# Patient Record
Sex: MALE | Race: WHITE | NOT HISPANIC OR LATINO | Employment: OTHER | URBAN - METROPOLITAN AREA
[De-identification: names, ages, dates, MRNs, and addresses within clinical notes are randomized per-mention and may not be internally consistent; named-entity substitution may affect disease eponyms.]

---

## 2017-01-26 ENCOUNTER — LAB VISIT (OUTPATIENT)
Dept: LAB | Facility: HOSPITAL | Age: 77
End: 2017-01-26
Attending: FAMILY MEDICINE
Payer: MEDICARE

## 2017-01-26 DIAGNOSIS — D64.9 ANEMIA: ICD-10-CM

## 2017-01-26 LAB
BASOPHILS # BLD AUTO: 0.01 K/UL
BASOPHILS NFR BLD: 0.2 %
DIFFERENTIAL METHOD: ABNORMAL
EOSINOPHIL # BLD AUTO: 0.2 K/UL
EOSINOPHIL NFR BLD: 2.6 %
ERYTHROCYTE [DISTWIDTH] IN BLOOD BY AUTOMATED COUNT: 14.6 %
HCT VFR BLD AUTO: 41.2 %
HGB BLD-MCNC: 13.7 G/DL
IRON SERPL-MCNC: 106 UG/DL
LYMPHOCYTES # BLD AUTO: 1.7 K/UL
LYMPHOCYTES NFR BLD: 28.8 %
MCH RBC QN AUTO: 29.6 PG
MCHC RBC AUTO-ENTMCNC: 33.3 %
MCV RBC AUTO: 89 FL
MONOCYTES # BLD AUTO: 0.6 K/UL
MONOCYTES NFR BLD: 9.7 %
NEUTROPHILS # BLD AUTO: 3.4 K/UL
NEUTROPHILS NFR BLD: 58.5 %
PLATELET # BLD AUTO: 174 K/UL
PMV BLD AUTO: 11.1 FL
RBC # BLD AUTO: 4.63 M/UL
SATURATED IRON: 30 %
TOTAL IRON BINDING CAPACITY: 351 UG/DL
TRANSFERRIN SERPL-MCNC: 237 MG/DL
WBC # BLD AUTO: 5.77 K/UL

## 2017-01-26 PROCEDURE — 85025 COMPLETE CBC W/AUTO DIFF WBC: CPT

## 2017-01-26 PROCEDURE — 36415 COLL VENOUS BLD VENIPUNCTURE: CPT | Mod: PO

## 2017-01-26 PROCEDURE — 83540 ASSAY OF IRON: CPT

## 2017-02-01 ENCOUNTER — TELEPHONE (OUTPATIENT)
Dept: FAMILY MEDICINE | Facility: CLINIC | Age: 77
End: 2017-02-01

## 2017-02-01 DIAGNOSIS — D64.9 ANEMIA, UNSPECIFIED TYPE: Primary | ICD-10-CM

## 2017-02-13 ENCOUNTER — PATIENT OUTREACH (OUTPATIENT)
Dept: ADMINISTRATIVE | Facility: HOSPITAL | Age: 77
End: 2017-02-13

## 2017-02-13 DIAGNOSIS — E11.69 COMBINED HYPERLIPIDEMIA ASSOCIATED WITH TYPE 2 DIABETES MELLITUS: Primary | ICD-10-CM

## 2017-02-13 DIAGNOSIS — E78.2 COMBINED HYPERLIPIDEMIA ASSOCIATED WITH TYPE 2 DIABETES MELLITUS: Primary | ICD-10-CM

## 2017-02-16 ENCOUNTER — TELEPHONE (OUTPATIENT)
Dept: PHARMACY | Facility: CLINIC | Age: 77
End: 2017-02-16

## 2017-02-22 ENCOUNTER — OFFICE VISIT (OUTPATIENT)
Dept: FAMILY MEDICINE | Facility: CLINIC | Age: 77
End: 2017-02-22
Payer: MEDICARE

## 2017-02-22 VITALS
TEMPERATURE: 98 F | HEART RATE: 50 BPM | WEIGHT: 229.25 LBS | BODY MASS INDEX: 31.05 KG/M2 | HEIGHT: 72 IN | SYSTOLIC BLOOD PRESSURE: 138 MMHG | DIASTOLIC BLOOD PRESSURE: 78 MMHG

## 2017-02-22 DIAGNOSIS — N18.30 CONTROLLED TYPE 2 DIABETES MELLITUS WITH STAGE 3 CHRONIC KIDNEY DISEASE, WITHOUT LONG-TERM CURRENT USE OF INSULIN: Primary | ICD-10-CM

## 2017-02-22 DIAGNOSIS — E11.59 HYPERTENSION ASSOCIATED WITH DIABETES: ICD-10-CM

## 2017-02-22 DIAGNOSIS — N18.30 CKD STAGE 3 SECONDARY TO DIABETES: ICD-10-CM

## 2017-02-22 DIAGNOSIS — E11.22 CONTROLLED TYPE 2 DIABETES MELLITUS WITH STAGE 3 CHRONIC KIDNEY DISEASE, WITHOUT LONG-TERM CURRENT USE OF INSULIN: Primary | ICD-10-CM

## 2017-02-22 DIAGNOSIS — E11.69 COMBINED HYPERLIPIDEMIA ASSOCIATED WITH TYPE 2 DIABETES MELLITUS: ICD-10-CM

## 2017-02-22 DIAGNOSIS — L84 PRE-ULCERATIVE CORN OR CALLOUS: ICD-10-CM

## 2017-02-22 DIAGNOSIS — E78.2 COMBINED HYPERLIPIDEMIA ASSOCIATED WITH TYPE 2 DIABETES MELLITUS: ICD-10-CM

## 2017-02-22 DIAGNOSIS — E11.22 CKD STAGE 3 SECONDARY TO DIABETES: ICD-10-CM

## 2017-02-22 DIAGNOSIS — I15.2 HYPERTENSION ASSOCIATED WITH DIABETES: ICD-10-CM

## 2017-02-22 PROCEDURE — 1157F ADVNC CARE PLAN IN RCRD: CPT | Mod: S$GLB,,, | Performed by: FAMILY MEDICINE

## 2017-02-22 PROCEDURE — 99213 OFFICE O/P EST LOW 20 MIN: CPT | Mod: S$GLB,,, | Performed by: FAMILY MEDICINE

## 2017-02-22 PROCEDURE — 3078F DIAST BP <80 MM HG: CPT | Mod: S$GLB,,, | Performed by: FAMILY MEDICINE

## 2017-02-22 PROCEDURE — 99999 PR PBB SHADOW E&M-EST. PATIENT-LVL III: CPT | Mod: PBBFAC,,, | Performed by: FAMILY MEDICINE

## 2017-02-22 PROCEDURE — 1126F AMNT PAIN NOTED NONE PRSNT: CPT | Mod: S$GLB,,, | Performed by: FAMILY MEDICINE

## 2017-02-22 PROCEDURE — 3075F SYST BP GE 130 - 139MM HG: CPT | Mod: S$GLB,,, | Performed by: FAMILY MEDICINE

## 2017-02-22 PROCEDURE — 99499 UNLISTED E&M SERVICE: CPT | Mod: S$PBB,,, | Performed by: FAMILY MEDICINE

## 2017-02-22 PROCEDURE — 1160F RVW MEDS BY RX/DR IN RCRD: CPT | Mod: S$GLB,,, | Performed by: FAMILY MEDICINE

## 2017-02-22 PROCEDURE — 1159F MED LIST DOCD IN RCRD: CPT | Mod: S$GLB,,, | Performed by: FAMILY MEDICINE

## 2017-02-22 NOTE — MR AVS SNAPSHOT
[facility]  [address] [place] [address]  Phone: [phone]  Fax: [phone]                  Rudy Saldaña   2017 1:40 PM   Office Visit    Description:  Male : 1940   Provider:  Mohinder Morfin MD   Department:  [facility]           Reason for Visit     Follow-up           Diagnoses this Visit        Comments    Controlled type 2 diabetes mellitus with stage 3 chronic kidney disease, without long-term current use of insulin    -  Primary     CKD stage 3 secondary to diabetes         Combined hyperlipidemia associated with type 2 diabetes mellitus         Hypertension associated with diabetes         Pre-ulcerative corn or callous                To Do List           Future Appointments        Provider Department Dept Phone    3/2/2017 7:25 AM SPECIMEN, [place] [facility] [phone]    3/2/2017 8:25 AM LABORATORY, [place] [facility] [phone]    3/2/2017 8:30 AM SPECIMEN, [place] [facility] [phone]    3/7/2017 2:40 PM Padmaja Dave DPM [facility] [phone]    2017 8:55 AM LABORATORY, [place] [facility] [phone]      Goals (5 Years of Data)     None      [facility]     [facility] Care [facility] -  Assistance  Registered nurses in the [facility] provide clinical advisement, health education, appointment booking, and other advisory services.  Call for this free service at [phone].             Medications           Message regarding Medications     Verify the changes and/or additions to your medication regime listed below are the same as discussed with your clinician today.  If any of these changes or additions are incorrect, please notify your healthcare provider.             Verify that the below list of medications is an accurate representation of the medications you are currently taking.  If  none reported, the list may be blank. If incorrect, please contact your healthcare provider. Carry this list with you in case of emergency.           Current Medications     acarbose (PRECOSE) 100 MG Tab Take 1 tablet (100 mg total) by mouth 3 (three) times daily with meals.    aspirin (ECOTRIN) 81 MG EC tablet Take 81 mg by mouth once daily.    atorvastatin (LIPITOR) 20 MG tablet take 1 tablet by mouth once daily    benazepril-hydrochlorthiazide (LOTENSIN HCT) 20-25 mg Tab Take 1 tablet by mouth 2 (two) times daily.    blood sugar diagnostic Strp Use once daily as directed    calcium-vitamin D3 500 mg(1,250mg) -200 unit per tablet Take 1 tablet by mouth 2 (two) times daily with meals.    clopidogrel (PLAVIX) 75 mg tablet Take 75 mg by mouth once daily.    ergocalciferol (VITAMIN D2) 50,000 unit Cap take 1 tablet by mouth every 7 days    ferrous sulfate 325 mg (65 mg iron) Tab tablet Take 1 tablet (325 mg total) by mouth 3 (three) times daily.    galantamine (RAZADYNE) 8 MG Tab take 1 tablet by mouth twice a day with meals    glipiZIDE (GLUCOTROL) 10 MG tablet Take 1 tablet (10 mg total) by mouth 2 (two) times daily before meals.    lancets Misc One touch lancets use once daily as directed    levothyroxine (SYNTHROID) 25 MCG tablet Take 1 tablet (25 mcg total) by mouth before breakfast.    loratadine (CLARITIN) 10 mg tablet     niacin (NIASPAN) 1000 MG CR tablet Take 1 tablet (1,000 mg total) by mouth every evening.    ondansetron (ZOFRAN-ODT) 4 MG TbDL dissolve 1 tablet ON TONGUE every 6 hours if needed for nausea    pioglitazone (ACTOS) 30 MG tablet Take 1 tablet (30 mg total) by mouth once daily.    sitagliptin (JANUVIA) 100 MG Tab Take 1 tablet (100 mg total) by mouth once daily.    ONETOUCH VERIO IQ METER kit Use as instructed           Clinical Reference Information           Your Vitals Were     BP Pulse Temp Height Weight BMI    138/78 (BP Location: Left arm, Patient Position: Sitting, BP Method: Automatic)  50 97.7 °F (36.5 °C) (Oral) 6' (1.829 m) 104 kg (229 lb 4.5 oz) 31.1 kg/m2      Blood Pressure          Most Recent Value    BP  138/78      Allergies as of 2/22/2017     No Known Drug Allergies      Immunizations Administered on Date of Encounter - 2/22/2017     None      Orders Placed During Today's Visit      Normal Orders This Visit    Ambulatory referral to Podiatry     Future Labs/Procedures Expected by Expires    Basic metabolic panel  3/2/2017 4/23/2018    Influenza - High Dose (65+) (PF) (IM)  3/24/2017 (Approximate) 2/22/2018      Language Assistance Services     ATTENTION: Language assistance services are available, free of charge. Please call [phone].      ATENCIÓN: Si lindsayla monique, tiene a rodríguez disposición servicios gratuitos de asistencia lingüística. Llame al [phone].     CHÚ Ý: N?u b?n nói Ti?ng Vi?t, có các d?ch v? h? tr? ngôn ng? mi?n phí dành cho b?n. G?i s? [phone].         [facility] complies with applicable Federal civil rights laws and does not discriminate on the basis of race, color, national origin, age, disability, or sex.

## 2017-02-22 NOTE — PROGRESS NOTES
Subjective:      Patient ID: Rudy Saldaña is a 76 y.o. male.    Chief Complaint: Follow-up (diabetes)    HPI  The patient presents with diabetes.  The patient denies polyuria, polydipsia, polyphagia, hypoglycemia and paresthesias.  The patient's glucose control has been good.  Home glucose averages are routinely checked.  The patient is without retinopathy currently.  The patient has no history of neuropathy.  The patient currently complains of no podiatric problems.  The patient has excellent compliance.  Hemoglobin A1C   Date Value Ref Range Status   09/01/2016 6.5 (H) 4.5 - 6.2 % Final     Comment:     According to ADA guidelines, hemoglobin A1C <7.0% represents  optimal control in non-pregnant diabetic patients.  Different  metrics may apply to specific populations.   Standards of Medical Care in Diabetes - 2016.  For the purpose of screening for the presence of diabetes:  <5.7%     Consistent with the absence of diabetes  5.7-6.4%  Consistent with increasing risk for diabetes   (prediabetes)  >or=6.5%  Consistent with diabetes  Currently no consensus exists for use of hemoglobin A1C  for diagnosis of diabetes for children.     02/29/2016 6.1 4.5 - 6.2 % Final   11/27/2015 7.6 (H) 4.5 - 6.2 % Final     No results found for: JENNIFER, [id number]    The patient presents with essential hypertension.  The patient is tolerating the medication well and is in excellent compliance.  The patient is experiencing no side effects.  Counseling was offered regarding low salt diets.  The patient has a reduced salt intake.  The patient denies chest pain, palpitations, shortness of breath, dyspnea on exertion, left or murmur neck pain, nausea, vomiting, diaphoresis, paroxysmal nocturnal dyspnea, and orthopnea.     Visit Vitals    /78 (BP Location: Left arm, Patient Position: Sitting, BP Method: Automatic)    Pulse (!) 50    Temp 97.7 °F (36.5 °C) (Oral)    Ht 6' (1.829 m)    Wt 104 kg (229 lb 4.5 oz)    BMI 31.1  kg/m2     The patient presents with hyperlipidemia.  The patient reports tolerating the medication well and is in excellent compliance.  There have been no medication side effects.  The patient denies chest pain, neuropathy, and myalgias.  The patient has reduced fat intake and has been exercising.  Current treatment has included the medications listed in the med card.    Lab Results   Component Value Date    CHOL 124 12/12/2016    CHOL 114 (L) 06/15/2016    CHOL 114 (L) 12/14/2015       Lab Results   Component Value Date    HDL 49 12/12/2016    HDL 51 06/15/2016    HDL 39 (L) 12/14/2015       Lab Results   Component Value Date    LDLCALC 65.4 12/12/2016    LDLCALC 53.6 (L) 06/15/2016    LDLCALC 60.4 (L) 12/14/2015       Lab Results   Component Value Date    TRIG 48 12/12/2016    TRIG 47 06/15/2016    TRIG 73 12/14/2015       Lab Results   Component Value Date    CHOLHDL 39.5 12/12/2016    CHOLHDL 44.7 06/15/2016    CHOLHDL 34.2 12/14/2015     Lab Results   Component Value Date    ALT 19 12/12/2016    AST 21 12/12/2016    ALKPHOS 46 (L) 12/12/2016    BILITOT 1.3 (H) 12/12/2016       Health Maintenance Due   Topic Date Due    Influenza Vaccine  08/01/2016    Foot Exam  11/03/2016    Hemoglobin A1c  03/01/2017       Past Medical History:  Past Medical History   Diagnosis Date    Anemia     BPH (benign prostatic hypertrophy)     Carotid bruit     CVA (cerebral vascular accident)     Hyperlipidemia     Hypertension     Hypothyroidism 2/29/2016    Type II or unspecified type diabetes mellitus without mention of complication, not stated as uncontrolled     Vitamin D deficiency 2/29/2016     Past Surgical History   Procedure Laterality Date    Colonoscopy  2008    Transurethral resection of prostate      Pilonidal cyst drainage      Colonoscopy  5/31/2005     repeat in 2015     Review of patient's allergies indicates:   Allergen Reactions    No known drug allergies      Current Outpatient Prescriptions on  File Prior to Visit   Medication Sig Dispense Refill    acarbose (PRECOSE) 100 MG Tab Take 1 tablet (100 mg total) by mouth 3 (three) times daily with meals. 270 tablet 3    aspirin (ECOTRIN) 81 MG EC tablet Take 81 mg by mouth once daily.      atorvastatin (LIPITOR) 20 MG tablet take 1 tablet by mouth once daily 90 tablet 3    benazepril-hydrochlorthiazide (LOTENSIN HCT) 20-25 mg Tab Take 1 tablet by mouth 2 (two) times daily. 180 tablet 3    blood sugar diagnostic Strp Use once daily as directed 100 each 0    calcium-vitamin D3 500 mg(1,250mg) -200 unit per tablet Take 1 tablet by mouth 2 (two) times daily with meals.      clopidogrel (PLAVIX) 75 mg tablet Take 75 mg by mouth once daily.      ergocalciferol (VITAMIN D2) 50,000 unit Cap take 1 tablet by mouth every 7 days 12 capsule 3    ferrous sulfate 325 mg (65 mg iron) Tab tablet Take 1 tablet (325 mg total) by mouth 3 (three) times daily. 270 tablet 3    galantamine (RAZADYNE) 8 MG Tab take 1 tablet by mouth twice a day with meals 180 tablet 3    glipiZIDE (GLUCOTROL) 10 MG tablet Take 1 tablet (10 mg total) by mouth 2 (two) times daily before meals. 180 tablet 3    lancets Misc One touch lancets use once daily as directed 100 each 0    levothyroxine (SYNTHROID) 25 MCG tablet Take 1 tablet (25 mcg total) by mouth before breakfast. 90 tablet 3    loratadine (CLARITIN) 10 mg tablet   0    niacin (NIASPAN) 1000 MG CR tablet Take 1 tablet (1,000 mg total) by mouth every evening. 90 tablet 3    ondansetron (ZOFRAN-ODT) 4 MG TbDL dissolve 1 tablet ON TONGUE every 6 hours if needed for nausea  0    pioglitazone (ACTOS) 30 MG tablet Take 1 tablet (30 mg total) by mouth once daily. 90 tablet 3    sitagliptin (JANUVIA) 100 MG Tab Take 1 tablet (100 mg total) by mouth once daily. 90 tablet 3    ONETOUCH VERIO IQ METER kit Use as instructed 1 each 0     No current facility-administered medications on file prior to visit.      Social History     Social  History    Marital status:      Spouse name: N/A    Number of children: N/A    Years of education: N/A     Occupational History    Not on file.     Social History Main Topics    Smoking status: Never Smoker    Smokeless tobacco: Not on file    Alcohol use No    Drug use: No    Sexual activity: Not on file     Other Topics Concern    Not on file     Social History Narrative    No narrative on file     Family History   Problem Relation Age of Onset    Hypertension Mother     Hypertension Father     Heart attack Maternal Uncle     Brain cancer Sister     Diabetes Neg Hx     Stroke Neg Hx            Review of Systems   Constitutional: Negative for chills and fever.   Respiratory: Negative for cough, shortness of breath and wheezing.    Cardiovascular: Negative for chest pain and palpitations.   Genitourinary: Negative for dysuria and hematuria.       Objective:     Visit Vitals    /78 (BP Location: Left arm, Patient Position: Sitting, BP Method: Automatic)    Pulse (!) 50    Temp 97.7 °F (36.5 °C) (Oral)    Ht 6' (1.829 m)    Wt 104 kg (229 lb 4.5 oz)    BMI 31.1 kg/m2       Physical Exam   Constitutional: He is oriented to person, place, and time. He appears well-developed and well-nourished.   HENT:   Head: Normocephalic and atraumatic.   Right Ear: External ear normal.   Left Ear: External ear normal.   Nose: Nose normal.   Mouth/Throat: Oropharynx is clear and moist. No oropharyngeal exudate.   Eyes: Conjunctivae and EOM are normal. Pupils are equal, round, and reactive to light. Right eye exhibits no discharge. Left eye exhibits no discharge. No scleral icterus.   Neck: Normal range of motion. Neck supple. No JVD present. No thyromegaly present.   Cardiovascular: Normal rate, regular rhythm, normal heart sounds and intact distal pulses.  Exam reveals no gallop and no friction rub.    No murmur heard.  Pulses:       Dorsalis pedis pulses are 2+ on the right side, and 2+ on the  left side.        Posterior tibial pulses are 2+ on the right side, and 2+ on the left side.   Pulmonary/Chest: Effort normal and breath sounds normal. No respiratory distress. He has no wheezes. He has no rales. He exhibits no tenderness.   Abdominal: Soft. Bowel sounds are normal. He exhibits no distension and no mass. There is no tenderness. There is no rebound and no guarding.   Musculoskeletal: Normal range of motion. He exhibits no edema or tenderness.        Right foot: There is deformity (callouses are noted on the great toe). There is normal range of motion.        Left foot: There is deformity (callouses are noted on the great toe). There is normal range of motion.   Feet:   Right Foot:   Protective Sensation: 10 sites tested. 10 sites sensed.   Skin Integrity: Negative for ulcer, blister, skin breakdown, erythema, warmth, callus or dry skin.   Left Foot:   Protective Sensation: 10 sites tested. 10 sites sensed.   Skin Integrity: Negative for ulcer, blister, skin breakdown, erythema, warmth, callus or dry skin.   Lymphadenopathy:     He has no cervical adenopathy.   Neurological: He is alert and oriented to person, place, and time. No cranial nerve deficit. Coordination normal.   Skin: Skin is warm and dry. He is not diaphoretic.   Psychiatric: He has a normal mood and affect.       Assessment:     1. Controlled type 2 diabetes mellitus with stage 3 chronic kidney disease, without long-term current use of insulin    2. CKD stage 3 secondary to diabetes    3. Combined hyperlipidemia associated with type 2 diabetes mellitus    4. Hypertension associated with diabetes    5. Pre-ulcerative corn or callous        Plan:   Rudy was seen today for follow-up.    Diagnoses and all orders for this visit:    Controlled type 2 diabetes mellitus with stage 3 chronic kidney disease, without long-term current use of insulin    CKD stage 3 secondary to diabetes    Combined hyperlipidemia associated with type 2 diabetes  mellitus    Hypertension associated with diabetes    Pre-ulcerative corn or callous  -     Ambulatory referral to Podiatry    Other orders  -     Influenza - High Dose (65+) (PF) (IM); Future

## 2017-03-02 ENCOUNTER — LAB VISIT (OUTPATIENT)
Dept: LAB | Facility: HOSPITAL | Age: 77
End: 2017-03-02
Attending: FAMILY MEDICINE
Payer: MEDICARE

## 2017-03-02 DIAGNOSIS — E11.9 TYPE 2 DIABETES MELLITUS WITHOUT COMPLICATION, WITHOUT LONG-TERM CURRENT USE OF INSULIN: ICD-10-CM

## 2017-03-02 PROCEDURE — 36415 COLL VENOUS BLD VENIPUNCTURE: CPT | Mod: PO

## 2017-03-02 PROCEDURE — 83036 HEMOGLOBIN GLYCOSYLATED A1C: CPT

## 2017-03-03 DIAGNOSIS — E11.9 TYPE II OR UNSPECIFIED TYPE DIABETES MELLITUS WITHOUT MENTION OF COMPLICATION, NOT STATED AS UNCONTROLLED: Primary | ICD-10-CM

## 2017-03-03 LAB
ESTIMATED AVG GLUCOSE: 140 MG/DL
HBA1C MFR BLD HPLC: 6.5 %

## 2017-03-03 RX ORDER — ACARBOSE 100 MG/1
100 TABLET ORAL
Qty: 270 TABLET | Refills: 3 | Status: SHIPPED | OUTPATIENT
Start: 2017-03-03 | End: 2017-09-07 | Stop reason: SDUPTHER

## 2017-03-03 RX ORDER — GLIPIZIDE 10 MG/1
10 TABLET ORAL
Qty: 180 TABLET | Refills: 3 | Status: SHIPPED | OUTPATIENT
Start: 2017-03-03 | End: 2017-09-07 | Stop reason: SDUPTHER

## 2017-03-03 NOTE — TELEPHONE ENCOUNTER
I have signed for the following orders AND/OR meds.  Please call the patient and ask the patient to schedule the testing AND/OR inform about any medications that were sent.      Orders Placed This Encounter   Procedures    Hemoglobin A1c     Standing Status:   Future     Standing Expiration Date:   5/2/2018         Medications Ordered This Encounter      acarbose (PRECOSE) 100 MG Tab          Sig: Take 1 tablet (100 mg total) by mouth 3 (three) times daily with meals.          Dispense:  270 tablet          Refill:  3      glipiZIDE (GLUCOTROL) 10 MG tablet          Sig: Take 1 tablet (10 mg total) by mouth 2 (two) times daily before meals.          Dispense:  180 tablet          Refill:  3      SITagliptan (JANUVIA) 100 MG Tab          Sig: Take 1 tablet (100 mg total) by mouth once daily.          Dispense:  90 tablet          Refill:  3

## 2017-03-07 ENCOUNTER — PATIENT MESSAGE (OUTPATIENT)
Dept: PODIATRY | Facility: CLINIC | Age: 77
End: 2017-03-07

## 2017-03-07 ENCOUNTER — OFFICE VISIT (OUTPATIENT)
Dept: PODIATRY | Facility: CLINIC | Age: 77
End: 2017-03-07
Payer: MEDICARE

## 2017-03-07 ENCOUNTER — HOSPITAL ENCOUNTER (OUTPATIENT)
Dept: RADIOLOGY | Facility: HOSPITAL | Age: 77
Discharge: HOME | End: 2017-03-07
Attending: PODIATRIST
Payer: MEDICARE

## 2017-03-07 ENCOUNTER — PATIENT MESSAGE (OUTPATIENT)
Dept: PULMONOLOGY | Facility: CLINIC | Age: 77
End: 2017-03-07

## 2017-03-07 VITALS
BODY MASS INDEX: 30.97 KG/M2 | HEART RATE: 53 BPM | DIASTOLIC BLOOD PRESSURE: 67 MMHG | HEIGHT: 72 IN | WEIGHT: 228.63 LBS | SYSTOLIC BLOOD PRESSURE: 121 MMHG

## 2017-03-07 DIAGNOSIS — M20.41 HAMMER TOES OF BOTH FEET: ICD-10-CM

## 2017-03-07 DIAGNOSIS — M79.672 PAIN IN BOTH FEET: ICD-10-CM

## 2017-03-07 DIAGNOSIS — M20.42 HAMMER TOES OF BOTH FEET: ICD-10-CM

## 2017-03-07 DIAGNOSIS — M79.671 PAIN IN BOTH FEET: ICD-10-CM

## 2017-03-07 DIAGNOSIS — E11.49 TYPE II DIABETES MELLITUS WITH NEUROLOGICAL MANIFESTATIONS: Primary | ICD-10-CM

## 2017-03-07 DIAGNOSIS — M19.079 DJD (DEGENERATIVE JOINT DISEASE), ANKLE AND FOOT, UNSPECIFIED LATERALITY: ICD-10-CM

## 2017-03-07 PROCEDURE — 73630 X-RAY EXAM OF FOOT: CPT | Mod: 50,TC,PO

## 2017-03-07 PROCEDURE — 1159F MED LIST DOCD IN RCRD: CPT | Mod: S$GLB,,, | Performed by: PODIATRIST

## 2017-03-07 PROCEDURE — 99204 OFFICE O/P NEW MOD 45 MIN: CPT | Mod: S$GLB,,, | Performed by: PODIATRIST

## 2017-03-07 PROCEDURE — 73630 X-RAY EXAM OF FOOT: CPT | Mod: 26,LT,, | Performed by: RADIOLOGY

## 2017-03-07 PROCEDURE — 1157F ADVNC CARE PLAN IN RCRD: CPT | Mod: S$GLB,,, | Performed by: PODIATRIST

## 2017-03-07 PROCEDURE — 99999 PR PBB SHADOW E&M-EST. PATIENT-LVL III: CPT | Mod: PBBFAC,,, | Performed by: PODIATRIST

## 2017-03-07 PROCEDURE — 1160F RVW MEDS BY RX/DR IN RCRD: CPT | Mod: S$GLB,,, | Performed by: PODIATRIST

## 2017-03-07 PROCEDURE — 1126F AMNT PAIN NOTED NONE PRSNT: CPT | Mod: S$GLB,,, | Performed by: PODIATRIST

## 2017-03-07 PROCEDURE — 73630 X-RAY EXAM OF FOOT: CPT | Mod: 26,RT,, | Performed by: RADIOLOGY

## 2017-03-07 PROCEDURE — 3078F DIAST BP <80 MM HG: CPT | Mod: S$GLB,,, | Performed by: PODIATRIST

## 2017-03-07 PROCEDURE — 99499 UNLISTED E&M SERVICE: CPT | Mod: S$PBB,,, | Performed by: PODIATRIST

## 2017-03-07 PROCEDURE — 3074F SYST BP LT 130 MM HG: CPT | Mod: S$GLB,,, | Performed by: PODIATRIST

## 2017-03-07 RX ORDER — CALCIUM CARBONATE 600 MG
600 TABLET ORAL
COMMUNITY
End: 2018-11-16

## 2017-03-07 RX ORDER — NISOLDIPINE 30 MG/1
30 TABLET, FILM COATED, EXTENDED RELEASE ORAL
COMMUNITY
End: 2018-07-06

## 2017-03-07 RX ORDER — EZETIMIBE 10 MG/1
10 TABLET ORAL
COMMUNITY
End: 2017-03-13

## 2017-03-07 RX ORDER — FENOFIBRATE 48 MG/1
48 TABLET, FILM COATED ORAL
COMMUNITY
End: 2018-07-06

## 2017-03-07 RX ORDER — METFORMIN HYDROCHLORIDE 1000 MG/1
1000 TABLET ORAL
COMMUNITY
End: 2017-03-13

## 2017-03-07 RX ORDER — TAMSULOSIN HYDROCHLORIDE 0.4 MG/1
0.4 CAPSULE ORAL
COMMUNITY
End: 2017-09-25

## 2017-03-07 RX ORDER — NATEGLINIDE 120 MG/1
120 TABLET ORAL
COMMUNITY
End: 2017-09-07 | Stop reason: SDUPTHER

## 2017-03-07 NOTE — PROGRESS NOTES
Subjective:     Patient ID: Rudy Saldaña is a 76 y.o. male.    Chief Complaint: Diabetic Foot Exam (Patient states his feet are stiff and they are sometimes painful to touch.) and Diabetes Mellitus (Last PCP visit with - 2/22/17. 140 mg/dL-AM glucose. )    Rudy is a 76 y.o. male who presents to the clinic upon referral from Dr. Morfin  for evaluation and treatment of diabetic feet. Rudy has a past medical history of Anemia; BPH (benign prostatic hypertrophy); Carotid bruit; CVA (cerebral vascular accident); Hyperlipidemia; Hypertension; Hypothyroidism (2/29/2016); Type II or unspecified type diabetes mellitus without mention of complication, not stated as uncontrolled; and Vitamin D deficiency (2/29/2016). Patient relates no major problem with feet. Only complaints today consist of tightness of feet when he first step out of bed and area on the left foot painful to touch. Patient points to lateral left midfoot. Patient states the pain comes and goes and at the worst is 9/10. Patient denies any trauma to foot.     PCP: Mohinder Morfin MD    Date Last Seen by PCP: 2/29/16    Current shoe gear: Casual shoes    Hemoglobin A1C   Date Value Ref Range Status   03/02/2017 6.5 (H) 4.5 - 6.2 % Final     Comment:     According to ADA guidelines, hemoglobin A1C <7.0% represents  optimal control in non-pregnant diabetic patients.  Different  metrics may apply to specific populations.   Standards of Medical Care in Diabetes - 2016.  For the purpose of screening for the presence of diabetes:  <5.7%     Consistent with the absence of diabetes  5.7-6.4%  Consistent with increasing risk for diabetes   (prediabetes)  >or=6.5%  Consistent with diabetes  Currently no consensus exists for use of hemoglobin A1C  for diagnosis of diabetes for children.     09/01/2016 6.5 (H) 4.5 - 6.2 % Final     Comment:     According to ADA guidelines, hemoglobin A1C <7.0% represents  optimal control in non-pregnant diabetic  patients.  Different  metrics may apply to specific populations.   Standards of Medical Care in Diabetes - 2016.  For the purpose of screening for the presence of diabetes:  <5.7%     Consistent with the absence of diabetes  5.7-6.4%  Consistent with increasing risk for diabetes   (prediabetes)  >or=6.5%  Consistent with diabetes  Currently no consensus exists for use of hemoglobin A1C  for diagnosis of diabetes for children.     02/29/2016 6.1 4.5 - 6.2 % Final       Patient Active Problem List   Diagnosis    Combined hyperlipidemia associated with type 2 diabetes mellitus    Hypertension associated with diabetes    DM (diabetes mellitus) type II controlled with renal manifestation    History of stroke    BPH (benign prostatic hypertrophy)    History of colon polyps    Iron deficiency anemia    Polyneuropathy due to secondary diabetes    DEVIKA on CPAP    Family history of colonic polyps    Vitamin D deficiency    Hypothyroidism    Anemia    Gross hematuria    History of diabetes mellitus    Obstruction of urinary tract    CKD stage 3 secondary to diabetes       Medication List with Changes/Refills   Current Medications    ACARBOSE (PRECOSE) 100 MG TAB    Take 1 tablet (100 mg total) by mouth 3 (three) times daily with meals.    ASPIRIN (ECOTRIN) 81 MG EC TABLET    Take 81 mg by mouth once daily.    ATORVASTATIN (LIPITOR) 20 MG TABLET    take 1 tablet by mouth once daily    BENAZEPRIL-HYDROCHLORTHIAZIDE (LOTENSIN HCT) 20-25 MG TAB    Take 1 tablet by mouth 2 (two) times daily.    BLOOD SUGAR DIAGNOSTIC STRP    Use once daily as directed    CALCIUM CARBONATE (OS-STACEY) 600 MG (1,500 MG) TAB    Take 600 mg by mouth.    CALCIUM-VITAMIN D3 500 MG(1,250MG) -200 UNIT PER TABLET    Take 1 tablet by mouth 2 (two) times daily with meals.    CLOPIDOGREL (PLAVIX) 75 MG TABLET    Take 75 mg by mouth once daily.    ERGOCALCIFEROL (VITAMIN D2) 50,000 UNIT CAP    take 1 tablet by mouth every 7 days    EZETIMIBE  (ZETIA) 10 MG TABLET    Take 10 mg by mouth.    FENOFIBRATE (TRICOR) 48 MG TABLET    Take 48 mg by mouth.    FERROUS SULFATE 325 MG (65 MG IRON) TAB TABLET    Take 1 tablet (325 mg total) by mouth 3 (three) times daily.    GALANTAMINE (RAZADYNE) 8 MG TAB    take 1 tablet by mouth twice a day with meals    GLIPIZIDE (GLUCOTROL) 10 MG TABLET    Take 1 tablet (10 mg total) by mouth 2 (two) times daily before meals.    LANCETS MISC    One touch lancets use once daily as directed    LEVOTHYROXINE (SYNTHROID) 25 MCG TABLET    Take 1 tablet (25 mcg total) by mouth before breakfast.    LORATADINE (CLARITIN) 10 MG TABLET        METFORMIN (GLUCOPHAGE) 1000 MG TABLET    Take 1,000 mg by mouth.    NATEGLINIDE (STARLIX) 120 MG TABLET    Take 120 mg by mouth.    NIACIN (NIASPAN) 1000 MG CR TABLET    Take 1 tablet (1,000 mg total) by mouth every evening.    NISOLDIPINE (SULAR) 30 MG TB24    Take 30 mg by mouth.    ONDANSETRON (ZOFRAN-ODT) 4 MG TBDL    dissolve 1 tablet ON TONGUE every 6 hours if needed for nausea    ONETOUCH VERIO IQ METER KIT    Use as instructed    PIOGLITAZONE (ACTOS) 30 MG TABLET    Take 1 tablet (30 mg total) by mouth once daily.    SITAGLIPTAN (JANUVIA) 100 MG TAB    Take 1 tablet (100 mg total) by mouth once daily.    TAMSULOSIN (FLOMAX) 0.4 MG CP24    Take 0.4 mg by mouth.       Review of patient's allergies indicates:   Allergen Reactions    No known drug allergies        Past Surgical History:   Procedure Laterality Date    COLONOSCOPY  2008    COLONOSCOPY  5/31/2005    repeat in 2015    PILONIDAL CYST DRAINAGE      TRANSURETHRAL RESECTION OF PROSTATE         Family History   Problem Relation Age of Onset    Hypertension Mother     Hypertension Father     Heart attack Maternal Uncle     Brain cancer Sister     Diabetes Neg Hx     Stroke Neg Hx        Social History     Social History    Marital status:      Spouse name: N/A    Number of children: N/A    Years of education: N/A      Occupational History    Not on file.     Social History Main Topics    Smoking status: Never Smoker    Smokeless tobacco: Not on file    Alcohol use No    Drug use: No    Sexual activity: Not on file     Other Topics Concern    Not on file     Social History Narrative       Vitals:    03/07/17 1448   BP: 121/67   Pulse: (!) 53   Weight: 103.7 kg (228 lb 9.6 oz)   Height: 6' (1.829 m)   PainSc: 0-No pain       Hemoglobin A1C   Date Value Ref Range Status   03/02/2017 6.5 (H) 4.5 - 6.2 % Final     Comment:     According to ADA guidelines, hemoglobin A1C <7.0% represents  optimal control in non-pregnant diabetic patients.  Different  metrics may apply to specific populations.   Standards of Medical Care in Diabetes - 2016.  For the purpose of screening for the presence of diabetes:  <5.7%     Consistent with the absence of diabetes  5.7-6.4%  Consistent with increasing risk for diabetes   (prediabetes)  >or=6.5%  Consistent with diabetes  Currently no consensus exists for use of hemoglobin A1C  for diagnosis of diabetes for children.     09/01/2016 6.5 (H) 4.5 - 6.2 % Final     Comment:     According to ADA guidelines, hemoglobin A1C <7.0% represents  optimal control in non-pregnant diabetic patients.  Different  metrics may apply to specific populations.   Standards of Medical Care in Diabetes - 2016.  For the purpose of screening for the presence of diabetes:  <5.7%     Consistent with the absence of diabetes  5.7-6.4%  Consistent with increasing risk for diabetes   (prediabetes)  >or=6.5%  Consistent with diabetes  Currently no consensus exists for use of hemoglobin A1C  for diagnosis of diabetes for children.     02/29/2016 6.1 4.5 - 6.2 % Final        Review of Systems   Constitutional: Negative for chills and fever.   Respiratory: Negative for shortness of breath.    Cardiovascular: Negative for chest pain, palpitations, orthopnea, claudication and leg swelling.   Gastrointestinal: Negative for  diarrhea, nausea and vomiting.   Musculoskeletal: Negative for joint pain.   Skin: Negative for rash.   Neurological: Negative for dizziness, tingling, sensory change, focal weakness and weakness.   Psychiatric/Behavioral: Negative.          Objective:      PHYSICAL EXAM: Apperance: Alert and orient in no distress,well developed, and with good attention to grooming and body habits  Patient presents ambulating in lace up shoes.   LOWER EXTREMITY EXAM:  VASCULAR: Dorsalis pedis pulses 2/4 bilateral and Posterior Tibial pulses 2/4 bilateral. Capillary fill time <4 seconds bilateral. No edema observed bilateral. Varicosities present bilateral. Skin temperature of the lower extremities is warm to warm, proximal to distal. Hair growth WNL bilateral.  DERMATOLOGICAL: No skin rashes, subcutaneous nodules, lesions, or ulcers observed bilateral. Nails 1-5 right and 2-5 left normal length and thickness. Left hallux nail absent. Webspaces 1-4 clean, dry and without evidence of break in skin integrity bilateral.   NEUROLOGICAL: Light touch, sharp-dull, proprioception all present and equal bilaterally.  Vibratory sensation absent at bilateral hallux and diminished at bilateral navicular . Protective sensation intact at all sites as tested with a [employer] 5.07 monofilament.   MUSCULOSKELETAL: Muscle strength is 5/5 for foot inverters, everters, plantarflexors, and dorsiflexors. Muscle tone is normal. No pain on palpation left lateral midfoot. Minimal bony prominence noted to bilateral lateral midfoot at cuboid-metatarsal joint.       Assessment:       Encounter Diagnoses   Name Primary?    Type II diabetes mellitus with neurological manifestations Yes    Hammer toes of both feet     DJD (degenerative joint disease), ankle and foot, unspecified laterality     Pain in both feet          Plan:   Type II diabetes mellitus with neurological manifestations  -     DIABETIC SHOES FOR HOME USE    Hammer toes of both feet  -      DIABETIC SHOES FOR HOME USE    DJD (degenerative joint disease), ankle and foot, unspecified laterality  -     DIABETIC SHOES FOR HOME USE    Pain in both feet  -     X-Ray Foot Complete Bilateral; Future; Expected date: 3/7/17      I counseled the patient on his conditions, their implications and medical management.  1. The patient was counseled regarding findings of my examination, my impressions, and usual treatment plan.   2.Greater than 50% of this visit spent on counseling and coordination of care.  Greater than 20 minutes spent on education about the diabetic foot, neuropathy, and prevention of limb loss.  3. Shoe inspection. Diabetic Foot Education. Patient reminded of the importance of good nutrition and blood sugar control to help prevent podiatric complications of diabetes. Patient instructed on proper foot hygeine. We discussed wearing proper shoe gear, daily foot inspections, never walking without protective shoe gear, never putting sharp instruments to feet.    4. Prescription written for Diabetic shoes and inserts.  5. Ordered bilateral foot x-rays. Results to be reviewed through [facility].   6. Patient  will continue to monitor the areas daily, inspect feet, wear protective shoe gear when ambulatory, moisturizer to maintain skin integrity. Patient reminded of the importance of good nutrition and blood sugar control to help prevent podiatric complications of diabetes.  7. Patient to return in this office in approximately 6 months, or sooner if needed.             Padmaja Dave DPM  [facility]

## 2017-03-07 NOTE — LETTER
March 7, 2017      Mohinder Morfin MD  [address] [facility] [address]           [facility]  [address] [facility] [address]  Phone: [phone]  Fax: [phone]          Patient: Rudy Saldaña   MR Number: [id number]   YOB: 1940   Date of Visit: 3/7/2017       Dear Dr. Mohinder Morfin:    Thank you for referring Rudy Saldaña to me for evaluation. Attached you will find relevant portions of my assessment and plan of care.    If you have questions, please do not hesitate to call me. I look forward to following Rudy Saldaña along with you.    Sincerely,    Padmaja Dave, HEATHER    Enclosure  CC:  No Recipients    If you would like to receive this communication electronically, please contact [email or web address] or [phone] to request more information on [employer] access.    For providers and/or their staff who would like to refer a patient to [facility], please contact us through our one-stop-shop provider referral line, Jose Jose, at [phone].    If you feel you have received this communication in error or would no longer like to receive these types of communications, please e-mail [email or web address]

## 2017-03-07 NOTE — MR AVS SNAPSHOT
[facility]  [address] [address] [facility]  [place] [address]  Phone: [phone]  Fax: [phone]                  Rudy Saldaña   3/7/2017 2:40 PM   Office Visit    Description:  Male : 1940   Provider:  Padmaja Dave DPM   Department:  [facility]           Reason for Visit     Diabetic Foot Exam     Diabetes Mellitus           Diagnoses this Visit        Comments    Type II diabetes mellitus with neurological manifestations    -  Primary     Hammer toes of both feet         DJD (degenerative joint disease), ankle and foot, unspecified laterality         Pain in both feet                To Do List           Future Appointments        Provider Department Dept Phone    3/7/2017 3:30 PM [facility] XR1 [facility] [phone]    2017 8:55 AM LABORATORY, [place] [facility] [phone]    2017 8:45 AM LABORATORY, [place] [facility] [phone]    2017 8:20 AM LABORATORY, [place] [facility] [phone]    2017 9:00 AM Padmaja Dave DPM [facility] [phone]      Goals (5 Years of Data)     None      [facility]     [facility] -  Assistance  Registered nurses in the [facility] provide clinical advisement, health education, appointment booking, and other advisory services.  Call for this free service at [phone].             Medications           Message regarding Medications     Verify the changes and/or additions to your medication regime listed below are the same as discussed with your clinician today.  If any of these changes or additions are incorrect, please notify your healthcare provider.             Verify that the below list of medications is an accurate representation of the medications you are currently taking.  If none reported, the list may be blank. If incorrect, please contact your healthcare provider. Carry this list  with you in case of emergency.           Current Medications     acarbose (PRECOSE) 100 MG Tab Take 1 tablet (100 mg total) by mouth 3 (three) times daily with meals.    aspirin (ECOTRIN) 81 MG EC tablet Take 81 mg by mouth once daily.    atorvastatin (LIPITOR) 20 MG tablet take 1 tablet by mouth once daily    benazepril-hydrochlorthiazide (LOTENSIN HCT) 20-25 mg Tab Take 1 tablet by mouth 2 (two) times daily.    blood sugar diagnostic Strp Use once daily as directed    calcium carbonate (OS-STACEY) 600 mg (1,500 mg) Tab Take 600 mg by mouth.    calcium-vitamin D3 500 mg(1,250mg) -200 unit per tablet Take 1 tablet by mouth 2 (two) times daily with meals.    clopidogrel (PLAVIX) 75 mg tablet Take 75 mg by mouth once daily.    ergocalciferol (VITAMIN D2) 50,000 unit Cap take 1 tablet by mouth every 7 days    ezetimibe (ZETIA) 10 mg tablet Take 10 mg by mouth.    fenofibrate (TRICOR) 48 MG tablet Take 48 mg by mouth.    ferrous sulfate 325 mg (65 mg iron) Tab tablet Take 1 tablet (325 mg total) by mouth 3 (three) times daily.    galantamine (RAZADYNE) 8 MG Tab take 1 tablet by mouth twice a day with meals    glipiZIDE (GLUCOTROL) 10 MG tablet Take 1 tablet (10 mg total) by mouth 2 (two) times daily before meals.    lancets Misc One touch lancets use once daily as directed    levothyroxine (SYNTHROID) 25 MCG tablet Take 1 tablet (25 mcg total) by mouth before breakfast.    loratadine (CLARITIN) 10 mg tablet     metformin (GLUCOPHAGE) 1000 MG tablet Take 1,000 mg by mouth.    nateglinide (STARLIX) 120 MG tablet Take 120 mg by mouth.    niacin (NIASPAN) 1000 MG CR tablet Take 1 tablet (1,000 mg total) by mouth every evening.    nisoldipine (SULAR) 30 MG Tb24 Take 30 mg by mouth.    ondansetron (ZOFRAN-ODT) 4 MG TbDL dissolve 1 tablet ON TONGUE every 6 hours if needed for nausea    pioglitazone (ACTOS) 30 MG tablet Take 1 tablet (30 mg total) by mouth once daily.    SITagliptan (JANUVIA) 100 MG Tab Take 1 tablet (100 mg  total) by mouth once daily.    tamsulosin (FLOMAX) 0.4 mg Cp24 Take 0.4 mg by mouth.    ONETOUCH VERIO IQ METER kit Use as instructed           Clinical Reference Information           Your Vitals Were     BP Pulse Height Weight BMI    121/67 (BP Location: Left arm, Patient Position: Sitting, BP Method: Automatic) 53 6' (1.829 m) 103.7 kg (228 lb 9.6 oz) 31 kg/m2      Blood Pressure          Most Recent Value    BP  121/67      Allergies as of 3/7/2017     No Known Drug Allergies      Immunizations Administered on Date of Encounter - 3/7/2017     None      Orders Placed During Today's Visit      Normal Orders This Visit    DIABETIC SHOES FOR HOME USE     Future Labs/Procedures Expected by Expires    X-Ray Foot Complete Bilateral  3/7/2017 3/7/2018      Language Assistance Services     ATTENTION: Language assistance services are available, free of charge. Please call [phone].      ATENCIÓN: Si habla monique, tiene a rodríguez disposición servicios gratuitos de asistencia lingüística. Llame al [phone].     CHÚ Ý: N?u b?n nói Ti?ng Vi?t, có các d?ch v? h? tr? ngôn ng? mi?n phí dành cho b?n. G?i s? [phone].         [facility] complies with applicable Federal civil rights laws and does not discriminate on the basis of race, color, national origin, age, disability, or sex.

## 2017-03-13 ENCOUNTER — OFFICE VISIT (OUTPATIENT)
Dept: PULMONOLOGY | Facility: CLINIC | Age: 77
End: 2017-03-13
Payer: MEDICARE

## 2017-03-13 VITALS
OXYGEN SATURATION: 100 % | DIASTOLIC BLOOD PRESSURE: 60 MMHG | HEIGHT: 73 IN | RESPIRATION RATE: 18 BRPM | BODY MASS INDEX: 30.33 KG/M2 | HEART RATE: 44 BPM | SYSTOLIC BLOOD PRESSURE: 120 MMHG | WEIGHT: 228.81 LBS

## 2017-03-13 DIAGNOSIS — G47.33 OSA ON CPAP: Primary | Chronic | ICD-10-CM

## 2017-03-13 PROCEDURE — 99214 OFFICE O/P EST MOD 30 MIN: CPT | Mod: S$GLB,,, | Performed by: INTERNAL MEDICINE

## 2017-03-13 PROCEDURE — 1159F MED LIST DOCD IN RCRD: CPT | Mod: S$GLB,,, | Performed by: INTERNAL MEDICINE

## 2017-03-13 PROCEDURE — 1160F RVW MEDS BY RX/DR IN RCRD: CPT | Mod: S$GLB,,, | Performed by: INTERNAL MEDICINE

## 2017-03-13 PROCEDURE — 3078F DIAST BP <80 MM HG: CPT | Mod: S$GLB,,, | Performed by: INTERNAL MEDICINE

## 2017-03-13 PROCEDURE — 99999 PR PBB SHADOW E&M-EST. PATIENT-LVL III: CPT | Mod: PBBFAC,,, | Performed by: INTERNAL MEDICINE

## 2017-03-13 PROCEDURE — 3074F SYST BP LT 130 MM HG: CPT | Mod: S$GLB,,, | Performed by: INTERNAL MEDICINE

## 2017-03-13 PROCEDURE — 1157F ADVNC CARE PLAN IN RCRD: CPT | Mod: S$GLB,,, | Performed by: INTERNAL MEDICINE

## 2017-03-13 NOTE — PROGRESS NOTES
Subjective:      Patient ID: Rudy Saldaña is a 76 y.o. male.    Patient Active Problem List   Diagnosis    Combined hyperlipidemia associated with type 2 diabetes mellitus    Hypertension associated with diabetes    DM (diabetes mellitus) type II controlled with renal manifestation    History of stroke    BPH (benign prostatic hypertrophy)    History of colon polyps    Iron deficiency anemia    Polyneuropathy due to secondary diabetes    DEVIKA on CPAP    Family history of colonic polyps    Vitamin D deficiency    Hypothyroidism    Anemia    Gross hematuria    History of diabetes mellitus    Obstruction of urinary tract    CKD stage 3 secondary to diabetes     Problem list has been reviewed.    Chief Complaint: Sleep Apnea    HPI: He is  here for follow up for DEVIKA and CPAP complaince assessment.  Patient denies snoring, headaches, excessive daytime sleepiness. He definitely thinks PAP is beneficial to his health and he wants to continue with PAP therapy.       Compliance Summary  2/11/2017 - 3/12/2017 (30 days)  Days with Device Usage 29 days  Days without Device Usage 1 day  Percent Days with Device Usage 96.7%  Cumulative Usage 5 days 4 hrs. 59 mins. 50 secs.  Maximum Usage (1 Day) 6 hrs. 22 mins. 16 secs.  Average Usage (All Days) 4 hrs. 9 mins. 59 secs.  Average Usage (Days Used) 4 hrs. 18 mins. 36 secs.  Minimum Usage (1 Day) 3 hrs. 32 secs.  Percent of Days with Usage >= 4 Hours 60.0%  Percent of Days with Usage < 4 Hours 40.0%  Date Range  Total Blower Time 5 days 4 hrs. 59 mins. 50 secs.  Sleep Therapy Statistics ([facility])  Average Time in Large Leak Per Day 0 secs.  Average AHI 3.0  CPAP 8.0 cmH2O      [place] Sleepiness Scale   EPWORTH SLEEPINESS SCALE 12/7/2015 10/5/2015 8/10/2015   Sitting and reading 2 2 3   Watching TV 2 2 3   Sitting, inactive in a public place (e.g. a theatre or a meeting) 2 1 3   As a passenger in a car for an hour without a break 2 1 3   Lying down to  rest in the afternoon when circumstances permit 1 2 3   Sitting and talking to someone 0 0 1   Sitting quietly after a lunch without alcohol 2 1 3   In a car, while stopped for a few minutes in traffic 0 1 0   Total score 11 10 19     Previous Report Reviewed: office notes     The following portions of the patient's history were reviewed and updated as appropriate: He  has a past medical history of Anemia; BPH (benign prostatic hypertrophy); Carotid bruit; CVA (cerebral vascular accident); Hyperlipidemia; Hypertension; Hypothyroidism (2/29/2016); Type II or unspecified type diabetes mellitus without mention of complication, not stated as uncontrolled; and Vitamin D deficiency (2/29/2016).  He  has a past surgical history that includes Colonoscopy (2008); Transurethral resection of prostate; Pilonidal cyst drainage; and Colonoscopy (5/31/2005).  His family history includes Brain cancer in his sister; Heart attack in his maternal uncle; Hypertension in his father and mother. There is no history of Diabetes or Stroke.  He  reports that he has never smoked. He does not have any smokeless tobacco history on file. He reports that he does not drink alcohol or use illicit drugs.  He has a current medication list which includes the following prescription(s): acarbose, aspirin, atorvastatin, benazepril-hydrochlorthiazide, blood sugar diagnostic, calcium carbonate, calcium-vitamin d3, clopidogrel, ergocalciferol, fenofibrate, ferrous sulfate, galantamine, glipizide, lancets, levothyroxine, loratadine, nateglinide, niacin, nisoldipine, ondansetron, pioglitazone, sitagliptan, tamsulosin, and onetouch verio iq meter.  He is allergic to no known drug allergies..    Review of Systems   Constitutional: Negative for fever, weight gain and weakness.   HENT: Negative for nosebleeds, postnasal drip and hearing loss.    Eyes: Negative for itching.   Respiratory: Negative for hemoptysis, asthma nighttime symptoms, dyspnea on extertion and  "somnolence.    Cardiovascular: Negative for chest pain and leg swelling.   Endocrine: Negative for polydipsia and heat intolerance.    Musculoskeletal: Negative for back pain and gait problem.   Skin: Negative for rash.   Gastrointestinal: Negative for nausea, vomiting and acid reflux.   Neurological: Negative for dizziness, light-headedness and headaches.   Hematological: Does not bruise/bleed easily.   Psychiatric/Behavioral: The patient is not nervous/anxious.    All other systems reviewed and are negative.     Objective:     Vitals:    03/13/17 0838   BP: 120/60   Pulse: (!) 44   Resp: 18   SpO2: 100%   Weight: 103.8 kg (228 lb 13.4 oz)   Height: 6' 1" (1.854 m)     Body mass index is 30.19 kg/(m^2).    Physical Exam   Constitutional: He is oriented to person, place, and time. He appears well-developed and well-nourished.   HENT:   Head: Normocephalic and atraumatic.   Right Ear: External ear normal.   Left Ear: External ear normal.   Nose: Nose normal.   Mouth/Throat: Oropharynx is clear and moist. No oropharyngeal exudate.   Eyes: Conjunctivae and EOM are normal. Pupils are equal, round, and reactive to light. Right eye exhibits no discharge. Left eye exhibits no discharge. No scleral icterus.   Neck: Normal range of motion. Neck supple. No JVD present. No thyromegaly present.   Cardiovascular: Normal rate, regular rhythm, normal heart sounds and intact distal pulses.  Exam reveals no gallop and no friction rub.    No murmur heard.  Pulmonary/Chest: Effort normal and breath sounds normal. No respiratory distress. He has no wheezes. He has no rales. He exhibits no tenderness.   Abdominal: Soft. Bowel sounds are normal. He exhibits no distension and no mass. There is no tenderness. There is no rebound and no guarding.   Musculoskeletal: Normal range of motion. He exhibits no edema or tenderness.   Lymphadenopathy:     He has no cervical adenopathy.   Neurological: He is alert and oriented to person, place, and " time. No cranial nerve deficit. Coordination normal.   Skin: Skin is warm and dry. He is not diaphoretic.   Psychiatric: He has a normal mood and affect.       Personal Diagnostic Review  CPAP complaince download.     Assessment:     1. DEVIKA on CPAP Stable       Orders Placed This Encounter   Procedures    CPAP/BIPAP SUPPLIES     Please fax to [email or web address].     Order Specific Question:   Type of mask:     Answer:   FFM     Order Specific Question:   Headgear?     Answer:   Yes     Order Specific Question:   Tubing?     Answer:   Yes     Order Specific Question:   Humidifier chamber?     Answer:   Yes     Order Specific Question:   Chin strap?     Answer:   Yes     Order Specific Question:   Filters?     Answer:   Yes     Order Specific Question:   Length of need (1-99 months):     Answer:   99       Plan:     Discussed diagnosis, its evaluation, treatment and usual course. All questions answered.    DEVIKA on CPAP  Continue CPAP of 8 CMWP. [facility] Discussed therapeutic goals for positive airway pressure therapy(CPAP or BiPAP): Ideal is usage 100% of nights for 6 - 8 hours per night. Minimum usage is 70% of night for at least 4 hours per night used. Pateint expressed understanding. All Questions answered. CPAP supplies renewed.     Complications of untreated sleep apnea discussed with patient in detail including but not limited to  high blood pressure, heart attack, stroke, obesity,  diabetes and worsening heart failure. Patient voiced understanding.      Return in about 1 year (around 3/13/2018) for DEVIKA.

## 2017-03-13 NOTE — PATIENT INSTRUCTIONS
Continuous Positive Air Pressure (CPAP)  Continuous positive air pressure (CPAP) uses gentle air pressure to hold the airway open. CPAP is often the most effective treatment for sleep apnea and severe snoring. It works very well for many people. But keep in mind that it can take several adjustments before the setup is right for you.    How CPAP works  The CPAPmachine  is a small portable pump beside the bed. The pump sends air through a hose, which is held over your nose and mouth by a mask. Mild air pressure is gently pushed through your airway. The air pressure nudges sagging tissues aside. This widens the airway so you can breathe better. CPAP may be combined with other kinds of therapy for sleep apnea.     A mask over the nose gently directs air into the throat to keep the airway open.   Types of air pressure treatments  There are different types of CPAP. Your doctor or CPAP technician will help you decide which type is best for you:  · Basic CPAP keeps the pressure constant all night long.  · A bilevel device (BiPAP) provides more pressure when you breathe in and less when you breathe out. A BiPAP machine also may be set to provide automatic breaths to maintain breathing if you stop breathing while sleeping.  · An autoCPAP device automatically adjusts pressure throughout the night and in response to changes such as body position, sleep stage, and snoring.  Date Last Reviewed: 8/10/2015  © 3867-5841 [employer]. [address] [place] [address], [place] [address]. All rights reserved. This information is not intended as a substitute for professional medical care. Always follow your healthcare professional's instructions.

## 2017-03-13 NOTE — ASSESSMENT & PLAN NOTE
Continue CPAP of 8 CMWP. ([facility]). Discussed therapeutic goals for positive airway pressure therapy(CPAP or BiPAP): Ideal is usage 100% of nights for 6 - 8 hours per night. Minimum usage is 70% of night for at least 4 hours per night used. Pateint expressed understanding. All Questions answered. CPAP supplies renewed.     Complications of untreated sleep apnea discussed with patient in detail including but not limited to  high blood pressure, heart attack, stroke, obesity,  diabetes and worsening heart failure. Patient voiced understanding.

## 2017-04-10 DIAGNOSIS — E78.2 COMBINED HYPERLIPIDEMIA ASSOCIATED WITH TYPE 2 DIABETES MELLITUS: ICD-10-CM

## 2017-04-10 DIAGNOSIS — E11.69 COMBINED HYPERLIPIDEMIA ASSOCIATED WITH TYPE 2 DIABETES MELLITUS: ICD-10-CM

## 2017-04-10 DIAGNOSIS — I15.2 HYPERTENSION ASSOCIATED WITH DIABETES: ICD-10-CM

## 2017-04-10 DIAGNOSIS — E11.59 HYPERTENSION ASSOCIATED WITH DIABETES: ICD-10-CM

## 2017-04-10 RX ORDER — NIACIN 1000 MG/1
TABLET, EXTENDED RELEASE ORAL
Qty: 90 TABLET | Refills: 3 | Status: SHIPPED | OUTPATIENT
Start: 2017-04-10 | End: 2018-03-28 | Stop reason: SDUPTHER

## 2017-04-10 RX ORDER — BENAZEPRIL/HYDROCHLOROTHIAZIDE 20 MG-25MG
TABLET ORAL
Qty: 180 TABLET | Refills: 3 | Status: SHIPPED | OUTPATIENT
Start: 2017-04-10 | End: 2018-04-02 | Stop reason: SDUPTHER

## 2017-04-24 DIAGNOSIS — E03.9 HYPOTHYROIDISM: ICD-10-CM

## 2017-04-24 DIAGNOSIS — Z86.73 HISTORY OF CVA (CEREBROVASCULAR ACCIDENT): ICD-10-CM

## 2017-04-24 RX ORDER — PIOGLITAZONEHYDROCHLORIDE 30 MG/1
TABLET ORAL
Qty: 90 TABLET | Refills: 3 | Status: SHIPPED | OUTPATIENT
Start: 2017-04-24 | End: 2018-03-14 | Stop reason: SDUPTHER

## 2017-04-24 RX ORDER — CLOPIDOGREL BISULFATE 75 MG/1
TABLET ORAL
Qty: 90 TABLET | Refills: 3 | Status: SHIPPED | OUTPATIENT
Start: 2017-04-24 | End: 2018-04-30 | Stop reason: SDUPTHER

## 2017-04-24 RX ORDER — LEVOTHYROXINE SODIUM 25 UG/1
TABLET ORAL
Qty: 90 TABLET | Refills: 3 | Status: SHIPPED | OUTPATIENT
Start: 2017-04-24 | End: 2018-04-23 | Stop reason: SDUPTHER

## 2017-05-01 DIAGNOSIS — D50.9 IRON DEFICIENCY ANEMIA: ICD-10-CM

## 2017-05-01 RX ORDER — FERROUS SULFATE 325(65) MG
TABLET ORAL
Qty: 270 TABLET | Refills: 1 | Status: SHIPPED | OUTPATIENT
Start: 2017-05-01 | End: 2017-10-30 | Stop reason: SDUPTHER

## 2017-05-01 RX ORDER — ATORVASTATIN CALCIUM 20 MG/1
TABLET, FILM COATED ORAL
Qty: 90 TABLET | Refills: 3 | Status: SHIPPED | OUTPATIENT
Start: 2017-05-01 | End: 2018-04-30 | Stop reason: SDUPTHER

## 2017-05-01 RX ORDER — GALANTAMINE 8 MG/1
TABLET, FILM COATED ORAL
Qty: 180 TABLET | Refills: 3 | Status: SHIPPED | OUTPATIENT
Start: 2017-05-01 | End: 2018-03-09 | Stop reason: SDUPTHER

## 2017-05-15 ENCOUNTER — TELEPHONE (OUTPATIENT)
Dept: URGENT CARE | Facility: CLINIC | Age: 77
End: 2017-05-15

## 2017-05-15 DIAGNOSIS — E55.9 VITAMIN D DEFICIENCY: ICD-10-CM

## 2017-05-15 DIAGNOSIS — E55.9 VITAMIN D DEFICIENCY: Primary | ICD-10-CM

## 2017-05-15 RX ORDER — ERGOCALCIFEROL 1.25 MG/1
CAPSULE ORAL
Qty: 4 CAPSULE | Refills: 0 | Status: SHIPPED | OUTPATIENT
Start: 2017-05-15 | End: 2017-06-16

## 2017-05-19 DIAGNOSIS — E55.9 VITAMIN D DEFICIENCY: ICD-10-CM

## 2017-05-19 RX ORDER — ERGOCALCIFEROL 1.25 MG/1
CAPSULE ORAL
Qty: 12 CAPSULE | Refills: 3 | Status: SHIPPED | OUTPATIENT
Start: 2017-05-19 | End: 2017-06-16

## 2017-06-14 ENCOUNTER — LAB VISIT (OUTPATIENT)
Dept: LAB | Facility: HOSPITAL | Age: 77
End: 2017-06-14
Attending: FAMILY MEDICINE
Payer: MEDICARE

## 2017-06-14 DIAGNOSIS — E55.9 VITAMIN D DEFICIENCY: ICD-10-CM

## 2017-06-14 DIAGNOSIS — E78.5 HYPERLIPIDEMIA, UNSPECIFIED HYPERLIPIDEMIA TYPE: ICD-10-CM

## 2017-06-14 DIAGNOSIS — D64.9 ANEMIA, UNSPECIFIED TYPE: ICD-10-CM

## 2017-06-14 LAB
25(OH)D3+25(OH)D2 SERPL-MCNC: 28 NG/ML
ALBUMIN SERPL BCP-MCNC: 3.8 G/DL
ALP SERPL-CCNC: 42 U/L
ALT SERPL W/O P-5'-P-CCNC: 16 U/L
AST SERPL-CCNC: 20 U/L
BASOPHILS # BLD AUTO: 0.01 K/UL
BASOPHILS NFR BLD: 0.2 %
BILIRUB DIRECT SERPL-MCNC: 0.6 MG/DL
BILIRUB SERPL-MCNC: 1.6 MG/DL
CHOLEST/HDLC SERPL: 2.5 {RATIO}
DIFFERENTIAL METHOD: ABNORMAL
EOSINOPHIL # BLD AUTO: 0.3 K/UL
EOSINOPHIL NFR BLD: 4.4 %
ERYTHROCYTE [DISTWIDTH] IN BLOOD BY AUTOMATED COUNT: 14.3 %
HCT VFR BLD AUTO: 41.2 %
HDL/CHOLESTEROL RATIO: 40.2 %
HDLC SERPL-MCNC: 117 MG/DL
HDLC SERPL-MCNC: 47 MG/DL
HGB BLD-MCNC: 13.8 G/DL
IRON SERPL-MCNC: 78 UG/DL
LDLC SERPL CALC-MCNC: 56.8 MG/DL
LYMPHOCYTES # BLD AUTO: 1.4 K/UL
LYMPHOCYTES NFR BLD: 25.4 %
MCH RBC QN AUTO: 29.8 PG
MCHC RBC AUTO-ENTMCNC: 33.5 %
MCV RBC AUTO: 89 FL
MONOCYTES # BLD AUTO: 0.5 K/UL
MONOCYTES NFR BLD: 9.3 %
NEUTROPHILS # BLD AUTO: 3.4 K/UL
NEUTROPHILS NFR BLD: 60.2 %
NONHDLC SERPL-MCNC: 70 MG/DL
PLATELET # BLD AUTO: 143 K/UL
PMV BLD AUTO: 11.5 FL
PROT SERPL-MCNC: 6.9 G/DL
RBC # BLD AUTO: 4.63 M/UL
SATURATED IRON: 22 %
TOTAL IRON BINDING CAPACITY: 348 UG/DL
TRANSFERRIN SERPL-MCNC: 235 MG/DL
TRIGL SERPL-MCNC: 66 MG/DL
WBC # BLD AUTO: 5.68 K/UL

## 2017-06-14 PROCEDURE — 82306 VITAMIN D 25 HYDROXY: CPT

## 2017-06-14 PROCEDURE — 83540 ASSAY OF IRON: CPT

## 2017-06-14 PROCEDURE — 80061 LIPID PANEL: CPT

## 2017-06-14 PROCEDURE — 85025 COMPLETE CBC W/AUTO DIFF WBC: CPT

## 2017-06-14 PROCEDURE — 36415 COLL VENOUS BLD VENIPUNCTURE: CPT | Mod: PO

## 2017-06-14 PROCEDURE — 80076 HEPATIC FUNCTION PANEL: CPT

## 2017-06-16 ENCOUNTER — TELEPHONE (OUTPATIENT)
Dept: ENDOSCOPY | Facility: HOSPITAL | Age: 77
End: 2017-06-16

## 2017-06-16 DIAGNOSIS — E55.9 VITAMIN D DEFICIENCY: Primary | ICD-10-CM

## 2017-06-16 RX ORDER — ERGOCALCIFEROL 1.25 MG/1
50000 CAPSULE ORAL
Qty: 24 CAPSULE | Refills: 3 | Status: SHIPPED | OUTPATIENT
Start: 2017-06-16 | End: 2017-07-16

## 2017-06-16 NOTE — TELEPHONE ENCOUNTER
----- Message from Lacie Sandoval NP sent at 6/15/2017 10:48 AM CDT -----  Dr. Morfin,   Forwarding lab report to you.

## 2017-06-16 NOTE — TELEPHONE ENCOUNTER
I have signed for the following orders AND/OR meds.  Please call the patient and ask the patient to schedule the testing AND/OR inform about any medications that were sent.      Orders Placed This Encounter   Procedures    Vitamin D     Standing Status:   Future     Standing Expiration Date:   8/15/2018         Medications Ordered This Encounter      ergocalciferol (ERGOCALCIFEROL) 50,000 unit Cap          Sig: Take 1 capsule (50,000 Units total) by mouth twice a week.          Dispense:  24 capsule          Refill:  3

## 2017-06-18 NOTE — PROGRESS NOTES
The lipid and liver along with the blood count and iron are stable.  Don't change any meds.  Recheck all of this in 1 year.

## 2017-08-02 ENCOUNTER — LAB VISIT (OUTPATIENT)
Dept: LAB | Facility: HOSPITAL | Age: 77
End: 2017-08-02
Attending: FAMILY MEDICINE
Payer: MEDICARE

## 2017-08-02 DIAGNOSIS — D64.9 ANEMIA, UNSPECIFIED TYPE: ICD-10-CM

## 2017-08-02 LAB
BASOPHILS # BLD AUTO: 0.01 K/UL
BASOPHILS NFR BLD: 0.2 %
DIFFERENTIAL METHOD: ABNORMAL
EOSINOPHIL # BLD AUTO: 0.2 K/UL
EOSINOPHIL NFR BLD: 3.8 %
ERYTHROCYTE [DISTWIDTH] IN BLOOD BY AUTOMATED COUNT: 14.5 %
HCT VFR BLD AUTO: 40.8 %
HGB BLD-MCNC: 13.6 G/DL
IRON SERPL-MCNC: 104 UG/DL
LYMPHOCYTES # BLD AUTO: 1.4 K/UL
LYMPHOCYTES NFR BLD: 26.3 %
MCH RBC QN AUTO: 29.6 PG
MCHC RBC AUTO-ENTMCNC: 33.3 G/DL
MCV RBC AUTO: 89 FL
MONOCYTES # BLD AUTO: 0.6 K/UL
MONOCYTES NFR BLD: 11.9 %
NEUTROPHILS # BLD AUTO: 3 K/UL
NEUTROPHILS NFR BLD: 57.4 %
PLATELET # BLD AUTO: 157 K/UL
PMV BLD AUTO: 11 FL
RBC # BLD AUTO: 4.59 M/UL
SATURATED IRON: 32 %
TOTAL IRON BINDING CAPACITY: 330 UG/DL
TRANSFERRIN SERPL-MCNC: 223 MG/DL
WBC # BLD AUTO: 5.29 K/UL

## 2017-08-02 PROCEDURE — 85025 COMPLETE CBC W/AUTO DIFF WBC: CPT

## 2017-08-02 PROCEDURE — 83540 ASSAY OF IRON: CPT

## 2017-08-02 PROCEDURE — 36415 COLL VENOUS BLD VENIPUNCTURE: CPT | Mod: PO

## 2017-08-16 ENCOUNTER — LAB VISIT (OUTPATIENT)
Dept: LAB | Facility: HOSPITAL | Age: 77
End: 2017-08-16
Attending: FAMILY MEDICINE
Payer: MEDICARE

## 2017-08-16 DIAGNOSIS — E55.9 VITAMIN D DEFICIENCY: ICD-10-CM

## 2017-08-16 LAB — 25(OH)D3+25(OH)D2 SERPL-MCNC: 35 NG/ML

## 2017-08-16 PROCEDURE — 36415 COLL VENOUS BLD VENIPUNCTURE: CPT | Mod: PO

## 2017-08-16 PROCEDURE — 82306 VITAMIN D 25 HYDROXY: CPT

## 2017-09-05 ENCOUNTER — OFFICE VISIT (OUTPATIENT)
Dept: PODIATRY | Facility: CLINIC | Age: 77
End: 2017-09-05
Payer: MEDICARE

## 2017-09-05 ENCOUNTER — LAB VISIT (OUTPATIENT)
Dept: LAB | Facility: HOSPITAL | Age: 77
End: 2017-09-05
Attending: FAMILY MEDICINE
Payer: MEDICARE

## 2017-09-05 VITALS
HEART RATE: 58 BPM | SYSTOLIC BLOOD PRESSURE: 152 MMHG | WEIGHT: 233.69 LBS | HEIGHT: 73 IN | BODY MASS INDEX: 30.97 KG/M2 | DIASTOLIC BLOOD PRESSURE: 72 MMHG

## 2017-09-05 DIAGNOSIS — E11.9 TYPE 2 DIABETES MELLITUS WITHOUT COMPLICATIONS: ICD-10-CM

## 2017-09-05 DIAGNOSIS — E11.49 TYPE II DIABETES MELLITUS WITH NEUROLOGICAL MANIFESTATIONS: Primary | ICD-10-CM

## 2017-09-05 DIAGNOSIS — M19.079 DJD (DEGENERATIVE JOINT DISEASE), ANKLE AND FOOT, UNSPECIFIED LATERALITY: ICD-10-CM

## 2017-09-05 LAB
ESTIMATED AVG GLUCOSE: 140 MG/DL
HBA1C MFR BLD HPLC: 6.5 %

## 2017-09-05 PROCEDURE — 99213 OFFICE O/P EST LOW 20 MIN: CPT | Mod: S$GLB,,, | Performed by: PODIATRIST

## 2017-09-05 PROCEDURE — 3078F DIAST BP <80 MM HG: CPT | Mod: S$GLB,,, | Performed by: PODIATRIST

## 2017-09-05 PROCEDURE — 1126F AMNT PAIN NOTED NONE PRSNT: CPT | Mod: S$GLB,,, | Performed by: PODIATRIST

## 2017-09-05 PROCEDURE — 3008F BODY MASS INDEX DOCD: CPT | Mod: S$GLB,,, | Performed by: PODIATRIST

## 2017-09-05 PROCEDURE — 1159F MED LIST DOCD IN RCRD: CPT | Mod: S$GLB,,, | Performed by: PODIATRIST

## 2017-09-05 PROCEDURE — 99999 PR PBB SHADOW E&M-EST. PATIENT-LVL III: CPT | Mod: PBBFAC,,, | Performed by: PODIATRIST

## 2017-09-05 PROCEDURE — 83036 HEMOGLOBIN GLYCOSYLATED A1C: CPT

## 2017-09-05 PROCEDURE — 99499 UNLISTED E&M SERVICE: CPT | Mod: S$PBB,,, | Performed by: PODIATRIST

## 2017-09-05 PROCEDURE — 36415 COLL VENOUS BLD VENIPUNCTURE: CPT | Mod: PO

## 2017-09-05 PROCEDURE — 3077F SYST BP >= 140 MM HG: CPT | Mod: S$GLB,,, | Performed by: PODIATRIST

## 2017-09-05 RX ORDER — ERGOCALCIFEROL 1.25 MG/1
50000 CAPSULE ORAL
COMMUNITY
End: 2018-07-05 | Stop reason: SDUPTHER

## 2017-09-05 NOTE — PROGRESS NOTES
Subjective:     Patient ID: Rudy Saldaña is a 77 y.o. male.    Chief Complaint: Diabetic Foot Exam (Patient states no current pain but he is experiencing some stiffness.) and Diabetes Mellitus (Last PCP visit with - 2/22/17. 126 mg/dL-AM glucose. )    Rudy is a 77 y.o. male who presents to the clinic upon referral from Dr. Hillary silva. provider found  for evaluation and treatment of diabetic feet. Rudy has a past medical history of Anemia; BPH (benign prostatic hypertrophy); Carotid bruit; CVA (cerebral vascular accident); Hyperlipidemia; Hypertension; Hypothyroidism (2/29/2016); Type II or unspecified type diabetes mellitus without mention of complication, not stated as uncontrolled; and Vitamin D deficiency (2/29/2016). Patient relates no major problem with feet. Only complaints today consist of tightness of feet when he first step out of bed. Patient state his blood sugar this morning 126mg/dl. Patient denies any trauma to foot.     PCP: Mohinder Morfin MD    Date Last Seen by PCP: 2/22/17 Scheduled for 9/25/17    Current shoe gear: Casual shoes    Hemoglobin A1C   Date Value Ref Range Status   03/02/2017 6.5 (H) 4.5 - 6.2 % Final     Comment:     According to ADA guidelines, hemoglobin A1C <7.0% represents  optimal control in non-pregnant diabetic patients.  Different  metrics may apply to specific populations.   Standards of Medical Care in Diabetes - 2016.  For the purpose of screening for the presence of diabetes:  <5.7%     Consistent with the absence of diabetes  5.7-6.4%  Consistent with increasing risk for diabetes   (prediabetes)  >or=6.5%  Consistent with diabetes  Currently no consensus exists for use of hemoglobin A1C  for diagnosis of diabetes for children.     09/01/2016 6.5 (H) 4.5 - 6.2 % Final     Comment:     According to ADA guidelines, hemoglobin A1C <7.0% represents  optimal control in non-pregnant diabetic patients.  Different  metrics may apply to specific populations.    Standards of Medical Care in Diabetes - 2016.  For the purpose of screening for the presence of diabetes:  <5.7%     Consistent with the absence of diabetes  5.7-6.4%  Consistent with increasing risk for diabetes   (prediabetes)  >or=6.5%  Consistent with diabetes  Currently no consensus exists for use of hemoglobin A1C  for diagnosis of diabetes for children.     02/29/2016 6.1 4.5 - 6.2 % Final       Patient Active Problem List   Diagnosis    Combined hyperlipidemia associated with type 2 diabetes mellitus    Hypertension associated with diabetes    DM (diabetes mellitus) type II controlled with renal manifestation    History of stroke    BPH (benign prostatic hypertrophy)    History of colon polyps    Iron deficiency anemia    Polyneuropathy due to secondary diabetes    DEVIKA on CPAP    Family history of colonic polyps    Vitamin D deficiency    Hypothyroidism    Anemia    Gross hematuria    History of diabetes mellitus    Obstruction of urinary tract    CKD stage 3 secondary to diabetes       Medication List with Changes/Refills   Current Medications    ACARBOSE (PRECOSE) 100 MG TAB    Take 1 tablet (100 mg total) by mouth 3 (three) times daily with meals.    ASPIRIN (ECOTRIN) 81 MG EC TABLET    Take 81 mg by mouth once daily.    ATORVASTATIN (LIPITOR) 20 MG TABLET    take 1 tablet by mouth once daily    BENAZEPRIL-HYDROCHLORTHIAZIDE (LOTENSIN HCT) 20-25 MG TAB    take 1 tablet by mouth twice a day    BLOOD SUGAR DIAGNOSTIC STRP    Use once daily as directed    CALCIUM CARBONATE (OS-STACEY) 600 MG (1,500 MG) TAB    Take 600 mg by mouth.    CALCIUM-VITAMIN D3 500 MG(1,250MG) -200 UNIT PER TABLET    Take 1 tablet by mouth 2 (two) times daily with meals.    CLOPIDOGREL (PLAVIX) 75 MG TABLET    take 1 tablet by mouth once daily    ERGOCALCIFEROL (ERGOCALCIFEROL) 50,000 UNIT CAP    Take 50,000 Units by mouth.    FENOFIBRATE (TRICOR) 48 MG TABLET    Take 48 mg by mouth.    FERROUS SULFATE 325 MG (65 MG  IRON) TAB TABLET    take 1 tablet by mouth three times a day    GALANTAMINE (RAZADYNE) 8 MG TAB    take 1 tablet by mouth twice a day with meals    GLIPIZIDE (GLUCOTROL) 10 MG TABLET    Take 1 tablet (10 mg total) by mouth 2 (two) times daily before meals.    LANCETS MISC    One touch lancets use once daily as directed    LEVOTHYROXINE (SYNTHROID) 25 MCG TABLET    take 1 tablet by mouth every morning ON AN EMPTY STOMACH    LORATADINE (CLARITIN) 10 MG TABLET        NATEGLINIDE (STARLIX) 120 MG TABLET    Take 120 mg by mouth.    NIACIN (NIASPAN) 1000 MG CR TABLET    take 1 tablet by mouth at bedtime    NISOLDIPINE (SULAR) 30 MG TB24    Take 30 mg by mouth.    ONDANSETRON (ZOFRAN-ODT) 4 MG TBDL    dissolve 1 tablet ON TONGUE every 6 hours if needed for nausea    ONETOUCH VERIO IQ METER KIT    Use as instructed    PIOGLITAZONE (ACTOS) 30 MG TABLET    take 1 tablet by mouth once daily    SITAGLIPTAN (JANUVIA) 100 MG TAB    Take 1 tablet (100 mg total) by mouth once daily.    TAMSULOSIN (FLOMAX) 0.4 MG CP24    Take 0.4 mg by mouth.       Review of patient's allergies indicates:   Allergen Reactions    No known drug allergies        Past Surgical History:   Procedure Laterality Date    COLONOSCOPY  2008    COLONOSCOPY  5/31/2005    repeat in 2015    PILONIDAL CYST DRAINAGE      TRANSURETHRAL RESECTION OF PROSTATE         Family History   Problem Relation Age of Onset    Hypertension Mother     Hypertension Father     Heart attack Maternal Uncle     Brain cancer Sister     Diabetes Neg Hx     Stroke Neg Hx        Social History     Social History    Marital status:      Spouse name: N/A    Number of children: N/A    Years of education: N/A     Occupational History    Not on file.     Social History Main Topics    Smoking status: Never Smoker    Smokeless tobacco: Never Used    Alcohol use No    Drug use: No    Sexual activity: Not on file     Other Topics Concern    Not on file     Social History  "Narrative    No narrative on file       Vitals:    09/05/17 0910   BP: (!) 152/72   Pulse: (!) 58   Weight: 106 kg (233 lb 11 oz)   Height: 6' 1" (1.854 m)   PainSc: 0-No pain       Hemoglobin A1C   Date Value Ref Range Status   03/02/2017 6.5 (H) 4.5 - 6.2 % Final     Comment:     According to ADA guidelines, hemoglobin A1C <7.0% represents  optimal control in non-pregnant diabetic patients.  Different  metrics may apply to specific populations.   Standards of Medical Care in Diabetes - 2016.  For the purpose of screening for the presence of diabetes:  <5.7%     Consistent with the absence of diabetes  5.7-6.4%  Consistent with increasing risk for diabetes   (prediabetes)  >or=6.5%  Consistent with diabetes  Currently no consensus exists for use of hemoglobin A1C  for diagnosis of diabetes for children.     09/01/2016 6.5 (H) 4.5 - 6.2 % Final     Comment:     According to ADA guidelines, hemoglobin A1C <7.0% represents  optimal control in non-pregnant diabetic patients.  Different  metrics may apply to specific populations.   Standards of Medical Care in Diabetes - 2016.  For the purpose of screening for the presence of diabetes:  <5.7%     Consistent with the absence of diabetes  5.7-6.4%  Consistent with increasing risk for diabetes   (prediabetes)  >or=6.5%  Consistent with diabetes  Currently no consensus exists for use of hemoglobin A1C  for diagnosis of diabetes for children.     02/29/2016 6.1 4.5 - 6.2 % Final        Review of Systems   Constitutional: Negative for chills and fever.   Respiratory: Negative for shortness of breath.    Cardiovascular: Negative for chest pain, palpitations, orthopnea, claudication and leg swelling.   Gastrointestinal: Negative for diarrhea, nausea and vomiting.   Musculoskeletal: Negative for joint pain.   Skin: Negative for rash.   Neurological: Negative for dizziness, tingling, sensory change, focal weakness and weakness.   Psychiatric/Behavioral: Negative.        "   Objective:      PHYSICAL EXAM: Apperance: Alert and orient in no distress,well developed, and with good attention to grooming and body habits  Patient presents ambulating in lace up shoes.   LOWER EXTREMITY EXAM:  VASCULAR: Dorsalis pedis pulses 2/4 bilateral and Posterior Tibial pulses 2/4 bilateral. Capillary fill time <4 seconds bilateral. No edema observed bilateral. Varicosities present bilateral. Skin temperature of the lower extremities is warm to warm, proximal to distal. Hair growth WNL bilateral.  DERMATOLOGICAL: No skin rashes, subcutaneous nodules, lesions, or ulcers observed bilateral. Nails 1,2,3,4,5 right and 2,3,4,5 left normal length and thickness. Left hallux nail absent. Webspaces 1-4 clean, dry and without evidence of break in skin integrity bilateral.   NEUROLOGICAL: Light touch, sharp-dull, proprioception all present and equal bilaterally.  Vibratory sensation absent at bilateral hallux and diminished at bilateral navicular . Protective sensation absent at 4/10 sites as tested with a Tidioute-Chelsy 5.07 monofilament.   MUSCULOSKELETAL: Muscle strength is 5/5 for foot inverters, everters, plantarflexors, and dorsiflexors. Muscle tone is normal. No pain on palpation left lateral midfoot. Minimal bony prominence noted to bilateral lateral midfoot at cuboid-metatarsal joint.       Assessment:       Encounter Diagnoses   Name Primary?    Type II diabetes mellitus with neurological manifestations Yes    DJD (degenerative joint disease), ankle and foot, unspecified laterality          Plan:   Type II diabetes mellitus with neurological manifestations    DJD (degenerative joint disease), ankle and foot, unspecified laterality      I counseled the patient on his conditions, their implications and medical management.  Reviewed previous x-rays from last visit in exam room with patient.   Shoe inspection. Diabetic Foot Education. Patient reminded of the importance of good nutrition and blood sugar  control to help prevent podiatric complications of diabetes. Patient instructed on proper foot hygeine. We discussed wearing proper shoe gear, daily foot inspections, never walking without protective shoe gear, never putting sharp instruments to feet.    Patient  will continue to monitor the areas daily, inspect feet, wear protective shoe gear when ambulatory, moisturizer to maintain skin integrity. Patient reminded of the importance of good nutrition and blood sugar control to help prevent podiatric complications of diabetes.  Patient to return in this office in approximately 6 months, or sooner if needed.             Padmaja Dave DPM  [facility]

## 2017-09-07 DIAGNOSIS — N18.30 CONTROLLED TYPE 2 DIABETES MELLITUS WITH STAGE 3 CHRONIC KIDNEY DISEASE, WITHOUT LONG-TERM CURRENT USE OF INSULIN: Primary | ICD-10-CM

## 2017-09-07 DIAGNOSIS — E11.22 CONTROLLED TYPE 2 DIABETES MELLITUS WITH STAGE 3 CHRONIC KIDNEY DISEASE, WITHOUT LONG-TERM CURRENT USE OF INSULIN: Primary | ICD-10-CM

## 2017-09-07 RX ORDER — GLIPIZIDE 10 MG/1
10 TABLET ORAL
Qty: 180 TABLET | Refills: 3 | Status: SHIPPED | OUTPATIENT
Start: 2017-09-07 | End: 2018-03-14 | Stop reason: SDUPTHER

## 2017-09-07 RX ORDER — NATEGLINIDE 120 MG/1
120 TABLET ORAL
Qty: 180 TABLET | Refills: 3 | Status: SHIPPED | OUTPATIENT
Start: 2017-09-07 | End: 2017-09-25

## 2017-09-07 RX ORDER — ACARBOSE 100 MG/1
100 TABLET ORAL
Qty: 270 TABLET | Refills: 3 | Status: SHIPPED | OUTPATIENT
Start: 2017-09-07 | End: 2018-03-14 | Stop reason: SDUPTHER

## 2017-09-07 NOTE — TELEPHONE ENCOUNTER
I have signed for the following orders AND/OR meds.  Please call the patient and ask the patient to schedule the testing AND/OR inform about any medications that were sent.      Orders Placed This Encounter   Procedures    Hemoglobin A1c     Standing Status:   Future     Standing Expiration Date:   11/6/2018         Medications Ordered This Encounter      acarbose (PRECOSE) 100 MG Tab          Sig: Take 1 tablet (100 mg total) by mouth 3 (three) times daily with meals.          Dispense:  270 tablet          Refill:  3      glipiZIDE (GLUCOTROL) 10 MG tablet          Sig: Take 1 tablet (10 mg total) by mouth 2 (two) times daily before meals.          Dispense:  180 tablet          Refill:  3      nateglinide (STARLIX) 120 MG tablet          Sig: Take 1 tablet (120 mg total) by mouth 2 (two) times daily before meals.          Dispense:  180 tablet          Refill:  3      SITagliptin (JANUVIA) 100 MG Tab          Sig: Take 1 tablet (100 mg total) by mouth once daily.          Dispense:  90 tablet          Refill:  3

## 2017-09-08 ENCOUNTER — OFFICE VISIT (OUTPATIENT)
Dept: FAMILY MEDICINE | Facility: CLINIC | Age: 77
End: 2017-09-08
Payer: MEDICARE

## 2017-09-08 VITALS
TEMPERATURE: 98 F | BODY MASS INDEX: 31.48 KG/M2 | HEART RATE: 68 BPM | WEIGHT: 232.38 LBS | SYSTOLIC BLOOD PRESSURE: 115 MMHG | DIASTOLIC BLOOD PRESSURE: 68 MMHG | HEIGHT: 72 IN

## 2017-09-08 DIAGNOSIS — J30.9 ALLERGIC RHINITIS, UNSPECIFIED CHRONICITY, UNSPECIFIED SEASONALITY, UNSPECIFIED TRIGGER: ICD-10-CM

## 2017-09-08 DIAGNOSIS — J02.9 ACUTE PHARYNGITIS, UNSPECIFIED ETIOLOGY: Primary | ICD-10-CM

## 2017-09-08 PROCEDURE — 99999 PR PBB SHADOW E&M-EST. PATIENT-LVL III: CPT | Mod: PBBFAC,,, | Performed by: NURSE PRACTITIONER

## 2017-09-08 PROCEDURE — 3078F DIAST BP <80 MM HG: CPT | Mod: S$GLB,,, | Performed by: NURSE PRACTITIONER

## 2017-09-08 PROCEDURE — 3008F BODY MASS INDEX DOCD: CPT | Mod: S$GLB,,, | Performed by: NURSE PRACTITIONER

## 2017-09-08 PROCEDURE — 3074F SYST BP LT 130 MM HG: CPT | Mod: S$GLB,,, | Performed by: NURSE PRACTITIONER

## 2017-09-08 PROCEDURE — 1159F MED LIST DOCD IN RCRD: CPT | Mod: S$GLB,,, | Performed by: NURSE PRACTITIONER

## 2017-09-08 PROCEDURE — 99213 OFFICE O/P EST LOW 20 MIN: CPT | Mod: S$GLB,,, | Performed by: NURSE PRACTITIONER

## 2017-09-08 RX ORDER — AMOXICILLIN 500 MG/1
500 TABLET, FILM COATED ORAL EVERY 12 HOURS
Qty: 20 TABLET | Refills: 0 | Status: SHIPPED | OUTPATIENT
Start: 2017-09-08 | End: 2017-09-18

## 2017-09-08 RX ORDER — FLUTICASONE PROPIONATE 50 MCG
1 SPRAY, SUSPENSION (ML) NASAL DAILY
Qty: 1 BOTTLE | Refills: 0 | Status: SHIPPED | OUTPATIENT
Start: 2017-09-08 | End: 2018-07-06

## 2017-09-08 NOTE — PROGRESS NOTES
Subjective:       Patient ID: Rudy Saldaña is a 77 y.o. male.    Chief Complaint: Sore Throat and Cough    Sore Throat    This is a new problem. Episode onset: Wednesday. The problem has been gradually worsening. Associated symptoms include coughing and a hoarse voice. Pertinent negatives include no congestion, ear pain, headaches, plugged ear sensation, shortness of breath or trouble swallowing. Treatments tried: Coricidin. The treatment provided mild relief.   Cough   This is a new problem. Episode onset: Wednesday. The problem has been gradually worsening. The cough is non-productive. Associated symptoms include a sore throat. Pertinent negatives include no chest pain, chills, ear pain, fever, headaches, postnasal drip, rash, rhinorrhea, shortness of breath or wheezing.     Review of Systems   Constitutional: Positive for fatigue. Negative for chills and fever.   HENT: Positive for hoarse voice and sore throat. Negative for congestion, ear pain, postnasal drip, rhinorrhea, sinus pain, sinus pressure and trouble swallowing.    Eyes: Negative.    Respiratory: Positive for cough. Negative for shortness of breath and wheezing.    Cardiovascular: Negative for chest pain, palpitations and leg swelling.   Gastrointestinal: Negative.    Endocrine: Negative.    Genitourinary: Negative.    Skin: Negative for rash and wound.   Allergic/Immunologic: Negative.    Neurological: Negative for weakness, numbness and headaches.   Psychiatric/Behavioral: The patient is not nervous/anxious.          Objective:      /68 (BP Location: Left arm, Patient Position: Sitting, BP Method: Medium (Automatic))   Pulse 68   Temp 98 °F (36.7 °C) (Oral)   Ht 6' (1.829 m)   Wt 105.4 kg (232 lb 6.4 oz)   BMI 31.52 kg/m²     Physical Exam   Constitutional: He is oriented to person, place, and time. He appears well-developed and well-nourished.   HENT:   Head: Normocephalic.   Right Ear: Tympanic membrane normal.   Left Ear: Tympanic  membrane normal.   Nose: Rhinorrhea present. No mucosal edema.   Mouth/Throat: Oropharyngeal exudate, posterior oropharyngeal edema and posterior oropharyngeal erythema present.       Eyes: Pupils are equal, round, and reactive to light.   Cardiovascular: Normal rate, regular rhythm and normal heart sounds.    Pulmonary/Chest: Effort normal and breath sounds normal. No respiratory distress. He has no wheezes. He has no rales.   Neurological: He is alert and oriented to person, place, and time.   Skin: Skin is warm and dry. No rash noted.   Psychiatric: He has a normal mood and affect. His behavior is normal. Judgment and thought content normal.   Vitals reviewed.      Assessment:       1. Acute pharyngitis, unspecified etiology    2. Allergic rhinitis, unspecified chronicity, unspecified seasonality, unspecified trigger          Plan:   Acute pharyngitis, unspecified etiology  -     amoxicillin (AMOXIL) 500 MG Tab; Take 1 tablet (500 mg total) by mouth every 12 (twelve) hours.  Dispense: 20 tablet; Refill: 0  - Rest and increase fluids.   - Use sugar free lozenges as needed.    Allergic rhinitis, unspecified chronicity, unspecified seasonality, unspecified trigger        - Recommended Claritin or Zyrtec         -     fluticasone (FLONASE) 50 mcg/actuation nasal spray; 1 spray by Each Nare route once daily.  Dispense: 1 Bottle; Refill: 0             Return if symptoms worsen or fail to improve.

## 2017-09-25 ENCOUNTER — OFFICE VISIT (OUTPATIENT)
Dept: FAMILY MEDICINE | Facility: CLINIC | Age: 77
End: 2017-09-25
Payer: MEDICARE

## 2017-09-25 ENCOUNTER — HOSPITAL ENCOUNTER (OUTPATIENT)
Dept: RADIOLOGY | Facility: HOSPITAL | Age: 77
Discharge: HOME | End: 2017-09-25
Attending: FAMILY MEDICINE
Payer: MEDICARE

## 2017-09-25 VITALS
SYSTOLIC BLOOD PRESSURE: 126 MMHG | WEIGHT: 232.63 LBS | DIASTOLIC BLOOD PRESSURE: 72 MMHG | BODY MASS INDEX: 31.51 KG/M2 | HEART RATE: 62 BPM | HEIGHT: 72 IN

## 2017-09-25 DIAGNOSIS — E78.2 COMBINED HYPERLIPIDEMIA ASSOCIATED WITH TYPE 2 DIABETES MELLITUS: ICD-10-CM

## 2017-09-25 DIAGNOSIS — E11.22 CONTROLLED TYPE 2 DIABETES MELLITUS WITH STAGE 3 CHRONIC KIDNEY DISEASE, WITHOUT LONG-TERM CURRENT USE OF INSULIN: ICD-10-CM

## 2017-09-25 DIAGNOSIS — M25.551 RIGHT HIP PAIN: ICD-10-CM

## 2017-09-25 DIAGNOSIS — E11.69 COMBINED HYPERLIPIDEMIA ASSOCIATED WITH TYPE 2 DIABETES MELLITUS: ICD-10-CM

## 2017-09-25 DIAGNOSIS — M25.551 RIGHT HIP PAIN: Primary | ICD-10-CM

## 2017-09-25 DIAGNOSIS — N18.30 CONTROLLED TYPE 2 DIABETES MELLITUS WITH STAGE 3 CHRONIC KIDNEY DISEASE, WITHOUT LONG-TERM CURRENT USE OF INSULIN: ICD-10-CM

## 2017-09-25 DIAGNOSIS — I15.2 HYPERTENSION ASSOCIATED WITH DIABETES: ICD-10-CM

## 2017-09-25 DIAGNOSIS — E13.42 POLYNEUROPATHY DUE TO SECONDARY DIABETES: ICD-10-CM

## 2017-09-25 DIAGNOSIS — Z86.73 HISTORY OF CVA (CEREBROVASCULAR ACCIDENT): ICD-10-CM

## 2017-09-25 DIAGNOSIS — E11.22 CKD STAGE 3 SECONDARY TO DIABETES: ICD-10-CM

## 2017-09-25 DIAGNOSIS — D64.9 ANEMIA, UNSPECIFIED TYPE: ICD-10-CM

## 2017-09-25 DIAGNOSIS — N18.30 CKD STAGE 3 SECONDARY TO DIABETES: ICD-10-CM

## 2017-09-25 DIAGNOSIS — E03.9 HYPOTHYROIDISM, UNSPECIFIED TYPE: ICD-10-CM

## 2017-09-25 DIAGNOSIS — E11.59 HYPERTENSION ASSOCIATED WITH DIABETES: ICD-10-CM

## 2017-09-25 DIAGNOSIS — G47.33 OSA ON CPAP: Chronic | ICD-10-CM

## 2017-09-25 PROCEDURE — 73502 X-RAY EXAM HIP UNI 2-3 VIEWS: CPT | Mod: 26,RT,, | Performed by: RADIOLOGY

## 2017-09-25 PROCEDURE — 90662 IIV NO PRSV INCREASED AG IM: CPT | Mod: S$GLB,,, | Performed by: FAMILY MEDICINE

## 2017-09-25 PROCEDURE — G0008 ADMIN INFLUENZA VIRUS VAC: HCPCS | Mod: S$GLB,,, | Performed by: FAMILY MEDICINE

## 2017-09-25 PROCEDURE — 73502 X-RAY EXAM HIP UNI 2-3 VIEWS: CPT | Mod: TC,PO,RT

## 2017-09-25 PROCEDURE — 99499 UNLISTED E&M SERVICE: CPT | Mod: S$PBB,,, | Performed by: FAMILY MEDICINE

## 2017-09-25 PROCEDURE — 3074F SYST BP LT 130 MM HG: CPT | Mod: S$GLB,,, | Performed by: FAMILY MEDICINE

## 2017-09-25 PROCEDURE — 1159F MED LIST DOCD IN RCRD: CPT | Mod: S$GLB,,, | Performed by: FAMILY MEDICINE

## 2017-09-25 PROCEDURE — 3078F DIAST BP <80 MM HG: CPT | Mod: S$GLB,,, | Performed by: FAMILY MEDICINE

## 2017-09-25 PROCEDURE — 3008F BODY MASS INDEX DOCD: CPT | Mod: S$GLB,,, | Performed by: FAMILY MEDICINE

## 2017-09-25 PROCEDURE — 99215 OFFICE O/P EST HI 40 MIN: CPT | Mod: S$GLB,,, | Performed by: FAMILY MEDICINE

## 2017-09-25 PROCEDURE — 99999 PR PBB SHADOW E&M-EST. PATIENT-LVL IV: CPT | Mod: PBBFAC,,, | Performed by: FAMILY MEDICINE

## 2017-09-25 NOTE — PROGRESS NOTES
Subjective:      Patient ID: Rudy Saldaña is a 77 y.o. male.    Chief Complaint: Extremity Weakness (right lower body)    HPI he states that he has a pain in his right leg for the last month. h he states that when he stands he has a strange feeling in his right buttock and into the right leg.  He has had a pain in it and he can't stand on it long.  He states that standing for any period of time will cause him the pain.   Lying in bed hurts him also.  He has an intermittent limp with this.    The patient presents with diabetes.  The patient denies polyuria, polydipsia, polyphagia, hypoglycemia and paresthesias.  The patient's glucose control has been good.  Home glucose averages are routinely checked.  The patient is without retinopathy currently.  The patient has no history of neuropathy.  The patient currently complains of no podiatric problems.  The patient has excellent compliance.  Hemoglobin A1C   Date Value Ref Range Status   09/05/2017 6.5 (H) 4.0 - 5.6 % Final     Comment:     According to ADA guidelines, hemoglobin A1c <7.0% represents  optimal control in non-pregnant diabetic patients. Different  metrics may apply to specific patient populations.   Standards of Medical Care in Diabetes-2016.  For the purpose of screening for the presence of diabetes:  <5.7%     Consistent with the absence of diabetes  5.7-6.4%  Consistent with increasing risk for diabetes   (prediabetes)  >or=6.5%  Consistent with diabetes  Currently, no consensus exists for use of hemoglobin A1c  for diagnosis of diabetes for children.  This Hemoglobin A1c assay has significant interference with fetal   hemoglobin   (HbF). The results are invalid for patients with abnormal amounts of   HbF,   including those with known Hereditary Persistence   of Fetal Hemoglobin. Heterozygous hemoglobin variants (HbAS, HbAC,   HbAD, HbAE, HbA2) do not significantly interfere with this assay;   however, presence of multiple variants in a sample may  impact the %   interference.     03/02/2017 6.5 (H) 4.5 - 6.2 % Final     Comment:     According to ADA guidelines, hemoglobin A1C <7.0% represents  optimal control in non-pregnant diabetic patients.  Different  metrics may apply to specific populations.   Standards of Medical Care in Diabetes - 2016.  For the purpose of screening for the presence of diabetes:  <5.7%     Consistent with the absence of diabetes  5.7-6.4%  Consistent with increasing risk for diabetes   (prediabetes)  >or=6.5%  Consistent with diabetes  Currently no consensus exists for use of hemoglobin A1C  for diagnosis of diabetes for children.     09/01/2016 6.5 (H) 4.5 - 6.2 % Final     Comment:     According to ADA guidelines, hemoglobin A1C <7.0% represents  optimal control in non-pregnant diabetic patients.  Different  metrics may apply to specific populations.   Standards of Medical Care in Diabetes - 2016.  For the purpose of screening for the presence of diabetes:  <5.7%     Consistent with the absence of diabetes  5.7-6.4%  Consistent with increasing risk for diabetes   (prediabetes)  >or=6.5%  Consistent with diabetes  Currently no consensus exists for use of hemoglobin A1C  for diagnosis of diabetes for children.       No results found for: MICROALBUR, [id number]  He was not on starlix and we thought that he was and had called in a refill but he is not taking it now and we discussed that he should not be on it.  I took it off of the list.    The patient presents with essential hypertension.  The patient is tolerating the medication well and is in excellent compliance.  The patient is experiencing no side effects.  Counseling was offered regarding low salt diets.  The patient has a reduced salt intake.  The patient denies chest pain, palpitations, shortness of breath, dyspnea on exertion, left or murmur neck pain, nausea, vomiting, diaphoresis, paroxysmal nocturnal dyspnea, and orthopnea.   /72   Pulse 62   Ht 6' (1.829 m)   Wt  105.5 kg (232 lb 9.6 oz)   BMI 31.55 kg/m²     The patient presents with hypothyroidism.  The patient denies agitation, anxiety, blurred vision, chest pain, cold intolerance, constipation, dizziness, dry skin, fatigue, lightheadedness, paresthesias, skin coarsening, tachycardia, tremor, weight gain or weight loss.  The patient's current treatment has included Synthroid with a good response.    Lab Results   Component Value Date    TSH 3.125 01/29/2016       He has had iron deficiency anemia.   Lab Results   Component Value Date    WBC 5.29 08/02/2017    HGB 13.6 (L) 08/02/2017    HCT 40.8 08/02/2017    MCV 89 08/02/2017     08/02/2017     Lab Results   Component Value Date    IRON 104 08/02/2017    TIBC 330 08/02/2017    FERRITIN 350 (H) 11/09/2015   ]    He has vit d deficiency and he is on meds. He had a good level recently.      He has DEVIKA and has to use his cpap nightly.  He does well with this.     He has polyneuropathy due to diabetes that has been stable.     He has stage III ckd due to diabetes.  He does not see a kidney specialist  CMP  Sodium   Date Value Ref Range Status   03/02/2017 141 136 - 145 mmol/L Final     Potassium   Date Value Ref Range Status   03/02/2017 4.3 3.5 - 5.1 mmol/L Final     Chloride   Date Value Ref Range Status   03/02/2017 107 95 - 110 mmol/L Final     CO2   Date Value Ref Range Status   03/02/2017 29 23 - 29 mmol/L Final     Glucose   Date Value Ref Range Status   03/02/2017 136 (H) 70 - 110 mg/dL Final     BUN, Bld   Date Value Ref Range Status   03/02/2017 29 (H) 8 - 23 mg/dL Final     Creatinine   Date Value Ref Range Status   03/02/2017 1.4 0.5 - 1.4 mg/dL Final     Calcium   Date Value Ref Range Status   03/02/2017 9.7 8.7 - 10.5 mg/dL Final     Total Protein   Date Value Ref Range Status   06/14/2017 6.9 6.0 - 8.4 g/dL Final     Albumin   Date Value Ref Range Status   06/14/2017 3.8 3.5 - 5.2 g/dL Final     Total Bilirubin   Date Value Ref Range Status   06/14/2017  1.6 (H) 0.1 - 1.0 mg/dL Final     Comment:     For infants and newborns, interpretation of results should be based  on gestational age, weight and in agreement with clinical  observations.  Premature Infant recommended reference ranges:  Up to 24 hours.............<8.0 mg/dL  Up to 48 hours............<12.0 mg/dL  3-5 days..................<15.0 mg/dL  6-29 days.................<15.0 mg/dL       Alkaline Phosphatase   Date Value Ref Range Status   06/14/2017 42 (L) 55 - 135 U/L Final     AST   Date Value Ref Range Status   06/14/2017 20 10 - 40 U/L Final     ALT   Date Value Ref Range Status   06/14/2017 16 10 - 44 U/L Final     Anion Gap   Date Value Ref Range Status   03/02/2017 5 (L) 8 - 16 mmol/L Final     eGFR if    Date Value Ref Range Status   03/02/2017 55.2 (A) >60 mL/min/1.73 m^2 Final     eGFR if non    Date Value Ref Range Status   03/02/2017 47.8 (A) >60 mL/min/1.73 m^2 Final     Comment:     Calculation used to obtain the estimated glomerular filtration  rate (eGFR) is the CKD-EPI equation. Since race is unknown   in our information system, the eGFR values for   -American and Non--American patients are given   for each creatinine result.       He has been stable on the renal issue but it is time to recheck this.     Health Maintenance Due   Topic Date Due    Influenza Vaccine  08/01/2017       Past Medical History:  Past Medical History:   Diagnosis Date    Anemia     BPH (benign prostatic hypertrophy)     Carotid bruit     CVA (cerebral vascular accident)     Hyperlipidemia     Hypertension     Hypothyroidism 2/29/2016    Type II or unspecified type diabetes mellitus without mention of complication, not stated as uncontrolled     Vitamin D deficiency 2/29/2016     Past Surgical History:   Procedure Laterality Date    COLONOSCOPY  2008    COLONOSCOPY  5/31/2005    repeat in 2015    PILONIDAL CYST DRAINAGE      TRANSURETHRAL RESECTION OF  PROSTATE       Review of patient's allergies indicates:   Allergen Reactions    No known drug allergies      Current Outpatient Prescriptions on File Prior to Visit   Medication Sig Dispense Refill    acarbose (PRECOSE) 100 MG Tab Take 1 tablet (100 mg total) by mouth 3 (three) times daily with meals. 270 tablet 3    aspirin (ECOTRIN) 81 MG EC tablet Take 81 mg by mouth once daily.      atorvastatin (LIPITOR) 20 MG tablet take 1 tablet by mouth once daily 90 tablet 3    benazepril-hydrochlorthiazide (LOTENSIN HCT) 20-25 mg Tab take 1 tablet by mouth twice a day 180 tablet 3    blood sugar diagnostic Strp Use once daily as directed 100 each 0    calcium carbonate (OS-STACEY) 600 mg (1,500 mg) Tab Take 600 mg by mouth.      calcium-vitamin D3 500 mg(1,250mg) -200 unit per tablet Take 1 tablet by mouth 2 (two) times daily with meals.      clopidogrel (PLAVIX) 75 mg tablet take 1 tablet by mouth once daily 90 tablet 3    ergocalciferol (ERGOCALCIFEROL) 50,000 unit Cap Take 50,000 Units by mouth.      ferrous sulfate 325 mg (65 mg iron) Tab tablet take 1 tablet by mouth three times a day 270 tablet 1    galantamine (RAZADYNE) 8 MG Tab take 1 tablet by mouth twice a day with meals 180 tablet 3    glipiZIDE (GLUCOTROL) 10 MG tablet Take 1 tablet (10 mg total) by mouth 2 (two) times daily before meals. 180 tablet 3    lancets Misc One touch lancets use once daily as directed 100 each 0    levothyroxine (SYNTHROID) 25 MCG tablet take 1 tablet by mouth every morning ON AN EMPTY STOMACH 90 tablet 3    niacin (NIASPAN) 1000 MG CR tablet take 1 tablet by mouth at bedtime 90 tablet 3    pioglitazone (ACTOS) 30 MG tablet take 1 tablet by mouth once daily 90 tablet 3    SITagliptin (JANUVIA) 100 MG Tab Take 1 tablet (100 mg total) by mouth once daily. 90 tablet 3    fenofibrate (TRICOR) 48 MG tablet Take 48 mg by mouth.      fluticasone (FLONASE) 50 mcg/actuation nasal spray 1 spray by Each Nare route once daily. 1  Bottle 0    loratadine (CLARITIN) 10 mg tablet   0    nisoldipine (SULAR) 30 MG Tb24 Take 30 mg by mouth.      ONETOUCH VERIO IQ METER kit Use as instructed 1 each 0    [DISCONTINUED] nateglinide (STARLIX) 120 MG tablet Take 1 tablet (120 mg total) by mouth 2 (two) times daily before meals. 180 tablet 3    [DISCONTINUED] ondansetron (ZOFRAN-ODT) 4 MG TbDL dissolve 1 tablet ON TONGUE every 6 hours if needed for nausea  0    [DISCONTINUED] tamsulosin (FLOMAX) 0.4 mg Cp24 Take 0.4 mg by mouth.       No current facility-administered medications on file prior to visit.      Social History     Social History    Marital status:      Spouse name: N/A    Number of children: N/A    Years of education: N/A     Occupational History    Not on file.     Social History Main Topics    Smoking status: Never Smoker    Smokeless tobacco: Never Used    Alcohol use No    Drug use: No    Sexual activity: Not on file     Other Topics Concern    Not on file     Social History Narrative    No narrative on file     Family History   Problem Relation Age of Onset    Hypertension Mother     Hypertension Father     Heart attack Maternal Uncle     Brain cancer Sister     Diabetes Neg Hx     Stroke Neg Hx                Review of Systems   Constitutional: Negative.  Negative for chills, diaphoresis and fever.   HENT: Negative for congestion, hearing loss, mouth sores, postnasal drip and sore throat.    Eyes: Negative for pain and visual disturbance.   Respiratory: Positive for shortness of breath. Negative for cough, chest tightness and wheezing.    Cardiovascular: Negative for chest pain.   Gastrointestinal: Negative for abdominal pain, anal bleeding, blood in stool, constipation, diarrhea, nausea and vomiting.   Genitourinary: Negative for dysuria and hematuria.   Musculoskeletal: Positive for arthralgias. Negative for back pain, neck pain and neck stiffness.   Skin: Negative for rash.   Neurological: Negative for  dizziness and weakness.       Objective:   /72   Pulse 62   Ht 6' (1.829 m)   Wt 105.5 kg (232 lb 9.6 oz)   BMI 31.55 kg/m²     Physical Exam   Constitutional: He is oriented to person, place, and time. He appears well-developed and well-nourished.   HENT:   Head: Normocephalic and atraumatic.   Right Ear: External ear normal.   Left Ear: External ear normal.   Nose: Nose normal.   Mouth/Throat: Oropharynx is clear and moist. No oropharyngeal exudate.   Eyes: Conjunctivae and EOM are normal. Pupils are equal, round, and reactive to light. Right eye exhibits no discharge. Left eye exhibits no discharge. No scleral icterus.   Neck: Normal range of motion. Neck supple. No JVD present. No thyromegaly present.   Cardiovascular: Normal rate, regular rhythm, normal heart sounds and intact distal pulses.  Exam reveals no gallop and no friction rub.    No murmur heard.  Pulmonary/Chest: Effort normal and breath sounds normal. No respiratory distress. He has no wheezes. He has no rales. He exhibits no tenderness.   Abdominal: Soft. Bowel sounds are normal. He exhibits no distension and no mass. There is no tenderness. There is no rebound and no guarding.   Genitourinary:   Genitourinary Comments: The patient was offered to allow me to check the prostate today but the patient declined to have it examined.    Musculoskeletal: Normal range of motion. He exhibits no edema or tenderness.        Right hip: He exhibits normal range of motion and no swelling.        Legs:  Lymphadenopathy:     He has no cervical adenopathy.   Neurological: He is alert and oriented to person, place, and time. No cranial nerve deficit. Coordination normal.   Skin: Skin is warm and dry. He is not diaphoretic.   Psychiatric: He has a normal mood and affect.       Assessment:     1. Right hip pain    2. Controlled type 2 diabetes mellitus with stage 3 chronic kidney disease, without long-term current use of insulin    3. Combined hyperlipidemia  associated with type 2 diabetes mellitus    4. CKD stage 3 secondary to diabetes    5. Anemia, unspecified type    6. Hypertension associated with diabetes    7. Hypothyroidism, unspecified type    8. DEVIKA on CPAP    9. Polyneuropathy due to secondary diabetes    10. History of CVA (cerebrovascular accident)        Plan:   Rudy was seen today for extremity weakness.    Diagnoses and all orders for this visit:    Right hip pain  -     X-Ray Hip 2 or 3 views Right; Future    Controlled type 2 diabetes mellitus with stage 3 chronic kidney disease, without long-term current use of insulin  -     Basic metabolic panel; Future    Combined hyperlipidemia associated with type 2 diabetes mellitus    CKD stage 3 secondary to diabetes  -     Basic metabolic panel; Future    Anemia, unspecified type    Hypertension associated with diabetes    Hypothyroidism, unspecified type  -     TSH; Future    DEVIKA on CPAP    Polyneuropathy due to secondary diabetes    History of CVA (cerebrovascular accident)    Other orders  -     Influenza - High Dose (65+) (PF) (IM)      Continue lipitor for the cholesterol and continue the blood pressure medicine and diabetes medicine.  Continue the cpap.

## 2017-09-27 NOTE — PROGRESS NOTES
The xray of the hip shows some arthritis but there is no fracture or bone lesions noted.  I recommend that we start physical therapy to help with the pain and if this does not help him, I would want to do an MRI of the hip.  Please order the PT for him at [facility] if he agrees.   Please call him to discuss this.

## 2017-10-06 ENCOUNTER — PATIENT MESSAGE (OUTPATIENT)
Dept: FAMILY MEDICINE | Facility: CLINIC | Age: 77
End: 2017-10-06

## 2017-10-06 ENCOUNTER — TELEPHONE (OUTPATIENT)
Dept: FAMILY MEDICINE | Facility: CLINIC | Age: 77
End: 2017-10-06

## 2017-10-06 DIAGNOSIS — M25.551 RIGHT HIP PAIN: Primary | ICD-10-CM

## 2017-10-06 NOTE — TELEPHONE ENCOUNTER
I put this in when I reviewed his xray so I guess desiree no one put in an order to get it done.  I will put it in now.  Family Medicine      []Hide copied text  []Hover for attribution information  The xray of the hip shows some arthritis but there is no fracture or bone lesions noted.  I recommend that we start physical therapy to help with the pain and if this does not help him, I would want to do an MRI of the hip.  Please order the PT for him at [facility] if he agrees.   Please call him to discuss this.      Electronically signed by Mohinder Morfin MD at 9/27/2017  8:55 AM          Orders Placed This Encounter   Procedures    Ambulatory consult to Physical Therapy     Referral Priority:   Routine     Referral Type:   Physical Medicine     Referral Reason:   Specialty Services Required     Requested Specialty:   Physical Therapy     Number of Visits Requested:   1

## 2017-10-20 ENCOUNTER — PATIENT MESSAGE (OUTPATIENT)
Dept: FAMILY MEDICINE | Facility: CLINIC | Age: 77
End: 2017-10-20

## 2017-10-20 RX ORDER — TRAMADOL HYDROCHLORIDE 50 MG/1
50 TABLET ORAL EVERY 6 HOURS PRN
Qty: 30 TABLET | Refills: 0 | Status: SHIPPED | OUTPATIENT
Start: 2017-10-20 | End: 2017-10-30

## 2017-10-23 ENCOUNTER — PATIENT MESSAGE (OUTPATIENT)
Dept: FAMILY MEDICINE | Facility: CLINIC | Age: 77
End: 2017-10-23

## 2017-10-23 DIAGNOSIS — M25.551 RIGHT HIP PAIN: Primary | ICD-10-CM

## 2017-10-23 NOTE — TELEPHONE ENCOUNTER
Refer to ortho.      I have signed for the following orders AND/OR meds.  Please call the patient and ask the patient to schedule the testing AND/OR inform about any medications that were sent.      Orders Placed This Encounter   Procedures    Ambulatory referral to Orthopedics     Referral Priority:   Routine     Referral Type:   Consultation     Referral Reason:   Specialty Services Required

## 2017-10-25 ENCOUNTER — OFFICE VISIT (OUTPATIENT)
Dept: ORTHOPEDICS | Facility: CLINIC | Age: 77
End: 2017-10-25
Payer: MEDICARE

## 2017-10-25 VITALS
HEIGHT: 72 IN | BODY MASS INDEX: 31.42 KG/M2 | HEART RATE: 72 BPM | SYSTOLIC BLOOD PRESSURE: 153 MMHG | DIASTOLIC BLOOD PRESSURE: 75 MMHG | WEIGHT: 232 LBS

## 2017-10-25 DIAGNOSIS — M16.11 PRIMARY OSTEOARTHRITIS OF RIGHT HIP: ICD-10-CM

## 2017-10-25 DIAGNOSIS — M70.61 GREATER TROCHANTERIC BURSITIS OF RIGHT HIP: Primary | ICD-10-CM

## 2017-10-25 DIAGNOSIS — M47.26 OSTEOARTHRITIS OF SPINE WITH RADICULOPATHY, LUMBAR REGION: ICD-10-CM

## 2017-10-25 DIAGNOSIS — M25.551 RIGHT HIP PAIN: ICD-10-CM

## 2017-10-25 DIAGNOSIS — M47.816 FACET ARTHROPATHY, LUMBAR: ICD-10-CM

## 2017-10-25 PROCEDURE — 99999 PR PBB SHADOW E&M-EST. PATIENT-LVL III: CPT | Mod: PBBFAC,,, | Performed by: ORTHOPAEDIC SURGERY

## 2017-10-25 PROCEDURE — 99203 OFFICE O/P NEW LOW 30 MIN: CPT | Mod: S$GLB,,, | Performed by: ORTHOPAEDIC SURGERY

## 2017-10-25 PROCEDURE — 99499 UNLISTED E&M SERVICE: CPT | Mod: S$PBB,,, | Performed by: ORTHOPAEDIC SURGERY

## 2017-10-25 RX ORDER — DICLOFENAC SODIUM 10 MG/G
2 GEL TOPICAL 4 TIMES DAILY
Qty: 300 G | Refills: 3 | Status: SHIPPED | OUTPATIENT
Start: 2017-10-25 | End: 2018-07-06

## 2017-10-25 NOTE — LETTER
October 27, 2017      Mohinder Morfin MD  [address] [facility] [address] [facility] [address]           [place] [facility]  [address] [facility] [address] [address]  Phone: [phone]          Patient: Rudy Saldaña   MR Number: [id number]   YOB: 1940   Date of Visit: 10/25/2017       Dear Dr. Mohinder Morfin:    Thank you for referring Rudy Saldaña to me for evaluation. Attached you will find relevant portions of my assessment and plan of care.    If you have questions, please do not hesitate to call me. I look forward to following Rudy Saldaña along with you.    Sincerely,    Yasir Peralta MD    Enclosure  CC:  No Recipients    If you would like to receive this communication electronically, please contact [email or web address] or [phone] to request more information on [email or web address] access.    For providers and/or their staff who would like to refer a patient to [facility], please contact us through our one-stop-shop provider referral line, Jose Jose, at [phone].    If you feel you have received this communication in error or would no longer like to receive these types of communications, please e-mail [email or web address]

## 2017-10-27 NOTE — PROGRESS NOTES
Subjective:          Chief Complaint: Rudy Saldaña is a 77 y.o. male who had concerns including Pain of the Right Hip.    Mr. Saldaña is here for his right hip pain. His right hip and leg pain are seemingly multifactorial as he has right groin; right lateral hip pain as well as right posterior hip/lower back and leg pain.     Right lateral hip pain is increased when he lies directly on the right hip when sleeping and it does disturb his sleeping    Pain: 2/10          Past Medical History:   Diagnosis Date    Anemia     BPH (benign prostatic hypertrophy)     Carotid bruit     CVA (cerebral vascular accident)     Hyperlipidemia     Hypertension     Hypothyroidism 2/29/2016    Type II or unspecified type diabetes mellitus without mention of complication, not stated as uncontrolled     Vitamin D deficiency 2/29/2016       Past Surgical History:   Procedure Laterality Date    COLONOSCOPY  2008    COLONOSCOPY  5/31/2005    repeat in 2015    PILONIDAL CYST DRAINAGE      TRANSURETHRAL RESECTION OF PROSTATE         Family History   Problem Relation Age of Onset    Hypertension Mother     Hypertension Father     Heart attack Maternal Uncle     Brain cancer Sister     Diabetes Neg Hx     Stroke Neg Hx          Current Outpatient Prescriptions:     acarbose (PRECOSE) 100 MG Tab, Take 1 tablet (100 mg total) by mouth 3 (three) times daily with meals., Disp: 270 tablet, Rfl: 3    aspirin (ECOTRIN) 81 MG EC tablet, Take 81 mg by mouth once daily., Disp: , Rfl:     atorvastatin (LIPITOR) 20 MG tablet, take 1 tablet by mouth once daily, Disp: 90 tablet, Rfl: 3    benazepril-hydrochlorthiazide (LOTENSIN HCT) 20-25 mg Tab, take 1 tablet by mouth twice a day, Disp: 180 tablet, Rfl: 3    blood sugar diagnostic Strp, Use once daily as directed, Disp: 100 each, Rfl: 0    calcium carbonate (OS-STACEY) 600 mg (1,500 mg) Tab, Take 600 mg by mouth., Disp: , Rfl:     calcium-vitamin D3 500 mg(1,250mg) -200 unit per  tablet, Take 1 tablet by mouth 2 (two) times daily with meals., Disp: , Rfl:     clopidogrel (PLAVIX) 75 mg tablet, take 1 tablet by mouth once daily, Disp: 90 tablet, Rfl: 3    ergocalciferol (ERGOCALCIFEROL) 50,000 unit Cap, Take 50,000 Units by mouth., Disp: , Rfl:     fenofibrate (TRICOR) 48 MG tablet, Take 48 mg by mouth., Disp: , Rfl:     ferrous sulfate 325 mg (65 mg iron) Tab tablet, take 1 tablet by mouth three times a day, Disp: 270 tablet, Rfl: 1    fluticasone (FLONASE) 50 mcg/actuation nasal spray, 1 spray by Each Nare route once daily., Disp: 1 Bottle, Rfl: 0    galantamine (RAZADYNE) 8 MG Tab, take 1 tablet by mouth twice a day with meals, Disp: 180 tablet, Rfl: 3    glipiZIDE (GLUCOTROL) 10 MG tablet, Take 1 tablet (10 mg total) by mouth 2 (two) times daily before meals., Disp: 180 tablet, Rfl: 3    lancets Misc, One touch lancets use once daily as directed, Disp: 100 each, Rfl: 0    levothyroxine (SYNTHROID) 25 MCG tablet, take 1 tablet by mouth every morning ON AN EMPTY STOMACH, Disp: 90 tablet, Rfl: 3    loratadine (CLARITIN) 10 mg tablet, , Disp: , Rfl: 0    niacin (NIASPAN) 1000 MG CR tablet, take 1 tablet by mouth at bedtime, Disp: 90 tablet, Rfl: 3    nisoldipine (SULAR) 30 MG Tb24, Take 30 mg by mouth., Disp: , Rfl:     pioglitazone (ACTOS) 30 MG tablet, take 1 tablet by mouth once daily, Disp: 90 tablet, Rfl: 3    SITagliptin (JANUVIA) 100 MG Tab, Take 1 tablet (100 mg total) by mouth once daily., Disp: 90 tablet, Rfl: 3    tramadol (ULTRAM) 50 mg tablet, Take 1 tablet (50 mg total) by mouth every 6 (six) hours as needed for Pain., Disp: 30 tablet, Rfl: 0    diclofenac sodium 1 % Gel, Apply 2 g topically 4 (four) times daily. Apply to affected areas; CKD and anti-coagulants, Disp: 300 g, Rfl: 3    ONETOUCH VERIO IQ METER kit, Use as instructed, Disp: 1 each, Rfl: 0    Review of patient's allergies indicates:   Allergen Reactions    No known drug allergies        Vitals:     10/25/17 0851   BP: (!) 153/75   Pulse: 72       Review of Systems   Musculoskeletal: Positive for arthritis, joint pain and myalgias.   Neurological: Positive for numbness and paresthesias.   All other systems reviewed and are negative.                  Objective:        General: Rudy is well-developed, well-nourished, appears stated age, in no acute distress, alert and oriented to time, place and person.     General    Vitals reviewed.  Constitutional: He is oriented to person, place, and time. He appears well-developed and well-nourished. No distress.   HENT:   Head: Normocephalic and atraumatic.   Nose: Nose normal.   Eyes: Pupils are equal, round, and reactive to light.   Cardiovascular: Normal rate.    Pulmonary/Chest: Effort normal.   Neurological: He is alert and oriented to person, place, and time.   Psychiatric: He has a normal mood and affect. His behavior is normal. Judgment and thought content normal.     General Musculoskeletal Exam   Gait: normal       Right Knee Exam     Inspection   Alignment:  normal  Effusion: effusion    Right Hip Exam     Inspection   Scars: absent  Swelling: absent  Bruising: absent  No deformity of hip.  Quadriceps Atrophy:  Negative  Erythema: absent    Tenderness   The patient tender to palpation of the trochanteric bursa.    Range of Motion   Extension: normal   Flexion: normal   Internal Rotation: normal   External Rotation: normal   Abduction: normal   Adduction: normal     Tests   Pain w/ forced internal rotation (BHUPENDRA): absent  Pain w/ forced external rotation (FADIR): present  Ko: negative  Stinchfield test: positive    Other   Sensation: normal      Muscle Strength   Right Lower Extremity   Hip Abduction: 5/5   Hip Adduction: 5/5   Hip Flexion: 5/5   Ankle Dorsiflexion:  5/5     Vascular Exam     Right Pulses  Dorsalis Pedis:      2+  Posterior Tibial:      2+        Capillary Refill  Right Hand: normal capillary refill    Edema  Right Upper Leg:  absent        Current and previous radiographic studies and results were reviewed with the patient:   Findings:    Generalized osteopenia and degenerative change again noted throughout the included lumbosacral spine, SI and hip joints.  No acute fracture, dislocation or definite AVN.  Vascular calcifications noted.   Impression           Stable exam area see above.    Followup and/or further evaluation as clinically warranted.      Previous Lumbar Films:  There is anterior osteophyte formation throughout the lumbar region.  There is slight anterior wedging of L1 which is slightly increased relative to the 05/23/08 examination.  Lower lumbar degenerative facet changes are present.  There is anterior   osteophyte formation lower lumbar degenerative facet changes.  There is loss of disk space height at L2-3 and L3-4.   Impression      Slight anterior wedging of L1 increased relative to 2008.    Lower lumbar degenerative disk and facet changes without evidence of subluxation.           Assessment:       Encounter Diagnoses   Name Primary?    Right hip pain Yes    Primary osteoarthritis of right hip     Osteoarthritis of spine with radiculopathy, lumbar region     Facet arthropathy, lumbar     Greater trochanteric bursitis of right hip           Plan:         The patient was counseled today regarding his diagnostic study results and the different treatment options. I spent >50% of the time discussing conservative vs. Operative options with the patient as well as risks and benefits of the different options.  The patient does not want to have an injection at this time  He is unable to take NSAIDs secondary chronic anti-coagulation and CKD  He is also not able to take a Medrol dose pack secondary to his DM II.  He will attempt topical medications for now and f/u PRN

## 2017-10-30 DIAGNOSIS — D50.9 IRON DEFICIENCY ANEMIA: ICD-10-CM

## 2017-10-30 RX ORDER — FERROUS SULFATE 325(65) MG
TABLET ORAL
Qty: 270 TABLET | Refills: 1 | Status: SHIPPED | OUTPATIENT
Start: 2017-10-30 | End: 2018-05-08 | Stop reason: SDUPTHER

## 2018-03-05 ENCOUNTER — PATIENT MESSAGE (OUTPATIENT)
Dept: PODIATRY | Facility: CLINIC | Age: 78
End: 2018-03-05

## 2018-03-05 ENCOUNTER — PATIENT MESSAGE (OUTPATIENT)
Dept: FAMILY MEDICINE | Facility: CLINIC | Age: 78
End: 2018-03-05

## 2018-03-05 ENCOUNTER — PATIENT MESSAGE (OUTPATIENT)
Dept: PULMONOLOGY | Facility: CLINIC | Age: 78
End: 2018-03-05

## 2018-03-09 RX ORDER — GALANTAMINE 8 MG/1
TABLET, FILM COATED ORAL
Qty: 180 TABLET | Refills: 3 | Status: SHIPPED | OUTPATIENT
Start: 2018-03-09 | End: 2018-08-10 | Stop reason: SDUPTHER

## 2018-03-13 ENCOUNTER — LAB VISIT (OUTPATIENT)
Dept: LAB | Facility: HOSPITAL | Age: 78
End: 2018-03-13
Attending: FAMILY MEDICINE
Payer: MEDICARE

## 2018-03-13 DIAGNOSIS — E11.22 CONTROLLED TYPE 2 DIABETES MELLITUS WITH STAGE 3 CHRONIC KIDNEY DISEASE, WITHOUT LONG-TERM CURRENT USE OF INSULIN: ICD-10-CM

## 2018-03-13 DIAGNOSIS — N18.30 CONTROLLED TYPE 2 DIABETES MELLITUS WITH STAGE 3 CHRONIC KIDNEY DISEASE, WITHOUT LONG-TERM CURRENT USE OF INSULIN: ICD-10-CM

## 2018-03-13 LAB
ESTIMATED AVG GLUCOSE: 151 MG/DL
HBA1C MFR BLD HPLC: 6.9 %

## 2018-03-13 PROCEDURE — 36415 COLL VENOUS BLD VENIPUNCTURE: CPT | Mod: PO

## 2018-03-13 PROCEDURE — 83036 HEMOGLOBIN GLYCOSYLATED A1C: CPT

## 2018-03-14 DIAGNOSIS — N18.30 CONTROLLED TYPE 2 DIABETES MELLITUS WITH STAGE 3 CHRONIC KIDNEY DISEASE, WITHOUT LONG-TERM CURRENT USE OF INSULIN: Primary | ICD-10-CM

## 2018-03-14 DIAGNOSIS — E11.22 CONTROLLED TYPE 2 DIABETES MELLITUS WITH STAGE 3 CHRONIC KIDNEY DISEASE, WITHOUT LONG-TERM CURRENT USE OF INSULIN: Primary | ICD-10-CM

## 2018-03-14 RX ORDER — PIOGLITAZONEHYDROCHLORIDE 30 MG/1
30 TABLET ORAL DAILY
Qty: 90 TABLET | Refills: 1 | Status: SHIPPED | OUTPATIENT
Start: 2018-03-14 | End: 2018-04-23 | Stop reason: SDUPTHER

## 2018-03-14 RX ORDER — ACARBOSE 100 MG/1
100 TABLET ORAL
Qty: 270 TABLET | Refills: 1 | Status: SHIPPED | OUTPATIENT
Start: 2018-03-14 | End: 2018-08-10 | Stop reason: SDUPTHER

## 2018-03-14 RX ORDER — GLIPIZIDE 10 MG/1
10 TABLET ORAL
Qty: 180 TABLET | Refills: 1 | Status: SHIPPED | OUTPATIENT
Start: 2018-03-14 | End: 2018-08-10 | Stop reason: SDUPTHER

## 2018-03-14 NOTE — TELEPHONE ENCOUNTER
----- Message from Mohinder Morfin MD sent at 3/14/2018  6:34 AM CDT -----  Book a1c in 6 months.  Notify of the appointment via MyChart or letter.   Refill Diabetes Meds x 6 months.    The patient's Health Maintenance that is due is below:  There are no preventive care reminders to display for this patient.

## 2018-03-15 ENCOUNTER — OFFICE VISIT (OUTPATIENT)
Dept: PULMONOLOGY | Facility: CLINIC | Age: 78
End: 2018-03-15
Payer: MEDICARE

## 2018-03-15 VITALS
OXYGEN SATURATION: 96 % | RESPIRATION RATE: 18 BRPM | SYSTOLIC BLOOD PRESSURE: 120 MMHG | DIASTOLIC BLOOD PRESSURE: 83 MMHG | WEIGHT: 230.63 LBS | HEART RATE: 62 BPM | HEIGHT: 72 IN | BODY MASS INDEX: 31.24 KG/M2

## 2018-03-15 DIAGNOSIS — G47.33 OSA ON CPAP: Primary | Chronic | ICD-10-CM

## 2018-03-15 PROCEDURE — 3074F SYST BP LT 130 MM HG: CPT | Mod: CPTII,S$GLB,, | Performed by: INTERNAL MEDICINE

## 2018-03-15 PROCEDURE — 99999 PR PBB SHADOW E&M-EST. PATIENT-LVL III: CPT | Mod: PBBFAC,,, | Performed by: INTERNAL MEDICINE

## 2018-03-15 PROCEDURE — 99214 OFFICE O/P EST MOD 30 MIN: CPT | Mod: S$GLB,,, | Performed by: INTERNAL MEDICINE

## 2018-03-15 PROCEDURE — 3079F DIAST BP 80-89 MM HG: CPT | Mod: CPTII,S$GLB,, | Performed by: INTERNAL MEDICINE

## 2018-03-15 NOTE — ASSESSMENT & PLAN NOTE
Continue CPAP of 8 CMWP. ([facility] [place] [facility] /[employer]). Discussed therapeutic goals for positive airway pressure therapy(CPAP or BiPAP): Ideal is usage 100% of nights for 6 - 8 hours per night. Minimum usage is 70% of night for at least 4 hours per night used. Pateint expressed understanding. All Questions answered. CPAP supplies renewed.         CPAP supplies renewed.

## 2018-03-15 NOTE — PATIENT INSTRUCTIONS
Continuous Positive Airway Pressure (CPAP)  Your healthcare provider has prescribed continuous positive airway pressure (CPAP) therapy for you. A CPAP device helps you breathe better at night. The device sends air through your nose or mouth when you breathe in to keep your air passages open. CPAP is:  · Used most often to treat sleep apnea and some other problems. (Sleep apnea is a chronic condition with periods of sleep in which you briefly stop breathing.)  · Safe and very effective. But it takes time to get used to the mask.  Your healthcare provider, nurse, or medical supplier will give you tips for wearing and caring for your CPAP device.  General guidelines  Recommendations include the following:  · It's very important not to give up! It takes time to get used to wearing the mask at night.  · Practice using your CPAP device during the day, especially whenever you take a nap.  · Remember, there are several different types of masks. If you cant get used to your mask, ask your provider or medical supply company about trying another style.  · If you have nasal stuffiness or dryness when using your CPAP device, talk with your provider or medical supply company. There are ways to ease these problems. For example, your provider may recommend using a moistening nasal spray. Or the medical supply company may recommend a device with a humidifier.  · The goal is to use your CPAP all night, every night, during all naps, and even when you travel.  · Keep your mask clean. Wash it with soap and water. Be sure to rinse the mask and tubing well with water to remove any soap. Let them air-dry completely before using.  · Make yourself comfortable when sleeping with CPAP. Try using extra pillows.  Work with your medical supply company so that you know how to correctly use your CPAP. The company's representative will be able to help you:  · Use the CPAP correctly  · Troubleshoot any problems that come up  · Learn to clean and  maintain the device  · Adjust to regular use of the CPAP     The CPAP device settings are given as centimeters of water, or cm/H2O. Each persons pressure settings are different. Your healthcare provider will tell you what settings to use. Never change your CPAP pressure setting unless your provider tells you to.  CPAP ____________cm/H20 pressure when you breathe in   Date Last Reviewed: 5/1/2016  © 3591-9701 [employer]. [address] [place] [address]. All rights reserved. This information is not intended as a substitute for professional medical care. Always follow your healthcare professional's instructions.

## 2018-03-28 DIAGNOSIS — E11.69 COMBINED HYPERLIPIDEMIA ASSOCIATED WITH TYPE 2 DIABETES MELLITUS: ICD-10-CM

## 2018-03-28 DIAGNOSIS — E78.2 COMBINED HYPERLIPIDEMIA ASSOCIATED WITH TYPE 2 DIABETES MELLITUS: ICD-10-CM

## 2018-03-28 RX ORDER — NIACIN 1000 MG/1
TABLET, EXTENDED RELEASE ORAL
Qty: 90 TABLET | Refills: 0 | Status: SHIPPED | OUTPATIENT
Start: 2018-03-28 | End: 2018-07-05 | Stop reason: SDUPTHER

## 2018-04-02 DIAGNOSIS — I15.2 HYPERTENSION ASSOCIATED WITH DIABETES: ICD-10-CM

## 2018-04-02 DIAGNOSIS — E11.59 HYPERTENSION ASSOCIATED WITH DIABETES: ICD-10-CM

## 2018-04-02 RX ORDER — BENAZEPRIL/HYDROCHLOROTHIAZIDE 20 MG-25MG
TABLET ORAL
Qty: 180 TABLET | Refills: 0 | Status: SHIPPED | OUTPATIENT
Start: 2018-04-02 | End: 2018-07-09 | Stop reason: SDUPTHER

## 2018-04-02 NOTE — TELEPHONE ENCOUNTER
I have refilled the patient's requested medication x 1 month.  However, the patient is due for a follow up visit.  Call the patient on the phone and book an appointment

## 2018-04-03 ENCOUNTER — OFFICE VISIT (OUTPATIENT)
Dept: PODIATRY | Facility: CLINIC | Age: 78
End: 2018-04-03
Payer: MEDICARE

## 2018-04-03 VITALS
WEIGHT: 230 LBS | RESPIRATION RATE: 20 BRPM | SYSTOLIC BLOOD PRESSURE: 129 MMHG | HEART RATE: 53 BPM | BODY MASS INDEX: 31.15 KG/M2 | DIASTOLIC BLOOD PRESSURE: 56 MMHG | HEIGHT: 72 IN

## 2018-04-03 DIAGNOSIS — M20.42 HAMMER TOES OF BOTH FEET: ICD-10-CM

## 2018-04-03 DIAGNOSIS — E11.9 COMPREHENSIVE DIABETIC FOOT EXAMINATION, TYPE 2 DM, ENCOUNTER FOR: ICD-10-CM

## 2018-04-03 DIAGNOSIS — B35.1 DERMATOPHYTOSIS OF NAIL: ICD-10-CM

## 2018-04-03 DIAGNOSIS — E11.49 TYPE II DIABETES MELLITUS WITH NEUROLOGICAL MANIFESTATIONS: Primary | ICD-10-CM

## 2018-04-03 DIAGNOSIS — M20.41 HAMMER TOES OF BOTH FEET: ICD-10-CM

## 2018-04-03 PROCEDURE — 99213 OFFICE O/P EST LOW 20 MIN: CPT | Mod: S$GLB,,, | Performed by: PODIATRIST

## 2018-04-03 PROCEDURE — 3078F DIAST BP <80 MM HG: CPT | Mod: CPTII,S$GLB,, | Performed by: PODIATRIST

## 2018-04-03 PROCEDURE — 3074F SYST BP LT 130 MM HG: CPT | Mod: CPTII,S$GLB,, | Performed by: PODIATRIST

## 2018-04-03 PROCEDURE — 99999 PR PBB SHADOW E&M-EST. PATIENT-LVL III: CPT | Mod: PBBFAC,,, | Performed by: PODIATRIST

## 2018-04-03 NOTE — PROGRESS NOTES
Subjective:     Patient ID: Rudy Saldaña is a 77 y.o. male.    Chief Complaint: Routine Foot Care (last PCP visit 9/25/17)    Rudy is a 77 y.o. male who presents to the clinic upon referral from Dr. Hillary silva. provider found  for evaluation and treatment of diabetic feet. Rudy has a past medical history of Anemia; BPH (benign prostatic hypertrophy); Carotid bruit; CVA (cerebral vascular accident); Hyperlipidemia; Hypertension; Hypothyroidism (2/29/2016); Type II or unspecified type diabetes mellitus without mention of complication, not stated as uncontrolled; and Vitamin D deficiency (2/29/2016). Patient relates no major problem with feet. Patient state his blood sugar this morning 142mg/dl.    PCP: Mohinder Morfin MD    Date Last Seen by PCP:  9/25/17    Current shoe gear: Casual shoes    Hemoglobin A1C   Date Value Ref Range Status   03/13/2018 6.9 (H) 4.0 - 5.6 % Final     Comment:     According to ADA guidelines, hemoglobin A1c <7.0% represents  optimal control in non-pregnant diabetic patients. Different  metrics may apply to specific patient populations.   Standards of Medical Care in Diabetes-2016.  For the purpose of screening for the presence of diabetes:  <5.7%     Consistent with the absence of diabetes  5.7-6.4%  Consistent with increasing risk for diabetes   (prediabetes)  >or=6.5%  Consistent with diabetes  Currently, no consensus exists for use of hemoglobin A1c  for diagnosis of diabetes for children.  This Hemoglobin A1c assay has significant interference with fetal   hemoglobin   (HbF). The results are invalid for patients with abnormal amounts of   HbF,   including those with known Hereditary Persistence   of Fetal Hemoglobin. Heterozygous hemoglobin variants (HbAS, HbAC,   HbAD, HbAE, HbA2) do not significantly interfere with this assay;   however, presence of multiple variants in a sample may impact the %   interference.     09/05/2017 6.5 (H) 4.0 - 5.6 % Final     Comment:     According  to ADA guidelines, hemoglobin A1c <7.0% represents  optimal control in non-pregnant diabetic patients. Different  metrics may apply to specific patient populations.   Standards of Medical Care in Diabetes-2016.  For the purpose of screening for the presence of diabetes:  <5.7%     Consistent with the absence of diabetes  5.7-6.4%  Consistent with increasing risk for diabetes   (prediabetes)  >or=6.5%  Consistent with diabetes  Currently, no consensus exists for use of hemoglobin A1c  for diagnosis of diabetes for children.  This Hemoglobin A1c assay has significant interference with fetal   hemoglobin   (HbF). The results are invalid for patients with abnormal amounts of   HbF,   including those with known Hereditary Persistence   of Fetal Hemoglobin. Heterozygous hemoglobin variants (HbAS, HbAC,   HbAD, HbAE, HbA2) do not significantly interfere with this assay;   however, presence of multiple variants in a sample may impact the %   interference.     03/02/2017 6.5 (H) 4.5 - 6.2 % Final     Comment:     According to ADA guidelines, hemoglobin A1C <7.0% represents  optimal control in non-pregnant diabetic patients.  Different  metrics may apply to specific populations.   Standards of Medical Care in Diabetes - 2016.  For the purpose of screening for the presence of diabetes:  <5.7%     Consistent with the absence of diabetes  5.7-6.4%  Consistent with increasing risk for diabetes   (prediabetes)  >or=6.5%  Consistent with diabetes  Currently no consensus exists for use of hemoglobin A1C  for diagnosis of diabetes for children.         Patient Active Problem List   Diagnosis    Combined hyperlipidemia associated with type 2 diabetes mellitus    Hypertension associated with diabetes    DM (diabetes mellitus) type II controlled with renal manifestation    History of stroke    BPH (benign prostatic hypertrophy)    History of colon polyps    Iron deficiency anemia    Polyneuropathy due to secondary diabetes     DEVIKA on CPAP    Family history of colonic polyps    Vitamin D deficiency    Hypothyroidism    Anemia    Gross hematuria    Obstruction of urinary tract    CKD stage 3 secondary to diabetes    Facet arthropathy, lumbar    Right hip pain    Primary osteoarthritis of right hip    Osteoarthritis of spine with radiculopathy, lumbar region       Medication List with Changes/Refills   Current Medications    ACARBOSE (PRECOSE) 100 MG TAB    Take 1 tablet (100 mg total) by mouth 3 (three) times daily with meals.    ASPIRIN (ECOTRIN) 81 MG EC TABLET    Take 81 mg by mouth once daily.    ATORVASTATIN (LIPITOR) 20 MG TABLET    take 1 tablet by mouth once daily    BENAZEPRIL-HYDROCHLORTHIAZIDE (LOTENSIN HCT) 20-25 MG TAB    take 1 tablet by mouth twice a day    BLOOD SUGAR DIAGNOSTIC STRP    Use once daily as directed    CALCIUM CARBONATE (OS-STACEY) 600 MG (1,500 MG) TAB    Take 600 mg by mouth.    CALCIUM-VITAMIN D3 500 MG(1,250MG) -200 UNIT PER TABLET    Take 1 tablet by mouth 2 (two) times daily with meals.    CLOPIDOGREL (PLAVIX) 75 MG TABLET    take 1 tablet by mouth once daily    DICLOFENAC SODIUM 1 % GEL    Apply 2 g topically 4 (four) times daily. Apply to affected areas; CKD and anti-coagulants    ERGOCALCIFEROL (ERGOCALCIFEROL) 50,000 UNIT CAP    Take 50,000 Units by mouth.    FENOFIBRATE (TRICOR) 48 MG TABLET    Take 48 mg by mouth.    FERROUS SULFATE 325 MG (65 MG IRON) TAB TABLET    take 1 tablet by mouth three times a day    FLUTICASONE (FLONASE) 50 MCG/ACTUATION NASAL SPRAY    1 spray by Each Nare route once daily.    GALANTAMINE (RAZADYNE) 8 MG TAB    take 1 tablet by mouth twice a day with meals    GLIPIZIDE (GLUCOTROL) 10 MG TABLET    Take 1 tablet (10 mg total) by mouth 2 (two) times daily before meals.    LANCETS MISC    One touch lancets use once daily as directed    LEVOTHYROXINE (SYNTHROID) 25 MCG TABLET    take 1 tablet by mouth every morning ON AN EMPTY STOMACH    LORATADINE (CLARITIN) 10 MG  TABLET        NIACIN (NIASPAN) 1000 MG CR TABLET    take 1 tablet by mouth at bedtime    NISOLDIPINE (SULAR) 30 MG TB24    Take 30 mg by mouth.    ONETOUCH VERIO IQ METER KIT    Use as instructed    PIOGLITAZONE (ACTOS) 30 MG TABLET    Take 1 tablet (30 mg total) by mouth once daily.    SITAGLIPTIN (JANUVIA) 100 MG TAB    Take 1 tablet (100 mg total) by mouth once daily.       Review of patient's allergies indicates:   Allergen Reactions    No known drug allergies        Past Surgical History:   Procedure Laterality Date    COLONOSCOPY  2008    COLONOSCOPY  5/31/2005    repeat in 2015    PILONIDAL CYST DRAINAGE      TRANSURETHRAL RESECTION OF PROSTATE         Family History   Problem Relation Age of Onset    Hypertension Mother     Hypertension Father     Heart attack Maternal Uncle     Brain cancer Sister     Diabetes Neg Hx     Stroke Neg Hx        Social History     Social History    Marital status:      Spouse name: N/A    Number of children: N/A    Years of education: N/A     Occupational History    Not on file.     Social History Main Topics    Smoking status: Never Smoker    Smokeless tobacco: Never Used    Alcohol use No    Drug use: No    Sexual activity: Not on file     Other Topics Concern    Not on file     Social History Narrative    No narrative on file       Vitals:    04/03/18 1049   BP: (!) 129/56   Pulse: (!) 53   Resp: 20   Weight: 104.3 kg (230 lb)   Height: 6' (1.829 m)   PainSc: 0-No pain       Hemoglobin A1C   Date Value Ref Range Status   03/13/2018 6.9 (H) 4.0 - 5.6 % Final     Comment:     According to ADA guidelines, hemoglobin A1c <7.0% represents  optimal control in non-pregnant diabetic patients. Different  metrics may apply to specific patient populations.   Standards of Medical Care in Diabetes-2016.  For the purpose of screening for the presence of diabetes:  <5.7%     Consistent with the absence of diabetes  5.7-6.4%  Consistent with increasing risk for  diabetes   (prediabetes)  >or=6.5%  Consistent with diabetes  Currently, no consensus exists for use of hemoglobin A1c  for diagnosis of diabetes for children.  This Hemoglobin A1c assay has significant interference with fetal   hemoglobin   (HbF). The results are invalid for patients with abnormal amounts of   HbF,   including those with known Hereditary Persistence   of Fetal Hemoglobin. Heterozygous hemoglobin variants (HbAS, HbAC,   HbAD, HbAE, HbA2) do not significantly interfere with this assay;   however, presence of multiple variants in a sample may impact the %   interference.     09/05/2017 6.5 (H) 4.0 - 5.6 % Final     Comment:     According to ADA guidelines, hemoglobin A1c <7.0% represents  optimal control in non-pregnant diabetic patients. Different  metrics may apply to specific patient populations.   Standards of Medical Care in Diabetes-2016.  For the purpose of screening for the presence of diabetes:  <5.7%     Consistent with the absence of diabetes  5.7-6.4%  Consistent with increasing risk for diabetes   (prediabetes)  >or=6.5%  Consistent with diabetes  Currently, no consensus exists for use of hemoglobin A1c  for diagnosis of diabetes for children.  This Hemoglobin A1c assay has significant interference with fetal   hemoglobin   (HbF). The results are invalid for patients with abnormal amounts of   HbF,   including those with known Hereditary Persistence   of Fetal Hemoglobin. Heterozygous hemoglobin variants (HbAS, HbAC,   HbAD, HbAE, HbA2) do not significantly interfere with this assay;   however, presence of multiple variants in a sample may impact the %   interference.     03/02/2017 6.5 (H) 4.5 - 6.2 % Final     Comment:     According to ADA guidelines, hemoglobin A1C <7.0% represents  optimal control in non-pregnant diabetic patients.  Different  metrics may apply to specific populations.   Standards of Medical Care in Diabetes - 2016.  For the purpose of screening for the presence of  diabetes:  <5.7%     Consistent with the absence of diabetes  5.7-6.4%  Consistent with increasing risk for diabetes   (prediabetes)  >or=6.5%  Consistent with diabetes  Currently no consensus exists for use of hemoglobin A1C  for diagnosis of diabetes for children.          Review of Systems   Constitutional: Negative for chills and fever.   Respiratory: Negative for shortness of breath.    Cardiovascular: Negative for chest pain, palpitations, orthopnea, claudication and leg swelling.   Gastrointestinal: Negative for diarrhea, nausea and vomiting.   Musculoskeletal: Negative for joint pain.   Skin: Negative for rash.   Neurological: Negative for dizziness, tingling, sensory change, focal weakness and weakness.   Psychiatric/Behavioral: Negative.          Objective:      PHYSICAL EXAM: Apperance: Alert and orient in no distress,well developed, and with good attention to grooming and body habits  Patient presents ambulating in lace up shoes.   LOWER EXTREMITY EXAM:  VASCULAR: Dorsalis pedis pulses 2/4 bilateral and Posterior Tibial pulses 2/4 bilateral. Capillary fill time <4 seconds bilateral. No edema observed bilateral. Varicosities present bilateral. Skin temperature of the lower extremities is warm to warm, proximal to distal. Hair growth WNL bilateral.  DERMATOLOGICAL: No skin rashes, subcutaneous nodules, lesions, or ulcers observed bilateral. Nails 1,2,3,4,5 right and 2,3,4,5 left thickened. Left hallux nail absent. Webspaces 1-4 clean, dry and without evidence of break in skin integrity bilateral.   NEUROLOGICAL: Light touch, sharp-dull, proprioception all present and equal bilaterally.  Vibratory sensation absent at bilateral hallux and diminished at bilateral navicular . Protective sensation absent at 4/10 sites as tested with a [employer] 5.07 monofilament.   MUSCULOSKELETAL: Muscle strength is 5/5 for foot inverters, everters, plantarflexors, and dorsiflexors. Muscle tone is normal. No pain on  palpation left lateral midfoot. Minimal bony prominence noted to bilateral lateral midfoot at cuboid-metatarsal joint.       Assessment:       Encounter Diagnoses   Name Primary?    Type II diabetes mellitus with neurological manifestations Yes    Comprehensive diabetic foot examination, type 2 DM, encounter for     Dermatophytosis of nail          Plan:   Type II diabetes mellitus with neurological manifestations    Comprehensive diabetic foot examination, type 2 DM, encounter for    Dermatophytosis of nail      I counseled the patient on his conditions, their implications and medical management.  Greater than 50% of this visit spent on counseling and coordination of care.  Greater than 15 minutes of a 20 minute appointment spent on education about the diabetic foot, neuropathy, and prevention of limb loss.  Prescription written for Diabetic shoes and inserts.   Shoe inspection. Diabetic Foot Education. Patient reminded of the importance of good nutrition and blood sugar control to help prevent podiatric complications of diabetes. Patient instructed on proper foot hygeine. We discussed wearing proper shoe gear, daily foot inspections, never walking without protective shoe gear, never putting sharp instruments to feet.    Patient  will continue to monitor the areas daily, inspect feet, wear protective shoe gear when ambulatory, moisturizer to maintain skin integrity. Patient reminded of the importance of good nutrition and blood sugar control to help prevent podiatric complications of diabetes.  Patient to return in this office in approximately 6 months, or sooner if needed.             Padmaja Dave DPM  [facility]

## 2018-04-23 DIAGNOSIS — E03.9 HYPOTHYROIDISM: ICD-10-CM

## 2018-04-23 RX ORDER — LEVOTHYROXINE SODIUM 25 UG/1
TABLET ORAL
Qty: 90 TABLET | Refills: 3 | Status: SHIPPED | OUTPATIENT
Start: 2018-04-23 | End: 2018-08-10 | Stop reason: SDUPTHER

## 2018-04-23 RX ORDER — PIOGLITAZONEHYDROCHLORIDE 30 MG/1
TABLET ORAL
Qty: 90 TABLET | Refills: 3 | Status: SHIPPED | OUTPATIENT
Start: 2018-04-23 | End: 2018-08-10 | Stop reason: SDUPTHER

## 2018-04-30 ENCOUNTER — PATIENT MESSAGE (OUTPATIENT)
Dept: FAMILY MEDICINE | Facility: CLINIC | Age: 78
End: 2018-04-30

## 2018-04-30 DIAGNOSIS — Z86.73 HISTORY OF CVA (CEREBROVASCULAR ACCIDENT): ICD-10-CM

## 2018-04-30 RX ORDER — CLOPIDOGREL BISULFATE 75 MG/1
TABLET ORAL
Qty: 30 TABLET | Refills: 0 | Status: SHIPPED | OUTPATIENT
Start: 2018-04-30 | End: 2018-05-28 | Stop reason: SDUPTHER

## 2018-04-30 RX ORDER — ATORVASTATIN CALCIUM 20 MG/1
TABLET, FILM COATED ORAL
Qty: 30 TABLET | Refills: 0 | Status: SHIPPED | OUTPATIENT
Start: 2018-04-30 | End: 2018-05-28 | Stop reason: SDUPTHER

## 2018-04-30 NOTE — TELEPHONE ENCOUNTER
I have refilled the patient's medication for 1 month.  Please contact the patient and book for an annual physical because it is due.

## 2018-05-08 DIAGNOSIS — D50.9 IRON DEFICIENCY ANEMIA: ICD-10-CM

## 2018-05-08 RX ORDER — FERROUS SULFATE 325(65) MG
TABLET ORAL
Qty: 270 TABLET | Refills: 1 | Status: SHIPPED | OUTPATIENT
Start: 2018-05-08 | End: 2018-08-10 | Stop reason: SDUPTHER

## 2018-05-28 DIAGNOSIS — Z86.73 HISTORY OF CVA (CEREBROVASCULAR ACCIDENT): ICD-10-CM

## 2018-05-28 RX ORDER — CLOPIDOGREL BISULFATE 75 MG/1
TABLET ORAL
Qty: 30 TABLET | Refills: 0 | Status: SHIPPED | OUTPATIENT
Start: 2018-05-28 | End: 2018-07-03 | Stop reason: SDUPTHER

## 2018-05-28 RX ORDER — ATORVASTATIN CALCIUM 20 MG/1
TABLET, FILM COATED ORAL
Qty: 30 TABLET | Refills: 0 | Status: SHIPPED | OUTPATIENT
Start: 2018-05-28 | End: 2018-07-03 | Stop reason: SDUPTHER

## 2018-06-04 DIAGNOSIS — Z86.73 HISTORY OF CVA (CEREBROVASCULAR ACCIDENT): ICD-10-CM

## 2018-06-04 RX ORDER — CLOPIDOGREL BISULFATE 75 MG/1
TABLET ORAL
Qty: 30 TABLET | Refills: 0 | OUTPATIENT
Start: 2018-06-04

## 2018-06-04 RX ORDER — ATORVASTATIN CALCIUM 20 MG/1
TABLET, FILM COATED ORAL
Qty: 30 TABLET | Refills: 0 | OUTPATIENT
Start: 2018-06-04

## 2018-06-14 RX ORDER — ERGOCALCIFEROL 1.25 MG/1
CAPSULE ORAL
Qty: 24 CAPSULE | Refills: 3 | OUTPATIENT
Start: 2018-06-14

## 2018-06-14 RX ORDER — ERGOCALCIFEROL 1.25 MG/1
50000 CAPSULE ORAL
Qty: 8 CAPSULE | Refills: 11 | Status: CANCELLED | OUTPATIENT
Start: 2018-06-14

## 2018-06-15 DIAGNOSIS — E78.2 COMBINED HYPERLIPIDEMIA ASSOCIATED WITH TYPE 2 DIABETES MELLITUS: ICD-10-CM

## 2018-06-15 DIAGNOSIS — E11.69 COMBINED HYPERLIPIDEMIA ASSOCIATED WITH TYPE 2 DIABETES MELLITUS: ICD-10-CM

## 2018-06-15 RX ORDER — ERGOCALCIFEROL 1.25 MG/1
CAPSULE ORAL
Qty: 24 CAPSULE | Refills: 0 | Status: CANCELLED | OUTPATIENT
Start: 2018-06-15

## 2018-06-16 ENCOUNTER — TELEPHONE (OUTPATIENT)
Dept: FAMILY MEDICINE | Facility: CLINIC | Age: 78
End: 2018-06-16

## 2018-06-16 RX ORDER — ATORVASTATIN CALCIUM 20 MG/1
TABLET, FILM COATED ORAL
Qty: 30 TABLET | Refills: 0 | OUTPATIENT
Start: 2018-06-16

## 2018-06-16 RX ORDER — NIACIN 1000 MG/1
TABLET, EXTENDED RELEASE ORAL
Qty: 90 TABLET | Refills: 0 | OUTPATIENT
Start: 2018-06-16

## 2018-06-18 RX ORDER — ERGOCALCIFEROL 1.25 MG/1
CAPSULE ORAL
Qty: 24 CAPSULE | Refills: 3 | OUTPATIENT
Start: 2018-06-18

## 2018-06-25 ENCOUNTER — PATIENT MESSAGE (OUTPATIENT)
Dept: FAMILY MEDICINE | Facility: CLINIC | Age: 78
End: 2018-06-25

## 2018-06-29 ENCOUNTER — PATIENT OUTREACH (OUTPATIENT)
Dept: ADMINISTRATIVE | Facility: HOSPITAL | Age: 78
End: 2018-06-29

## 2018-06-29 DIAGNOSIS — E11.69 COMBINED HYPERLIPIDEMIA ASSOCIATED WITH TYPE 2 DIABETES MELLITUS: Primary | ICD-10-CM

## 2018-06-29 DIAGNOSIS — E78.2 COMBINED HYPERLIPIDEMIA ASSOCIATED WITH TYPE 2 DIABETES MELLITUS: Primary | ICD-10-CM

## 2018-07-02 ENCOUNTER — OFFICE VISIT (OUTPATIENT)
Dept: FAMILY MEDICINE | Facility: CLINIC | Age: 78
End: 2018-07-02
Payer: MEDICARE

## 2018-07-02 VITALS
HEIGHT: 72 IN | HEART RATE: 68 BPM | TEMPERATURE: 98 F | WEIGHT: 220.38 LBS | SYSTOLIC BLOOD PRESSURE: 117 MMHG | BODY MASS INDEX: 29.85 KG/M2 | DIASTOLIC BLOOD PRESSURE: 60 MMHG

## 2018-07-02 DIAGNOSIS — M10.041 ACUTE IDIOPATHIC GOUT OF RIGHT HAND: ICD-10-CM

## 2018-07-02 DIAGNOSIS — L03.90 CELLULITIS OF SKIN: Primary | ICD-10-CM

## 2018-07-02 DIAGNOSIS — E11.22 CONTROLLED TYPE 2 DIABETES MELLITUS WITH STAGE 3 CHRONIC KIDNEY DISEASE, WITHOUT LONG-TERM CURRENT USE OF INSULIN: ICD-10-CM

## 2018-07-02 DIAGNOSIS — E11.22 CKD STAGE 3 SECONDARY TO DIABETES: ICD-10-CM

## 2018-07-02 DIAGNOSIS — N18.30 CKD STAGE 3 SECONDARY TO DIABETES: ICD-10-CM

## 2018-07-02 DIAGNOSIS — E78.2 COMBINED HYPERLIPIDEMIA ASSOCIATED WITH TYPE 2 DIABETES MELLITUS: ICD-10-CM

## 2018-07-02 DIAGNOSIS — N18.30 CONTROLLED TYPE 2 DIABETES MELLITUS WITH STAGE 3 CHRONIC KIDNEY DISEASE, WITHOUT LONG-TERM CURRENT USE OF INSULIN: ICD-10-CM

## 2018-07-02 DIAGNOSIS — M47.816 FACET ARTHROPATHY, LUMBAR: ICD-10-CM

## 2018-07-02 DIAGNOSIS — E11.69 COMBINED HYPERLIPIDEMIA ASSOCIATED WITH TYPE 2 DIABETES MELLITUS: ICD-10-CM

## 2018-07-02 PROCEDURE — 96372 THER/PROPH/DIAG INJ SC/IM: CPT | Mod: S$GLB,,, | Performed by: FAMILY MEDICINE

## 2018-07-02 PROCEDURE — 99499 UNLISTED E&M SERVICE: CPT | Mod: S$GLB,,, | Performed by: FAMILY MEDICINE

## 2018-07-02 PROCEDURE — 3078F DIAST BP <80 MM HG: CPT | Mod: CPTII,S$GLB,, | Performed by: FAMILY MEDICINE

## 2018-07-02 PROCEDURE — 99999 PR PBB SHADOW E&M-EST. PATIENT-LVL IV: CPT | Mod: PBBFAC,,, | Performed by: FAMILY MEDICINE

## 2018-07-02 PROCEDURE — 3074F SYST BP LT 130 MM HG: CPT | Mod: CPTII,S$GLB,, | Performed by: FAMILY MEDICINE

## 2018-07-02 PROCEDURE — 99214 OFFICE O/P EST MOD 30 MIN: CPT | Mod: 25,S$GLB,, | Performed by: FAMILY MEDICINE

## 2018-07-02 RX ORDER — CEFTRIAXONE 1 G/1
1 INJECTION, POWDER, FOR SOLUTION INTRAMUSCULAR; INTRAVENOUS ONCE
Status: COMPLETED | OUTPATIENT
Start: 2018-07-02 | End: 2018-07-02

## 2018-07-02 RX ORDER — LIDOCAINE HYDROCHLORIDE 10 MG/ML
1 INJECTION INFILTRATION; PERINEURAL ONCE
Status: DISCONTINUED | OUTPATIENT
Start: 2018-07-02 | End: 2018-07-02

## 2018-07-02 RX ORDER — LIDOCAINE HYDROCHLORIDE 10 MG/ML
2.1 INJECTION INFILTRATION; PERINEURAL ONCE
Status: COMPLETED | OUTPATIENT
Start: 2018-07-02 | End: 2018-07-02

## 2018-07-02 RX ORDER — SULFAMETHOXAZOLE AND TRIMETHOPRIM 800; 160 MG/1; MG/1
1 TABLET ORAL 2 TIMES DAILY
Qty: 20 TABLET | Refills: 0 | Status: SHIPPED | OUTPATIENT
Start: 2018-07-02 | End: 2018-07-12

## 2018-07-02 RX ORDER — CEFTRIAXONE 1 G/1
1 INJECTION, POWDER, FOR SOLUTION INTRAMUSCULAR; INTRAVENOUS ONCE
Status: DISCONTINUED | OUTPATIENT
Start: 2018-07-02 | End: 2018-07-02

## 2018-07-02 RX ADMIN — CEFTRIAXONE 1 G: 1 INJECTION, POWDER, FOR SOLUTION INTRAMUSCULAR; INTRAVENOUS at 04:07

## 2018-07-02 RX ADMIN — LIDOCAINE HYDROCHLORIDE 2.1 ML: 10 INJECTION INFILTRATION; PERINEURAL at 05:07

## 2018-07-02 NOTE — PROGRESS NOTES
Subjective:      Patient ID: Rudy Saldaña is a 77 y.o. male.    Chief Complaint: right hand cellulitis.     HPI   He got pain on the back of his right hand 9 days ago and 7 days ago, he went to an orthopedist and he was given medrol and it helped some initially but it drove the sugar up.  He then had blood work later and he has been diagnosed with gout.  Of concern to me is that patient has had some fever at.  His white count was normal patient has been on steroids.  The steroids tapered off.  Patient was called earlier and was told that he needed to get on colcrys and this was sent to his pharmacy by the physician's assistant that saw at [facility].  I reviewed all of his labs and his x-rays that done.  Of concern to me is that the patient has a history renal insufficiency and we discussed this today.  He has not been seen by his nephrologist since 2015.  His daughter, Shelli, was with him today so she understands the need for follow-up.  Also discussed is facet arthropathy today.  We discussed that this could be underlying gout but because it is diffuse in the hand, cellulitis is more risk to him.  Are no streaks going up the hand.  Problem List Items Addressed This Visit     Acute idiopathic gout of right hand    CKD stage 3 secondary to diabetes    Combined hyperlipidemia associated with type 2 diabetes mellitus    Overview     The patient presents with hyperlipidemia.  The patient reports tolerating the medication well and is in excellent compliance.  There have been no medication side effects.  The patient denies chest pain, neuropathy, and myalgias.  The patient has reduced fat intake and has been exercising.  Current treatment has included the medications listed in the med card.    Lab Results   Component Value Date    CHOL 117 (L) 06/14/2017    CHOL 124 12/12/2016    CHOL 114 (L) 06/15/2016       Lab Results   Component Value Date    HDL 47 06/14/2017    HDL 49 12/12/2016    HDL 51 06/15/2016        Lab Results   Component Value Date    LDLCALC 56.8 (L) 06/14/2017    LDLCALC 65.4 12/12/2016    LDLCALC 53.6 (L) 06/15/2016       Lab Results   Component Value Date    TRIG 66 06/14/2017    TRIG 48 12/12/2016    TRIG 47 06/15/2016       Lab Results   Component Value Date    CHOLHDL 40.2 06/14/2017    CHOLHDL 39.5 12/12/2016    CHOLHDL 44.7 06/15/2016     Lab Results   Component Value Date    ALT 16 06/14/2017    AST 20 06/14/2017    ALKPHOS 42 (L) 06/14/2017    BILITOT 1.6 (H) 06/14/2017              Relevant Orders    Lipid panel    DM (diabetes mellitus) type II controlled with renal manifestation    Overview     The patient presents with diabetes.  The patient denies polyuria, polydipsia, polyphagia, hypoglycemia and paresthesias.  The patient's glucose control has been good.  Home glucose averages are routinely checked.  The patient is without retinopathy currently.  The patient has no history of neuropathy.  The patient currently complains of no podiatric problems.  The patient has excellent compliance.  Hemoglobin A1C   Date Value Ref Range Status   03/13/2018 6.9 (H) 4.0 - 5.6 % Final     Comment:     According to ADA guidelines, hemoglobin A1c <7.0% represents  optimal control in non-pregnant diabetic patients. Different  metrics may apply to specific patient populations.   Standards of Medical Care in Diabetes-2016.  For the purpose of screening for the presence of diabetes:  <5.7%     Consistent with the absence of diabetes  5.7-6.4%  Consistent with increasing risk for diabetes   (prediabetes)  >or=6.5%  Consistent with diabetes  Currently, no consensus exists for use of hemoglobin A1c  for diagnosis of diabetes for children.  This Hemoglobin A1c assay has significant interference with fetal   hemoglobin   (HbF). The results are invalid for patients with abnormal amounts of   HbF,   including those with known Hereditary Persistence   of Fetal Hemoglobin. Heterozygous hemoglobin variants (HbAS, HbAC,    HbAD, HbAE, HbA2) do not significantly interfere with this assay;   however, presence of multiple variants in a sample may impact the %   interference.     09/05/2017 6.5 (H) 4.0 - 5.6 % Final     Comment:     According to ADA guidelines, hemoglobin A1c <7.0% represents  optimal control in non-pregnant diabetic patients. Different  metrics may apply to specific patient populations.   Standards of Medical Care in Diabetes-2016.  For the purpose of screening for the presence of diabetes:  <5.7%     Consistent with the absence of diabetes  5.7-6.4%  Consistent with increasing risk for diabetes   (prediabetes)  >or=6.5%  Consistent with diabetes  Currently, no consensus exists for use of hemoglobin A1c  for diagnosis of diabetes for children.  This Hemoglobin A1c assay has significant interference with fetal   hemoglobin   (HbF). The results are invalid for patients with abnormal amounts of   HbF,   including those with known Hereditary Persistence   of Fetal Hemoglobin. Heterozygous hemoglobin variants (HbAS, HbAC,   HbAD, HbAE, HbA2) do not significantly interfere with this assay;   however, presence of multiple variants in a sample may impact the %   interference.     03/02/2017 6.5 (H) 4.5 - 6.2 % Final     Comment:     According to ADA guidelines, hemoglobin A1C <7.0% represents  optimal control in non-pregnant diabetic patients.  Different  metrics may apply to specific populations.   Standards of Medical Care in Diabetes - 2016.  For the purpose of screening for the presence of diabetes:  <5.7%     Consistent with the absence of diabetes  5.7-6.4%  Consistent with increasing risk for diabetes   (prediabetes)  >or=6.5%  Consistent with diabetes  Currently no consensus exists for use of hemoglobin A1C  for diagnosis of diabetes for children.       No results found for: MICROALBUR, [id number]  Diabetes Management Status    Statin: Taking  ACE/ARB: Taking    Screening or Prevention Patient's value Goal  Complete/Controlled?   HgA1C Testing and Control   Lab Results   Component Value Date    HGBA1C 6.9 (H) 03/13/2018      Annually/Less than 8% Yes   Lipid profile : 06/14/2017 Annually No   LDL control Lab Results   Component Value Date    LDLCALC 56.8 (L) 06/14/2017    Annually/Less than 100 mg/dl  No   Nephropathy screening Lab Results   Component Value Date    LABMICR 21.0 03/02/2017     Lab Results   Component Value Date    PROTEINUA Trace (A) 03/23/2016    Annually No   Blood pressure BP Readings from Last 1 Encounters:   07/02/18 117/60    Less than 140/90 Yes   Dilated retinal exam : 11/08/2017 Annually Yes   Foot exam   : 09/05/2017 Annually Yes              Relevant Orders    Comprehensive metabolic panel    Facet arthropathy, lumbar    Overview     He has been diagnosed with facet arthropathy and he has weakness in the right leg but he does not have chronic pain in this.            Other Visit Diagnoses     Cellulitis of skin    -  Primary    Relevant Medications    cefTRIAXone injection 1 g (Completed) (Start on 7/2/2018  5:45 PM)    lidocaine HCL 10 mg/ml (1%) injection 1 mL (Start on 7/2/2018  5:45 PM)    sulfamethoxazole-trimethoprim 800-160mg (BACTRIM DS) 800-160 mg Tab          Health Maintenance Due   Topic Date Due    Colonoscopy  07/02/2018       Past Medical History:  Past Medical History:   Diagnosis Date    Anemia     BPH (benign prostatic hypertrophy)     Carotid bruit     CVA (cerebral vascular accident)     Hyperlipidemia     Hypertension     Hypothyroidism 2/29/2016    Type II or unspecified type diabetes mellitus without mention of complication, not stated as uncontrolled     Vitamin D deficiency 2/29/2016     Past Surgical History:   Procedure Laterality Date    COLONOSCOPY  2008    COLONOSCOPY  5/31/2005    repeat in 2015    PILONIDAL CYST DRAINAGE      TRANSURETHRAL RESECTION OF PROSTATE       Review of patient's allergies indicates:   Allergen Reactions    No known drug  allergies      Current Outpatient Prescriptions on File Prior to Visit   Medication Sig Dispense Refill    acarbose (PRECOSE) 100 MG Tab Take 1 tablet (100 mg total) by mouth 3 (three) times daily with meals. 270 tablet 1    aspirin (ECOTRIN) 81 MG EC tablet Take 81 mg by mouth once daily.      atorvastatin (LIPITOR) 20 MG tablet take 1 tablet by mouth once daily 30 tablet 0    benazepril-hydrochlorthiazide (LOTENSIN HCT) 20-25 mg Tab take 1 tablet by mouth twice a day 180 tablet 0    blood sugar diagnostic Strp Use once daily as directed 100 each 0    calcium-vitamin D3 500 mg(1,250mg) -200 unit per tablet Take 1 tablet by mouth 2 (two) times daily with meals.      clopidogrel (PLAVIX) 75 mg tablet take 1 tablet by mouth once daily 30 tablet 0    ergocalciferol (ERGOCALCIFEROL) 50,000 unit Cap Take 50,000 Units by mouth.      ferrous sulfate 325 mg (65 mg iron) Tab tablet take 1 tablet by mouth three times a day 270 tablet 1    galantamine (RAZADYNE) 8 MG Tab take 1 tablet by mouth twice a day with meals 180 tablet 3    glipiZIDE (GLUCOTROL) 10 MG tablet Take 1 tablet (10 mg total) by mouth 2 (two) times daily before meals. 180 tablet 1    lancets Misc One touch lancets use once daily as directed 100 each 0    levothyroxine (SYNTHROID) 25 MCG tablet take 1 tablet by mouth every morning ON AN EMPTY STOMACH 90 tablet 3    loratadine (CLARITIN) 10 mg tablet   0    niacin (NIASPAN) 1000 MG CR tablet take 1 tablet by mouth at bedtime 90 tablet 0    pioglitazone (ACTOS) 30 MG tablet take 1 tablet by mouth once daily 90 tablet 3    SITagliptin (JANUVIA) 100 MG Tab Take 1 tablet (100 mg total) by mouth once daily. 90 tablet 1    calcium carbonate (OS-STACEY) 600 mg (1,500 mg) Tab Take 600 mg by mouth.      diclofenac sodium 1 % Gel Apply 2 g topically 4 (four) times daily. Apply to affected areas; CKD and anti-coagulants 300 g 3    fenofibrate (TRICOR) 48 MG tablet Take 48 mg by mouth.      fluticasone  (FLONASE) 50 mcg/actuation nasal spray 1 spray by Each Nare route once daily. 1 Bottle 0    nisoldipine (SULAR) 30 MG Tb24 Take 30 mg by mouth.      ONETOUCH VERIO IQ METER kit Use as instructed 1 each 0     No current facility-administered medications on file prior to visit.      Social History     Social History    Marital status:      Spouse name: N/A    Number of children: N/A    Years of education: N/A     Occupational History    Not on file.     Social History Main Topics    Smoking status: Never Smoker    Smokeless tobacco: Never Used    Alcohol use No    Drug use: No    Sexual activity: Not on file     Other Topics Concern    Not on file     Social History Narrative    No narrative on file     Family History   Problem Relation Age of Onset    Hypertension Mother     Hypertension Father     Heart attack Maternal Uncle     Brain cancer Sister     Diabetes Neg Hx     Stroke Neg Hx            Review of Systems   Constitutional: Positive for fever. Negative for activity change and unexpected weight change.   HENT: Negative for hearing loss, rhinorrhea and trouble swallowing.    Eyes: Negative for discharge and visual disturbance.   Respiratory: Negative for chest tightness and wheezing.    Cardiovascular: Negative for chest pain and palpitations.   Gastrointestinal: Negative for blood in stool, constipation, diarrhea and vomiting.   Endocrine: Negative for polydipsia and polyuria.   Genitourinary: Positive for urgency. Negative for difficulty urinating and hematuria.   Musculoskeletal: Positive for arthralgias and joint swelling. Negative for neck pain.   Neurological: Positive for weakness. Negative for headaches.   Psychiatric/Behavioral: Negative for confusion and dysphoric mood.       Objective:   /60 (BP Location: Left arm, Patient Position: Sitting, BP Method: Large (Automatic))   Pulse 68   Temp 98.3 °F (36.8 °C) (Oral)   Ht 6' (1.829 m)   Wt 100 kg (220 lb 6.4 oz)    BMI 29.89 kg/m²     Physical Exam   Constitutional: He appears well-developed and well-nourished.   HENT:   Head: Normocephalic and atraumatic.   Eyes: EOM are normal. Pupils are equal, round, and reactive to light.   Skin:   THE DORSUM OF THE HAND IS SWOLLEN AND ERYTHEMATOUS FROM THE MCP JOINTS DIFFUSELY ALL OF THE WAY TO THE WRIST.  THERE ARE NO STREAKS.  THERE IS A TINY SUPERFICIAL BREAKAGE OF THE SKIN ON THE DORSUM OF THE HAND BUT OTHERWISE THERE ARE NO LESIONS THAT WOULD HAVE SEEDED THIS ISSUE.       Assessment:     1. Cellulitis of skin    2. Controlled type 2 diabetes mellitus with stage 3 chronic kidney disease, without long-term current use of insulin    3. Combined hyperlipidemia associated with type 2 diabetes mellitus    4. Facet arthropathy, lumbar    5. CKD stage 3 secondary to diabetes    6. Acute idiopathic gout of right hand        Plan:   Rudy was seen today for annual exam.    Diagnoses and all orders for this visit:    Cellulitis of skin  -     cefTRIAXone injection 1 g; Inject 1 g into the muscle once.  -     lidocaine HCL 10 mg/ml (1%) injection 1 mL; Inject 1 mL into the muscle once.  -     sulfamethoxazole-trimethoprim 800-160mg (BACTRIM DS) 800-160 mg Tab; Take 1 tablet by mouth 2 (two) times daily. for 10 days    Controlled type 2 diabetes mellitus with stage 3 chronic kidney disease, without long-term current use of insulin  -     Comprehensive metabolic panel; Future    Combined hyperlipidemia associated with type 2 diabetes mellitus  -     Lipid panel; Future    Facet arthropathy, lumbar    CKD stage 3 secondary to diabetes    Acute idiopathic gout of right hand    Other orders  -     cefTRIAXone injection 1 g; Inject 1 g into the muscle once.  -     lidocaine HCL 10 mg/ml (1%) injection 2.1 mL; Inject 2.1 mLs into the muscle once.      I agreed with his use of Colchrys but recommended that he check his kidney function in the near future to determine the effect from this at least in 2  weeks.  He will see my nurse practitioner tomorrow who I brought in to the room to see his hand so the determination be made tomorrow as to if additional antibiotics need to be given.  He will reschedule an annual physical in near future to address all of his chronic medical issues

## 2018-07-03 ENCOUNTER — OFFICE VISIT (OUTPATIENT)
Dept: FAMILY MEDICINE | Facility: CLINIC | Age: 78
End: 2018-07-03
Payer: MEDICARE

## 2018-07-03 ENCOUNTER — LAB VISIT (OUTPATIENT)
Dept: LAB | Facility: HOSPITAL | Age: 78
End: 2018-07-03
Attending: FAMILY MEDICINE
Payer: MEDICARE

## 2018-07-03 VITALS
TEMPERATURE: 98 F | DIASTOLIC BLOOD PRESSURE: 60 MMHG | HEART RATE: 55 BPM | BODY MASS INDEX: 29.71 KG/M2 | WEIGHT: 219.38 LBS | SYSTOLIC BLOOD PRESSURE: 94 MMHG | HEIGHT: 72 IN

## 2018-07-03 DIAGNOSIS — E11.22 CONTROLLED TYPE 2 DIABETES MELLITUS WITH STAGE 3 CHRONIC KIDNEY DISEASE, WITHOUT LONG-TERM CURRENT USE OF INSULIN: ICD-10-CM

## 2018-07-03 DIAGNOSIS — Z86.73 HISTORY OF CVA (CEREBROVASCULAR ACCIDENT): ICD-10-CM

## 2018-07-03 DIAGNOSIS — E11.69 COMBINED HYPERLIPIDEMIA ASSOCIATED WITH TYPE 2 DIABETES MELLITUS: ICD-10-CM

## 2018-07-03 DIAGNOSIS — L03.113 CELLULITIS OF RIGHT HAND: Primary | ICD-10-CM

## 2018-07-03 DIAGNOSIS — E78.2 COMBINED HYPERLIPIDEMIA ASSOCIATED WITH TYPE 2 DIABETES MELLITUS: ICD-10-CM

## 2018-07-03 DIAGNOSIS — N18.30 CONTROLLED TYPE 2 DIABETES MELLITUS WITH STAGE 3 CHRONIC KIDNEY DISEASE, WITHOUT LONG-TERM CURRENT USE OF INSULIN: ICD-10-CM

## 2018-07-03 LAB
ALBUMIN SERPL BCP-MCNC: 3.4 G/DL
ALP SERPL-CCNC: 53 U/L
ALT SERPL W/O P-5'-P-CCNC: 11 U/L
ANION GAP SERPL CALC-SCNC: 9 MMOL/L
AST SERPL-CCNC: 12 U/L
BILIRUB SERPL-MCNC: 1.5 MG/DL
BUN SERPL-MCNC: 28 MG/DL
CALCIUM SERPL-MCNC: 9.8 MG/DL
CHLORIDE SERPL-SCNC: 102 MMOL/L
CHOLEST SERPL-MCNC: 112 MG/DL
CHOLEST/HDLC SERPL: 3.2 {RATIO}
CO2 SERPL-SCNC: 28 MMOL/L
CREAT SERPL-MCNC: 1.8 MG/DL
EST. GFR  (AFRICAN AMERICAN): 40.5 ML/MIN/1.73 M^2
EST. GFR  (NON AFRICAN AMERICAN): 35 ML/MIN/1.73 M^2
GLUCOSE SERPL-MCNC: 222 MG/DL
HDLC SERPL-MCNC: 35 MG/DL
HDLC SERPL: 31.3 %
LDLC SERPL CALC-MCNC: 62.2 MG/DL
NONHDLC SERPL-MCNC: 77 MG/DL
POTASSIUM SERPL-SCNC: 4.8 MMOL/L
PROT SERPL-MCNC: 7.2 G/DL
SODIUM SERPL-SCNC: 139 MMOL/L
TRIGL SERPL-MCNC: 74 MG/DL

## 2018-07-03 PROCEDURE — 3078F DIAST BP <80 MM HG: CPT | Mod: CPTII,S$GLB,, | Performed by: NURSE PRACTITIONER

## 2018-07-03 PROCEDURE — 99213 OFFICE O/P EST LOW 20 MIN: CPT | Mod: 25,S$GLB,, | Performed by: NURSE PRACTITIONER

## 2018-07-03 PROCEDURE — 3074F SYST BP LT 130 MM HG: CPT | Mod: CPTII,S$GLB,, | Performed by: NURSE PRACTITIONER

## 2018-07-03 PROCEDURE — 99999 PR PBB SHADOW E&M-EST. PATIENT-LVL V: CPT | Mod: PBBFAC,,, | Performed by: NURSE PRACTITIONER

## 2018-07-03 PROCEDURE — 36415 COLL VENOUS BLD VENIPUNCTURE: CPT | Mod: PO

## 2018-07-03 PROCEDURE — 80053 COMPREHEN METABOLIC PANEL: CPT

## 2018-07-03 PROCEDURE — 96372 THER/PROPH/DIAG INJ SC/IM: CPT | Mod: S$GLB,,, | Performed by: NURSE PRACTITIONER

## 2018-07-03 PROCEDURE — 80061 LIPID PANEL: CPT

## 2018-07-03 RX ORDER — CEFTRIAXONE 1 G/1
1 INJECTION, POWDER, FOR SOLUTION INTRAMUSCULAR; INTRAVENOUS
Status: DISCONTINUED | OUTPATIENT
Start: 2018-07-03 | End: 2018-07-03

## 2018-07-03 RX ORDER — TRAMADOL HYDROCHLORIDE 50 MG/1
TABLET ORAL
COMMUNITY
Start: 2018-06-25 | End: 2018-08-10 | Stop reason: ALTCHOICE

## 2018-07-03 RX ORDER — ATORVASTATIN CALCIUM 20 MG/1
TABLET, FILM COATED ORAL
Qty: 30 TABLET | Refills: 0 | Status: SHIPPED | OUTPATIENT
Start: 2018-07-03 | End: 2018-07-26 | Stop reason: SDUPTHER

## 2018-07-03 RX ORDER — CEFTRIAXONE 500 MG/1
1000 INJECTION, POWDER, FOR SOLUTION INTRAMUSCULAR; INTRAVENOUS
Status: COMPLETED | OUTPATIENT
Start: 2018-07-03 | End: 2018-07-03

## 2018-07-03 RX ORDER — CLOPIDOGREL BISULFATE 75 MG/1
TABLET ORAL
Qty: 30 TABLET | Refills: 0 | Status: SHIPPED | OUTPATIENT
Start: 2018-07-03 | End: 2018-07-26 | Stop reason: SDUPTHER

## 2018-07-03 RX ORDER — COLCHICINE 0.6 MG/1
0.6 TABLET ORAL
COMMUNITY
Start: 2018-07-02 | End: 2018-08-10 | Stop reason: ALTCHOICE

## 2018-07-03 RX ADMIN — CEFTRIAXONE 1000 MG: 500 INJECTION, POWDER, FOR SOLUTION INTRAMUSCULAR; INTRAVENOUS at 09:07

## 2018-07-03 NOTE — PATIENT INSTRUCTIONS
continue Bactrim and colchicine  Elevate hand when sitting, continue use of sling  Follow up on Thursday  If symptoms worsen tomorrow while the office is closed please contact the on call dr or go to the ER

## 2018-07-03 NOTE — PROGRESS NOTES
Subjective:       Patient ID: Rudy Saldaña is a 77 y.o. male.    Chief Complaint: Follow-up and Hand Pain    He comes in for follow up of cellulitis. He was given Rocephin 1 gm IM yesterday in the office and discharged on Bactrim DS. He also started Colchicine for possible underlying gout. He reports small amount of improvement in pain and swelling. He denies fever. He has been keeping the right arm in a sling.         Review of Systems   Constitutional: Negative for fatigue, fever and unexpected weight change.   HENT: Negative for ear pain and sore throat.    Eyes: Negative.  Negative for pain and visual disturbance.   Respiratory: Negative for cough and shortness of breath.    Cardiovascular: Negative for chest pain and palpitations.   Gastrointestinal: Negative for abdominal pain, diarrhea, nausea and vomiting.   Genitourinary: Negative for dysuria and frequency.   Musculoskeletal: Positive for arthralgias and joint swelling ( right hand). Negative for myalgias.   Skin: Negative for color change and rash.   Neurological: Negative for dizziness and headaches.   Psychiatric/Behavioral: Negative for sleep disturbance. The patient is not nervous/anxious.        Vitals:    07/03/18 0829   BP: 94/60   Pulse: (!) 55   Temp: 97.9 °F (36.6 °C)       Objective:     Current Outpatient Prescriptions   Medication Sig Dispense Refill    acarbose (PRECOSE) 100 MG Tab Take 1 tablet (100 mg total) by mouth 3 (three) times daily with meals. 270 tablet 1    aspirin (ECOTRIN) 81 MG EC tablet Take 81 mg by mouth once daily.      atorvastatin (LIPITOR) 20 MG tablet take 1 tablet by mouth once daily 30 tablet 0    benazepril-hydrochlorthiazide (LOTENSIN HCT) 20-25 mg Tab take 1 tablet by mouth twice a day 180 tablet 0    blood sugar diagnostic Strp Use once daily as directed 100 each 0    calcium-vitamin D3 500 mg(1,250mg) -200 unit per tablet Take 1 tablet by mouth 2 (two) times daily with meals.      clopidogrel (PLAVIX) 75  mg tablet take 1 tablet by mouth once daily 30 tablet 0    colchicine 0.6 mg tablet Take 0.6 mg by mouth.      diclofenac sodium 1 % Gel Apply 2 g topically 4 (four) times daily. Apply to affected areas; CKD and anti-coagulants 300 g 3    ergocalciferol (ERGOCALCIFEROL) 50,000 unit Cap Take 50,000 Units by mouth.      ferrous sulfate 325 mg (65 mg iron) Tab tablet take 1 tablet by mouth three times a day 270 tablet 1    lancets Misc One touch lancets use once daily as directed 100 each 0    levothyroxine (SYNTHROID) 25 MCG tablet take 1 tablet by mouth every morning ON AN EMPTY STOMACH 90 tablet 3    niacin (NIASPAN) 1000 MG CR tablet take 1 tablet by mouth at bedtime 90 tablet 0    pioglitazone (ACTOS) 30 MG tablet take 1 tablet by mouth once daily 90 tablet 3    SITagliptin (JANUVIA) 100 MG Tab Take 1 tablet (100 mg total) by mouth once daily. 90 tablet 1    traMADol (ULTRAM) 50 mg tablet       calcium carbonate (OS-STACEY) 600 mg (1,500 mg) Tab Take 600 mg by mouth.      fenofibrate (TRICOR) 48 MG tablet Take 48 mg by mouth.      fluticasone (FLONASE) 50 mcg/actuation nasal spray 1 spray by Each Nare route once daily. 1 Bottle 0    galantamine (RAZADYNE) 8 MG Tab take 1 tablet by mouth twice a day with meals 180 tablet 3    glipiZIDE (GLUCOTROL) 10 MG tablet Take 1 tablet (10 mg total) by mouth 2 (two) times daily before meals. 180 tablet 1    loratadine (CLARITIN) 10 mg tablet   0    nisoldipine (SULAR) 30 MG Tb24 Take 30 mg by mouth.      ONETOUCH VERIO IQ METER kit Use as instructed 1 each 0    sulfamethoxazole-trimethoprim 800-160mg (BACTRIM DS) 800-160 mg Tab Take 1 tablet by mouth 2 (two) times daily. for 10 days 20 tablet 0     Current Facility-Administered Medications   Medication Dose Route Frequency Provider Last Rate Last Dose    cefTRIAXone injection 1,000 mg  1,000 mg Intramuscular 1 time in Clinic/HOD Mechelle Spain NP           Physical Exam   Constitutional: He is oriented to  person, place, and time. He appears well-developed. No distress.   HENT:   Head: Normocephalic and atraumatic.   Eyes: EOM are normal. Pupils are equal, round, and reactive to light.   Neck: Normal range of motion. Neck supple.   Cardiovascular: Normal rate and regular rhythm.    Pulmonary/Chest: Effort normal and breath sounds normal.   Musculoskeletal:        Right hand: He exhibits decreased range of motion and swelling ( with erythema, hot to touch). He exhibits normal capillary refill.   Neurological: He is alert and oriented to person, place, and time.   Skin: Skin is warm and dry. No rash noted.   Psychiatric: He has a normal mood and affect. Thought content normal.   Nursing note and vitals reviewed.      Assessment:       1. Cellulitis of right hand        Plan:   Cellulitis of right hand  -     Discontinue: cefTRIAXone injection 1 g; Inject 1 g into the muscle one time.  -     cefTRIAXone injection 1,000 mg; Inject 1 g (1,000 mg total) into the muscle one time.        Follow-up in about 2 days (around 7/5/2018), or if symptoms worsen or fail to improve.    Patient Instructions   continue Bactrim and colchicine  Elevate hand when sitting, continue use of sling  Follow up on Thursday  If symptoms worsen tomorrow while the office is closed please contact the on call dr or go to the ER

## 2018-07-05 ENCOUNTER — OFFICE VISIT (OUTPATIENT)
Dept: FAMILY MEDICINE | Facility: CLINIC | Age: 78
End: 2018-07-05
Payer: MEDICARE

## 2018-07-05 VITALS
BODY MASS INDEX: 29.58 KG/M2 | HEART RATE: 64 BPM | DIASTOLIC BLOOD PRESSURE: 64 MMHG | TEMPERATURE: 98 F | SYSTOLIC BLOOD PRESSURE: 111 MMHG | HEIGHT: 72 IN | WEIGHT: 218.38 LBS

## 2018-07-05 DIAGNOSIS — L03.113 CELLULITIS OF RIGHT HAND: ICD-10-CM

## 2018-07-05 DIAGNOSIS — E78.2 COMBINED HYPERLIPIDEMIA ASSOCIATED WITH TYPE 2 DIABETES MELLITUS: ICD-10-CM

## 2018-07-05 DIAGNOSIS — E11.69 COMBINED HYPERLIPIDEMIA ASSOCIATED WITH TYPE 2 DIABETES MELLITUS: ICD-10-CM

## 2018-07-05 PROCEDURE — 99213 OFFICE O/P EST LOW 20 MIN: CPT | Mod: S$GLB,,, | Performed by: NURSE PRACTITIONER

## 2018-07-05 PROCEDURE — 3074F SYST BP LT 130 MM HG: CPT | Mod: CPTII,S$GLB,, | Performed by: NURSE PRACTITIONER

## 2018-07-05 PROCEDURE — 99999 PR PBB SHADOW E&M-EST. PATIENT-LVL III: CPT | Mod: PBBFAC,,, | Performed by: NURSE PRACTITIONER

## 2018-07-05 PROCEDURE — 3078F DIAST BP <80 MM HG: CPT | Mod: CPTII,S$GLB,, | Performed by: NURSE PRACTITIONER

## 2018-07-05 RX ORDER — ERGOCALCIFEROL 1.25 MG/1
CAPSULE ORAL
Qty: 24 CAPSULE | Refills: 3 | Status: SHIPPED | OUTPATIENT
Start: 2018-07-05 | End: 2018-07-05 | Stop reason: SDUPTHER

## 2018-07-05 RX ORDER — ERGOCALCIFEROL 1.25 MG/1
CAPSULE ORAL
Qty: 24 CAPSULE | Refills: 3 | Status: SHIPPED | OUTPATIENT
Start: 2018-07-05 | End: 2018-08-10 | Stop reason: SDUPTHER

## 2018-07-05 RX ORDER — NIACIN 1000 MG/1
1000 TABLET, EXTENDED RELEASE ORAL NIGHTLY
Qty: 90 TABLET | Refills: 3 | Status: SHIPPED | OUTPATIENT
Start: 2018-07-05 | End: 2018-08-10 | Stop reason: SDUPTHER

## 2018-07-05 RX ORDER — NIACIN 1000 MG/1
1000 TABLET, EXTENDED RELEASE ORAL NIGHTLY
Qty: 90 TABLET | Refills: 3 | Status: SHIPPED | OUTPATIENT
Start: 2018-07-05 | End: 2018-07-05 | Stop reason: SDUPTHER

## 2018-07-05 NOTE — PROGRESS NOTES
Subjective:       Patient ID: Rudy Saldaña is a 77 y.o. male.    Chief Complaint: Follow-up    HPI        He comes in for follow up of cellulitis. He was given 2nd injection of Rocephin 1 gm IM 2 days ago in the office. He has continued Bactrim DS and Colchicine for possible underlying gout. He reports significant amount of improvement in pain and swelling. He denies fever. He has been keeping the right arm in a sling.     He also request printed refills of medication, he has requested refills multiple times and the pharmacy is not getting all of them.    Review of Systems   Constitutional: Negative for fatigue, fever and unexpected weight change.   HENT: Negative for ear pain and sore throat.    Eyes: Negative.  Negative for pain and visual disturbance.   Respiratory: Negative for cough and shortness of breath.    Cardiovascular: Negative for chest pain and palpitations.   Gastrointestinal: Negative for abdominal pain, diarrhea, nausea and vomiting.   Genitourinary: Negative for dysuria and frequency.   Musculoskeletal: Positive for arthralgias and joint swelling. Negative for myalgias.        Right hand pain, erythema, and edema   Skin: Negative for color change and rash.   Neurological: Negative for dizziness and headaches.   Psychiatric/Behavioral: Negative for sleep disturbance. The patient is not nervous/anxious.        Vitals:    07/05/18 0836   BP: 111/64   Pulse: 64   Temp: 97.7 °F (36.5 °C)       Objective:     Current Outpatient Prescriptions   Medication Sig Dispense Refill    acarbose (PRECOSE) 100 MG Tab Take 1 tablet (100 mg total) by mouth 3 (three) times daily with meals. 270 tablet 1    aspirin (ECOTRIN) 81 MG EC tablet Take 81 mg by mouth once daily.      atorvastatin (LIPITOR) 20 MG tablet TAKE 1 TABLET BY MOUTH ONCE DAILY 30 tablet 0    benazepril-hydrochlorthiazide (LOTENSIN HCT) 20-25 mg Tab take 1 tablet by mouth twice a day 180 tablet 0    blood sugar diagnostic Strp Use once daily  as directed 100 each 0    calcium carbonate (OS-STACEY) 600 mg (1,500 mg) Tab Take 600 mg by mouth.      calcium-vitamin D3 500 mg(1,250mg) -200 unit per tablet Take 1 tablet by mouth 2 (two) times daily with meals.      clopidogrel (PLAVIX) 75 mg tablet TAKE 1 TABLET BY MOUTH ONCE DAILY 30 tablet 0    colchicine 0.6 mg tablet Take 0.6 mg by mouth.      diclofenac sodium 1 % Gel Apply 2 g topically 4 (four) times daily. Apply to affected areas; CKD and anti-coagulants 300 g 3    ergocalciferol (ERGOCALCIFEROL) 50,000 unit Cap Take 1 capsule twice weekly 24 capsule 3    fenofibrate (TRICOR) 48 MG tablet Take 48 mg by mouth.      ferrous sulfate 325 mg (65 mg iron) Tab tablet take 1 tablet by mouth three times a day 270 tablet 1    fluticasone (FLONASE) 50 mcg/actuation nasal spray 1 spray by Each Nare route once daily. 1 Bottle 0    galantamine (RAZADYNE) 8 MG Tab take 1 tablet by mouth twice a day with meals 180 tablet 3    glipiZIDE (GLUCOTROL) 10 MG tablet Take 1 tablet (10 mg total) by mouth 2 (two) times daily before meals. 180 tablet 1    lancets Misc One touch lancets use once daily as directed 100 each 0    levothyroxine (SYNTHROID) 25 MCG tablet take 1 tablet by mouth every morning ON AN EMPTY STOMACH 90 tablet 3    loratadine (CLARITIN) 10 mg tablet   0    niacin (NIASPAN) 1000 MG CR tablet Take 1 tablet (1,000 mg total) by mouth every evening. 90 tablet 3    nisoldipine (SULAR) 30 MG Tb24 Take 30 mg by mouth.      ONETOUCH VERIO IQ METER kit Use as instructed 1 each 0    pioglitazone (ACTOS) 30 MG tablet take 1 tablet by mouth once daily 90 tablet 3    SITagliptin (JANUVIA) 100 MG Tab Take 1 tablet (100 mg total) by mouth once daily. 90 tablet 1    sulfamethoxazole-trimethoprim 800-160mg (BACTRIM DS) 800-160 mg Tab Take 1 tablet by mouth 2 (two) times daily. for 10 days 20 tablet 0    traMADol (ULTRAM) 50 mg tablet        No current facility-administered medications for this visit.         Physical Exam   Constitutional: He is oriented to person, place, and time. He appears well-developed. No distress.   HENT:   Head: Normocephalic and atraumatic.   Eyes: EOM are normal. Pupils are equal, round, and reactive to light.   Neck: Normal range of motion. Neck supple.   Cardiovascular: Normal rate and regular rhythm.    Pulmonary/Chest: Effort normal and breath sounds normal.   Musculoskeletal: Normal range of motion.        Right hand: He exhibits normal range of motion, no tenderness and no swelling.   Neurological: He is alert and oriented to person, place, and time.   Skin: Skin is warm and dry. No rash noted.   Psychiatric: He has a normal mood and affect. Thought content normal.   Nursing note and vitals reviewed.      Assessment:       1. Cellulitis of right hand    2. Combined hyperlipidemia associated with type 2 diabetes mellitus        Plan:   Cellulitis of right hand    Combined hyperlipidemia associated with type 2 diabetes mellitus  -     Discontinue: niacin (NIASPAN) 1000 MG CR tablet; Take 1 tablet (1,000 mg total) by mouth every evening.  Dispense: 90 tablet; Refill: 3  -     niacin (NIASPAN) 1000 MG CR tablet; Take 1 tablet (1,000 mg total) by mouth every evening.  Dispense: 90 tablet; Refill: 3    Other orders  -     Discontinue: ergocalciferol (ERGOCALCIFEROL) 50,000 unit Cap; Take 1 capsule twice weekly  Dispense: 24 capsule; Refill: 3  -     ergocalciferol (ERGOCALCIFEROL) 50,000 unit Cap; Take 1 capsule twice weekly  Dispense: 24 capsule; Refill: 3        No Follow-up on file.    There are no Patient Instructions on file for this visit.

## 2018-07-05 NOTE — PROGRESS NOTES
The cholesterol levels are great so continue the lipitor and recheck in 1 year.   However, the kidney function is a little worse than the past and based on this, we need for a visit to be made with the kidney doctor soon.  Please arrange the visit to be done.    The sugar was high on this blood test but the last 3 month average was good.    Lab Results       Component                Value               Date                       HGBA1C                   6.9 (H)             03/13/2018

## 2018-07-06 ENCOUNTER — OFFICE VISIT (OUTPATIENT)
Dept: NEPHROLOGY | Facility: CLINIC | Age: 78
End: 2018-07-06
Payer: MEDICARE

## 2018-07-06 ENCOUNTER — TELEPHONE (OUTPATIENT)
Dept: FAMILY MEDICINE | Facility: CLINIC | Age: 78
End: 2018-07-06

## 2018-07-06 VITALS
SYSTOLIC BLOOD PRESSURE: 114 MMHG | WEIGHT: 223.19 LBS | DIASTOLIC BLOOD PRESSURE: 63 MMHG | HEART RATE: 60 BPM | BODY MASS INDEX: 30.23 KG/M2 | HEIGHT: 72 IN

## 2018-07-06 DIAGNOSIS — N18.30 CKD (CHRONIC KIDNEY DISEASE) STAGE 3, GFR 30-59 ML/MIN: Primary | ICD-10-CM

## 2018-07-06 DIAGNOSIS — E03.9 HYPOTHYROIDISM, UNSPECIFIED TYPE: ICD-10-CM

## 2018-07-06 DIAGNOSIS — E55.9 VITAMIN D DEFICIENCY: ICD-10-CM

## 2018-07-06 DIAGNOSIS — G47.33 OSA ON CPAP: Chronic | ICD-10-CM

## 2018-07-06 DIAGNOSIS — E11.22 CKD STAGE 3 SECONDARY TO DIABETES: ICD-10-CM

## 2018-07-06 DIAGNOSIS — E78.2 COMBINED HYPERLIPIDEMIA ASSOCIATED WITH TYPE 2 DIABETES MELLITUS: ICD-10-CM

## 2018-07-06 DIAGNOSIS — E11.59 HYPERTENSION ASSOCIATED WITH DIABETES: ICD-10-CM

## 2018-07-06 DIAGNOSIS — N18.30 CONTROLLED TYPE 2 DIABETES MELLITUS WITH STAGE 3 CHRONIC KIDNEY DISEASE, WITHOUT LONG-TERM CURRENT USE OF INSULIN: ICD-10-CM

## 2018-07-06 DIAGNOSIS — N18.30 CKD (CHRONIC KIDNEY DISEASE), STAGE III: Primary | ICD-10-CM

## 2018-07-06 DIAGNOSIS — I15.2 HYPERTENSION ASSOCIATED WITH DIABETES: ICD-10-CM

## 2018-07-06 DIAGNOSIS — E11.69 COMBINED HYPERLIPIDEMIA ASSOCIATED WITH TYPE 2 DIABETES MELLITUS: ICD-10-CM

## 2018-07-06 DIAGNOSIS — N18.30 CKD STAGE 3 SECONDARY TO DIABETES: ICD-10-CM

## 2018-07-06 DIAGNOSIS — E11.22 CONTROLLED TYPE 2 DIABETES MELLITUS WITH STAGE 3 CHRONIC KIDNEY DISEASE, WITHOUT LONG-TERM CURRENT USE OF INSULIN: ICD-10-CM

## 2018-07-06 PROCEDURE — 3078F DIAST BP <80 MM HG: CPT | Mod: CPTII,S$GLB,, | Performed by: INTERNAL MEDICINE

## 2018-07-06 PROCEDURE — 3074F SYST BP LT 130 MM HG: CPT | Mod: CPTII,S$GLB,, | Performed by: INTERNAL MEDICINE

## 2018-07-06 PROCEDURE — 99999 PR PBB SHADOW E&M-EST. PATIENT-LVL IV: CPT | Mod: PBBFAC,,, | Performed by: INTERNAL MEDICINE

## 2018-07-06 PROCEDURE — 99204 OFFICE O/P NEW MOD 45 MIN: CPT | Mod: S$GLB,,, | Performed by: INTERNAL MEDICINE

## 2018-07-06 PROCEDURE — 99499 UNLISTED E&M SERVICE: CPT | Mod: S$GLB,,, | Performed by: INTERNAL MEDICINE

## 2018-07-06 NOTE — Clinical Note
July 9, 2018      Mohinder Morfin MD  [address] [facility] [address]           [facility]  [address] [facility] [address]  Phone: [phone]  Fax: [phone]          Patient: Rudy Saldaña   MR Number: [id number]   YOB: 1940   Date of Visit: 7/6/2018       Dear Dr. Mohinder Morfin:    Thank you for referring Rudy Saldaña to me for evaluation. Attached you will find relevant portions of my assessment and plan of care.    If you have questions, please do not hesitate to call me. I look forward to following Rudy Saldaña along with you.    Sincerely,    Bee Thompson  CC:  No Recipients    If you would like to receive this communication electronically, please contact [email or web address] or [phone] to request more information on [employer] access.    For providers and/or their staff who would like to refer a patient to [facility], please contact us through our one-stop-shop provider referral line, Jose Jose, at [phone].    If you feel you have received this communication in error or would no longer like to receive these types of communications, please e-mail [email or web address]

## 2018-07-09 ENCOUNTER — PATIENT MESSAGE (OUTPATIENT)
Dept: FAMILY MEDICINE | Facility: CLINIC | Age: 78
End: 2018-07-09

## 2018-07-09 DIAGNOSIS — E11.59 HYPERTENSION ASSOCIATED WITH DIABETES: ICD-10-CM

## 2018-07-09 DIAGNOSIS — I15.2 HYPERTENSION ASSOCIATED WITH DIABETES: ICD-10-CM

## 2018-07-09 RX ORDER — BENAZEPRIL/HYDROCHLOROTHIAZIDE 20 MG-25MG
TABLET ORAL
Qty: 180 TABLET | Refills: 3 | Status: SHIPPED | OUTPATIENT
Start: 2018-07-09 | End: 2018-08-10 | Stop reason: SDUPTHER

## 2018-07-15 NOTE — PROGRESS NOTES
Subjective:       Patient ID: Rudy Saldaña is a 77 y.o. White male who presents for new evaluation of Consult and Chronic Kidney Disease    HPI     He is referred by his PCP for CKD    He has a significant history of HTN, DM and gout, last Hba1c was 6.9, highest 7.6 in 2015.     He denies foamy urine, no hematuria and no flank pain. He admits to a high sodium diet but feels he stays hydrated. No LE edema and no SOB. No NSAID use and no herbal medications. He's quite sedentary, no routine exercise. No known family history of kidney disease      Review of Systems   Constitutional: Negative for activity change, appetite change, fatigue and unexpected weight change.   HENT: Negative for facial swelling.    Respiratory: Negative for cough and shortness of breath.    Cardiovascular: Negative for chest pain and leg swelling.   Gastrointestinal: Negative for abdominal distention.   Genitourinary: Negative for difficulty urinating, dysuria, frequency, hematuria and urgency.   Musculoskeletal: Negative for arthralgias.   Neurological: Negative for weakness and headaches.   Hematological: Does not bruise/bleed easily.   Psychiatric/Behavioral: Negative for decreased concentration.       Objective:      Physical Exam   Constitutional: He is oriented to person, place, and time. He appears well-developed and well-nourished. No distress.   Neck: No JVD present.   Cardiovascular: S1 normal and S2 normal.  Exam reveals no friction rub.    Pulmonary/Chest: Breath sounds normal. He has no wheezes. He has no rales.   Abdominal: Soft.   Musculoskeletal: He exhibits no edema.   Neurological: He is alert and oriented to person, place, and time.   Skin: Skin is warm and dry.   Psychiatric: He has a normal mood and affect.       Assessment:       1. CKD (chronic kidney disease) stage 3, GFR 30-59 ml/min    2. CKD stage 3 secondary to diabetes    3. Hypertension associated with diabetes    4. Combined hyperlipidemia associated with type 2  diabetes mellitus    5. Controlled type 2 diabetes mellitus with stage 3 chronic kidney disease, without long-term current use of insulin    6. Vitamin D deficiency    7. DEVIKA on CPAP    8. Hypothyroidism, unspecified type        Plan:              CKD appearing to be at Stage 3 with multiple risk factors. He displays typical progression over the past few years. Continue RAAS blockade (benazepril) for renal preservation    HTN is controlled    DM is currently controlled    Patient was advised to avoid NSAIDs, continue a low sodium diet, and ensure hydration    RTC 4 months

## 2018-07-26 DIAGNOSIS — Z86.73 HISTORY OF CVA (CEREBROVASCULAR ACCIDENT): ICD-10-CM

## 2018-07-26 RX ORDER — ATORVASTATIN CALCIUM 20 MG/1
TABLET, FILM COATED ORAL
Qty: 30 TABLET | Refills: 0 | Status: SHIPPED | OUTPATIENT
Start: 2018-07-26 | End: 2018-07-27 | Stop reason: SDUPTHER

## 2018-07-27 ENCOUNTER — PATIENT OUTREACH (OUTPATIENT)
Dept: ADMINISTRATIVE | Facility: HOSPITAL | Age: 78
End: 2018-07-27

## 2018-07-27 DIAGNOSIS — Z86.73 HISTORY OF CVA (CEREBROVASCULAR ACCIDENT): ICD-10-CM

## 2018-07-27 RX ORDER — CLOPIDOGREL BISULFATE 75 MG/1
TABLET ORAL
Qty: 30 TABLET | Refills: 0 | Status: SHIPPED | OUTPATIENT
Start: 2018-07-27 | End: 2018-08-10 | Stop reason: SDUPTHER

## 2018-07-27 RX ORDER — ATORVASTATIN CALCIUM 20 MG/1
TABLET, FILM COATED ORAL
Qty: 90 TABLET | Refills: 0 | Status: SHIPPED | OUTPATIENT
Start: 2018-07-27 | End: 2018-08-10 | Stop reason: SDUPTHER

## 2018-07-27 RX ORDER — CLOPIDOGREL BISULFATE 75 MG/1
TABLET ORAL
Qty: 90 TABLET | Refills: 0 | OUTPATIENT
Start: 2018-07-27

## 2018-08-01 ENCOUNTER — PATIENT MESSAGE (OUTPATIENT)
Dept: ADMINISTRATIVE | Facility: HOSPITAL | Age: 78
End: 2018-08-01

## 2018-08-10 ENCOUNTER — OFFICE VISIT (OUTPATIENT)
Dept: FAMILY MEDICINE | Facility: CLINIC | Age: 78
End: 2018-08-10
Payer: MEDICARE

## 2018-08-10 VITALS
HEIGHT: 71 IN | SYSTOLIC BLOOD PRESSURE: 136 MMHG | HEART RATE: 49 BPM | WEIGHT: 224.81 LBS | BODY MASS INDEX: 31.47 KG/M2 | TEMPERATURE: 98 F | DIASTOLIC BLOOD PRESSURE: 66 MMHG

## 2018-08-10 DIAGNOSIS — E11.22 CKD STAGE 3 SECONDARY TO DIABETES: Primary | ICD-10-CM

## 2018-08-10 DIAGNOSIS — D50.8 IRON DEFICIENCY ANEMIA SECONDARY TO INADEQUATE DIETARY IRON INTAKE: ICD-10-CM

## 2018-08-10 DIAGNOSIS — Z12.5 ENCOUNTER FOR PROSTATE CANCER SCREENING: ICD-10-CM

## 2018-08-10 DIAGNOSIS — Z83.719 FAMILY HISTORY OF COLONIC POLYPS: ICD-10-CM

## 2018-08-10 DIAGNOSIS — N18.30 CONTROLLED TYPE 2 DIABETES MELLITUS WITH STAGE 3 CHRONIC KIDNEY DISEASE, WITHOUT LONG-TERM CURRENT USE OF INSULIN: ICD-10-CM

## 2018-08-10 DIAGNOSIS — E55.9 VITAMIN D DEFICIENCY: ICD-10-CM

## 2018-08-10 DIAGNOSIS — I15.2 HYPERTENSION ASSOCIATED WITH DIABETES: ICD-10-CM

## 2018-08-10 DIAGNOSIS — Z12.11 COLON CANCER SCREENING: ICD-10-CM

## 2018-08-10 DIAGNOSIS — N18.30 CKD STAGE 3 SECONDARY TO DIABETES: Primary | ICD-10-CM

## 2018-08-10 DIAGNOSIS — Z86.73 HISTORY OF CVA (CEREBROVASCULAR ACCIDENT): ICD-10-CM

## 2018-08-10 DIAGNOSIS — E78.2 COMBINED HYPERLIPIDEMIA ASSOCIATED WITH TYPE 2 DIABETES MELLITUS: ICD-10-CM

## 2018-08-10 DIAGNOSIS — E13.42 POLYNEUROPATHY DUE TO SECONDARY DIABETES: ICD-10-CM

## 2018-08-10 DIAGNOSIS — G47.33 OSA ON CPAP: Chronic | ICD-10-CM

## 2018-08-10 DIAGNOSIS — E11.69 COMBINED HYPERLIPIDEMIA ASSOCIATED WITH TYPE 2 DIABETES MELLITUS: ICD-10-CM

## 2018-08-10 DIAGNOSIS — E03.9 HYPOTHYROIDISM, UNSPECIFIED TYPE: ICD-10-CM

## 2018-08-10 DIAGNOSIS — E11.22 CONTROLLED TYPE 2 DIABETES MELLITUS WITH STAGE 3 CHRONIC KIDNEY DISEASE, WITHOUT LONG-TERM CURRENT USE OF INSULIN: ICD-10-CM

## 2018-08-10 DIAGNOSIS — Z86.73 HISTORY OF STROKE: ICD-10-CM

## 2018-08-10 DIAGNOSIS — E11.59 HYPERTENSION ASSOCIATED WITH DIABETES: ICD-10-CM

## 2018-08-10 PROCEDURE — 3078F DIAST BP <80 MM HG: CPT | Mod: CPTII,S$GLB,, | Performed by: FAMILY MEDICINE

## 2018-08-10 PROCEDURE — 3075F SYST BP GE 130 - 139MM HG: CPT | Mod: CPTII,S$GLB,, | Performed by: FAMILY MEDICINE

## 2018-08-10 PROCEDURE — 99214 OFFICE O/P EST MOD 30 MIN: CPT | Mod: S$GLB,,, | Performed by: FAMILY MEDICINE

## 2018-08-10 PROCEDURE — 99999 PR PBB SHADOW E&M-EST. PATIENT-LVL III: CPT | Mod: PBBFAC,,, | Performed by: FAMILY MEDICINE

## 2018-08-10 RX ORDER — CLOPIDOGREL BISULFATE 75 MG/1
75 TABLET ORAL DAILY
Qty: 90 TABLET | Refills: 3 | Status: SHIPPED | OUTPATIENT
Start: 2018-08-10 | End: 2019-06-25 | Stop reason: SDUPTHER

## 2018-08-10 RX ORDER — FERROUS SULFATE 325(65) MG
1 TABLET ORAL 3 TIMES DAILY
Qty: 270 TABLET | Refills: 3 | Status: SHIPPED | OUTPATIENT
Start: 2018-08-10 | End: 2019-11-04 | Stop reason: SDUPTHER

## 2018-08-10 RX ORDER — ERGOCALCIFEROL 1.25 MG/1
CAPSULE ORAL
Qty: 24 CAPSULE | Refills: 3 | Status: SHIPPED | OUTPATIENT
Start: 2018-08-10 | End: 2018-08-10 | Stop reason: SDUPTHER

## 2018-08-10 RX ORDER — GLIPIZIDE 10 MG/1
10 TABLET ORAL
Qty: 180 TABLET | Refills: 1 | Status: SHIPPED | OUTPATIENT
Start: 2018-08-10 | End: 2019-01-31 | Stop reason: SDUPTHER

## 2018-08-10 RX ORDER — NIACIN 1000 MG/1
1000 TABLET, EXTENDED RELEASE ORAL NIGHTLY
Qty: 90 TABLET | Refills: 3 | Status: SHIPPED | OUTPATIENT
Start: 2018-08-10 | End: 2018-08-10 | Stop reason: SDUPTHER

## 2018-08-10 RX ORDER — LEVOTHYROXINE SODIUM 25 UG/1
TABLET ORAL
Qty: 90 TABLET | Refills: 3 | Status: SHIPPED | OUTPATIENT
Start: 2018-08-10 | End: 2019-09-25 | Stop reason: SDUPTHER

## 2018-08-10 RX ORDER — GALANTAMINE 8 MG/1
8 TABLET, FILM COATED ORAL 2 TIMES DAILY WITH MEALS
Qty: 180 TABLET | Refills: 3 | Status: SHIPPED | OUTPATIENT
Start: 2018-08-10 | End: 2019-05-04 | Stop reason: SDUPTHER

## 2018-08-10 RX ORDER — NIACIN 1000 MG/1
1000 TABLET, EXTENDED RELEASE ORAL NIGHTLY
Qty: 90 TABLET | Refills: 3 | Status: SHIPPED | OUTPATIENT
Start: 2018-08-10 | End: 2019-09-12 | Stop reason: SDUPTHER

## 2018-08-10 RX ORDER — ACARBOSE 100 MG/1
100 TABLET ORAL
Qty: 270 TABLET | Refills: 1 | Status: SHIPPED | OUTPATIENT
Start: 2018-08-10 | End: 2019-02-06 | Stop reason: SDUPTHER

## 2018-08-10 RX ORDER — BENAZEPRIL/HYDROCHLOROTHIAZIDE 20 MG-25MG
TABLET ORAL
Qty: 180 TABLET | Refills: 3 | Status: SHIPPED | OUTPATIENT
Start: 2018-08-10 | End: 2018-11-12

## 2018-08-10 RX ORDER — ATORVASTATIN CALCIUM 20 MG/1
20 TABLET, FILM COATED ORAL DAILY
Qty: 90 TABLET | Refills: 3 | Status: SHIPPED | OUTPATIENT
Start: 2018-08-10 | End: 2019-06-25 | Stop reason: SDUPTHER

## 2018-08-10 RX ORDER — PIOGLITAZONEHYDROCHLORIDE 30 MG/1
30 TABLET ORAL DAILY
Qty: 90 TABLET | Refills: 3 | Status: SHIPPED | OUTPATIENT
Start: 2018-08-10 | End: 2019-03-21 | Stop reason: SDUPTHER

## 2018-08-10 RX ORDER — ERGOCALCIFEROL 1.25 MG/1
CAPSULE ORAL
Qty: 24 CAPSULE | Refills: 3 | Status: SHIPPED | OUTPATIENT
Start: 2018-08-10 | End: 2018-11-16

## 2018-08-10 NOTE — PROGRESS NOTES
"Subjective:      Patient ID: Rudy Saldaña is a 77 y.o. male.    Chief Complaint: Annual Exam    HPI     The patient presents with diabetes.  The patient denies polyuria, polydipsia, polyphagia, hypoglycemia and paresthesias.  The patient's glucose control has been good.  Home glucose averages are routinely checked.  The patient is without retinopathy currently.  The patient has no history of neuropathy.  The patient currently complains of no podiatric problems.  The patient has excellent compliance.  Lab Results   Component Value Date    HGBA1C 6.9 (H) 03/13/2018       He was not on starlix and we thought that he was and had called in a refill but he is not taking it now and we discussed that he should not be on it.  I took it off of the list.    The patient presents with essential hypertension.  The patient is tolerating the medication well and is in excellent compliance.  The patient is experiencing no side effects.  Counseling was offered regarding low salt diets.  The patient has a reduced salt intake.  The patient denies chest pain, palpitations, shortness of breath, dyspnea on exertion, left or murmur neck pain, nausea, vomiting, diaphoresis, paroxysmal nocturnal dyspnea, and orthopnea.  /66   Pulse (!) 49   Temp 98 °F (36.7 °C) (Oral)   Ht 5' 11" (1.803 m)   Wt 102 kg (224 lb 12.8 oz)   BMI 31.35 kg/m²     The patient presents with hypothyroidism.  The patient denies agitation, anxiety, blurred vision, chest pain, cold intolerance, constipation, dizziness, dry skin, fatigue, lightheadedness, paresthesias, skin coarsening, tachycardia, tremor, weight gain or weight loss.  The patient's current treatment has included Synthroid with a good response.    Lab Results   Component Value Date    TSH 2.337 09/25/2017           He has had iron deficiency anemia.   Lab Results   Component Value Date    WBC 5.29 08/02/2017    HGB 13.6 (L) 08/02/2017    HCT 40.8 08/02/2017    MCV 89 08/02/2017     " 08/02/2017     Lab Results   Component Value Date    IRON 104 08/02/2017    TIBC 330 08/02/2017    FERRITIN 350 (H) 11/09/2015     He has a family history of colon polyps and he had a colonoscopy in 7/2013 at which time he had polyps but they were not even precancerous.  Therefore, he should not be a candidate for future colonoscopy screening.    He has vit d deficiency and he is on meds. He had a good level recently.      He has DEVIKA and has to use his cpap nightly.  He does well with this.     He has polyneuropathy due to diabetes that has been stable.     He has stage III ckd due to diabetes.  He does not see a kidney specialist. I reviewed her notes with him today and he knows to avoid NSAID's.  CMP  Sodium   Date Value Ref Range Status   07/03/2018 139 136 - 145 mmol/L Final     Potassium   Date Value Ref Range Status   07/03/2018 4.8 3.5 - 5.1 mmol/L Final     Chloride   Date Value Ref Range Status   07/03/2018 102 95 - 110 mmol/L Final     CO2   Date Value Ref Range Status   07/03/2018 28 23 - 29 mmol/L Final     Glucose   Date Value Ref Range Status   07/03/2018 222 (H) 70 - 110 mg/dL Final     BUN, Bld   Date Value Ref Range Status   07/03/2018 28 (H) 8 - 23 mg/dL Final     Creatinine   Date Value Ref Range Status   07/03/2018 1.8 (H) 0.5 - 1.4 mg/dL Final     Calcium   Date Value Ref Range Status   07/03/2018 9.8 8.7 - 10.5 mg/dL Final     Total Protein   Date Value Ref Range Status   07/03/2018 7.2 6.0 - 8.4 g/dL Final     Albumin   Date Value Ref Range Status   07/03/2018 3.4 (L) 3.5 - 5.2 g/dL Final     Total Bilirubin   Date Value Ref Range Status   07/03/2018 1.5 (H) 0.1 - 1.0 mg/dL Final     Comment:     For infants and newborns, interpretation of results should be based  on gestational age, weight and in agreement with clinical  observations.  Premature Infant recommended reference ranges:  Up to 24 hours.............<8.0 mg/dL  Up to 48 hours............<12.0 mg/dL  3-5 days..................<15.0  mg/dL  6-29 days.................<15.0 mg/dL       Alkaline Phosphatase   Date Value Ref Range Status   07/03/2018 53 (L) 55 - 135 U/L Final     AST   Date Value Ref Range Status   07/03/2018 12 10 - 40 U/L Final     ALT   Date Value Ref Range Status   07/03/2018 11 10 - 44 U/L Final     Anion Gap   Date Value Ref Range Status   07/03/2018 9 8 - 16 mmol/L Final     eGFR if    Date Value Ref Range Status   07/03/2018 40.5 (A) >60 mL/min/1.73 m^2 Final     eGFR if non    Date Value Ref Range Status   07/03/2018 35.0 (A) >60 mL/min/1.73 m^2 Final     Comment:     Calculation used to obtain the estimated glomerular filtration  rate (eGFR) is the CKD-EPI equation.        He has had a stroke in the past but he has been treated with plavix and he has not had residual weakness at this time.     He has been stable on the renal issue but it is time to recheck this.     Health Maintenance Due   Topic Date Due    Influenza Vaccine  08/01/2018       Past Medical History:  Past Medical History:   Diagnosis Date    Anemia     BPH (benign prostatic hypertrophy)     Carotid bruit     CVA (cerebral vascular accident)     Hyperlipidemia     Hypertension     Hypothyroidism 2/29/2016    Type II or unspecified type diabetes mellitus without mention of complication, not stated as uncontrolled     Vitamin D deficiency 2/29/2016     Past Surgical History:   Procedure Laterality Date    COLONOSCOPY  2008    COLONOSCOPY  5/31/2005    repeat in 2015    PILONIDAL CYST DRAINAGE      TRANSURETHRAL RESECTION OF PROSTATE       Review of patient's allergies indicates:   Allergen Reactions    No known drug allergies      Current Outpatient Prescriptions on File Prior to Visit   Medication Sig Dispense Refill    acarbose (PRECOSE) 100 MG Tab Take 1 tablet (100 mg total) by mouth 3 (three) times daily with meals. 270 tablet 1    aspirin (ECOTRIN) 81 MG EC tablet Take 81 mg by mouth once daily.       atorvastatin (LIPITOR) 20 MG tablet TAKE 1 TABLET BY MOUTH EVERY DAY 90 tablet 0    benazepril-hydrochlorthiazide (LOTENSIN HCT) 20-25 mg Tab take 1 tablet by mouth twice a day 180 tablet 3    blood sugar diagnostic Strp Use once daily as directed 100 each 0    calcium carbonate (OS-STACEY) 600 mg (1,500 mg) Tab Take 600 mg by mouth.      clopidogrel (PLAVIX) 75 mg tablet TAKE 1 TABLET BY MOUTH EVERY DAY 30 tablet 0    ergocalciferol (ERGOCALCIFEROL) 50,000 unit Cap Take 1 capsule twice weekly 24 capsule 3    ferrous sulfate 325 mg (65 mg iron) Tab tablet take 1 tablet by mouth three times a day 270 tablet 1    galantamine (RAZADYNE) 8 MG Tab take 1 tablet by mouth twice a day with meals 180 tablet 3    glipiZIDE (GLUCOTROL) 10 MG tablet Take 1 tablet (10 mg total) by mouth 2 (two) times daily before meals. 180 tablet 1    lancets Misc One touch lancets use once daily as directed 100 each 0    levothyroxine (SYNTHROID) 25 MCG tablet take 1 tablet by mouth every morning ON AN EMPTY STOMACH 90 tablet 3    niacin (NIASPAN) 1000 MG CR tablet Take 1 tablet (1,000 mg total) by mouth every evening. 90 tablet 3    pioglitazone (ACTOS) 30 MG tablet take 1 tablet by mouth once daily 90 tablet 3    SITagliptin (JANUVIA) 100 MG Tab Take 1 tablet (100 mg total) by mouth once daily. 90 tablet 1    ONETOUCH VERIO IQ METER kit Use as instructed 1 each 0    [DISCONTINUED] calcium-vitamin D3 500 mg(1,250mg) -200 unit per tablet Take 1 tablet by mouth 2 (two) times daily with meals.      [DISCONTINUED] colchicine 0.6 mg tablet Take 0.6 mg by mouth.      [DISCONTINUED] traMADol (ULTRAM) 50 mg tablet        No current facility-administered medications on file prior to visit.      Social History     Social History    Marital status:      Spouse name: N/A    Number of children: N/A    Years of education: N/A     Occupational History    Not on file.     Social History Main Topics    Smoking status: Never Smoker  "   Smokeless tobacco: Never Used    Alcohol use No    Drug use: No    Sexual activity: Not on file     Other Topics Concern    Not on file     Social History Narrative    No narrative on file     Family History   Problem Relation Age of Onset    Hypertension Mother     Hypertension Father     Heart attack Maternal Uncle     Brain cancer Sister     Diabetes Neg Hx     Stroke Neg Hx              Review of Systems   Constitutional: Negative.  Negative for chills, diaphoresis and fever.   HENT: Negative for congestion, hearing loss, mouth sores, postnasal drip and sore throat.    Eyes: Negative for pain and visual disturbance.   Respiratory: Negative for cough, chest tightness, shortness of breath and wheezing.    Cardiovascular: Negative for chest pain.   Gastrointestinal: Negative for abdominal pain, anal bleeding, blood in stool, constipation, diarrhea, nausea and vomiting.   Genitourinary: Negative for dysuria and hematuria.   Musculoskeletal: Negative for back pain, neck pain and neck stiffness.   Skin: Negative for rash.   Neurological: Negative for dizziness and weakness.       Objective:   /66   Pulse (!) 49   Temp 98 °F (36.7 °C) (Oral)   Ht 5' 11" (1.803 m)   Wt 102 kg (224 lb 12.8 oz)   BMI 31.35 kg/m²     Physical Exam   Constitutional: He is oriented to person, place, and time. He appears well-developed and well-nourished.   HENT:   Head: Normocephalic and atraumatic.   Right Ear: External ear normal.   Left Ear: External ear normal.   Nose: Nose normal.   Mouth/Throat: Oropharynx is clear and moist. No oropharyngeal exudate.   Eyes: Conjunctivae and EOM are normal. Pupils are equal, round, and reactive to light. Right eye exhibits no discharge. Left eye exhibits no discharge. No scleral icterus.   Neck: Normal range of motion. Neck supple. No JVD present. No thyromegaly present.   Cardiovascular: Normal rate, regular rhythm, normal heart sounds and intact distal pulses.  Exam reveals " no gallop and no friction rub.    No murmur heard.  Pulmonary/Chest: Effort normal and breath sounds normal. No respiratory distress. He has no wheezes. He has no rales. He exhibits no tenderness.   Abdominal: Soft. Bowel sounds are normal. He exhibits no distension and no mass. There is no tenderness. There is no rebound and no guarding.   Musculoskeletal: Normal range of motion. He exhibits no edema or tenderness.   Lymphadenopathy:     He has no cervical adenopathy.   Neurological: He is alert and oriented to person, place, and time. No cranial nerve deficit. Coordination normal.   Skin: Skin is warm and dry. He is not diaphoretic.   Psychiatric: He has a normal mood and affect.       Assessment:     1. CKD stage 3 secondary to diabetes    2. Family history of colonic polyps    3. Hypertension associated with diabetes    4. History of CVA (cerebrovascular accident)    5. Iron deficiency anemia secondary to inadequate dietary iron intake    6. Hypothyroidism, unspecified type    7. Combined hyperlipidemia associated with type 2 diabetes mellitus    8. Controlled type 2 diabetes mellitus with stage 3 chronic kidney disease, without long-term current use of insulin    9. History of stroke    10. DEVIKA on CPAP    11. Polyneuropathy due to secondary diabetes    12. Vitamin D deficiency        Plan:   Rudy was seen today for annual exam.    Diagnoses and all orders for this visit:    CKD stage 3 secondary to diabetes    Family history of colonic polyps    Hypertension associated with diabetes  -     benazepril-hydrochlorthiazide (LOTENSIN HCT) 20-25 mg Tab; take 1 tablet by mouth twice a day    History of CVA (cerebrovascular accident)  -     clopidogrel (PLAVIX) 75 mg tablet; Take 1 tablet (75 mg total) by mouth once daily.    Iron deficiency anemia secondary to inadequate dietary iron intake  -     CBC auto differential; Future  -     Iron and TIBC; Future  -     ferrous sulfate 325 mg (65 mg iron) Tab tablet; Take  1 tablet (325 mg total) by mouth 3 (three) times daily.    Hypothyroidism, unspecified type  -     TSH; Future  -     levothyroxine (SYNTHROID) 25 MCG tablet; take 1 tablet by mouth every morning ON AN EMPTY STOMACH    Combined hyperlipidemia associated with type 2 diabetes mellitus  -     atorvastatin (LIPITOR) 20 MG tablet; Take 1 tablet (20 mg total) by mouth once daily.  -     niacin (NIASPAN) 1000 MG CR tablet; Take 1 tablet (1,000 mg total) by mouth every evening.    Controlled type 2 diabetes mellitus with stage 3 chronic kidney disease, without long-term current use of insulin  -     Hemoglobin A1c; Future  -     acarbose (PRECOSE) 100 MG Tab; Take 1 tablet (100 mg total) by mouth 3 (three) times daily with meals.  -     glipiZIDE (GLUCOTROL) 10 MG tablet; Take 1 tablet (10 mg total) by mouth 2 (two) times daily before meals.  -     pioglitazone (ACTOS) 30 MG tablet; Take 1 tablet (30 mg total) by mouth once daily.  -     SITagliptin (JANUVIA) 100 MG Tab; Take 1 tablet (100 mg total) by mouth once daily.    History of stroke    DEVIKA on CPAP    Polyneuropathy due to secondary diabetes    Vitamin D deficiency  -     Vitamin D; Future  -     ergocalciferol (ERGOCALCIFEROL) 50,000 unit Cap; Take 1 capsule twice weekly    Encounter for prostate cancer screening  -     PSA, Screening; Future    Other orders  -     Cancel: Case request GI: COLONOSCOPY  -     galantamine (RAZADYNE) 8 MG Tab; Take 1 tablet (8 mg total) by mouth 2 (two) times daily with meals.      Cont using the cpap. Cont his plavix for the history of stroke.  I do not feel that he needs future colonoscopy screening. Cont f/u with the urologist and nephrologist.

## 2018-08-15 ENCOUNTER — PATIENT MESSAGE (OUTPATIENT)
Dept: FAMILY MEDICINE | Facility: CLINIC | Age: 78
End: 2018-08-15

## 2018-08-16 RX ORDER — COLCHICINE 0.6 MG/1
0.6 TABLET ORAL DAILY
Qty: 15 TABLET | Refills: 0 | Status: SHIPPED | OUTPATIENT
Start: 2018-08-16 | End: 2018-10-16 | Stop reason: SDUPTHER

## 2018-08-18 ENCOUNTER — OFFICE VISIT (OUTPATIENT)
Dept: URGENT CARE | Facility: CLINIC | Age: 78
End: 2018-08-18
Payer: MEDICARE

## 2018-08-18 VITALS
HEART RATE: 61 BPM | DIASTOLIC BLOOD PRESSURE: 65 MMHG | BODY MASS INDEX: 31.5 KG/M2 | TEMPERATURE: 98 F | WEIGHT: 225 LBS | RESPIRATION RATE: 18 BRPM | SYSTOLIC BLOOD PRESSURE: 142 MMHG | OXYGEN SATURATION: 97 % | HEIGHT: 71 IN

## 2018-08-18 DIAGNOSIS — M10.072 ACUTE IDIOPATHIC GOUT INVOLVING TOE OF LEFT FOOT: Primary | ICD-10-CM

## 2018-08-18 PROCEDURE — 3077F SYST BP >= 140 MM HG: CPT | Mod: CPTII,S$GLB,, | Performed by: FAMILY MEDICINE

## 2018-08-18 PROCEDURE — 3078F DIAST BP <80 MM HG: CPT | Mod: CPTII,S$GLB,, | Performed by: FAMILY MEDICINE

## 2018-08-18 PROCEDURE — 99499 UNLISTED E&M SERVICE: CPT | Mod: S$GLB,,, | Performed by: NURSE PRACTITIONER

## 2018-08-18 PROCEDURE — 99999 PR PBB SHADOW E&M-EST. PATIENT-LVL IV: CPT | Mod: PBBFAC,,,

## 2018-08-18 PROCEDURE — 99213 OFFICE O/P EST LOW 20 MIN: CPT | Mod: S$GLB,,, | Performed by: FAMILY MEDICINE

## 2018-08-18 RX ORDER — METHYLPREDNISOLONE 4 MG/1
TABLET ORAL
Qty: 1 PACKAGE | Refills: 0 | Status: SHIPPED | OUTPATIENT
Start: 2018-08-18 | End: 2018-10-29 | Stop reason: ALTCHOICE

## 2018-08-18 NOTE — PROGRESS NOTES
"Subjective:       Patient ID: Rudy Saldaña is a 77 y.o. male.    Chief Complaint: Gout    Patient is presenting with a " gout flare up". Patient spoke to PCP and has started on Colchicine 0.6 mg for 15 days. Patient reports left great toe pain, redness and mild edema. Patient has a h/o DM  and is on oral meds for his DM.       Review of Systems   Constitutional: Positive for activity change. Negative for chills and fever.   Respiratory: Negative.    Cardiovascular: Negative.    Musculoskeletal: Positive for joint swelling.        Left great toe   Skin: Positive for color change.       Objective:      BP (!) 142/65   Pulse 61   Temp 98.4 °F (36.9 °C) (Tympanic)   Resp 18   Ht 5' 11" (1.803 m)   Wt 102.1 kg (225 lb)   SpO2 97%   BMI 31.38 kg/m²   Physical Exam   Constitutional: He appears well-developed and well-nourished. No distress.   Cardiovascular: Normal rate, regular rhythm, normal heart sounds and intact distal pulses. Exam reveals no gallop and no friction rub.   No murmur heard.  Pulmonary/Chest: Effort normal and breath sounds normal. No stridor. No respiratory distress. He has no wheezes.   Musculoskeletal: He exhibits edema and tenderness.   Left great toe erythema and edema consistent with gout.   Neurological: He is alert.   Skin: Skin is warm. Capillary refill takes less than 2 seconds. There is erythema.   Nursing note and vitals reviewed.      Assessment:       1. Acute idiopathic gout involving toe of left foot        Plan:       Acute idiopathic gout involving toe of left foot    Other orders  -     methylPREDNISolone (MEDROL DOSEPACK) 4 mg tablet; use as directed  Dispense: 1 Package; Refill: 0        Patient states that he was given oral steroids for his " last gout flare up" and this helped to resolve it. Patient has controlled DM and treatment plan uses steroids explained with risk and benefits.Patient to contact PCP for f/u.    "

## 2018-09-14 ENCOUNTER — LAB VISIT (OUTPATIENT)
Dept: LAB | Facility: HOSPITAL | Age: 78
End: 2018-09-14
Attending: FAMILY MEDICINE
Payer: MEDICARE

## 2018-09-14 DIAGNOSIS — E11.69 COMBINED HYPERLIPIDEMIA ASSOCIATED WITH TYPE 2 DIABETES MELLITUS: ICD-10-CM

## 2018-09-14 DIAGNOSIS — N18.30 CONTROLLED TYPE 2 DIABETES MELLITUS WITH STAGE 3 CHRONIC KIDNEY DISEASE, WITHOUT LONG-TERM CURRENT USE OF INSULIN: ICD-10-CM

## 2018-09-14 DIAGNOSIS — E78.2 COMBINED HYPERLIPIDEMIA ASSOCIATED WITH TYPE 2 DIABETES MELLITUS: ICD-10-CM

## 2018-09-14 DIAGNOSIS — E11.22 CONTROLLED TYPE 2 DIABETES MELLITUS WITH STAGE 3 CHRONIC KIDNEY DISEASE, WITHOUT LONG-TERM CURRENT USE OF INSULIN: ICD-10-CM

## 2018-09-14 LAB
CHOLEST SERPL-MCNC: 116 MG/DL
CHOLEST/HDLC SERPL: 2.6 {RATIO}
ESTIMATED AVG GLUCOSE: 151 MG/DL
HBA1C MFR BLD HPLC: 6.9 %
HDLC SERPL-MCNC: 45 MG/DL
HDLC SERPL: 38.8 %
LDLC SERPL CALC-MCNC: 58.8 MG/DL
NONHDLC SERPL-MCNC: 71 MG/DL
TRIGL SERPL-MCNC: 61 MG/DL

## 2018-09-14 PROCEDURE — 36415 COLL VENOUS BLD VENIPUNCTURE: CPT | Mod: PO

## 2018-09-14 PROCEDURE — 83036 HEMOGLOBIN GLYCOSYLATED A1C: CPT

## 2018-09-14 PROCEDURE — 80061 LIPID PANEL: CPT

## 2018-10-11 ENCOUNTER — PATIENT MESSAGE (OUTPATIENT)
Dept: FAMILY MEDICINE | Facility: CLINIC | Age: 78
End: 2018-10-11

## 2018-10-16 ENCOUNTER — OFFICE VISIT (OUTPATIENT)
Dept: PODIATRY | Facility: CLINIC | Age: 78
End: 2018-10-16
Payer: MEDICARE

## 2018-10-16 ENCOUNTER — CLINICAL SUPPORT (OUTPATIENT)
Dept: FAMILY MEDICINE | Facility: CLINIC | Age: 78
End: 2018-10-16
Payer: MEDICARE

## 2018-10-16 VITALS
SYSTOLIC BLOOD PRESSURE: 136 MMHG | HEIGHT: 71 IN | HEART RATE: 62 BPM | WEIGHT: 224.81 LBS | BODY MASS INDEX: 31.47 KG/M2 | DIASTOLIC BLOOD PRESSURE: 58 MMHG

## 2018-10-16 DIAGNOSIS — E11.49 TYPE II DIABETES MELLITUS WITH NEUROLOGICAL MANIFESTATIONS: ICD-10-CM

## 2018-10-16 DIAGNOSIS — M1A.0710 CHRONIC GOUT OF RIGHT FOOT, UNSPECIFIED CAUSE: Primary | ICD-10-CM

## 2018-10-16 DIAGNOSIS — Z23 IMMUNIZATION DUE: Primary | ICD-10-CM

## 2018-10-16 PROCEDURE — 3078F DIAST BP <80 MM HG: CPT | Mod: CPTII,,, | Performed by: PODIATRIST

## 2018-10-16 PROCEDURE — 3075F SYST BP GE 130 - 139MM HG: CPT | Mod: CPTII,,, | Performed by: PODIATRIST

## 2018-10-16 PROCEDURE — 99213 OFFICE O/P EST LOW 20 MIN: CPT | Mod: PBBFAC,PO,25 | Performed by: PODIATRIST

## 2018-10-16 PROCEDURE — 90662 IIV NO PRSV INCREASED AG IM: CPT | Mod: PBBFAC,PO

## 2018-10-16 PROCEDURE — 99214 OFFICE O/P EST MOD 30 MIN: CPT | Mod: S$PBB,,, | Performed by: PODIATRIST

## 2018-10-16 PROCEDURE — 1101F PT FALLS ASSESS-DOCD LE1/YR: CPT | Mod: CPTII,,, | Performed by: PODIATRIST

## 2018-10-16 PROCEDURE — 99999 PR PBB SHADOW E&M-EST. PATIENT-LVL III: CPT | Mod: PBBFAC,,, | Performed by: PODIATRIST

## 2018-10-16 PROCEDURE — 99213 OFFICE O/P EST LOW 20 MIN: CPT | Mod: PBBFAC,27,PO,25

## 2018-10-16 PROCEDURE — 99999 PR PBB SHADOW E&M-EST. PATIENT-LVL III: CPT | Mod: PBBFAC,,,

## 2018-10-16 RX ORDER — COLCHICINE 0.6 MG/1
0.6 TABLET ORAL DAILY
Qty: 6 TABLET | Refills: 0 | Status: SHIPPED | OUTPATIENT
Start: 2018-10-16 | End: 2018-11-16

## 2018-10-16 RX ORDER — INDOMETHACIN 75 MG/1
75 CAPSULE, EXTENDED RELEASE ORAL 2 TIMES DAILY WITH MEALS
Qty: 14 CAPSULE | Refills: 0 | Status: SHIPPED | OUTPATIENT
Start: 2018-10-16 | End: 2018-10-29 | Stop reason: SDUPTHER

## 2018-10-16 RX ORDER — ALLOPURINOL 100 MG/1
100 TABLET ORAL DAILY
Qty: 30 TABLET | Refills: 1 | Status: SHIPPED | OUTPATIENT
Start: 2018-10-16 | End: 2018-11-03

## 2018-10-16 NOTE — PROGRESS NOTES
Subjective:     Patient ID: Rudy Saldaña is a 78 y.o. male.    Chief Complaint: Routine Foot Care (PCP: CHANDA Morfin 8/10/2018 , diabetic nail care/feet check ) and Foot Pain (right foot)    Rudy is a 78 y.o. male who presents to the clinic upon referral from Dr. Hillary silva. provider found  for evaluation and treatment of diabetic feet. Rudy has a past medical history of Anemia, BPH (benign prostatic hypertrophy), Carotid bruit, CVA (cerebral vascular accident), Diabetes mellitus, type 2, Hyperlipidemia, Hypertension, Hypothyroidism (2/29/2016), Type II or unspecified type diabetes mellitus without mention of complication, not stated as uncontrolled, and Vitamin D deficiency (2/29/2016). Patient states the top of the right foot has been painful. Patient states he has had a recent bout with gout in his right wrist. Patient states the pain in the foot started yesterday. Patient rates pain 4/10. Patient state his blood sugar this morning 127mg/dl.    PCP: Mohinder Morfin MD    Date Last Seen by PCP:  8/10/18    Current shoe gear: Casual shoes    Hemoglobin A1C   Date Value Ref Range Status   09/14/2018 6.9 (H) 4.0 - 5.6 % Final     Comment:     ADA Screening Guidelines:  5.7-6.4%  Consistent with prediabetes  >or=6.5%  Consistent with diabetes  High levels of fetal hemoglobin interfere with the HbA1C  assay. Heterozygous hemoglobin variants (HbS, HgC, etc)do  not significantly interfere with this assay.   However, presence of multiple variants may affect accuracy.     03/13/2018 6.9 (H) 4.0 - 5.6 % Final     Comment:     According to ADA guidelines, hemoglobin A1c <7.0% represents  optimal control in non-pregnant diabetic patients. Different  metrics may apply to specific patient populations.   Standards of Medical Care in Diabetes-2016.  For the purpose of screening for the presence of diabetes:  <5.7%     Consistent with the absence of diabetes  5.7-6.4%  Consistent with increasing risk for diabetes    (prediabetes)  >or=6.5%  Consistent with diabetes  Currently, no consensus exists for use of hemoglobin A1c  for diagnosis of diabetes for children.  This Hemoglobin A1c assay has significant interference with fetal   hemoglobin   (HbF). The results are invalid for patients with abnormal amounts of   HbF,   including those with known Hereditary Persistence   of Fetal Hemoglobin. Heterozygous hemoglobin variants (HbAS, HbAC,   HbAD, HbAE, HbA2) do not significantly interfere with this assay;   however, presence of multiple variants in a sample may impact the %   interference.     09/05/2017 6.5 (H) 4.0 - 5.6 % Final     Comment:     According to ADA guidelines, hemoglobin A1c <7.0% represents  optimal control in non-pregnant diabetic patients. Different  metrics may apply to specific patient populations.   Standards of Medical Care in Diabetes-2016.  For the purpose of screening for the presence of diabetes:  <5.7%     Consistent with the absence of diabetes  5.7-6.4%  Consistent with increasing risk for diabetes   (prediabetes)  >or=6.5%  Consistent with diabetes  Currently, no consensus exists for use of hemoglobin A1c  for diagnosis of diabetes for children.  This Hemoglobin A1c assay has significant interference with fetal   hemoglobin   (HbF). The results are invalid for patients with abnormal amounts of   HbF,   including those with known Hereditary Persistence   of Fetal Hemoglobin. Heterozygous hemoglobin variants (HbAS, HbAC,   HbAD, HbAE, HbA2) do not significantly interfere with this assay;   however, presence of multiple variants in a sample may impact the %   interference.         Patient Active Problem List   Diagnosis    Combined hyperlipidemia associated with type 2 diabetes mellitus    Hypertension associated with diabetes    DM (diabetes mellitus) type II controlled with renal manifestation    History of stroke    BPH (benign prostatic hypertrophy)    History of colon polyps    Iron  deficiency anemia    Polyneuropathy due to secondary diabetes    DEVIKA on CPAP    Family history of colonic polyps    Vitamin D deficiency    Hypothyroidism    Anemia    Gross hematuria    CKD stage 3 secondary to diabetes    Facet arthropathy, lumbar    Right hip pain    Primary osteoarthritis of right hip    Osteoarthritis of spine with radiculopathy, lumbar region    Acute idiopathic gout of right hand          Medication List           Accurate as of 10/16/18 11:24 AM. If you have any questions, ask your nurse or doctor.               START taking these medications    allopurinol 100 MG tablet  Commonly known as:  ZYLOPRIM  Take 1 tablet (100 mg total) by mouth once daily.  Started by:  Padmaja Dave DPM     indomethacin 75 mg Cpsr CR capsule  Commonly known as:  INDOCIN SR  Take 1 capsule (75 mg total) by mouth 2 (two) times daily with meals.  Started by:  Padmaja Dave DPM        CONTINUE taking these medications    acarbose 100 MG Tab  Commonly known as:  PRECOSE  Take 1 tablet (100 mg total) by mouth 3 (three) times daily with meals.     aspirin 81 MG EC tablet  Commonly known as:  ECOTRIN     atorvastatin 20 MG tablet  Commonly known as:  LIPITOR  Take 1 tablet (20 mg total) by mouth once daily.     benazepril-hydrochlorthiazide 20-25 mg Tab  Commonly known as:  LOTENSIN HCT  take 1 tablet by mouth twice a day     blood sugar diagnostic Strp  Use once daily as directed     calcium carbonate 600 mg calcium (1,500 mg) Tab  Commonly known as:  OS-STACEY     clopidogrel 75 mg tablet  Commonly known as:  PLAVIX  Take 1 tablet (75 mg total) by mouth once daily.     colchicine 0.6 mg tablet  Commonly known as:  COLCRYS  Take 1 tablet (0.6 mg total) by mouth once daily.     ergocalciferol 50,000 unit Cap  Commonly known as:  ERGOCALCIFEROL  Take 1 capsule twice weekly     ferrous sulfate 325 mg (65 mg iron) Tab tablet  Commonly known as:  FEOSOL  Take 1 tablet (325 mg total) by mouth 3 (three) times  daily.     galantamine 8 MG Tab  Commonly known as:  RAZADYNE  Take 1 tablet (8 mg total) by mouth 2 (two) times daily with meals.     glipiZIDE 10 MG tablet  Commonly known as:  GLUCOTROL  Take 1 tablet (10 mg total) by mouth 2 (two) times daily before meals.     lancets Misc  One touch lancets use once daily as directed     levothyroxine 25 MCG tablet  Commonly known as:  SYNTHROID  take 1 tablet by mouth every morning ON AN EMPTY STOMACH     methylPREDNISolone 4 mg tablet  Commonly known as:  MEDROL DOSEPACK  use as directed     niacin 1000 MG CR tablet  Commonly known as:  NIASPAN  Take 1 tablet (1,000 mg total) by mouth every evening.     ONETOUCH VERIO IQ METER kit  Generic drug:  blood-glucose meter  Use as instructed     pioglitazone 30 MG tablet  Commonly known as:  ACTOS  Take 1 tablet (30 mg total) by mouth once daily.     SITagliptin 100 MG Tab  Commonly known as:  JANUVIA  Take 1 tablet (100 mg total) by mouth once daily.           Where to Get Your Medications      These medications were sent to [facility] [address] [place] [address] AT [facility] OF [facility] [address] [address]    Phone:  [phone]   · allopurinol 100 MG tablet  · colchicine 0.6 mg tablet  · indomethacin 75 mg Cpsr CR capsule         Review of patient's allergies indicates:   Allergen Reactions    No known drug allergies        Past Surgical History:   Procedure Laterality Date    COLONOSCOPY  2008    COLONOSCOPY  5/31/2005    repeat in 2015    PILONIDAL CYST DRAINAGE      TRANSURETHRAL RESECTION OF PROSTATE         Family History   Problem Relation Age of Onset    Hypertension Mother     Hypertension Father     Heart attack Maternal Uncle     Brain cancer Sister     Diabetes Neg Hx     Stroke Neg Hx        Social History     Socioeconomic History    Marital status:      Spouse name: Not on file    Number of children: Not on file    Years of education: Not on file     "Highest education level: Not on file   Social Needs    Financial resource strain: Not on file    Food insecurity - worry: Not on file    Food insecurity - inability: Not on file    Transportation needs - medical: Not on file    Transportation needs - non-medical: Not on file   Occupational History    Not on file   Tobacco Use    Smoking status: Never Smoker    Smokeless tobacco: Never Used   Substance and Sexual Activity    Alcohol use: No     Alcohol/week: 0.0 oz    Drug use: No    Sexual activity: Not on file   Other Topics Concern    Not on file   Social History Narrative    Not on file       Vitals:    10/16/18 1044   BP: (!) 136/58   Pulse: 62   Weight: 102 kg (224 lb 12.8 oz)   Height: 5' 11" (1.803 m)   PainSc: 0-No pain       Hemoglobin A1C   Date Value Ref Range Status   09/14/2018 6.9 (H) 4.0 - 5.6 % Final     Comment:     ADA Screening Guidelines:  5.7-6.4%  Consistent with prediabetes  >or=6.5%  Consistent with diabetes  High levels of fetal hemoglobin interfere with the HbA1C  assay. Heterozygous hemoglobin variants (HbS, HgC, etc)do  not significantly interfere with this assay.   However, presence of multiple variants may affect accuracy.     03/13/2018 6.9 (H) 4.0 - 5.6 % Final     Comment:     According to ADA guidelines, hemoglobin A1c <7.0% represents  optimal control in non-pregnant diabetic patients. Different  metrics may apply to specific patient populations.   Standards of Medical Care in Diabetes-2016.  For the purpose of screening for the presence of diabetes:  <5.7%     Consistent with the absence of diabetes  5.7-6.4%  Consistent with increasing risk for diabetes   (prediabetes)  >or=6.5%  Consistent with diabetes  Currently, no consensus exists for use of hemoglobin A1c  for diagnosis of diabetes for children.  This Hemoglobin A1c assay has significant interference with fetal   hemoglobin   (HbF). The results are invalid for patients with abnormal amounts of   HbF, "   including those with known Hereditary Persistence   of Fetal Hemoglobin. Heterozygous hemoglobin variants (HbAS, HbAC,   HbAD, HbAE, HbA2) do not significantly interfere with this assay;   however, presence of multiple variants in a sample may impact the %   interference.     09/05/2017 6.5 (H) 4.0 - 5.6 % Final     Comment:     According to ADA guidelines, hemoglobin A1c <7.0% represents  optimal control in non-pregnant diabetic patients. Different  metrics may apply to specific patient populations.   Standards of Medical Care in Diabetes-2016.  For the purpose of screening for the presence of diabetes:  <5.7%     Consistent with the absence of diabetes  5.7-6.4%  Consistent with increasing risk for diabetes   (prediabetes)  >or=6.5%  Consistent with diabetes  Currently, no consensus exists for use of hemoglobin A1c  for diagnosis of diabetes for children.  This Hemoglobin A1c assay has significant interference with fetal   hemoglobin   (HbF). The results are invalid for patients with abnormal amounts of   HbF,   including those with known Hereditary Persistence   of Fetal Hemoglobin. Heterozygous hemoglobin variants (HbAS, HbAC,   HbAD, HbAE, HbA2) do not significantly interfere with this assay;   however, presence of multiple variants in a sample may impact the %   interference.          Review of Systems   Constitutional: Negative for chills and fever.   Respiratory: Negative for shortness of breath.    Cardiovascular: Negative for chest pain, palpitations, orthopnea, claudication and leg swelling.   Gastrointestinal: Negative for diarrhea, nausea and vomiting.   Musculoskeletal: Positive for joint pain (right 2-5 MPJ).   Skin: Negative for rash.   Neurological: Negative for dizziness, tingling, sensory change, focal weakness and weakness.   Psychiatric/Behavioral: Negative.          Objective:      PHYSICAL EXAM: Apperance: Alert and orient in no distress,well developed, and with good attention to grooming  and body habits  Patient presents ambulating in lace up shoes.   LOWER EXTREMITY EXAM:  VASCULAR: Dorsalis pedis pulses 2/4 bilateral and Posterior Tibial pulses 2/4 bilateral. Capillary fill time <4 seconds bilateral. No edema observed bilateral. Varicosities present bilateral. Skin temperature of the lower extremities is warm to warm, proximal to distal. Hair growth WNL bilateral.  DERMATOLOGICAL: No skin rashes, subcutaneous nodules, lesions, or ulcers observed bilateral. Nails 1,2,3,4,5 right and 2,3,4,5 left thickened. Left hallux nail absent. Webspaces 1-4 clean, dry and without evidence of break in skin integrity bilateral. (+) edema (+) warmth to right toes.   NEUROLOGICAL: Light touch, sharp-dull, proprioception all present and equal bilaterally.  Vibratory sensation absent at bilateral hallux and diminished at bilateral navicular . Protective sensation absent at 4/10 sites as tested with a [employer] 5.07 monofilament.   MUSCULOSKELETAL: Muscle strength is 5/5 for foot inverters, everters, plantarflexors, and dorsiflexors. Muscle tone is normal. Pain on palpation of right toes 2-5.      Assessment:       Encounter Diagnoses   Name Primary?    Chronic gout of right foot, unspecified cause Yes    Type II diabetes mellitus with neurological manifestations          Plan:   Chronic gout of right foot, unspecified cause  -     Sedimentation rate; Future; Expected date: 10/16/2018  -     C-reactive protein; Future; Expected date: 10/16/2018  -     Uric acid; Future; Expected date: 10/16/2018    Type II diabetes mellitus with neurological manifestations    Other orders  -     colchicine (COLCRYS) 0.6 mg tablet; Take 1 tablet (0.6 mg total) by mouth once daily.  Dispense: 6 tablet; Refill: 0  -     indomethacin (INDOCIN SR) 75 mg CpSR CR capsule; Take 1 capsule (75 mg total) by mouth 2 (two) times daily with meals.  Dispense: 14 capsule; Refill: 0  -     allopurinol (ZYLOPRIM) 100 MG tablet; Take 1 tablet  (100 mg total) by mouth once daily.  Dispense: 30 tablet; Refill: 1      I counseled the patient on his conditions, their implications and medical management.  Greater than 50% of this visit spent on counseling and coordination of care.  Greater than 15 minutes of a 20 minute appointment spent on education about the diabetic foot, neuropathy, and prevention of limb loss.  Reviewed uric acid labs from [facility] in 8/2018.   I explained to the patient that etiologies and treatment options for gout including rest, elevation, diet control, NSAID's, long term gout medication, and/or injection therapy.   Prescription written for Colchicine 0.6mg to be taken as prescribed.  Prescribed Indomethacin 75 mg B.I.D. to be taken for 7 days as needed for inflammation. Patient advised on the possible elevation of blood pressure or heart effects and caution to take pills as needed and to discontinue use if symptoms arise, patient agreed.   Prescription written for Allopurinol 100mg to be taken as prescribed.   Ordered uric acid, crp, and esr testing to be completed in 1 month.   Shoe inspection. Diabetic Foot Education. Patient reminded of the importance of good nutrition and blood sugar control to help prevent podiatric complications of diabetes. Patient instructed on proper foot hygeine. We discussed wearing proper shoe gear, daily foot inspections, never walking without protective shoe gear, never putting sharp instruments to feet.    Patient  will continue to monitor the areas daily, inspect feet, wear protective shoe gear when ambulatory, moisturizer to maintain skin integrity. Patient reminded of the importance of good nutrition and blood sugar control to help prevent podiatric complications of diabetes.  Patient to return in this office in approximately 6 months, or sooner if needed.             Padmaja Dave DPM  [facility]

## 2018-10-29 ENCOUNTER — OFFICE VISIT (OUTPATIENT)
Dept: FAMILY MEDICINE | Facility: CLINIC | Age: 78
End: 2018-10-29
Payer: MEDICARE

## 2018-10-29 ENCOUNTER — LAB VISIT (OUTPATIENT)
Dept: LAB | Facility: HOSPITAL | Age: 78
End: 2018-10-29
Attending: PODIATRIST
Payer: MEDICARE

## 2018-10-29 VITALS — DIASTOLIC BLOOD PRESSURE: 84 MMHG | TEMPERATURE: 99 F | SYSTOLIC BLOOD PRESSURE: 192 MMHG

## 2018-10-29 DIAGNOSIS — M1A.0710 CHRONIC GOUT OF RIGHT FOOT, UNSPECIFIED CAUSE: ICD-10-CM

## 2018-10-29 DIAGNOSIS — M79.671 FOOT PAIN, BILATERAL: Primary | ICD-10-CM

## 2018-10-29 DIAGNOSIS — M79.672 FOOT PAIN, BILATERAL: Primary | ICD-10-CM

## 2018-10-29 LAB
CRP SERPL-MCNC: 17.6 MG/L
ERYTHROCYTE [SEDIMENTATION RATE] IN BLOOD BY WESTERGREN METHOD: 26 MM/HR
URATE SERPL-MCNC: 6.8 MG/DL

## 2018-10-29 PROCEDURE — 84550 ASSAY OF BLOOD/URIC ACID: CPT

## 2018-10-29 PROCEDURE — 3077F SYST BP >= 140 MM HG: CPT | Mod: CPTII,,, | Performed by: NURSE PRACTITIONER

## 2018-10-29 PROCEDURE — 85651 RBC SED RATE NONAUTOMATED: CPT | Mod: PO

## 2018-10-29 PROCEDURE — 86140 C-REACTIVE PROTEIN: CPT

## 2018-10-29 PROCEDURE — 36415 COLL VENOUS BLD VENIPUNCTURE: CPT | Mod: PO

## 2018-10-29 PROCEDURE — 99213 OFFICE O/P EST LOW 20 MIN: CPT | Mod: S$PBB,,, | Performed by: NURSE PRACTITIONER

## 2018-10-29 PROCEDURE — 99999 PR PBB SHADOW E&M-EST. PATIENT-LVL III: CPT | Mod: PBBFAC,,, | Performed by: NURSE PRACTITIONER

## 2018-10-29 PROCEDURE — 1101F PT FALLS ASSESS-DOCD LE1/YR: CPT | Mod: CPTII,,, | Performed by: NURSE PRACTITIONER

## 2018-10-29 PROCEDURE — 3079F DIAST BP 80-89 MM HG: CPT | Mod: CPTII,,, | Performed by: NURSE PRACTITIONER

## 2018-10-29 PROCEDURE — 99213 OFFICE O/P EST LOW 20 MIN: CPT | Mod: PBBFAC,PO | Performed by: NURSE PRACTITIONER

## 2018-10-29 RX ORDER — PIOGLITAZONEHYDROCHLORIDE 45 MG/1
45 TABLET ORAL
COMMUNITY
End: 2018-11-03 | Stop reason: SDUPTHER

## 2018-10-29 RX ORDER — INDOMETHACIN 75 MG/1
75 CAPSULE, EXTENDED RELEASE ORAL 2 TIMES DAILY WITH MEALS
Qty: 14 CAPSULE | Refills: 0 | Status: SHIPPED | OUTPATIENT
Start: 2018-10-29 | End: 2018-11-03

## 2018-10-29 RX ORDER — KETOCONAZOLE 20 MG/ML
SHAMPOO, SUSPENSION TOPICAL
COMMUNITY
Start: 2018-08-16 | End: 2020-01-13

## 2018-10-29 RX ORDER — NATEGLINIDE 120 MG/1
120 TABLET ORAL
COMMUNITY
End: 2018-11-12

## 2018-10-29 RX ORDER — ATORVASTATIN CALCIUM 40 MG/1
40 TABLET, FILM COATED ORAL
COMMUNITY
End: 2018-11-03 | Stop reason: SDUPTHER

## 2018-10-29 NOTE — PROGRESS NOTES
Subjective:       Patient ID: Rudy Saldaña is a 78 y.o. male.    Chief Complaint: Gout    Foot Injury    Incident onset: 3 weeks. There was no injury mechanism. The pain is present in the left foot and right foot. The quality of the pain is described as aching. The pain is severe. The pain has been constant since onset. Associated symptoms include an inability to bear weight. Pertinent negatives include no numbness or tingling. The symptoms are aggravated by weight bearing. Treatments tried: allopurinol, colcrys, indocin. The treatment provided no relief.       Review of Systems   Constitutional: Negative for fatigue, fever and unexpected weight change.   HENT: Negative for ear pain and sore throat.    Eyes: Negative.  Negative for pain and visual disturbance.   Respiratory: Negative for cough and shortness of breath.    Cardiovascular: Negative for chest pain and palpitations.   Gastrointestinal: Negative for abdominal pain, diarrhea, nausea and vomiting.   Genitourinary: Negative for dysuria and frequency.   Musculoskeletal: Positive for arthralgias and gait problem. Negative for myalgias.   Skin: Negative for color change and rash.   Neurological: Negative for dizziness, tingling, numbness and headaches.   Psychiatric/Behavioral: Negative for sleep disturbance. The patient is not nervous/anxious.        Vitals:    10/29/18 1124   BP: (!) 192/84   Temp:        Objective:     Current Outpatient Medications   Medication Sig Dispense Refill    acarbose (PRECOSE) 100 MG Tab Take 1 tablet (100 mg total) by mouth 3 (three) times daily with meals. 270 tablet 1    allopurinol (ZYLOPRIM) 100 MG tablet Take 1 tablet (100 mg total) by mouth once daily. 30 tablet 1    aspirin (ECOTRIN) 81 MG EC tablet Take 81 mg by mouth once daily.      atorvastatin (LIPITOR) 20 MG tablet Take 1 tablet (20 mg total) by mouth once daily. 90 tablet 3    atorvastatin (LIPITOR) 40 MG tablet Take 40 mg by mouth.       benazepril-hydrochlorthiazide (LOTENSIN HCT) 20-25 mg Tab take 1 tablet by mouth twice a day 180 tablet 3    blood sugar diagnostic Strp Use once daily as directed 100 each 0    calcium carbonate (OS-STACEY) 600 mg (1,500 mg) Tab Take 600 mg by mouth.      clopidogrel (PLAVIX) 75 mg tablet Take 1 tablet (75 mg total) by mouth once daily. 90 tablet 3    colchicine (COLCRYS) 0.6 mg tablet Take 1 tablet (0.6 mg total) by mouth once daily. 6 tablet 0    ergocalciferol (ERGOCALCIFEROL) 50,000 unit Cap Take 1 capsule twice weekly 24 capsule 3    ferrous sulfate 325 mg (65 mg iron) Tab tablet Take 1 tablet (325 mg total) by mouth 3 (three) times daily. 270 tablet 3    galantamine (RAZADYNE) 8 MG Tab Take 1 tablet (8 mg total) by mouth 2 (two) times daily with meals. 180 tablet 3    glipiZIDE (GLUCOTROL) 10 MG tablet Take 1 tablet (10 mg total) by mouth 2 (two) times daily before meals. 180 tablet 1    indomethacin (INDOCIN SR) 75 mg CpSR CR capsule Take 1 capsule (75 mg total) by mouth 2 (two) times daily with meals. 14 capsule 0    ketoconazole (NIZORAL) 2 % shampoo       lancets Misc One touch lancets use once daily as directed 100 each 0    levothyroxine (SYNTHROID) 25 MCG tablet take 1 tablet by mouth every morning ON AN EMPTY STOMACH 90 tablet 3    nateglinide (STARLIX) 120 MG tablet Take 120 mg by mouth.      niacin (NIASPAN) 1000 MG CR tablet Take 1 tablet (1,000 mg total) by mouth every evening. 90 tablet 3    ONETOUCH VERIO IQ METER kit Use as instructed 1 each 0    pioglitazone (ACTOS) 30 MG tablet Take 1 tablet (30 mg total) by mouth once daily. 90 tablet 3    pioglitazone (ACTOS) 45 MG tablet Take 45 mg by mouth.      SITagliptin (JANUVIA) 100 MG Tab Take 1 tablet (100 mg total) by mouth once daily. 90 tablet 1     No current facility-administered medications for this visit.        Physical Exam   Constitutional: He is oriented to person, place, and time. He appears well-developed. No distress.    HENT:   Head: Normocephalic and atraumatic.   Eyes: EOM are normal. Pupils are equal, round, and reactive to light.   Neck: Normal range of motion. Neck supple.   Cardiovascular: Normal rate and regular rhythm.   Pulses:       Dorsalis pedis pulses are 2+ on the right side, and 2+ on the left side.   Pulmonary/Chest: Effort normal and breath sounds normal.   Musculoskeletal: Normal range of motion.        Right foot: There is normal range of motion, no swelling, normal capillary refill and no deformity.        Left foot: There is normal range of motion, no swelling, normal capillary refill and no deformity.   Feet:   Right Foot:   Skin Integrity: Negative for ulcer, blister, skin breakdown, erythema or warmth.   Left Foot:   Skin Integrity: Negative for ulcer, blister, skin breakdown, erythema or warmth.   Neurological: He is alert and oriented to person, place, and time.   Skin: Skin is warm and dry. No rash noted.   Psychiatric: He has a normal mood and affect. Thought content normal.   Nursing note and vitals reviewed.      Assessment:       1. Foot pain, bilateral        Plan:   Foot pain, bilateral    Other orders  -     indomethacin (INDOCIN SR) 75 mg CpSR CR capsule; Take 1 capsule (75 mg total) by mouth 2 (two) times daily with meals.  Dispense: 14 capsule; Refill: 0      He is going to have labs drawn today consisting of uric acid, sed rate, and CRP  If he continues having symptoms he was schedule follow-up with Podiatry  No Follow-up on file.    There are no Patient Instructions on file for this visit.

## 2018-10-31 RX ORDER — INDOMETHACIN 75 MG/1
CAPSULE, EXTENDED RELEASE ORAL
Qty: 180 CAPSULE | Refills: 0 | OUTPATIENT
Start: 2018-10-31

## 2018-11-03 ENCOUNTER — OFFICE VISIT (OUTPATIENT)
Dept: FAMILY MEDICINE | Facility: CLINIC | Age: 78
End: 2018-11-03
Payer: MEDICARE

## 2018-11-03 VITALS
WEIGHT: 236.56 LBS | BODY MASS INDEX: 33.12 KG/M2 | TEMPERATURE: 98 F | DIASTOLIC BLOOD PRESSURE: 76 MMHG | HEIGHT: 71 IN | HEART RATE: 80 BPM | SYSTOLIC BLOOD PRESSURE: 160 MMHG

## 2018-11-03 DIAGNOSIS — J06.9 UPPER RESPIRATORY TRACT INFECTION, UNSPECIFIED TYPE: Primary | ICD-10-CM

## 2018-11-03 PROCEDURE — 99999 PR PBB SHADOW E&M-EST. PATIENT-LVL IV: CPT | Mod: PBBFAC,,, | Performed by: FAMILY MEDICINE

## 2018-11-03 PROCEDURE — 3078F DIAST BP <80 MM HG: CPT | Mod: CPTII,S$GLB,, | Performed by: FAMILY MEDICINE

## 2018-11-03 PROCEDURE — 1101F PT FALLS ASSESS-DOCD LE1/YR: CPT | Mod: CPTII,S$GLB,, | Performed by: FAMILY MEDICINE

## 2018-11-03 PROCEDURE — 3077F SYST BP >= 140 MM HG: CPT | Mod: CPTII,S$GLB,, | Performed by: FAMILY MEDICINE

## 2018-11-03 PROCEDURE — 99213 OFFICE O/P EST LOW 20 MIN: CPT | Mod: S$GLB,,, | Performed by: FAMILY MEDICINE

## 2018-11-03 RX ORDER — MONTELUKAST SODIUM 10 MG/1
10 TABLET ORAL NIGHTLY
Qty: 10 TABLET | Refills: 0 | Status: SHIPPED | OUTPATIENT
Start: 2018-11-03 | End: 2018-11-03 | Stop reason: SDUPTHER

## 2018-11-03 RX ORDER — ALLOPURINOL 100 MG/1
100 TABLET ORAL DAILY
Qty: 90 TABLET | Refills: 3 | Status: SHIPPED | OUTPATIENT
Start: 2018-11-03 | End: 2018-11-16

## 2018-11-03 RX ORDER — MONTELUKAST SODIUM 10 MG/1
TABLET ORAL
Qty: 90 TABLET | Refills: 4 | Status: SHIPPED | OUTPATIENT
Start: 2018-11-03 | End: 2018-11-13

## 2018-11-03 RX ORDER — METHYLPREDNISOLONE 4 MG/1
TABLET ORAL
Qty: 1 PACKAGE | Refills: 0 | Status: SHIPPED | OUTPATIENT
Start: 2018-11-03 | End: 2018-11-12

## 2018-11-03 NOTE — PROGRESS NOTES
Rudy Saldaña presents with moderate upper respiratory congestion,rhinnorhea,moderate cough past 2-3 days. OTC help slightly. Denies nausea,vomiting,diarrhea or significant fever.    Past Medical History:   Diagnosis Date    Anemia     BPH (benign prostatic hypertrophy)     Carotid bruit     CVA (cerebral vascular accident)     Diabetes mellitus, type 2     Hyperlipidemia     Hypertension     Hypothyroidism 2/29/2016    Type II or unspecified type diabetes mellitus without mention of complication, not stated as uncontrolled     Vitamin D deficiency 2/29/2016     Past Surgical History:   Procedure Laterality Date    COLONOSCOPY  2008    COLONOSCOPY  5/31/2005    repeat in 2015    PILONIDAL CYST DRAINAGE      TRANSURETHRAL RESECTION OF PROSTATE       Review of patient's allergies indicates:   Allergen Reactions    No known drug allergies      Current Outpatient Medications on File Prior to Visit   Medication Sig Dispense Refill    acarbose (PRECOSE) 100 MG Tab Take 1 tablet (100 mg total) by mouth 3 (three) times daily with meals. 270 tablet 1    allopurinol (ZYLOPRIM) 100 MG tablet Take 1 tablet (100 mg total) by mouth once daily. 30 tablet 1    aspirin (ECOTRIN) 81 MG EC tablet Take 81 mg by mouth once daily.      atorvastatin (LIPITOR) 20 MG tablet Take 1 tablet (20 mg total) by mouth once daily. 90 tablet 3    benazepril-hydrochlorthiazide (LOTENSIN HCT) 20-25 mg Tab take 1 tablet by mouth twice a day 180 tablet 3    blood sugar diagnostic Strp Use once daily as directed 100 each 0    calcium carbonate (OS-STACEY) 600 mg (1,500 mg) Tab Take 600 mg by mouth.      clopidogrel (PLAVIX) 75 mg tablet Take 1 tablet (75 mg total) by mouth once daily. 90 tablet 3    colchicine (COLCRYS) 0.6 mg tablet Take 1 tablet (0.6 mg total) by mouth once daily. 6 tablet 0    ergocalciferol (ERGOCALCIFEROL) 50,000 unit Cap Take 1 capsule twice weekly 24 capsule 3    ferrous sulfate 325 mg (65 mg iron) Tab  tablet Take 1 tablet (325 mg total) by mouth 3 (three) times daily. 270 tablet 3    galantamine (RAZADYNE) 8 MG Tab Take 1 tablet (8 mg total) by mouth 2 (two) times daily with meals. 180 tablet 3    glipiZIDE (GLUCOTROL) 10 MG tablet Take 1 tablet (10 mg total) by mouth 2 (two) times daily before meals. 180 tablet 1    ketoconazole (NIZORAL) 2 % shampoo       lancets Misc One touch lancets use once daily as directed 100 each 0    levothyroxine (SYNTHROID) 25 MCG tablet take 1 tablet by mouth every morning ON AN EMPTY STOMACH 90 tablet 3    nateglinide (STARLIX) 120 MG tablet Take 120 mg by mouth.      niacin (NIASPAN) 1000 MG CR tablet Take 1 tablet (1,000 mg total) by mouth every evening. 90 tablet 3    ONETOUCH VERIO IQ METER kit Use as instructed 1 each 0    pioglitazone (ACTOS) 30 MG tablet Take 1 tablet (30 mg total) by mouth once daily. 90 tablet 3    pioglitazone (ACTOS) 45 MG tablet Take 45 mg by mouth.      SITagliptin (JANUVIA) 100 MG Tab Take 1 tablet (100 mg total) by mouth once daily. 90 tablet 1    [DISCONTINUED] atorvastatin (LIPITOR) 40 MG tablet Take 40 mg by mouth.      [DISCONTINUED] indomethacin (INDOCIN SR) 75 mg CpSR CR capsule Take 1 capsule (75 mg total) by mouth 2 (two) times daily with meals. 14 capsule 0     No current facility-administered medications on file prior to visit.      Social History     Socioeconomic History    Marital status:      Spouse name: Not on file    Number of children: Not on file    Years of education: Not on file    Highest education level: Not on file   Social Needs    Financial resource strain: Not on file    Food insecurity - worry: Not on file    Food insecurity - inability: Not on file    Transportation needs - medical: Not on file    Transportation needs - non-medical: Not on file   Occupational History    Not on file   Tobacco Use    Smoking status: Never Smoker    Smokeless tobacco: Never Used   Substance and Sexual Activity     Alcohol use: No     Alcohol/week: 0.0 oz    Drug use: No    Sexual activity: Not on file   Other Topics Concern    Not on file   Social History Narrative    Not on file     Family History   Problem Relation Age of Onset    Hypertension Mother     Hypertension Father     Heart attack Maternal Uncle     Brain cancer Sister     Diabetes Neg Hx     Stroke Neg Hx          ROS:  SKIN: No rashes, itching or changes in color or texture of skin.  EYES: Visual acuity fine. No photophobia, ocular pain or diplopia.EARS: Denies ear pain, discharge or vertigo.NOSE: No loss of smell, no epistaxis some postnasal drip.MOUTH & THROAT: No hoarseness or change in voice. No excessive gum bleeding.CHEST: Denies WILDER, cyanosis, wheezing  CARDIOVASCULAR: Denies chest pain, PND, orthopnea or reduced exercise tolerance.  ABDOMEN:  No weight loss.No abdominal pain, no hematemesis or blood in stool.  URINARY: No flank pain, dysuria or hematuria.  PERIPHERAL VASCULAR: No claudication or cyanosis.  MUSCULOSKELETAL: Negative   NEUROLOGIC: No history of seizures, paralysis, alteration of gait or coordination.    PE: Vital signs as noted  Heent:Normocephalic with no recent cranial trauma,PERRLA,EOMI,conjunctiva clear,fundi reveal no hemmorhage exudate or papilledema.Otic canals clear, tympanic membranes slightly dull bilaterally.Nasal mucosa slightly red and edematous.Posterior pharynx slightly red but without exudate.  Neck:Supple with minimal anterior cervical adenopathy.  Chest:Clear bilateral breath sounds with mild scattered ronchi  Heart:Regular rhthym without murmer  Abdomen:Soft, non tender,no masses, no hepatosplenomegalyExtremeties and Neurologic:Grossly within normal limits  Impression: Upper Respiratory Infection. 465.9  Plan: Medrol dspk  Montelukast-stop indomethacin

## 2018-11-12 ENCOUNTER — HOSPITAL ENCOUNTER (OUTPATIENT)
Dept: RADIOLOGY | Facility: HOSPITAL | Age: 78
Discharge: HOME | End: 2018-11-12
Attending: FAMILY MEDICINE
Payer: MEDICARE

## 2018-11-12 ENCOUNTER — OFFICE VISIT (OUTPATIENT)
Dept: FAMILY MEDICINE | Facility: CLINIC | Age: 78
End: 2018-11-12
Payer: MEDICARE

## 2018-11-12 VITALS
DIASTOLIC BLOOD PRESSURE: 72 MMHG | SYSTOLIC BLOOD PRESSURE: 119 MMHG | WEIGHT: 212 LBS | TEMPERATURE: 98 F | BODY MASS INDEX: 28.71 KG/M2 | HEIGHT: 72 IN | HEART RATE: 82 BPM

## 2018-11-12 DIAGNOSIS — R53.82 CHRONIC FATIGUE: ICD-10-CM

## 2018-11-12 DIAGNOSIS — I15.2 HYPERTENSION ASSOCIATED WITH DIABETES: ICD-10-CM

## 2018-11-12 DIAGNOSIS — E11.59 HYPERTENSION ASSOCIATED WITH DIABETES: ICD-10-CM

## 2018-11-12 DIAGNOSIS — M13.0 POLYARTHRITIS: ICD-10-CM

## 2018-11-12 DIAGNOSIS — M10.042 ACUTE IDIOPATHIC GOUT OF LEFT HAND: ICD-10-CM

## 2018-11-12 DIAGNOSIS — G47.33 OSA ON CPAP: Chronic | ICD-10-CM

## 2018-11-12 DIAGNOSIS — R05.9 COUGH: ICD-10-CM

## 2018-11-12 DIAGNOSIS — M13.0 POLYARTHRITIS: Primary | ICD-10-CM

## 2018-11-12 PROCEDURE — 73130 X-RAY EXAM OF HAND: CPT | Mod: 50,TC,PO

## 2018-11-12 PROCEDURE — 99999 PR PBB SHADOW E&M-EST. PATIENT-LVL IV: CPT | Mod: PBBFAC,,, | Performed by: FAMILY MEDICINE

## 2018-11-12 PROCEDURE — 71046 X-RAY EXAM CHEST 2 VIEWS: CPT | Mod: TC,PO

## 2018-11-12 PROCEDURE — 1101F PT FALLS ASSESS-DOCD LE1/YR: CPT | Mod: CPTII,S$GLB,, | Performed by: FAMILY MEDICINE

## 2018-11-12 PROCEDURE — 3074F SYST BP LT 130 MM HG: CPT | Mod: CPTII,S$GLB,, | Performed by: FAMILY MEDICINE

## 2018-11-12 PROCEDURE — 73130 X-RAY EXAM OF HAND: CPT | Mod: 26,RT,, | Performed by: RADIOLOGY

## 2018-11-12 PROCEDURE — 73130 X-RAY EXAM OF HAND: CPT | Mod: 26,59,LT, | Performed by: RADIOLOGY

## 2018-11-12 PROCEDURE — 71046 X-RAY EXAM CHEST 2 VIEWS: CPT | Mod: 26,,, | Performed by: RADIOLOGY

## 2018-11-12 PROCEDURE — 3078F DIAST BP <80 MM HG: CPT | Mod: CPTII,S$GLB,, | Performed by: FAMILY MEDICINE

## 2018-11-12 PROCEDURE — 99214 OFFICE O/P EST MOD 30 MIN: CPT | Mod: S$GLB,,, | Performed by: FAMILY MEDICINE

## 2018-11-12 RX ORDER — BENAZEPRIL HYDROCHLORIDE 40 MG/1
40 TABLET ORAL DAILY
Qty: 90 TABLET | Refills: 3 | Status: SHIPPED | OUTPATIENT
Start: 2018-11-12 | End: 2019-04-02 | Stop reason: SDUPTHER

## 2018-11-12 NOTE — PROGRESS NOTES
The patient has a normal CXR.  No lesions were found and no fluid was noted.  The heart appears normal.

## 2018-11-12 NOTE — PROGRESS NOTES
Subjective:      Patient ID: Rudy Saldaña is a 78 y.o. male.    Chief Complaint: Gout; Hyperglycemia; Nasal Congestion; and Fatigue (legs)    Problem List Items Addressed This Visit     Acute idiopathic gout of left hand    Chronic fatigue    Hypertension associated with diabetes    DEVIKA on CPAP - Primary (Chronic)      Other Visit Diagnoses     Polyarthritis          he has had a wandering arthritis and joint swelling of the feet, then the right hand and now the left hand. He has had a high CRP and sed rate but the uric acid level was noraml.  It was on the high end of this.  He has not had fever with this.   He has had low dose steroids and he has been on colcrys and allopurinol. He is on hctz now and we discussed that this could be an offending agent.     He also has fatigue.  He has a cpap and he has not had it checked in 4 years he states.  He awakens tired.      He has also had a cough for a few weeks and he has not had fever.  He is weak and he has not had wheezing.  He had a visit with Dr. Britany huerta for this.  See the notes.  Lab Results   Component Value Date    WBC 5.29 08/02/2017    HGB 13.6 (L) 08/02/2017    HCT 40.8 08/02/2017    MCV 89 08/02/2017     08/02/2017     CMP  Sodium   Date Value Ref Range Status   07/03/2018 139 136 - 145 mmol/L Final     Potassium   Date Value Ref Range Status   07/03/2018 4.8 3.5 - 5.1 mmol/L Final     Chloride   Date Value Ref Range Status   07/03/2018 102 95 - 110 mmol/L Final     CO2   Date Value Ref Range Status   07/03/2018 28 23 - 29 mmol/L Final     Glucose   Date Value Ref Range Status   07/03/2018 222 (H) 70 - 110 mg/dL Final     BUN, Bld   Date Value Ref Range Status   07/03/2018 28 (H) 8 - 23 mg/dL Final     Creatinine   Date Value Ref Range Status   07/03/2018 1.8 (H) 0.5 - 1.4 mg/dL Final     Calcium   Date Value Ref Range Status   07/03/2018 9.8 8.7 - 10.5 mg/dL Final     Total Protein   Date Value Ref Range Status   07/03/2018 7.2 6.0 - 8.4 g/dL  Final     Albumin   Date Value Ref Range Status   07/03/2018 3.4 (L) 3.5 - 5.2 g/dL Final     Total Bilirubin   Date Value Ref Range Status   07/03/2018 1.5 (H) 0.1 - 1.0 mg/dL Final     Comment:     For infants and newborns, interpretation of results should be based  on gestational age, weight and in agreement with clinical  observations.  Premature Infant recommended reference ranges:  Up to 24 hours.............<8.0 mg/dL  Up to 48 hours............<12.0 mg/dL  3-5 days..................<15.0 mg/dL  6-29 days.................<15.0 mg/dL       Alkaline Phosphatase   Date Value Ref Range Status   07/03/2018 53 (L) 55 - 135 U/L Final     AST   Date Value Ref Range Status   07/03/2018 12 10 - 40 U/L Final     ALT   Date Value Ref Range Status   07/03/2018 11 10 - 44 U/L Final     Anion Gap   Date Value Ref Range Status   07/03/2018 9 8 - 16 mmol/L Final     eGFR if    Date Value Ref Range Status   07/03/2018 40.5 (A) >60 mL/min/1.73 m^2 Final     eGFR if non    Date Value Ref Range Status   07/03/2018 35.0 (A) >60 mL/min/1.73 m^2 Final     Comment:     Calculation used to obtain the estimated glomerular filtration  rate (eGFR) is the CKD-EPI equation.          Past Medical History:  Past Medical History:   Diagnosis Date    Anemia     BPH (benign prostatic hypertrophy)     Carotid bruit     CVA (cerebral vascular accident)     Diabetes mellitus, type 2     Hyperlipidemia     Hypertension     Hypothyroidism 2/29/2016    Type II or unspecified type diabetes mellitus without mention of complication, not stated as uncontrolled     Vitamin D deficiency 2/29/2016     Past Surgical History:   Procedure Laterality Date    COLONOSCOPY  2008    COLONOSCOPY  5/31/2005    repeat in 2015    PILONIDAL CYST DRAINAGE      TRANSURETHRAL RESECTION OF PROSTATE       Review of patient's allergies indicates:   Allergen Reactions    No known drug allergies      Current Outpatient Medications  on File Prior to Visit   Medication Sig Dispense Refill    acarbose (PRECOSE) 100 MG Tab Take 1 tablet (100 mg total) by mouth 3 (three) times daily with meals. 270 tablet 1    allopurinol (ZYLOPRIM) 100 MG tablet Take 1 tablet (100 mg total) by mouth once daily. 90 tablet 3    aspirin (ECOTRIN) 81 MG EC tablet Take 81 mg by mouth once daily.      atorvastatin (LIPITOR) 20 MG tablet Take 1 tablet (20 mg total) by mouth once daily. 90 tablet 3    blood sugar diagnostic Strp Use once daily as directed 100 each 0    clopidogrel (PLAVIX) 75 mg tablet Take 1 tablet (75 mg total) by mouth once daily. 90 tablet 3    colchicine (COLCRYS) 0.6 mg tablet Take 1 tablet (0.6 mg total) by mouth once daily. 6 tablet 0    ergocalciferol (ERGOCALCIFEROL) 50,000 unit Cap Take 1 capsule twice weekly 24 capsule 3    ferrous sulfate 325 mg (65 mg iron) Tab tablet Take 1 tablet (325 mg total) by mouth 3 (three) times daily. 270 tablet 3    galantamine (RAZADYNE) 8 MG Tab Take 1 tablet (8 mg total) by mouth 2 (two) times daily with meals. 180 tablet 3    glipiZIDE (GLUCOTROL) 10 MG tablet Take 1 tablet (10 mg total) by mouth 2 (two) times daily before meals. 180 tablet 1    levothyroxine (SYNTHROID) 25 MCG tablet take 1 tablet by mouth every morning ON AN EMPTY STOMACH 90 tablet 3    montelukast (SINGULAIR) 10 mg tablet TAKE 1 TABLET(10 MG) BY MOUTH EVERY EVENING 90 tablet 4    niacin (NIASPAN) 1000 MG CR tablet Take 1 tablet (1,000 mg total) by mouth every evening. 90 tablet 3    pioglitazone (ACTOS) 30 MG tablet Take 1 tablet (30 mg total) by mouth once daily. 90 tablet 3    SITagliptin (JANUVIA) 100 MG Tab Take 1 tablet (100 mg total) by mouth once daily. 90 tablet 1    [DISCONTINUED] benazepril-hydrochlorthiazide (LOTENSIN HCT) 20-25 mg Tab take 1 tablet by mouth twice a day 180 tablet 3    calcium carbonate (OS-STACEY) 600 mg (1,500 mg) Tab Take 600 mg by mouth.      ketoconazole (NIZORAL) 2 % shampoo       lancets  Misc One touch lancets use once daily as directed 100 each 0    ONETOUCH VERIO IQ METER kit Use as instructed 1 each 0    [DISCONTINUED] methylPREDNISolone (MEDROL DOSEPACK) 4 mg tablet As directed 1 Package 0    [DISCONTINUED] nateglinide (STARLIX) 120 MG tablet Take 120 mg by mouth.       No current facility-administered medications on file prior to visit.      Social History     Socioeconomic History    Marital status:      Spouse name: Not on file    Number of children: Not on file    Years of education: Not on file    Highest education level: Not on file   Social Needs    Financial resource strain: Not on file    Food insecurity - worry: Not on file    Food insecurity - inability: Not on file    Transportation needs - medical: Not on file    Transportation needs - non-medical: Not on file   Occupational History    Not on file   Tobacco Use    Smoking status: Never Smoker    Smokeless tobacco: Never Used   Substance and Sexual Activity    Alcohol use: No     Alcohol/week: 0.0 oz    Drug use: No    Sexual activity: Not on file   Other Topics Concern    Not on file   Social History Narrative    Not on file     Family History   Problem Relation Age of Onset    Hypertension Mother     Hypertension Father     Heart attack Maternal Uncle     Brain cancer Sister     Diabetes Neg Hx     Stroke Neg Hx        Review of Systems   Constitutional: Positive for fatigue. Negative for fever.   Respiratory: Positive for cough. Negative for shortness of breath and wheezing.    Cardiovascular: Negative for chest pain and palpitations.   Gastrointestinal: Negative for abdominal pain.   Genitourinary: Negative for dysuria and hematuria.   Musculoskeletal: Positive for arthralgias.       Objective:     /72   Pulse 82   Temp 97.9 °F (36.6 °C)   Ht 6' (1.829 m)   Wt 96.2 kg (212 lb)   BMI 28.75 kg/m²     Physical Exam   Constitutional: He is oriented to person, place, and time. He appears  well-developed and well-nourished.   HENT:   Head: Normocephalic and atraumatic.   Right Ear: External ear normal.   Left Ear: External ear normal.   Nose: Nose normal.   Mouth/Throat: Oropharynx is clear and moist. No oropharyngeal exudate.   Eyes: Conjunctivae and EOM are normal. Pupils are equal, round, and reactive to light. Right eye exhibits no discharge. Left eye exhibits no discharge. No scleral icterus.   Neck: Normal range of motion. Neck supple. No JVD present. No thyromegaly present.   Cardiovascular: Normal rate, regular rhythm, normal heart sounds and intact distal pulses. Exam reveals no gallop and no friction rub.   No murmur heard.  Pulmonary/Chest: Effort normal and breath sounds normal. No respiratory distress. He has no wheezes. He has no rales. He exhibits no tenderness.   Abdominal: Soft. Bowel sounds are normal. He exhibits no distension and no mass. There is no tenderness. There is no rebound and no guarding.   Musculoskeletal: Normal range of motion. He exhibits no edema or tenderness.        Arms:  Lymphadenopathy:     He has no cervical adenopathy.   Neurological: He is alert and oriented to person, place, and time. No cranial nerve deficit. Coordination normal.   Skin: Skin is warm and dry. He is not diaphoretic.   Psychiatric: He has a normal mood and affect.     Assessment:     1. Polyarthritis    2. Chronic fatigue    3. DEVIKA on CPAP    4. Hypertension associated with diabetes    5. Acute idiopathic gout of left hand    6. Cough        Plan:     Problem List Items Addressed This Visit     Acute idiopathic gout of left hand    Relevant Orders    Ambulatory referral to Rheumatology    Chronic fatigue    Relevant Orders    Ambulatory referral to Pulmonology    Basic metabolic panel (Completed)    CBC auto differential (Completed)    Hypertension associated with diabetes    Relevant Medications    benazepril (LOTENSIN) 40 MG tablet    DEVIKA on CPAP (Chronic)    Relevant Orders    Ambulatory  referral to Pulmonology      Other Visit Diagnoses     Polyarthritis    -  Primary    Relevant Orders    Ambulatory referral to Rheumatology    FRANDY (Completed)    Cyclic citrul peptide antibody, IgG (Completed)    Rheumatoid factor (Completed)    Uric acid (Completed)    X-Ray Hand 3 View Bilateral (Completed)    Cough        Relevant Orders    X-Ray Chest PA And Lateral (Completed)       I am concerned that his sleep apnea may not be adequately controlled and would like for Pulmonary to evaluate him for this.  I will obtain a chest x-ray to make sure that the lungs have something on the could be causing his cough.  Will obtain the laboratory above and then make decisions on treatment moving forward.  It  No Follow-up on file.

## 2018-11-13 ENCOUNTER — HOSPITAL ENCOUNTER (OUTPATIENT)
Dept: RADIOLOGY | Facility: HOSPITAL | Age: 78
Discharge: HOME | End: 2018-11-13
Attending: STUDENT IN AN ORGANIZED HEALTH CARE EDUCATION/TRAINING PROGRAM
Payer: MEDICARE

## 2018-11-13 ENCOUNTER — OFFICE VISIT (OUTPATIENT)
Dept: RHEUMATOLOGY | Facility: CLINIC | Age: 78
End: 2018-11-13
Payer: MEDICARE

## 2018-11-13 VITALS
HEART RATE: 72 BPM | SYSTOLIC BLOOD PRESSURE: 115 MMHG | HEIGHT: 72 IN | BODY MASS INDEX: 29.68 KG/M2 | DIASTOLIC BLOOD PRESSURE: 61 MMHG | WEIGHT: 219.13 LBS

## 2018-11-13 DIAGNOSIS — M11.9 CRYSTAL ARTHRITIS: ICD-10-CM

## 2018-11-13 DIAGNOSIS — M11.9 CRYSTAL ARTHRITIS: Primary | ICD-10-CM

## 2018-11-13 DIAGNOSIS — R93.89 ABNORMAL FINDINGS ON DIAGNOSTIC IMAGING OF OTHER SPECIFIED BODY STRUCTURES: ICD-10-CM

## 2018-11-13 PROCEDURE — 3074F SYST BP LT 130 MM HG: CPT | Mod: CPTII,S$GLB,, | Performed by: STUDENT IN AN ORGANIZED HEALTH CARE EDUCATION/TRAINING PROGRAM

## 2018-11-13 PROCEDURE — 3078F DIAST BP <80 MM HG: CPT | Mod: CPTII,S$GLB,, | Performed by: STUDENT IN AN ORGANIZED HEALTH CARE EDUCATION/TRAINING PROGRAM

## 2018-11-13 PROCEDURE — 73070 X-RAY EXAM OF ELBOW: CPT | Mod: 26,RT,, | Performed by: RADIOLOGY

## 2018-11-13 PROCEDURE — 73562 X-RAY EXAM OF KNEE 3: CPT | Mod: 26,RT,, | Performed by: RADIOLOGY

## 2018-11-13 PROCEDURE — 73562 X-RAY EXAM OF KNEE 3: CPT | Mod: 26,LT,, | Performed by: RADIOLOGY

## 2018-11-13 PROCEDURE — 73562 X-RAY EXAM OF KNEE 3: CPT | Mod: TC,50,FY,PO

## 2018-11-13 PROCEDURE — 73070 X-RAY EXAM OF ELBOW: CPT | Mod: TC,50,FY,PO

## 2018-11-13 PROCEDURE — 73070 X-RAY EXAM OF ELBOW: CPT | Mod: 26,LT,, | Performed by: RADIOLOGY

## 2018-11-13 PROCEDURE — 99999 PR PBB SHADOW E&M-EST. PATIENT-LVL IV: CPT | Mod: PBBFAC,,, | Performed by: STUDENT IN AN ORGANIZED HEALTH CARE EDUCATION/TRAINING PROGRAM

## 2018-11-13 PROCEDURE — 99204 OFFICE O/P NEW MOD 45 MIN: CPT | Mod: S$GLB,,, | Performed by: STUDENT IN AN ORGANIZED HEALTH CARE EDUCATION/TRAINING PROGRAM

## 2018-11-13 PROCEDURE — 1101F PT FALLS ASSESS-DOCD LE1/YR: CPT | Mod: CPTII,S$GLB,, | Performed by: STUDENT IN AN ORGANIZED HEALTH CARE EDUCATION/TRAINING PROGRAM

## 2018-11-13 RX ORDER — TRIAMCINOLONE ACETONIDE 40 MG/ML
40 INJECTION, SUSPENSION INTRA-ARTICULAR; INTRAMUSCULAR ONCE
Status: DISCONTINUED | OUTPATIENT
Start: 2018-11-13 | End: 2018-11-13

## 2018-11-13 RX ORDER — LIDOCAINE HYDROCHLORIDE 20 MG/ML
2 INJECTION, SOLUTION INFILTRATION; PERINEURAL ONCE
Status: DISCONTINUED | OUTPATIENT
Start: 2018-11-13 | End: 2018-11-13

## 2018-11-13 RX ORDER — PREDNISONE 5 MG/1
TABLET ORAL
Qty: 21 TABLET | Refills: 1 | Status: SHIPPED | OUTPATIENT
Start: 2018-11-13 | End: 2018-11-27 | Stop reason: SDUPTHER

## 2018-11-13 NOTE — LETTER
November 19, 2018      Mohinder Morfin MD  [id number] [facility] [address]           [facility] - Rheumatology  [address] [address]  Phone: [phone]  Fax: [phone]          Patient: Rudy Saldaña   MR Number: [id number]   YOB: 1940   Date of Visit: 11/13/2018       Dear Dr. Mohinder Morfin:    Thank you for referring Rudy Saldaña to me for evaluation. Attached you will find relevant portions of my assessment and plan of care.    If you have questions, please do not hesitate to call me. I look forward to following Rudy Saldaña along with you.    Sincerely,    Romelia Rodriguez MD    Enclosure  CC:  No Recipients    If you would like to receive this communication electronically, please contact [email or web address] or [phone] to request more information on [employer] access.    For providers and/or their staff who would like to refer a patient to [facility], please contact us through our one-stop-shop provider referral line, [facility], at [phone].    If you feel you have received this communication in error or would no longer like to receive these types of communications, please e-mail [email or web address]

## 2018-11-13 NOTE — PROGRESS NOTES
RHEUMATOLOGY OUTPATIENT CLINIC NOTE    11/13/2018    Attending Rheumatologist: Romelia Rodriguez  Primary Care Provider: Mohinder Morfin MD   Physician Requesting Consultation: Mohinder Morfin MD  [id number] [id number]  Chief Complaint/Reason For Consultation:  Gout and Polyarthritis      Subjective:       HPI  Rudy Saldaña is a 78 y.o. White male presents w recent onset acute wrist swelling in bilateral wrists , left midfoot, and right elbow. States he would go to sleep in his usual state of health, and woke up one morning with midfoot and wrist swollen. No prior hx of gout /pseudogout. Has just completed course of indomethacin per pcp. Recently started on allopurinol and colchicine (100mg daily+ colchicine .6mg daily). Completed several medrol dose packs but would have acute worsening of joint pains/swellings once he ran out.   No hx renal stones. +Hx DM and CKD. Uric acid elevated. 8.3      Review of Systems   Constitutional: Negative for chills, fever and weight loss.   HENT: Negative for congestion, ear pain, hearing loss, sinus pain, sore throat and tinnitus.    Eyes: Negative for blurred vision, double vision and photophobia.   Respiratory: Negative for cough, sputum production and shortness of breath.    Cardiovascular: Negative for chest pain and palpitations.   Gastrointestinal: Negative for heartburn, nausea and vomiting.   Genitourinary: Negative for dysuria and frequency.   Musculoskeletal: Positive for joint pain. Negative for back pain, myalgias and neck pain.   Skin: Negative for itching and rash.   Neurological: Negative for dizziness, tingling, focal weakness and headaches.   Psychiatric/Behavioral: Negative for depression and suicidal ideas.   All other systems reviewed and are negative.      Chronic comorbid conditions affecting medical decision making today:  Past Medical History:   Diagnosis Date    Anemia     BPH (benign prostatic hypertrophy)     Carotid bruit      CVA (cerebral vascular accident)     Diabetes mellitus, type 2     Hyperlipidemia     Hypertension     Hypothyroidism 2/29/2016    Type II or unspecified type diabetes mellitus without mention of complication, not stated as uncontrolled     Vitamin D deficiency 2/29/2016     Past Surgical History:   Procedure Laterality Date    COLONOSCOPY  2008    COLONOSCOPY  5/31/2005    repeat in 2015    PILONIDAL CYST DRAINAGE      TRANSURETHRAL RESECTION OF PROSTATE       Family History   Problem Relation Age of Onset    Hypertension Mother     Hypertension Father     Heart attack Maternal Uncle     Brain cancer Sister     Diabetes Neg Hx     Stroke Neg Hx      Social History     Substance and Sexual Activity   Alcohol Use No    Alcohol/week: 0.0 oz     Social History     Tobacco Use   Smoking Status Never Smoker   Smokeless Tobacco Never Used     Social History     Substance and Sexual Activity   Drug Use No       Current Outpatient Medications:     acarbose (PRECOSE) 100 MG Tab, Take 1 tablet (100 mg total) by mouth 3 (three) times daily with meals., Disp: 270 tablet, Rfl: 1    allopurinol (ZYLOPRIM) 100 MG tablet, Take 1 tablet (100 mg total) by mouth once daily., Disp: 90 tablet, Rfl: 3    aspirin (ECOTRIN) 81 MG EC tablet, Take 81 mg by mouth once daily., Disp: , Rfl:     atorvastatin (LIPITOR) 20 MG tablet, Take 1 tablet (20 mg total) by mouth once daily., Disp: 90 tablet, Rfl: 3    benazepril (LOTENSIN) 40 MG tablet, Take 1 tablet (40 mg total) by mouth once daily., Disp: 90 tablet, Rfl: 3    blood sugar diagnostic Strp, Use once daily as directed, Disp: 100 each, Rfl: 0    calcium carbonate (OS-STACEY) 600 mg (1,500 mg) Tab, Take 600 mg by mouth., Disp: , Rfl:     clopidogrel (PLAVIX) 75 mg tablet, Take 1 tablet (75 mg total) by mouth once daily., Disp: 90 tablet, Rfl: 3    colchicine (COLCRYS) 0.6 mg tablet, Take 1 tablet (0.6 mg total) by mouth once daily., Disp: 6 tablet, Rfl: 0     ergocalciferol (ERGOCALCIFEROL) 50,000 unit Cap, Take 1 capsule twice weekly, Disp: 24 capsule, Rfl: 3    ferrous sulfate 325 mg (65 mg iron) Tab tablet, Take 1 tablet (325 mg total) by mouth 3 (three) times daily., Disp: 270 tablet, Rfl: 3    galantamine (RAZADYNE) 8 MG Tab, Take 1 tablet (8 mg total) by mouth 2 (two) times daily with meals., Disp: 180 tablet, Rfl: 3    glipiZIDE (GLUCOTROL) 10 MG tablet, Take 1 tablet (10 mg total) by mouth 2 (two) times daily before meals., Disp: 180 tablet, Rfl: 1    ketoconazole (NIZORAL) 2 % shampoo, , Disp: , Rfl:     lancets Misc, One touch lancets use once daily as directed, Disp: 100 each, Rfl: 0    levothyroxine (SYNTHROID) 25 MCG tablet, take 1 tablet by mouth every morning ON AN EMPTY STOMACH, Disp: 90 tablet, Rfl: 3    niacin (NIASPAN) 1000 MG CR tablet, Take 1 tablet (1,000 mg total) by mouth every evening., Disp: 90 tablet, Rfl: 3    pioglitazone (ACTOS) 30 MG tablet, Take 1 tablet (30 mg total) by mouth once daily., Disp: 90 tablet, Rfl: 3    SITagliptin (JANUVIA) 100 MG Tab, Take 1 tablet (100 mg total) by mouth once daily., Disp: 90 tablet, Rfl: 1    ONETOUCH VERIO IQ METER kit, Use as instructed, Disp: 1 each, Rfl: 0    predniSONE (DELTASONE) 5 MG tablet, Take 10 mg daily x 1 week, then take 5mg daily x 1 week, Disp: 21 tablet, Rfl: 1       Objective:         Vitals:    11/13/18 1251   BP: 115/61   Pulse: 72     Physical Exam   Nursing note and vitals reviewed.  Constitutional: He is oriented to person, place, and time and well-developed, well-nourished, and in no distress.   HENT:   Head: Normocephalic and atraumatic.   Eyes: Conjunctivae and EOM are normal. Pupils are equal, round, and reactive to light.   Neck: Normal range of motion. Neck supple.   Cardiovascular: Normal rate and regular rhythm.    Pulmonary/Chest: Effort normal and breath sounds normal.   Abdominal: Soft. Bowel sounds are normal. He exhibits no distension. There is no tenderness.  There is no rebound.   Neurological: He is alert and oriented to person, place, and time.   Skin: Skin is warm and dry.     Psychiatric: Affect and judgment normal.   Musculoskeletal:   + swelling in bilateral wrists  No synovitis in small joints hands and feet  No effusions over large joints    No tophi/nodules         Reviewed old and all outside pertinent medical records available.    All lab results personally reviewed and interpreted by me.  Lab Results   Component Value Date    WBC 9.95 11/12/2018    HGB 14.7 11/12/2018    HCT 45.3 11/12/2018    MCV 91 11/12/2018    MCH 29.5 11/12/2018    MCHC 32.5 11/12/2018    RDW 14.6 (H) 11/12/2018     11/12/2018    MPV 10.8 11/12/2018       Lab Results   Component Value Date     11/12/2018    K 4.3 11/12/2018    CL 96 11/12/2018    CO2 27 11/12/2018     (H) 11/12/2018    BUN 36 (H) 11/12/2018    CALCIUM 11.3 (H) 11/12/2018    PROT 7.2 07/03/2018    ALBUMIN 3.4 (L) 07/03/2018    BILITOT 1.5 (H) 07/03/2018    AST 12 07/03/2018    ALKPHOS 53 (L) 07/03/2018    ALT 11 07/03/2018       Lab Results   Component Value Date    COLORU Red (A) 03/23/2016    APPEARANCEUA Cloudy (A) 03/23/2016    SPECGRAV 1.025 03/23/2016    PHUR 6.0 03/23/2016    PROTEINUA Trace (A) 03/23/2016    KETONESU Negative 03/23/2016    LEUKOCYTESUR Negative 03/23/2016    NITRITE Negative 03/23/2016    UROBILINOGEN neg 12/16/2015       Lab Results   Component Value Date    CRP 17.6 (H) 10/29/2018       Lab Results   Component Value Date    SEDRATE 26 (H) 10/29/2018       Lab Results   Component Value Date    FRANDY Negative 06/11/2004    SEDRATE 26 (H) 10/29/2018       No components found for: 25OHVITDTOT, 69STNLAJ4, 77CTIEGA4, METHODNOTE    Lab Results   Component Value Date    URICACID 8.3 (H) 11/12/2018       No components found for: TSPOTTB    Uric acid 8.3    Imaging:  All imaging reviewed and independently  interpreted by me.    XR Hand 11/18  FINDINGS:  Right hand: Bones are  demineralized.  There are stable multi articular degenerative changes noted.  No focal osseous erosions.  No fracture or dislocation.  Chronic appearing deformity at the base of the 5th metacarpal.  Soft tissues are within normal limits.    Left hand: Bones are demineralized.  There are occasions within the soft tissues along the ulnar aspect of the 3rd PIP joint.  No acute fracture or dislocation.  Soft tissues are unremarkable.      Impression       As above          ASSESSMENT / PLAN:     Rudy Saldaña is a 78 y.o. White male with:      1. Crystal arthritis  -FRANDY /RF negative ; uric acid 8.3   -no hx diagnostic arthrocentesis ; suspect likely gout vs pseudogout  - +actue flare in b/l wrists today ; tx and management complicated by hx DM and CKD (GFR 30)  -stop indomethacin; advised against nsaids 2.2 GI /CVS/ renal risks  -off allopurinol ; given GFR, will not restart at this time ;hold initiation colchicine also  -start prednisone taper as discussed ; further eval w/ XR to assess for evidence gout vs pseudogout and role of urate lowering therapy  -discussed diagnostic arthrocentesis for more definitive differentiation ; pt deferred at this time, reconsider at rtc    -uric acid 8.3; goal <6.5  - X-Ray Elbow 2 View Bilateral; Future  - TSH; Future          . Other specified counseling  - Immunization counseling done  - Weight loss counseling done  - Nutrition and exercise counseling  - Medication counseling provided      Follow-up in about 2 weeks (around 11/27/2018).    Method of contact with patient concerns: [facility]      Romelia Rodriguez M.D.  Rheumatology Department   [facility] [place] [address], [place] [address]  Phone: [phone]  Fax: [phone]

## 2018-11-14 DIAGNOSIS — M10.042 ACUTE IDIOPATHIC GOUT OF LEFT HAND: ICD-10-CM

## 2018-11-14 DIAGNOSIS — E11.22 CKD STAGE 3 SECONDARY TO DIABETES: ICD-10-CM

## 2018-11-14 DIAGNOSIS — N18.30 CKD STAGE 3 SECONDARY TO DIABETES: ICD-10-CM

## 2018-11-14 RX ORDER — INSULIN GLARGINE 100 [IU]/ML
10 INJECTION, SOLUTION SUBCUTANEOUS DAILY
Qty: 15 ML | Refills: 11 | Status: SHIPPED | OUTPATIENT
Start: 2018-11-14 | End: 2018-11-15

## 2018-11-14 RX ORDER — INSULIN ASPART 100 [IU]/ML
INJECTION, SOLUTION INTRAVENOUS; SUBCUTANEOUS
Qty: 3 BOX | Refills: 3 | Status: SHIPPED | OUTPATIENT
Start: 2018-11-14 | End: 2018-11-20

## 2018-11-14 RX ORDER — NAPROXEN SODIUM 220 MG
1 TABLET ORAL DAILY
Qty: 100 EACH | Refills: 11 | COMMUNITY
Start: 2018-11-14 | End: 2018-11-20

## 2018-11-14 NOTE — PROGRESS NOTES
I have reviewed this patient's recent labs and due to this, the following orders AND/OR meds have been sent in.  Please notify the patient so that the patient can be informed about my recommendations.      Orders Placed This Encounter   Procedures    Ambulatory referral to Nephrology     Referral Priority:   Routine     Referral Type:   Consultation     Referral Reason:   Specialty Services Required     Requested Specialty:   Nephrology     Number of Visits Requested:   1    Ambulatory Referral to Diabetes Education     Referral Priority:   Routine     Referral Type:   Consultation     Referral Reason:   Specialty Services Required     Requested Specialty:   Endocrinology     Number of Visits Requested:   1    Ambulatory referral to Rheumatology     Referral Priority:   Routine     Referral Type:   Consultation     Referral Reason:   Specialty Services Required     Requested Specialty:   Rheumatology     Number of Visits Requested:   1       Medications Ordered This Encounter   Medications    insulin aspart U-100 (NOVOLOG FLEXPEN U-100 INSULIN) 100 unit/mL InPn pen     Sig: Use before meals tid: If blood sugar is below 70 eat first then check your blood sugar 2 hours later and make correction. If blood pre-meal sugar is 70 -150 take 8 units of Novolog; If blood pre-meal sugar is 151-200 take +2 units of Novolog; If blood pre-meal sugar is 201-250 take +4 units of Novolog; If blood pre-meal sugar is 251-300 take +6 units of Novolog; If blood pre-meal sugar is 301-350 take +8 units of Novolog; If blood pre-meal sugar     Dispense:  3 Box     Refill:  3    insulin glargine (LANTUS SOLOSTAR U-100 INSULIN) 100 unit/mL (3 mL) InPn pen     Sig: Inject 10 Units into the skin once daily.     Dispense:  15 mL     Refill:  11    insulin syringe-needle U-100 0.3 mL 30 Syrg     Si Units by Misc.(Non-Drug; Combo Route) route once daily.     Dispense:  100 each     Refill:  11

## 2018-11-15 ENCOUNTER — TELEPHONE (OUTPATIENT)
Dept: FAMILY MEDICINE | Facility: CLINIC | Age: 78
End: 2018-11-15

## 2018-11-15 ENCOUNTER — TELEPHONE (OUTPATIENT)
Dept: PULMONOLOGY | Facility: CLINIC | Age: 78
End: 2018-11-15

## 2018-11-15 ENCOUNTER — TELEPHONE (OUTPATIENT)
Dept: NEPHROLOGY | Facility: CLINIC | Age: 78
End: 2018-11-15

## 2018-11-15 RX ORDER — PEN NEEDLE, DIABETIC 29 G X1/2"
1 NEEDLE, DISPOSABLE MISCELLANEOUS DAILY PRN
Qty: 100 EACH | Refills: 11 | Status: SHIPPED | OUTPATIENT
Start: 2018-11-15 | End: 2018-11-20

## 2018-11-15 RX ORDER — INSULIN GLARGINE 100 [IU]/ML
10 INJECTION, SOLUTION SUBCUTANEOUS DAILY
Qty: 15 ML | Refills: 1 | Status: SHIPPED | OUTPATIENT
Start: 2018-11-15 | End: 2018-11-20

## 2018-11-15 NOTE — TELEPHONE ENCOUNTER
Did you get him in with the diabetic educator?  If not, please schedule him with one of our NP's to instruct on how to use the insulin given.

## 2018-11-15 NOTE — TELEPHONE ENCOUNTER
I did schedule him a diabetic education appointment  but there are not any appointments until December. I will schedule him an appointment with one of the NP.

## 2018-11-15 NOTE — TELEPHONE ENCOUNTER
Returned call to pharmacy. Pharmacist asked if the lantus can be changed to basaglar which is the same medication but that it is covered by the patients ins. Where lantus is not a covered medication by the patients ins.

## 2018-11-15 NOTE — TELEPHONE ENCOUNTER
----- Message from Gali Carcamo sent at 11/15/2018  9:16 AM CST -----  Contact: ESTHER-DAUGHTER  Pt was advised by his rheumatologist to see nephrologist,she stated he was in kidney failure .Also stated his kidney's are only working at about 50 percent.please call back at cell-[phone] office [phone].      Thanks,  Gali Carcamo

## 2018-11-15 NOTE — TELEPHONE ENCOUNTER
----- Message from Althea Cruz sent at 11/15/2018  3:18 PM CST -----  Contact: Ellis  Request a call to reschedule the pt Lantis prescription, can be reached at [phone]///[email or web address]

## 2018-11-15 NOTE — TELEPHONE ENCOUNTER
Yes    Medications Ordered This Encounter   Medications    insulin glargine (LANTUS SOLOSTAR) 100 unit/mL (3 mL) InPn pen     Sig: Inject 10 Units into the skin once daily.     Dispense:  15 mL     Refill:  1

## 2018-11-15 NOTE — TELEPHONE ENCOUNTER
----- Message from Gali Carcamo sent at 11/15/2018  9:18 AM CST -----  Contact: -terridaughter  Pt was told that his cpap machine may not be working as well as it should.please call back at [phone] [phone].    Thanks,  Gali Carcamo

## 2018-11-15 NOTE — TELEPHONE ENCOUNTER
Patient is in office because he and his sister needed some clarity with the insulin orders. Patent stated that pharmacy need order for insulin needles.

## 2018-11-16 ENCOUNTER — OFFICE VISIT (OUTPATIENT)
Dept: NEPHROLOGY | Facility: CLINIC | Age: 78
End: 2018-11-16
Payer: MEDICARE

## 2018-11-16 VITALS
BODY MASS INDEX: 29.38 KG/M2 | DIASTOLIC BLOOD PRESSURE: 82 MMHG | WEIGHT: 216.63 LBS | HEART RATE: 71 BPM | SYSTOLIC BLOOD PRESSURE: 178 MMHG

## 2018-11-16 DIAGNOSIS — D64.9 ANEMIA, UNSPECIFIED TYPE: ICD-10-CM

## 2018-11-16 DIAGNOSIS — M11.9 CRYSTAL ARTHRITIS: ICD-10-CM

## 2018-11-16 DIAGNOSIS — E78.2 COMBINED HYPERLIPIDEMIA ASSOCIATED WITH TYPE 2 DIABETES MELLITUS: ICD-10-CM

## 2018-11-16 DIAGNOSIS — E11.69 COMBINED HYPERLIPIDEMIA ASSOCIATED WITH TYPE 2 DIABETES MELLITUS: ICD-10-CM

## 2018-11-16 DIAGNOSIS — N18.30 CKD STAGE 3 SECONDARY TO DIABETES: ICD-10-CM

## 2018-11-16 DIAGNOSIS — E13.42 POLYNEUROPATHY DUE TO SECONDARY DIABETES: ICD-10-CM

## 2018-11-16 DIAGNOSIS — E83.52 HYPERCALCEMIA: Primary | ICD-10-CM

## 2018-11-16 DIAGNOSIS — I15.2 HYPERTENSION ASSOCIATED WITH DIABETES: ICD-10-CM

## 2018-11-16 DIAGNOSIS — E11.59 HYPERTENSION ASSOCIATED WITH DIABETES: ICD-10-CM

## 2018-11-16 DIAGNOSIS — E55.9 VITAMIN D DEFICIENCY: ICD-10-CM

## 2018-11-16 DIAGNOSIS — E03.9 HYPOTHYROIDISM, UNSPECIFIED TYPE: ICD-10-CM

## 2018-11-16 DIAGNOSIS — E11.22 CKD STAGE 3 SECONDARY TO DIABETES: ICD-10-CM

## 2018-11-16 DIAGNOSIS — Z86.73 HISTORY OF STROKE: ICD-10-CM

## 2018-11-16 PROCEDURE — 99214 OFFICE O/P EST MOD 30 MIN: CPT | Mod: S$GLB,,, | Performed by: INTERNAL MEDICINE

## 2018-11-16 PROCEDURE — 3077F SYST BP >= 140 MM HG: CPT | Mod: CPTII,S$GLB,, | Performed by: INTERNAL MEDICINE

## 2018-11-16 PROCEDURE — 99999 PR PBB SHADOW E&M-EST. PATIENT-LVL IV: CPT | Mod: PBBFAC,,, | Performed by: INTERNAL MEDICINE

## 2018-11-16 PROCEDURE — 1101F PT FALLS ASSESS-DOCD LE1/YR: CPT | Mod: CPTII,S$GLB,, | Performed by: INTERNAL MEDICINE

## 2018-11-16 PROCEDURE — 3079F DIAST BP 80-89 MM HG: CPT | Mod: CPTII,S$GLB,, | Performed by: INTERNAL MEDICINE

## 2018-11-16 RX ORDER — ERGOCALCIFEROL 1.25 MG/1
CAPSULE ORAL
Qty: 24 CAPSULE | Refills: 3
Start: 2018-11-16 | End: 2019-04-18 | Stop reason: SDUPTHER

## 2018-11-16 NOTE — LETTER
November 16, 2018      Mohinder Morfin MD  [id number] [address]           [facility]  [address] [address]  Phone: [phone]  Fax: [phone]          Patient: Rudy Saldaña   MR Number: [id number]   YOB: 1940   Date of Visit: 11/16/2018       Dear Dr. Mohinder Morfin:    Thank you for referring Rudy Saldaña to me for evaluation. Attached you will find relevant portions of my assessment and plan of care.    If you have questions, please do not hesitate to call me. I look forward to following Rudy Saldaña along with you.    Sincerely,    Christopher Nails MD    Enclosure  CC:  No Recipients    If you would like to receive this communication electronically, please contact [email or web address] or [phone] to request more information on [employer] access.    For providers and/or their staff who would like to refer a patient to [facility], please contact us through our one-stop-shop provider referral line, [facility] , at [phone].    If you feel you have received this communication in error or would no longer like to receive these types of communications, please e-mail [email or web address]

## 2018-11-17 NOTE — PROGRESS NOTES
Subjective:       Patient ID: Rudy Saldaña is a 78 y.o. White male who presents for new evaluation of Follow-up and Chronic Kidney Disease    HPI     Initial consult--7/2018: He is referred by his PCP for CKD  He has a significant history of HTN, DM and gout, last Hba1c was 6.9, highest 7.6 in 2015.   He denies foamy urine, no hematuria and no flank pain. He admits to a high sodium diet but feels he stays hydrated. No LE edema and no SOB. No NSAID use and no herbal medications. He's quite sedentary, no routine exercise. No known family history of kidney disease    Interval history: He complains of various joint pains and saw Rheumatology. He is on prednisone and notes his BS are running high. He was scripted Insulin Lantus plus ISS but he has never given himself an injection and is wary of using it. No NSAID use for his arthralgias, Tylenol use but he is even concerned with using that. No LE edema and no SOB. Nocturia os 0-2X. He denies calcium supplements, Tums, Rolaids, large amounts of calcium based foods. His wife has a BP wrist type at home that is several years old.     Sister accompanies him today. He received the flu vaccine    Review of Systems   Constitutional: Positive for fatigue. Negative for activity change, appetite change and unexpected weight change.   HENT: Negative for facial swelling.    Respiratory: Negative for cough and shortness of breath.    Cardiovascular: Negative for chest pain and leg swelling.   Gastrointestinal: Positive for constipation (uses stool softeners).   Genitourinary: Positive for frequency. Negative for difficulty urinating, dysuria, hematuria and urgency.   Musculoskeletal: Positive for arthralgias.   Neurological: Negative for weakness and headaches.   Hematological: Does not bruise/bleed easily.   Psychiatric/Behavioral: Negative for confusion.       Objective:      Physical Exam   Constitutional: He is oriented to person, place, and time. He appears well-developed and  well-nourished. No distress.   Neck: No JVD present.   Cardiovascular: S1 normal and S2 normal. Exam reveals no friction rub.   Pulmonary/Chest: Breath sounds normal. He has no wheezes. He has no rales.   Abdominal: Soft.   Musculoskeletal: He exhibits no edema.   Neurological: He is alert and oriented to person, place, and time.   Skin: Skin is warm and dry.   Psychiatric: He has a normal mood and affect.       Assessment:       1. Hypercalcemia    2. Vitamin D deficiency    3. CKD stage 3 secondary to diabetes    4. Hypertension associated with diabetes    5. Anemia, unspecified type    6. Type 2 diabetes mellitus, uncontrolled, with renal complications    7. Hypothyroidism, unspecified type    8. Crystal arthritis    9. Combined hyperlipidemia associated with type 2 diabetes mellitus    10. History of stroke    11. Polyneuropathy due to secondary diabetes        Plan:              CKD appearing to be at Stage 3 with multiple risk factors. He displays typical progression over the past few years. Continue RAAS blockade (benazepril) for renal preservation    HTN--high today. Purchase BP machine and have checked at clinic. Monitor at home, discussed BP goals given his age    DM--advised if he is wary of insulin use do not use (concerned with user error and causing hypoglycemia) There was no clinic availability for a nurse to teach pt today. Gave 24 hour RN [facility] phone number     Hypercalcemia/Mineral and Bone Disease--decrease D2 to q week from twice a week. Recheck on 12/5    Patient was advised to avoid NSAIDs, continue a low sodium diet, and ensure hydration      RTC pending 12/5 labs

## 2018-11-20 ENCOUNTER — OFFICE VISIT (OUTPATIENT)
Dept: FAMILY MEDICINE | Facility: CLINIC | Age: 78
End: 2018-11-20
Payer: MEDICARE

## 2018-11-20 VITALS
BODY MASS INDEX: 29.26 KG/M2 | DIASTOLIC BLOOD PRESSURE: 68 MMHG | SYSTOLIC BLOOD PRESSURE: 140 MMHG | TEMPERATURE: 98 F | HEIGHT: 72 IN | WEIGHT: 216 LBS | HEART RATE: 56 BPM

## 2018-11-20 DIAGNOSIS — N18.30 CKD STAGE 3 SECONDARY TO DIABETES: ICD-10-CM

## 2018-11-20 DIAGNOSIS — M13.0 POLYARTHRITIS: ICD-10-CM

## 2018-11-20 DIAGNOSIS — E11.59 HYPERTENSION ASSOCIATED WITH DIABETES: ICD-10-CM

## 2018-11-20 DIAGNOSIS — E11.22 CKD STAGE 3 SECONDARY TO DIABETES: ICD-10-CM

## 2018-11-20 DIAGNOSIS — I15.2 HYPERTENSION ASSOCIATED WITH DIABETES: ICD-10-CM

## 2018-11-20 PROCEDURE — 99999 PR PBB SHADOW E&M-EST. PATIENT-LVL III: CPT | Mod: PBBFAC,,, | Performed by: NURSE PRACTITIONER

## 2018-11-20 PROCEDURE — 1101F PT FALLS ASSESS-DOCD LE1/YR: CPT | Mod: CPTII,S$GLB,, | Performed by: NURSE PRACTITIONER

## 2018-11-20 PROCEDURE — 3078F DIAST BP <80 MM HG: CPT | Mod: CPTII,S$GLB,, | Performed by: NURSE PRACTITIONER

## 2018-11-20 PROCEDURE — 3077F SYST BP >= 140 MM HG: CPT | Mod: CPTII,S$GLB,, | Performed by: NURSE PRACTITIONER

## 2018-11-20 PROCEDURE — 99213 OFFICE O/P EST LOW 20 MIN: CPT | Mod: S$GLB,,, | Performed by: NURSE PRACTITIONER

## 2018-11-20 NOTE — PROGRESS NOTES
Subjective:      Patient ID: Rudy Saldaña is a 78 y.o. male.    Chief Complaint: Consult    HPI   Patient clinic to discuss starting insulin.  Patient was experiencing severe arthritis limiting his ability to ambulate.  He was treated with Medrol Dosepak and then subsequently seeing Rheumatology and started high-dose steroid treatment.  After this he would experience a fasting glucose of 303 and Dr. Morfin recommended that since he is unable to exercise due to his pain and on high dose steroids that he should start insulin to control his blood glucose.  The steroid regimen has significantly decreased his pain and he is now able to walk regularly.  He denies pain currently. Reports he is now walking at least 1 mile and has noted a decrease in his glucose readings.  He has not started his insulin and voices that he would prefer to not begin insulin at this time.  He has 1 more week of low-dose steroids and he will be finished with his steroids.  His fasting glucose this morning was 147.  He denies signs or symptoms associated with hyper or hypoglycemia.  He cannot identify any other exacerbating or mitigating factors.  Lab Results   Component Value Date    HGBA1C 6.9 (H) 09/14/2018       Review of Systems   Constitutional: Negative for chills, fatigue and fever.   Respiratory: Negative for cough, shortness of breath and wheezing.    Cardiovascular: Negative for chest pain, palpitations and leg swelling.   Skin: Negative for rash and wound.   Neurological: Negative for weakness and numbness.   Psychiatric/Behavioral: The patient is not nervous/anxious.        Objective:     BP (!) 140/68   Pulse (!) 56   Temp 98 °F (36.7 °C) (Oral)   Ht 6' (1.829 m)   Wt 98 kg (216 lb)   BMI 29.29 kg/m²     Physical Exam   Constitutional: He is oriented to person, place, and time. He appears well-developed and well-nourished.   HENT:   Head: Normocephalic.   Eyes: Pupils are equal, round, and reactive to light.   Neck: Normal  range of motion. Neck supple.   Cardiovascular: Normal rate, regular rhythm and normal heart sounds.   Pulmonary/Chest: Effort normal and breath sounds normal. No respiratory distress. He has no decreased breath sounds. He has no wheezes. He has no rhonchi. He has no rales.   Musculoskeletal: Normal range of motion.   Neurological: He is alert and oriented to person, place, and time.   Skin: Skin is warm and dry. No rash noted.   Psychiatric: He has a normal mood and affect. His behavior is normal. Judgment and thought content normal.   Vitals reviewed.    Assessment:     1. Type 2 diabetes mellitus, uncontrolled, with renal complications    2. Hypertension associated with diabetes    3. CKD stage 3 secondary to diabetes    4. Polyarthritis        Plan:     Problem List Items Addressed This Visit        Cardiac/Vascular    Hypertension associated with diabetes       Renal/    CKD stage 3 secondary to diabetes       Endocrine    Type 2 diabetes mellitus, uncontrolled, with renal complications - Primary    Relevant Orders    MyChart Patient Entered Glucose      Other Visit Diagnoses     Polyarthritis          After speaking with Dr. Morfin we agree that we will hold off on beginning the insulin at this time and patient will continue to monitor his glucose closely and follow diabetic diet.  Will report if he has elevations in his blood glucose.  He will follow up on Monday for blood pressure recheck as previously scheduled.    Follow-up in about 6 days (around 11/26/2018), or if symptoms worsen or fail to improve, for Blood pressure check.        Parts of this note was dictated using voice recognition software. Please excuse any grammatical or typographical errors.

## 2018-11-26 ENCOUNTER — OFFICE VISIT (OUTPATIENT)
Dept: FAMILY MEDICINE | Facility: CLINIC | Age: 78
End: 2018-11-26
Payer: MEDICARE

## 2018-11-26 VITALS
SYSTOLIC BLOOD PRESSURE: 164 MMHG | HEART RATE: 47 BPM | DIASTOLIC BLOOD PRESSURE: 64 MMHG | TEMPERATURE: 98 F | BODY MASS INDEX: 30.12 KG/M2 | HEIGHT: 72 IN | WEIGHT: 222.38 LBS

## 2018-11-26 DIAGNOSIS — I15.2 HYPERTENSION ASSOCIATED WITH DIABETES: Primary | ICD-10-CM

## 2018-11-26 DIAGNOSIS — E11.59 HYPERTENSION ASSOCIATED WITH DIABETES: Primary | ICD-10-CM

## 2018-11-26 PROCEDURE — 99999 PR PBB SHADOW E&M-EST. PATIENT-LVL III: CPT | Mod: PBBFAC,,, | Performed by: NURSE PRACTITIONER

## 2018-11-26 PROCEDURE — 99213 OFFICE O/P EST LOW 20 MIN: CPT | Mod: S$GLB,,, | Performed by: NURSE PRACTITIONER

## 2018-11-26 PROCEDURE — 1101F PT FALLS ASSESS-DOCD LE1/YR: CPT | Mod: CPTII,S$GLB,, | Performed by: NURSE PRACTITIONER

## 2018-11-26 PROCEDURE — 3077F SYST BP >= 140 MM HG: CPT | Mod: CPTII,S$GLB,, | Performed by: NURSE PRACTITIONER

## 2018-11-26 PROCEDURE — 3078F DIAST BP <80 MM HG: CPT | Mod: CPTII,S$GLB,, | Performed by: NURSE PRACTITIONER

## 2018-11-26 RX ORDER — AMLODIPINE BESYLATE 5 MG/1
5 TABLET ORAL DAILY
Qty: 30 TABLET | Refills: 1 | Status: SHIPPED | OUTPATIENT
Start: 2018-11-26 | End: 2018-11-26 | Stop reason: SDUPTHER

## 2018-11-26 RX ORDER — AMLODIPINE BESYLATE 5 MG/1
TABLET ORAL
Qty: 90 TABLET | Refills: 1 | Status: SHIPPED | OUTPATIENT
Start: 2018-11-26 | End: 2019-04-02

## 2018-11-26 NOTE — PROGRESS NOTES
Subjective:       Patient ID: Rudy Saldaña is a 78 y.o. male.    Chief Complaint: Follow-up and Hypertension    Hypertension   This is a chronic problem. The problem is unchanged. The problem is uncontrolled (home readings 160/70's). Pertinent negatives include no chest pain, headaches, palpitations or shortness of breath. Risk factors for coronary artery disease include male gender, obesity, diabetes mellitus and dyslipidemia. Past treatments include ACE inhibitors.       Review of Systems   Constitutional: Negative for fatigue, fever and unexpected weight change.   HENT: Negative for ear pain and sore throat.    Eyes: Negative.  Negative for pain and visual disturbance.   Respiratory: Negative for cough and shortness of breath.    Cardiovascular: Negative for chest pain and palpitations.   Gastrointestinal: Negative for abdominal pain, diarrhea, nausea and vomiting.   Genitourinary: Negative for dysuria and frequency.   Musculoskeletal: Negative for arthralgias and myalgias.   Skin: Negative for color change and rash.   Neurological: Negative for dizziness and headaches.   Psychiatric/Behavioral: Negative for sleep disturbance. The patient is not nervous/anxious.        Vitals:    11/26/18 0906   BP: (!) 164/64   Pulse:    Temp:        Objective:     Current Outpatient Medications   Medication Sig Dispense Refill    acarbose (PRECOSE) 100 MG Tab Take 1 tablet (100 mg total) by mouth 3 (three) times daily with meals. 270 tablet 1    aspirin (ECOTRIN) 81 MG EC tablet Take 81 mg by mouth once daily.      atorvastatin (LIPITOR) 20 MG tablet Take 1 tablet (20 mg total) by mouth once daily. 90 tablet 3    benazepril (LOTENSIN) 40 MG tablet Take 1 tablet (40 mg total) by mouth once daily. 90 tablet 3    blood sugar diagnostic Strp Use once daily as directed 100 each 0    clopidogrel (PLAVIX) 75 mg tablet Take 1 tablet (75 mg total) by mouth once daily. 90 tablet 3    ergocalciferol (ERGOCALCIFEROL) 50,000 unit  Cap Take 1 capsule weekly 24 capsule 3    ferrous sulfate 325 mg (65 mg iron) Tab tablet Take 1 tablet (325 mg total) by mouth 3 (three) times daily. 270 tablet 3    galantamine (RAZADYNE) 8 MG Tab Take 1 tablet (8 mg total) by mouth 2 (two) times daily with meals. 180 tablet 3    glipiZIDE (GLUCOTROL) 10 MG tablet Take 1 tablet (10 mg total) by mouth 2 (two) times daily before meals. 180 tablet 1    ketoconazole (NIZORAL) 2 % shampoo       lancets Misc One touch lancets use once daily as directed 100 each 0    levothyroxine (SYNTHROID) 25 MCG tablet take 1 tablet by mouth every morning ON AN EMPTY STOMACH 90 tablet 3    niacin (NIASPAN) 1000 MG CR tablet Take 1 tablet (1,000 mg total) by mouth every evening. 90 tablet 3    ONETOUCH VERIO IQ METER kit Use as instructed 1 each 0    pioglitazone (ACTOS) 30 MG tablet Take 1 tablet (30 mg total) by mouth once daily. 90 tablet 3    predniSONE (DELTASONE) 5 MG tablet Take 10 mg daily x 1 week, then take 5mg daily x 1 week 21 tablet 1    SITagliptin (JANUVIA) 100 MG Tab Take 1 tablet (100 mg total) by mouth once daily. 90 tablet 1    amLODIPine (NORVASC) 5 MG tablet Take 1 tablet (5 mg total) by mouth once daily. 30 tablet 1     No current facility-administered medications for this visit.        Physical Exam   Constitutional: He is oriented to person, place, and time. He appears well-developed. No distress.   HENT:   Head: Normocephalic and atraumatic.   Eyes: EOM are normal. Pupils are equal, round, and reactive to light.   Neck: Normal range of motion. Neck supple.   Cardiovascular: Normal rate and regular rhythm.   Pulmonary/Chest: Effort normal and breath sounds normal.   Musculoskeletal: Normal range of motion.   Neurological: He is alert and oriented to person, place, and time.   Skin: Skin is warm and dry. No rash noted.   Psychiatric: He has a normal mood and affect. Thought content normal.   Nursing note and vitals reviewed.      Assessment:       1.  Hypertension associated with diabetes        Plan:   Hypertension associated with diabetes    Other orders  -     amLODIPine (NORVASC) 5 MG tablet; Take 1 tablet (5 mg total) by mouth once daily.  Dispense: 30 tablet; Refill: 1        Follow-up in about 4 weeks (around 12/24/2018) for repeat blood pressure.    There are no Patient Instructions on file for this visit.

## 2018-11-27 ENCOUNTER — OFFICE VISIT (OUTPATIENT)
Dept: PULMONOLOGY | Facility: CLINIC | Age: 78
End: 2018-11-27
Payer: MEDICARE

## 2018-11-27 ENCOUNTER — OFFICE VISIT (OUTPATIENT)
Dept: RHEUMATOLOGY | Facility: CLINIC | Age: 78
End: 2018-11-27
Payer: MEDICARE

## 2018-11-27 VITALS
DIASTOLIC BLOOD PRESSURE: 76 MMHG | SYSTOLIC BLOOD PRESSURE: 176 MMHG | BODY MASS INDEX: 30.86 KG/M2 | WEIGHT: 227.5 LBS | HEART RATE: 66 BPM

## 2018-11-27 VITALS
HEIGHT: 72 IN | DIASTOLIC BLOOD PRESSURE: 60 MMHG | OXYGEN SATURATION: 93 % | BODY MASS INDEX: 30.22 KG/M2 | WEIGHT: 223.13 LBS | HEART RATE: 85 BPM | RESPIRATION RATE: 18 BRPM | SYSTOLIC BLOOD PRESSURE: 142 MMHG

## 2018-11-27 DIAGNOSIS — M19.90 INFLAMMATORY ARTHRITIS: Primary | ICD-10-CM

## 2018-11-27 DIAGNOSIS — G47.33 OSA ON CPAP: Primary | Chronic | ICD-10-CM

## 2018-11-27 PROCEDURE — 3077F SYST BP >= 140 MM HG: CPT | Mod: CPTII,S$GLB,, | Performed by: STUDENT IN AN ORGANIZED HEALTH CARE EDUCATION/TRAINING PROGRAM

## 2018-11-27 PROCEDURE — 99214 OFFICE O/P EST MOD 30 MIN: CPT | Mod: S$GLB,,, | Performed by: INTERNAL MEDICINE

## 2018-11-27 PROCEDURE — 1101F PT FALLS ASSESS-DOCD LE1/YR: CPT | Mod: CPTII,S$GLB,, | Performed by: INTERNAL MEDICINE

## 2018-11-27 PROCEDURE — 3078F DIAST BP <80 MM HG: CPT | Mod: CPTII,S$GLB,, | Performed by: STUDENT IN AN ORGANIZED HEALTH CARE EDUCATION/TRAINING PROGRAM

## 2018-11-27 PROCEDURE — 3078F DIAST BP <80 MM HG: CPT | Mod: CPTII,S$GLB,, | Performed by: INTERNAL MEDICINE

## 2018-11-27 PROCEDURE — 99999 PR PBB SHADOW E&M-EST. PATIENT-LVL III: CPT | Mod: PBBFAC,,, | Performed by: STUDENT IN AN ORGANIZED HEALTH CARE EDUCATION/TRAINING PROGRAM

## 2018-11-27 PROCEDURE — 99214 OFFICE O/P EST MOD 30 MIN: CPT | Mod: S$GLB,,, | Performed by: STUDENT IN AN ORGANIZED HEALTH CARE EDUCATION/TRAINING PROGRAM

## 2018-11-27 PROCEDURE — 99999 PR PBB SHADOW E&M-EST. PATIENT-LVL III: CPT | Mod: PBBFAC,,, | Performed by: INTERNAL MEDICINE

## 2018-11-27 PROCEDURE — 1101F PT FALLS ASSESS-DOCD LE1/YR: CPT | Mod: CPTII,S$GLB,, | Performed by: STUDENT IN AN ORGANIZED HEALTH CARE EDUCATION/TRAINING PROGRAM

## 2018-11-27 PROCEDURE — 3077F SYST BP >= 140 MM HG: CPT | Mod: CPTII,S$GLB,, | Performed by: INTERNAL MEDICINE

## 2018-11-27 RX ORDER — SCOLOPAMINE TRANSDERMAL SYSTEM 1 MG/1
1 PATCH, EXTENDED RELEASE TRANSDERMAL
Qty: 30 PATCH | Refills: 1 | Status: SHIPPED | OUTPATIENT
Start: 2018-11-27 | End: 2019-01-21

## 2018-11-27 RX ORDER — PREDNISONE 5 MG/1
TABLET ORAL
Qty: 21 TABLET | Refills: 2 | Status: SHIPPED | OUTPATIENT
Start: 2018-11-27 | End: 2018-12-17 | Stop reason: SDUPTHER

## 2018-11-27 NOTE — PATIENT INSTRUCTIONS
Obstructive Sleep Apnea  Obstructive sleep apnea is a condition that causes your air passages to become narrowed or blocked during sleep. As a result, breathing stops for short periods. Your body wakes up enough for breathing to begin again, though you don't remember it. The cycle of stopped breathing and brief awakenings can repeat dozens of times a night. This prevents the body from getting to the deeper stages of sleep that are needed for good rest and may cause your body's oxygen level to fall.  Signs of sleep apnea include loud snoring, noisy breathing, and gasping sounds during sleep. Daytime symptoms include waking up tired after a full night's sleep, waking up with headaches, feeling very sleepy or falling asleep during the day, and having problems with memory or concentration.  Risk factors for sleep apnea include:  · Being overweight  · Being a man, or a woman in menopause  · Smoking  · Using alcohol or sedating medicines  · Having enlarged structures in the nose or throat  Home care  Lifestyle changes that can help treat snoring and sleep apnea include the following:  · If you are overweight, lose weight. Talk to your healthcare provider about a weight-loss plan for you.  · Avoid alcohol for 3 to 4 hours before bedtime. Avoid sedating medications. Ask your healthcare provider about the medicines you take.  · If you smoke, talk to your healthcare provider about ways to quit.  · Sleep on your side. This can help prevent gravity from pulling relaxed throat tissues into your breathing passages.  · If you have allergies or sinus problems that block your nose, ask your healthcare provider for help.  Follow-up care  Follow up with your healthcare provider, or as advised. A diagnosis of sleep apnea is made with a sleep study. Your healthcare provider can tell you more about this test.  When to seek medical advice  Sleep apnea can make you more likely to have certain health problems. These include high blood  pressure, heart attack, stroke, and sexual dysfunction. If you have sleep apnea, talk to your healthcare provider about the best treatments for you.  Date Last Reviewed: 4/1/2017  © 6924-2310 [employer]. [address] [place] [address], [place] [address]. All rights reserved. This information is not intended as a substitute for professional medical care. Always follow your healthcare professional's instructions.

## 2018-11-27 NOTE — LETTER
November 27, 2018      Mohinder Morfin MD  [id number] [facility] [address]           [facility] - Rheumatology  [address] [address]  Phone: [phone]  Fax: [phone]          Patient: Rudy Saldaña   MR Number: [id number]   YOB: 1940   Date of Visit: 11/27/2018       Dear Dr. Mohinder Morfin:    Thank you for referring Rudy Saldaña to me for evaluation. Attached you will find relevant portions of my assessment and plan of care.    If you have questions, please do not hesitate to call me. I look forward to following Rudy Saldaña along with you.    Sincerely,    Romelia Rodriguez MD    Enclosure  CC:  No Recipients    If you would like to receive this communication electronically, please contact [email or web address] or [phone] to request more information on [email or web address] Link access.    For providers and/or their staff who would like to refer a patient to [facility], please contact us through our one-stop-shop provider referral line, [facility], at [phone].    If you feel you have received this communication in error or would no longer like to receive these types of communications, please e-mail [email or web address]

## 2018-11-27 NOTE — PROGRESS NOTES
Subjective:      Patient ID: Rudy Saldaña is a 78 y.o. male.    Patient Active Problem List   Diagnosis    Combined hyperlipidemia associated with type 2 diabetes mellitus    Hypertension associated with diabetes    Type 2 diabetes mellitus, uncontrolled, with renal complications    History of stroke    BPH (benign prostatic hypertrophy)    History of colon polyps    Iron deficiency anemia    Polyneuropathy due to secondary diabetes    DEVIKA on CPAP    Family history of colonic polyps    Vitamin D deficiency    Hypothyroidism    Anemia    Gross hematuria    CKD stage 3 secondary to diabetes    Facet arthropathy, lumbar    Right hip pain    Primary osteoarthritis of right hip    Osteoarthritis of spine with radiculopathy, lumbar region    Acute idiopathic gout of left hand    Chronic fatigue    Crystal arthritis     Problem list has been reviewed.    Chief Complaint: Sleep Apnea    HPI: He is  here for follow up for DEVIKA and CPAP complaince assessment.     He reports excessive daytime sleepiness and fatigue in spite of his CPAP.     Patient denies snoring, headaches.    He definitely thinks PAP is beneficial to his health and he wants to continue with PAP therapy.       Compliance Summary  10/28/2018 - 11/26/2018 (30 days)  Days with Device Usage 30 days  Days without Device Usage 0 days  Percent Days with Device Usage 100.0%  Cumulative Usage 6 days 9 hrs. 1 mins. 3 secs.  Maximum Usage (1 Day) 8 hrs. 53 mins. 30 secs.  Average Usage (All Days) 5 hrs. 6 mins. 2 secs.  Average Usage (Days Used) 5 hrs. 6 mins. 2 secs.  Minimum Usage (1 Day) 2 hrs. 5 mins. 22 secs.  Percent of Days with Usage >= 4 Hours 90.0%  Percent of Days with Usage < 4 Hours 10.0%  Date Range  Total Blower Time 6 days 10 hrs. 24 mins. 40 secs.  CPAP Summary ([facility])  Average Time in Large Leak Per Day 2 mins. 52 secs.  Average AHI 6.5  CPAP 8.0 cmH2O          [place] Sleepiness Scale   EPWORTH SLEEPINESS SCALE  11/27/2018 3/15/2018 12/7/2015 10/5/2015 8/10/2015   Sitting and reading 0 1 2 2 3   Watching TV 2 1 2 2 3   Sitting, inactive in a public place (e.g. a theatre or a meeting) 0 0 2 1 3   As a passenger in a car for an hour without a break 1 1 2 1 3   Lying down to rest in the afternoon when circumstances permit 2 1 1 2 3   Sitting and talking to someone 0 0 0 0 1   Sitting quietly after a lunch without alcohol 0 0 2 1 3   In a car, while stopped for a few minutes in traffic 0 0 0 1 0   Total score 5 4 11 10 19     Previous Report Reviewed: office notes     The following portions of the patient's history were reviewed and updated as appropriate: He  has a past medical history of Anemia, BPH (benign prostatic hypertrophy), Carotid bruit, CVA (cerebral vascular accident), Diabetes mellitus, type 2, Hyperlipidemia, Hypertension, Hypothyroidism (2/29/2016), Type 2 diabetes mellitus, uncontrolled, with renal complications, Type II or unspecified type diabetes mellitus without mention of complication, not stated as uncontrolled, and Vitamin D deficiency (2/29/2016).  He  has a past surgical history that includes Colonoscopy (2008); Transurethral resection of prostate; Pilonidal cyst drainage; and Colonoscopy (5/31/2005).  His family history includes Brain cancer in his sister; Heart attack in his maternal uncle; Hypertension in his father and mother.  He  reports that  has never smoked. he has never used smokeless tobacco. He reports that he does not drink alcohol or use drugs.  He has a current medication list which includes the following prescription(s): acarbose, amlodipine, aspirin, atorvastatin, benazepril, blood sugar diagnostic, clopidogrel, ferrous sulfate, galantamine, glipizide, ketoconazole, lancets, levothyroxine, niacin, pioglitazone, prednisone, sitagliptin, ergocalciferol, and onetouch verio iq meter.  He is allergic to no known drug allergies..    Review of Systems   Constitutional: Negative for fever,  weight gain and weakness.   HENT: Negative for nosebleeds, postnasal drip and hearing loss.    Eyes: Negative for itching.   Respiratory: Negative for hemoptysis, asthma nighttime symptoms, dyspnea on extertion and somnolence.    Cardiovascular: Negative for chest pain and leg swelling.   Endocrine: Negative for polydipsia and heat intolerance.    Musculoskeletal: Negative for back pain and gait problem.   Skin: Negative for rash.   Gastrointestinal: Negative for nausea, vomiting and acid reflux.   Neurological: Negative for dizziness, light-headedness and headaches.   Hematological: Does not bruise/bleed easily.   Psychiatric/Behavioral: The patient is not nervous/anxious.    All other systems reviewed and are negative.     Objective:     Vitals:    11/27/18 1107   BP: (!) 142/60   Pulse: 85   Resp: 18   SpO2: (!) 93%   Weight: 101.2 kg (223 lb 1.7 oz)   Height: 6' (1.829 m)     Body mass index is 30.26 kg/m².    Physical Exam   Constitutional: He is oriented to person, place, and time. He appears well-developed and well-nourished.   HENT:   Head: Normocephalic and atraumatic.   Right Ear: External ear normal.   Left Ear: External ear normal.   Nose: Nose normal.   Mouth/Throat: Oropharynx is clear and moist. No oropharyngeal exudate.   Eyes: Conjunctivae and EOM are normal. Pupils are equal, round, and reactive to light. Right eye exhibits no discharge. Left eye exhibits no discharge. No scleral icterus.   Neck: Normal range of motion. Neck supple. No JVD present. No thyromegaly present.   Cardiovascular: Normal rate, regular rhythm, normal heart sounds and intact distal pulses. Exam reveals no gallop and no friction rub.   No murmur heard.  Pulmonary/Chest: Effort normal and breath sounds normal. No respiratory distress. He has no wheezes. He has no rales. He exhibits no tenderness.   Abdominal: Soft. Bowel sounds are normal. He exhibits no distension and no mass. There is no tenderness. There is no rebound and  no guarding.   Musculoskeletal: Normal range of motion. He exhibits no edema or tenderness.   Lymphadenopathy:     He has no cervical adenopathy.   Neurological: He is alert and oriented to person, place, and time. No cranial nerve deficit. Coordination normal.   Skin: Skin is warm and dry. He is not diaphoretic.   Psychiatric: He has a normal mood and affect.       Personal Diagnostic Review  CPAP complaince download.     Assessment / plan :       Discussed diagnosis, its evaluation, treatment and usual course. All questions answered.      Problem List Items Addressed This Visit        Other    DEVIKA on CPAP - Primary (Chronic)    Current Assessment & Plan     Recommend CPAP retitiration to re evaluate PAP therapy in the light of recurrent symptoms.       Continue CPAP at current settings in the meantime.    Return after CPAP titration.          Relevant Orders    CPAP titration (Must have diagnosis of DEVIKA from previous sleep study.)          TIME SPENT WITH PATIENT: Time spent: 25 minutes in face to face  discussion concerning diagnosis, prognosis, review of lab and test results, benefits of treatment as well as management of disease, counseling of patient and coordination of care between various health  care providers . Greater than half the time spent was used for coordination of care and counseling of patient.       Follow-up in about 1 month (around 12/27/2018) for DEVIKA.

## 2018-11-27 NOTE — ASSESSMENT & PLAN NOTE
Recommend CPAP retitiration to re evaluate PAP therapy in the light of recurrent symptoms.       Continue CPAP at current settings in the meantime.    Return after CPAP titration.

## 2018-12-03 NOTE — PROGRESS NOTES
RHEUMATOLOGY OUTPATIENT CLINIC NOTE    11/27/2018  Attending Rheumatologist: Romelia Rodriguez  Primary Care Provider: Mohinder Morfin MD   Physician Requesting Consultation: Mohinder Morfin MD  [address] [id number]  Chief Complaint/Reason For Consultation:  Follow-up (2wk follow up)      Subjective:       HPI  Rudy Saldaña is a 78 y.o. White male presents w recent onset acute wrist swelling in bilateral wrists , left midfoot, and right elbow. States he would go to sleep in his usual state of health, and woke up one morning with midfoot and wrist swollen. No prior hx of gout /pseudogout. Has just completed course of indomethacin per pcp. Recently started on allopurinol and colchicine (100mg daily+ colchicine .6mg daily). Completed several medrol dose packs but would have acute worsening of joint pains/swellings once he ran out.   No hx renal stones. +Hx DM and CKD. Uric acid elevated. 8.3    Today:   Since last visit, reports feeling well. Reports improvement and resolution of wrist pain/swelling w/ prednisone taper. No flares or issues since. No recurrence since getting off taper.     Review of Systems   Constitutional: Negative for chills, fever and weight loss.   HENT: Negative for congestion, ear pain, hearing loss, sinus pain, sore throat and tinnitus.    Eyes: Negative for blurred vision, double vision and photophobia.   Respiratory: Negative for cough, sputum production and shortness of breath.    Cardiovascular: Negative for chest pain and palpitations.   Gastrointestinal: Negative for heartburn, nausea and vomiting.   Genitourinary: Negative for dysuria and frequency.   Musculoskeletal: Positive for joint pain. Negative for back pain, myalgias and neck pain.   Skin: Negative for itching and rash.   Neurological: Negative for dizziness, tingling, focal weakness and headaches.   Psychiatric/Behavioral: Negative for depression and suicidal ideas.   All other systems reviewed and are  negative.      Chronic comorbid conditions affecting medical decision making today:  Past Medical History:   Diagnosis Date    Anemia     BPH (benign prostatic hypertrophy)     Carotid bruit     CVA (cerebral vascular accident)     Diabetes mellitus, type 2     Hyperlipidemia     Hypertension     Hypothyroidism 2/29/2016    Type 2 diabetes mellitus, uncontrolled, with renal complications     The patient presents with diabetes.  The patient denies polyuria, polydipsia, polyphagia, hypoglycemia and paresthesias.  The patient's glucose control has been good.  Home glucose averages are routinely checked.  The patient is without retinopathy currently.  The patient has no history of neuropathy.  The patient currently complains of no podiatric problems.  The patient has excellent compliance.    Type II or unspecified type diabetes mellitus without mention of complication, not stated as uncontrolled     Vitamin D deficiency 2/29/2016     Past Surgical History:   Procedure Laterality Date    COLONOSCOPY  2008    COLONOSCOPY  5/31/2005    repeat in 2015    PILONIDAL CYST DRAINAGE      TRANSURETHRAL RESECTION OF PROSTATE       Family History   Problem Relation Age of Onset    Hypertension Mother     Hypertension Father     Heart attack Maternal Uncle     Brain cancer Sister     Diabetes Neg Hx     Stroke Neg Hx      Social History     Substance and Sexual Activity   Alcohol Use No    Alcohol/week: 0.0 oz     Social History     Tobacco Use   Smoking Status Never Smoker   Smokeless Tobacco Never Used     Social History     Substance and Sexual Activity   Drug Use No       Current Outpatient Medications:     acarbose (PRECOSE) 100 MG Tab, Take 1 tablet (100 mg total) by mouth 3 (three) times daily with meals., Disp: 270 tablet, Rfl: 1    amLODIPine (NORVASC) 5 MG tablet, TAKE 1 TABLET(5 MG) BY MOUTH EVERY DAY, Disp: 90 tablet, Rfl: 1    aspirin (ECOTRIN) 81 MG EC tablet, Take 81 mg by mouth once daily.,  Disp: , Rfl:     atorvastatin (LIPITOR) 20 MG tablet, Take 1 tablet (20 mg total) by mouth once daily., Disp: 90 tablet, Rfl: 3    benazepril (LOTENSIN) 40 MG tablet, Take 1 tablet (40 mg total) by mouth once daily., Disp: 90 tablet, Rfl: 3    blood sugar diagnostic Strp, Use once daily as directed, Disp: 100 each, Rfl: 0    clopidogrel (PLAVIX) 75 mg tablet, Take 1 tablet (75 mg total) by mouth once daily., Disp: 90 tablet, Rfl: 3    ergocalciferol (ERGOCALCIFEROL) 50,000 unit Cap, Take 1 capsule weekly, Disp: 24 capsule, Rfl: 3    ferrous sulfate 325 mg (65 mg iron) Tab tablet, Take 1 tablet (325 mg total) by mouth 3 (three) times daily., Disp: 270 tablet, Rfl: 3    galantamine (RAZADYNE) 8 MG Tab, Take 1 tablet (8 mg total) by mouth 2 (two) times daily with meals., Disp: 180 tablet, Rfl: 3    glipiZIDE (GLUCOTROL) 10 MG tablet, Take 1 tablet (10 mg total) by mouth 2 (two) times daily before meals., Disp: 180 tablet, Rfl: 1    ketoconazole (NIZORAL) 2 % shampoo, , Disp: , Rfl:     lancets Misc, One touch lancets use once daily as directed, Disp: 100 each, Rfl: 0    levothyroxine (SYNTHROID) 25 MCG tablet, take 1 tablet by mouth every morning ON AN EMPTY STOMACH, Disp: 90 tablet, Rfl: 3    niacin (NIASPAN) 1000 MG CR tablet, Take 1 tablet (1,000 mg total) by mouth every evening., Disp: 90 tablet, Rfl: 3    pioglitazone (ACTOS) 30 MG tablet, Take 1 tablet (30 mg total) by mouth once daily., Disp: 90 tablet, Rfl: 3    predniSONE (DELTASONE) 5 MG tablet, Take 10 mg daily x 1 week, then take 5mg daily x 1 week, Disp: 21 tablet, Rfl: 2    SITagliptin (JANUVIA) 100 MG Tab, Take 1 tablet (100 mg total) by mouth once daily., Disp: 90 tablet, Rfl: 1    ONETOUCH VERIO IQ METER kit, Use as instructed, Disp: 1 each, Rfl: 0    scopolamine (TRANSDERM-SCOP) 1.3-1.5 mg (1 mg over 3 days), Place 1 patch onto the skin every 72 hours., Disp: 30 patch, Rfl: 1       Objective:         Vitals:    11/27/18 1332   BP: (!)  176/76   Pulse: 66     Physical Exam   Nursing note and vitals reviewed.  Constitutional: He is oriented to person, place, and time and well-developed, well-nourished, and in no distress.   HENT:   Head: Normocephalic and atraumatic.   Eyes: Conjunctivae and EOM are normal. Pupils are equal, round, and reactive to light.   Neck: Normal range of motion. Neck supple.   Cardiovascular: Normal rate and regular rhythm.    Pulmonary/Chest: Effort normal and breath sounds normal.   Abdominal: Soft. Bowel sounds are normal. He exhibits no distension. There is no tenderness. There is no rebound.   Neurological: He is alert and oriented to person, place, and time.   Skin: Skin is warm and dry.     Psychiatric: Affect and judgment normal.   Musculoskeletal:     No synovitis in small joints hands and feet  No effusions over large joints    No tophi/nodules         Reviewed old and all outside pertinent medical records available.    All lab results personally reviewed and interpreted by me.  Lab Results   Component Value Date    WBC 9.95 11/12/2018    HGB 14.7 11/12/2018    HCT 45.3 11/12/2018    MCV 91 11/12/2018    MCH 29.5 11/12/2018    MCHC 32.5 11/12/2018    RDW 14.6 (H) 11/12/2018     11/12/2018    MPV 10.8 11/12/2018       Lab Results   Component Value Date     11/12/2018    K 4.3 11/12/2018    CL 96 11/12/2018    CO2 27 11/12/2018     (H) 11/12/2018    BUN 36 (H) 11/12/2018    CALCIUM 11.3 (H) 11/12/2018    PROT 7.2 07/03/2018    ALBUMIN 3.4 (L) 07/03/2018    BILITOT 1.5 (H) 07/03/2018    AST 12 07/03/2018    ALKPHOS 53 (L) 07/03/2018    ALT 11 07/03/2018       Lab Results   Component Value Date    COLORU Red (A) 03/23/2016    APPEARANCEUA Cloudy (A) 03/23/2016    SPECGRAV 1.025 03/23/2016    PHUR 6.0 03/23/2016    PROTEINUA Trace (A) 03/23/2016    KETONESU Negative 03/23/2016    LEUKOCYTESUR Negative 03/23/2016    NITRITE Negative 03/23/2016    UROBILINOGEN neg 12/16/2015       Lab Results    Component Value Date    CRP 17.6 (H) 10/29/2018       Lab Results   Component Value Date    SEDRATE 26 (H) 10/29/2018       Lab Results   Component Value Date    FRANDY Negative 06/11/2004    RF <10.0 11/12/2018    SEDRATE 26 (H) 10/29/2018       No components found for: 25OHVITDTOT, 74SPXBAP8, 67NXFASJ8, METHODNOTE    Lab Results   Component Value Date    URICACID 8.3 (H) 11/12/2018       No components found for: TSPOTTB    Uric acid 8.3    Imaging:  All imaging reviewed and independently  interpreted by me.    XR Hand 11/18  FINDINGS:  Right hand: Bones are demineralized.  There are stable multi articular degenerative changes noted.  No focal osseous erosions.  No fracture or dislocation.  Chronic appearing deformity at the base of the 5th metacarpal.  Soft tissues are within normal limits.    Left hand: Bones are demineralized.  There are occasions within the soft tissues along the ulnar aspect of the 3rd PIP joint.  No acute fracture or dislocation.  Soft tissues are unremarkable.      Impression       As above     XR Knee 11/18  FINDINGS:  Juxta-articular osteopenia and bilateral tricompartment degenerative changes similar to prior exam.  There is a increased narrowing of the medial compartments, greater on the left.  Bilateral vascular calcifications.  There is prominence of the inferior pole both patella, greater on the right.  Spurring at the anterior tibial tuberosities bilaterally.  No fracture or dislocation.  No significant effusion.      Impression       As above.     XR Elbow 11/18  Impression       Bilateral degenerative changes without acute fracture or dislocation.    Prominent osteophyte formation bilaterally along the posterior olecranon at the triceps tendon insertion.    Findings most consistent with right olecranon bursitis.     XR Hand 11/18  FINDINGS:  Right hand: Bones are demineralized.  There are stable multi articular degenerative changes noted.  No focal osseous erosions.  No fracture or  dislocation.  Chronic appearing deformity at the base of the 5th metacarpal.  Soft tissues are within normal limits.    Left hand: Bones are demineralized.  There are occasions within the soft tissues along the ulnar aspect of the 3rd PIP joint.  No acute fracture or dislocation.  Soft tissues are unremarkable.      Impression       As above              ASSESSMENT / PLAN:     Rudy Saldaña is a 78 y.o. White male with:      1. Crystal arthritis  -FRANDY /RF negative ; uric acid 8.3   -no hx diagnostic arthrocentesis ; suspect likely gout vs pseudogout  -Wrist pain/swelling resolved since last visit w/ prednisone taper ; tx and management complicated by hx DM and CKD (GFR 30)  -off indomethacin; advised against nsaids 2.2 GI/ CVS / renal risks  -off allopurinol ; given GFR, will not restart at this time ;hold initiation colchicine also  -will continue to monitor; prednisone prn flares  -discussed diagnostic arthrocentesis for more definitive differentiation ; pt deferred at this time, reconsider at rtc            . Other specified counseling  - Immunization counseling done  - Weight loss counseling done  - Nutrition and exercise counseling  - Medication counseling provided      Follow-up in about 7 weeks (around 1/15/2019).    Method of contact with patient concerns: jJ attn Rheumatology      Romelia Rodriguez M.D.  Rheumatology Department   [facility] - [address] [address]  Phone: [phone]  Fax: [phone]

## 2018-12-05 ENCOUNTER — TELEPHONE (OUTPATIENT)
Dept: NEPHROLOGY | Facility: CLINIC | Age: 78
End: 2018-12-05

## 2018-12-05 ENCOUNTER — LAB VISIT (OUTPATIENT)
Dept: LAB | Facility: HOSPITAL | Age: 78
End: 2018-12-05
Attending: FAMILY MEDICINE
Payer: MEDICARE

## 2018-12-05 DIAGNOSIS — E83.52 HYPERCALCEMIA: ICD-10-CM

## 2018-12-05 DIAGNOSIS — N18.30 CKD (CHRONIC KIDNEY DISEASE) STAGE 3, GFR 30-59 ML/MIN: Primary | ICD-10-CM

## 2018-12-05 LAB
ANION GAP SERPL CALC-SCNC: 9 MMOL/L
BUN SERPL-MCNC: 17 MG/DL
CA-I BLDV-SCNC: 1.21 MMOL/L
CALCIUM SERPL-MCNC: 9.5 MG/DL
CHLORIDE SERPL-SCNC: 105 MMOL/L
CO2 SERPL-SCNC: 27 MMOL/L
CREAT SERPL-MCNC: 1.1 MG/DL
EST. GFR  (AFRICAN AMERICAN): >60 ML/MIN/1.73 M^2
EST. GFR  (NON AFRICAN AMERICAN): >60 ML/MIN/1.73 M^2
GLUCOSE SERPL-MCNC: 111 MG/DL
POTASSIUM SERPL-SCNC: 3.3 MMOL/L
SODIUM SERPL-SCNC: 141 MMOL/L

## 2018-12-05 PROCEDURE — 36415 COLL VENOUS BLD VENIPUNCTURE: CPT | Mod: PO

## 2018-12-05 PROCEDURE — 80048 BASIC METABOLIC PNL TOTAL CA: CPT

## 2018-12-05 PROCEDURE — 82330 ASSAY OF CALCIUM: CPT

## 2018-12-06 NOTE — TELEPHONE ENCOUNTER
Please inform pt that when I saw him for clinic visit his calcium was very high. I decreased his vitamin D from twice a week to once a week and calcium is now normal. Also his kidney function looks better.    RTC 4 mo in [place] with labs prior, orders in. Thank you

## 2018-12-17 ENCOUNTER — TELEPHONE (OUTPATIENT)
Dept: RHEUMATOLOGY | Facility: CLINIC | Age: 78
End: 2018-12-17

## 2018-12-17 ENCOUNTER — OFFICE VISIT (OUTPATIENT)
Dept: RHEUMATOLOGY | Facility: CLINIC | Age: 78
End: 2018-12-17
Payer: MEDICARE

## 2018-12-17 VITALS — SYSTOLIC BLOOD PRESSURE: 161 MMHG | HEART RATE: 68 BPM | DIASTOLIC BLOOD PRESSURE: 71 MMHG

## 2018-12-17 DIAGNOSIS — M11.9 CRYSTAL ARTHRITIS: Primary | ICD-10-CM

## 2018-12-17 PROCEDURE — 3077F SYST BP >= 140 MM HG: CPT | Mod: CPTII,S$GLB,, | Performed by: STUDENT IN AN ORGANIZED HEALTH CARE EDUCATION/TRAINING PROGRAM

## 2018-12-17 PROCEDURE — 99999 PR PBB SHADOW E&M-EST. PATIENT-LVL III: CPT | Mod: PBBFAC,,, | Performed by: STUDENT IN AN ORGANIZED HEALTH CARE EDUCATION/TRAINING PROGRAM

## 2018-12-17 PROCEDURE — 1101F PT FALLS ASSESS-DOCD LE1/YR: CPT | Mod: CPTII,S$GLB,, | Performed by: STUDENT IN AN ORGANIZED HEALTH CARE EDUCATION/TRAINING PROGRAM

## 2018-12-17 PROCEDURE — 3078F DIAST BP <80 MM HG: CPT | Mod: CPTII,S$GLB,, | Performed by: STUDENT IN AN ORGANIZED HEALTH CARE EDUCATION/TRAINING PROGRAM

## 2018-12-17 PROCEDURE — 96372 THER/PROPH/DIAG INJ SC/IM: CPT | Mod: 59,S$GLB,, | Performed by: STUDENT IN AN ORGANIZED HEALTH CARE EDUCATION/TRAINING PROGRAM

## 2018-12-17 PROCEDURE — 99214 OFFICE O/P EST MOD 30 MIN: CPT | Mod: 25,S$GLB,, | Performed by: STUDENT IN AN ORGANIZED HEALTH CARE EDUCATION/TRAINING PROGRAM

## 2018-12-17 RX ORDER — BETAMETHASONE SODIUM PHOSPHATE AND BETAMETHASONE ACETATE 3; 3 MG/ML; MG/ML
6 INJECTION, SUSPENSION INTRA-ARTICULAR; INTRALESIONAL; INTRAMUSCULAR; SOFT TISSUE
Status: COMPLETED | OUTPATIENT
Start: 2018-12-17 | End: 2018-12-17

## 2018-12-17 RX ORDER — PREDNISONE 5 MG/1
TABLET ORAL
Qty: 21 TABLET | Refills: 2 | Status: SHIPPED | OUTPATIENT
Start: 2018-12-17 | End: 2019-04-02

## 2018-12-17 RX ADMIN — BETAMETHASONE SODIUM PHOSPHATE AND BETAMETHASONE ACETATE 6 MG: 3; 3 INJECTION, SUSPENSION INTRA-ARTICULAR; INTRALESIONAL; INTRAMUSCULAR; SOFT TISSUE at 04:12

## 2018-12-17 NOTE — PROGRESS NOTES
Administered 1cc of Betamethasone 6mg/cc to Left Ventrogluteal.  Patient tolerated well. No acute reaction noted to site.  Patient instructed on S/S to report. Patient verbalized understanding.    Lot:[id number]  Exp:10/31/2019

## 2018-12-17 NOTE — TELEPHONE ENCOUNTER
----- Message from Ciara De Santiago sent at 12/17/2018  7:40 AM CST -----  Contact: Patients daughter Shania Jauregui would like to speak to nurse about her father and a gout flare up, please call her back at [phone]. Thank you

## 2018-12-18 NOTE — PROGRESS NOTES
Subjective:         Patient ID: Rudy Saldaña is a 78 y.o. male.  Patient's current PCP is Mohinder Morfin MD.       Chief Complaint: Diabetes Mellitus    HPI  Rudy Saldaña is a 78 y.o. White male presenting for a new consult for diabetes. Patient has been diagnosed with diabetes for several years and has the following complications from diabetes: nephropathy and cardiovascular disease, HTN, Hyperlipidemia. Blood glucose testing is performed regularly. In the past 1 months patient reports blood glucose values to have approximately ranged from 200s fasting and in the day. The patient reports medication compliance all of the time and diet compliance most of the time. Condition: good control, stable He denies recent hospital admissions, denies emergency room visits, denies hypoglycemia. He is on steroids oral and injections for arthritis/gout, due to related elevated BG insulin rec by PCP, patient is hesitant/fearful to start insulin, he believes that if he starts he will remain on insulin, he was not comfortable with giving injections.      Height: 6' (182.9 cm)  //  Weight: 100.2 kg (221 lb), Body mass index is 29.97 kg/m².  Home Blood Glucose reading this AM: 130 mg/dl fasting.  His blood sugar in clinic today is:   Lab Results   Component Value Date    POCGLU 174 (A) 12/20/2018       Labs reviewed and are noted below.    His most recent A1C is:  Lab Results   Component Value Date    HGBA1C 6.9 (H) 09/14/2018    HGBA1C 6.9 (H) 03/13/2018    HGBA1C 6.5 (H) 09/05/2017     No results found for: CPEPTIDE  No results found for: GLUTAMICACID  Lab Results   Component Value Date    WBC 9.95 11/12/2018    HGB 14.7 11/12/2018    HCT 45.3 11/12/2018     11/12/2018    CHOL 116 (L) 09/14/2018    TRIG 61 09/14/2018    HDL 45 09/14/2018    ALT 11 07/03/2018    AST 12 07/03/2018     12/05/2018    K 3.3 (L) 12/05/2018     12/05/2018    CREATININE 1.1 12/05/2018    BUN 17 12/05/2018    CO2 27 12/05/2018    TSH  1.995 11/13/2018    PSA 0.90 12/12/2016    HGBA1C 6.9 (H) 09/14/2018     CMP  Sodium   Date Value Ref Range Status   12/05/2018 141 136 - 145 mmol/L Final     Potassium   Date Value Ref Range Status   12/05/2018 3.3 (L) 3.5 - 5.1 mmol/L Final     Chloride   Date Value Ref Range Status   12/05/2018 105 95 - 110 mmol/L Final     CO2   Date Value Ref Range Status   12/05/2018 27 23 - 29 mmol/L Final     Glucose   Date Value Ref Range Status   12/05/2018 111 (H) 70 - 110 mg/dL Final     BUN, Bld   Date Value Ref Range Status   12/05/2018 17 8 - 23 mg/dL Final     Creatinine   Date Value Ref Range Status   12/05/2018 1.1 0.5 - 1.4 mg/dL Final     Calcium   Date Value Ref Range Status   12/05/2018 9.5 8.7 - 10.5 mg/dL Final     Total Protein   Date Value Ref Range Status   07/03/2018 7.2 6.0 - 8.4 g/dL Final     Albumin   Date Value Ref Range Status   07/03/2018 3.4 (L) 3.5 - 5.2 g/dL Final     Total Bilirubin   Date Value Ref Range Status   07/03/2018 1.5 (H) 0.1 - 1.0 mg/dL Final     Comment:     For infants and newborns, interpretation of results should be based  on gestational age, weight and in agreement with clinical  observations.  Premature Infant recommended reference ranges:  Up to 24 hours.............<8.0 mg/dL  Up to 48 hours............<12.0 mg/dL  3-5 days..................<15.0 mg/dL  6-29 days.................<15.0 mg/dL       Alkaline Phosphatase   Date Value Ref Range Status   07/03/2018 53 (L) 55 - 135 U/L Final     AST   Date Value Ref Range Status   07/03/2018 12 10 - 40 U/L Final     ALT   Date Value Ref Range Status   07/03/2018 11 10 - 44 U/L Final     Anion Gap   Date Value Ref Range Status   12/05/2018 9 8 - 16 mmol/L Final     eGFR if    Date Value Ref Range Status   12/05/2018 >60.0 >60 mL/min/1.73 m^2 Final     eGFR if non    Date Value Ref Range Status   12/05/2018 >60.0 >60 mL/min/1.73 m^2 Final     Comment:     Calculation used to obtain the estimated  glomerular filtration  rate (eGFR) is the CKD-EPI equation.            CURRENT DM MEDICATIONS:   Diabetes Medications             acarbose (PRECOSE) 100 MG Tab Take 1 tablet (100 mg total) by mouth 3 (three) times daily with meals.    glipiZIDE (GLUCOTROL) 10 MG tablet Take 1 tablet (10 mg total) by mouth 2 (two) times daily before meals.    pioglitazone (ACTOS) 30 MG tablet Take 1 tablet (30 mg total) by mouth once daily.    SITagliptin (JANUVIA) 100 MG Tab Take 1 tablet (100 mg total) by mouth once daily.              Health Maintenance   Topic Date Due    Eye Exam  11/08/2018    Hemoglobin A1c  03/14/2019    Lipid Panel  09/14/2019    Foot Exam  12/20/2019    TETANUS VACCINE  08/22/2026    Zoster Vaccine  Completed    Pneumococcal Vaccine (65+ Low/Medium Risk)  Completed    Influenza Vaccine  Completed       STANDARDS OF CARE:  Current Ophthalmologist/Optometrist: recommended annually;due     Current Dentist: recommended annually  Current Podiatrist: recommended annually  He  is to be enrolled in diabetes education classes.     LIFESTYLE:  ACTIVITY LEVEL: Rarely Active  EXERCISE: walking  MEAL PLANNING: Patient reports number of meals per day to be 3 and number of snacks per day to be 3  BLOOD GLUCOSE TESTING: Patient is testing 2 times per day     Diabetes Management Status    Statin: Taking  ACE/ARB: Taking    Screening or Prevention Patient's value Goal Complete/Controlled?   HgA1C Testing and Control   Lab Results   Component Value Date    HGBA1C 6.9 (H) 09/14/2018      Annually/Less than 8% Yes   Lipid profile : 09/14/2018 Annually Yes   LDL control Lab Results   Component Value Date    LDLCALC 58.8 (L) 09/14/2018    Annually/Less than 100 mg/dl  Yes   Nephropathy screening Lab Results   Component Value Date    LABMICR 21.0 03/02/2017     Lab Results   Component Value Date    PROTEINUA Trace (A) 03/23/2016    Annually No   Blood pressure BP Readings from Last 1 Encounters:   12/20/18 (!) 150/60     Less than 140/90 No   Dilated retinal exam : 11/08/2017 Annually No   Foot exam   : 12/20/2018 Annually Yes     Review of Systems   Constitutional: Negative for activity change and appetite change.   Eyes: Negative for visual disturbance.   Gastrointestinal: Negative for constipation and diarrhea.   Endocrine: Negative for polydipsia, polyphagia and polyuria.   Neurological: Negative for syncope and weakness.   Psychiatric/Behavioral: Negative for confusion.         Objective:      Physical Exam   Constitutional: He is oriented to person, place, and time. He appears well-developed and well-nourished. No distress.   Neck: Normal range of motion.   Cardiovascular: Normal rate.   Pulmonary/Chest: Effort normal.   Musculoskeletal: Normal range of motion.   Neurological: He is alert and oriented to person, place, and time.   Skin: Skin is warm and dry. He is not diaphoretic.   Psychiatric: He has a normal mood and affect. His behavior is normal. Judgment and thought content normal.       Assessment:       1. Type 2 diabetes mellitus, uncontrolled, with renal complications        Plan:   Type 2 diabetes mellitus, uncontrolled, with renal complications  -     Glutamic acid decarboxylase; Future; Expected date: 03/19/2019  -     C-peptide; Future; Expected date: 03/19/2019  -     Microalbumin/creatinine urine ratio; Future  -     POCT Glucose, Hand-Held Device  -     Brain natriuretic peptide; Future; Expected date: 03/19/2019    Other orders  -     Cancel: Hemoglobin A1c; Future; Expected date: 12/18/2018      BP- Suboptimal control  LDL- Good control    Additional Plan Details:    1.) Patient was instructed to monitor blood glucose 2x daily, fasting and ac dinner or at bedtime. Discussed ADA goal for fasting blood sugar, 90 - 140mg/dL; pp blood sugars below 180 mg/dl. Also, discussed prevention of hypoglycemia and the need to adjust goals to higher levels if persistent hypoglycemia.  Reminded to bring BG records or meter  to each visit for review. Patient instructed to send in log weekly for review and potential medication adjustments.  2.) Reviewed pathophysiology of Type 2 diabetes, complications related to the disease, importance of annual dilated eye exam and daily foot examination.  3.) We discussed the ADA recommendations, which are as follows:  Hemoglobin A1c below 8.0 %. All patients with diabetes should be on statins unless contraindicated.  ACE or ARB therapy if not contraindicated.    4.) Most of the visit was spent discussing medical hx, treatment options/rationales. I would like to see his renal function stable before initiating a non insulin medication if needed. He would like a conservative approach (diet/physical activity). Advised to Continue medications as prescribed, due to age and medical hx A1C < 8.0 is acceptable.  My [facility] e-mail or phone review in one week with BG records for adjustment of medication.  5.) Meal planning teaching: Reviewed carb counting, portion control, importance of spacing meals throughout the day to prevent post prandial elevations.  6.) Discussed activity with related benefits, methods, and precautions. Recommended patient start or continue some form of exercise and increase as tolerated to 30 minutes per day to aid in control of BGs.  7.) A1C, TSH, Lipid Panel, CMP with eGFR and Micro/Creatinine are utd or were ordered per ADA protocol.  8.) I would like to see him in 2 weeks, to review BG log before he goes on a cruise, he prefers to see me in 2 weeks if BG are elevated, appt scheduled for 2 weeks and in 1 month . The patient was explained the above plan and given opportunity to ask questions.  He understands, chooses and consents to this plan and accepts all the risks, which include but are not limited to the risks mentioned above. He understands the alternative of having no testing, interventions or treatments at this time. He left content and without further questions.     A total  of 60 minutes was spent in face to face time, of which over 50% was spent in counseling patient on disease process, complications, treatment, and side effects of medications.

## 2018-12-20 ENCOUNTER — OFFICE VISIT (OUTPATIENT)
Dept: DIABETES | Facility: CLINIC | Age: 78
End: 2018-12-20
Payer: MEDICARE

## 2018-12-20 VITALS
BODY MASS INDEX: 29.93 KG/M2 | HEIGHT: 72 IN | DIASTOLIC BLOOD PRESSURE: 60 MMHG | SYSTOLIC BLOOD PRESSURE: 150 MMHG | WEIGHT: 221 LBS

## 2018-12-20 LAB — GLUCOSE SERPL-MCNC: 174 MG/DL (ref 70–110)

## 2018-12-20 PROCEDURE — 3077F SYST BP >= 140 MM HG: CPT | Mod: CPTII,S$GLB,, | Performed by: NURSE PRACTITIONER

## 2018-12-20 PROCEDURE — 99215 OFFICE O/P EST HI 40 MIN: CPT | Mod: S$GLB,,, | Performed by: NURSE PRACTITIONER

## 2018-12-20 PROCEDURE — 82962 GLUCOSE BLOOD TEST: CPT | Mod: S$GLB,,, | Performed by: NURSE PRACTITIONER

## 2018-12-20 PROCEDURE — 99999 PR PBB SHADOW E&M-EST. PATIENT-LVL III: CPT | Mod: PBBFAC,,, | Performed by: NURSE PRACTITIONER

## 2018-12-20 PROCEDURE — 3078F DIAST BP <80 MM HG: CPT | Mod: CPTII,S$GLB,, | Performed by: NURSE PRACTITIONER

## 2018-12-20 PROCEDURE — 1101F PT FALLS ASSESS-DOCD LE1/YR: CPT | Mod: CPTII,S$GLB,, | Performed by: NURSE PRACTITIONER

## 2018-12-20 NOTE — LETTER
December 20, 2018      Mohinder Morfin MD  [address] [facility] [address]           [place] [facility]  [address] [facility] [address]  Phone: [phone]  Fax: [phone]          Patient: Rudy Saldaña   MR Number: [id number]   YOB: 1940   Date of Visit: 12/20/2018       Dear Dr. Mohinder Morfin:    Thank you for referring Rudy Saldaña to me for evaluation. Attached you will find relevant portions of my assessment and plan of care.    If you have questions, please do not hesitate to call me. I look forward to following Rudy Saldaña along with you.    Sincerely,    Marina Gordon, EMMETT    Enclosure  CC:  No Recipients    If you would like to receive this communication electronically, please contact [email or web address] or [phone] to request more information on [employer] access.    For providers and/or their staff who would like to refer a patient to [facility], please contact us through our one-stop-shop provider referral line, Jose Jose, at [phone].    If you feel you have received this communication in error or would no longer like to receive these types of communications, please e-mail [email or web address]

## 2018-12-20 NOTE — PATIENT INSTRUCTIONS
PATIENT INSTRUCTIONS  - Follow up as scheduled.   - Carb Count: 30-45G/meal and 15G/snack  - Exercise: Goal is 150 minutes or more per week  - Bring meter and blood sugar log to each appointment.       - Blood Sugar Goals:  1. The goal for fasting blood sugars is 90 -140 mg/dl.   2. The goal for the 2 hour after meal blood sugars is below 180 mg/dl.  3. Blood sugars below 70 are considered LOW and you must eat or drink 15G of carbohydrates immediately, then recheck blood sugar after 15 minutes to ensure blood sugar has returned to normal range, (70 or above)                                                              Diabetes (General Information)  Diabetes is a long-term health problem. It means your body does not make enough insulin. Or it may mean that your body cannot use the insulin it makes. Insulin is a hormone in your body. It lets blood sugar (glucose) reach the cells in your body. All of your cells need glucose for fuel.  When you have diabetes, the glucose in your blood builds up because it cannot get into the cells. This buildup is called high blood sugar (hyperglycemia).  Your blood sugar level depends on several things. It depends on what kind of food you eat and how much of it you eat. It also depends on how much exercise you get, and how much insulin you have in your body. Eating too much of the wrong kinds of food or not taking diabetes medicine on time can cause high blood sugar. Infections can cause high blood sugar even if you are taking medicines correctly.  These things can also cause low blood sugar:  · Missing meals  · Not eating enough food  · Taking too much diabetes medicine  Diabetes can cause serious problems over time if you do not get treated. These problems include heart disease, stroke, kidney failure, and blindness. They also include nerve pain or loss of feeling in your legs and feet, and gangrene of the feet. By keeping your blood sugar under control you can prevent or delay  these problems.  Normal blood sugar levels are 80 to 100 before a meal and less than 180 in the 1 to 2 hours after a meal.  Home care  Follow these guidelines when caring for yourself at home:  · Follow the diet your healthcare provider gives you. Take insulin or other diabetes medicine exactly as told to.  · Watch your blood sugar as you are told to. Keep a log of your results. This will help your provider change your medicines to keep your blood sugar under control.  · Try to reach your ideal weight. You may be able to cut back on or not have to take diabetes medicine if you eat the right foods and get exercise.  · Do not smoke. Smoking worsens the effects of diabetes on your circulation. You are much more likely to have a heart attack if you have diabetes and you smoke.  · Take good care of your feet. If you have lost feeling in your feet, you may not see an injury or infection. Check your feet and between your toes at least once a week.  · Wear a medical alert bracelet or necklace, or carry a card in your wallet that says you have diabetes. This will help healthcare providers give you the right care if you get very ill and cannot tell them that you have diabetes.  Sick day plan  If you get a cold, the flu, or a bacterial or viral infection, take these steps:  · Look at your diabetes sick plan and call your healthcare provider as you were told to. You may need to call your provider right away if:  ¨ Your blood sugar is above 240 while taking your diabetes medicine  ¨ Your urine ketone levels are above normal or high  ¨ You have been vomiting more than 6 hours  ¨ You have trouble breathing or your breath ha s a fruity smell  ¨ You have a high fever  ¨ You have a fever for several days and you are not getting better  ¨ You get light-headed and are sleepier than usual  · Keep taking your diabetes pills (oral medicine) even if you have been vomiting and are feeling sick. Call your provider right away because you may  need insulin to lower your blood sugar until you recover from your illness.  · Keep taking your insulin even if you have been vomiting and are feeling sick. Call your provider right away to ask if you need to change your insulin dose. This will depend on your blood sugar results.  · Check your blood sugar every 2 to 4 hours, or at least 4 times a day.  · Check your ketones often. If you are vomiting and having diarrhea, watch them more often.  · Do not skip meals. Try to eat small meals on a regular schedule. Do this even if you do not feel like eating.  · Drink water or other liquids that do not have caffeine or calories. This will keep you from getting dehydrated. If you are nauseated or vomiting, takes small sips every 5 minutes. To prevent dehydration try to drink a cup (8 ounces) of fluids every hour while you are awake.  General care  Always bring a source of fast-acting sugar with you in case you have symptoms of low blood sugar (below 70). At the first sign of low blood sugar, eat or drink 15 to 20 grams of fast-acting sugar to raise your blood sugar. Examples are:  · 3 to 4 glucose tablets. You can buy these at most [employer].  · 4 ounces (1/2 cup) of regular (not diet) soft drinks  · 4 ounces (1/2 cup) of any fruit juice  · 8 ounces (1 cup) of milk  · 5 to 6 pieces of hard candy  · 1 tablespoon of honey  Check your blood sugar 15 minutes after treating yourself. If it is still below 70, take 15 to 20 more grams of fast-acting sugar. Test again in 15 minutes. If it returns to normal (70 or above), eat a snack or meal to keep your blood sugar in a safe range. If it stays low, call your doctor or go to an emergency room.  Follow-up care  Follow-up with your healthcare provider, or as advised. For more information about diabetes, visit the American Diabetes Association website at www.diabetes.org or call 306-301-1939.  When to seek medical advice  Call your healthcare provider right away if you have any of  these symptoms of high blood sugar:  · Frequent urination  · Dizziness  · Drowsiness  · Thirst  · Headache  · Nausea or vomiting  · Abdominal pain  · Eyesight changes  · Fast breathing  · Confusion or loss of consciousness  Also call your provider right away if you have any of these signs of low blood sugar:  · Fatigue  · Headache  · Shakes  · Excess sweating  · Hunger  · Feeling anxious or restless  · Eyesight changes  · Drowsiness  · Weakness  · Confusion or loss of consciousness  Call 911  Call for emergency help right away if any of these occur:  · Chest pain or shortness of breath  · Dizziness or fainting  · Weakness of an arm or leg or one side of the face  · Trouble speaking or seeing   Date Last Reviewed: 6/1/2016 © 2000-2016 [employer]. [address] [place] [address], [place] [address]. All rights reserved. This information is not intended as a substitute for professional medical care. Always follow your healthcare professional's instructions.                                                                     Understanding Type 2 Diabetes  When your body is working normally, the food you eat is digested and used as fuel. This fuel supplies energy to the bodys cells. When you have diabetes, the fuel cant enter the cells. Without treatment, diabetes can cause serious long-term health problems.     Your body breaks down the food you eat into glucose.          How the body gets energy  The digestive system breaks down food, resulting in a sugar called glucose. Some of this glucose is stored in the liver. But most of it enters the bloodstream and travels to the cells to be used as fuel. Glucose needs the help of a hormone called insulin to enter the cells. Insulin is made in the pancreas. It is released into the bloodstream in response to the presence of glucose in the blood. Think of insulin as a key. When insulin reaches a cell, it attaches to the cell wall. This signals the cell to create an  opening that allows glucose to enter the cell.  When you have type 2 diabetes  Early in type 2 diabetes, your cells dont respond properly to insulin. Because of this, less glucose than normal moves into cells. This is called insulin resistance. In response, the pancreas makes more insulin. But eventually, the pancreas cant produce enough insulin to overcome insulin resistance. As less and less glucose enters cells, it builds up to a harmful level in the bloodstream. This is known as high blood sugar or hyperglycemia. The result is type 2 diabetes. The cells become starved for energy, which can leave you feeling tired and rundown.  Why high blood sugar is a problem  If high blood sugar is not controlled, blood vessels throughout the body become damaged. Prolonged high blood sugar affects organs, blood vessels, and nerves. As a result, the risks of damage to the heart, kidneys, eyes, and limbs increase. Diabetes also makes other problems, such as high blood pressure and high cholesterol, more dangerous. Over time, people with uncontrolled high blood sugar have an increase in risk of dying of, or being disabled by, heart attack or stroke.  Date Last Reviewed: 7/1/2016  © 5191-5225 [employer]. [address] [place] [address]. All rights reserved. This information is not intended as a substitute for professional medical care. Always follow your healthcare professional's instructions.                                                            Using a Blood Sugar Log    You have diabetes. This means your body has trouble regulating a sugar called glucose. To help manage your diabetes, youll need to check your blood sugar level as directed by your healthcare provider. Keeping a log of your blood sugar levels will help you track your blood sugar readings. Its a simple and easy way to see how well you are controlling your diabetes.  Checking your blood sugar level  You can check your blood sugar  level with a blood glucose meter. Youll first prick the side of your finger with a tiny lancet to draw a tiny drop of blood onto the test strip. Some glucose meters let you use another place on your body to test. But these other places should not be used in some cases as they may be inaccurate. Follow the instructions for your glucose meter. And talk with your healthcare provider before doing the test on other places.  The strip goes into the meter first, then a drop of blood is placed on the tip of the strip. The meter then shows a reading that tells you the level of your blood sugar. Your readings should be in your target range as often as possible. This means not too high or too low. Staying in this range helps lower your risk for complications. Your healthcare provider will help you figure out the target range that is best for you.  Tracking your readings  Every time you check your blood sugar, use your log to keep track of your readings. Your meter will also probably have a memory feature that your healthcare provider can check at your next visit. You may be advised by your healthcare provider to check your blood sugar in the morning, at bedtime, and before and after meals. Be sure to write down all of your numbers. Also use your log to record things that might have affected your blood sugar. Some examples include being sick, certain medicines, being physically active, feeling stressed, or skipping meals.   Lessons learned from your readings  Tracking your blood sugar readings helps you see patterns. These patterns tell you how your actions affect your blood sugar. For instance, you may have higher numbers after eating certain foods or lower numbers after exercise. They just help you understand how to stay in your target range more often, so that your diabetes remains in good control.  Sharing your log with your healthcare team  Bring your blood sugar log and glucose meter with you to all of your healthcare  appointments. This can help your healthcare team make changes to your treatment plan, if needed. This may involve making changes in what you eat, what medicines you take, or how much you exercise.  To learn more  The resources below can help you learn more:  · American Diabetes Association 658-273-2077 www.diabetes.org  · [employer] 220-479-9189 www.lighthouse.org  · [employer] 782-846-4294 www.nei.nih.gov  · [employer] 942-176-1789 www.hormone.org  Date Last Reviewed: 5/1/2016 © 2000-2016 [employer]. [address] [place] [address], [place] [address]. All rights reserved. This information is not intended as a substitute for professional medical care. Always follow your healthcare professional's instructions.                                                                                            Diabetes: Exams and Tests    For your diabetes care, you may see your primary care provider or a specialist 2 to 4 times a year, or as directed. This page lists some of the regular exams and tests recommended for people with diabetes. To learn more, contact the American Diabetes Association (383-210-3019, www.diabetes.org).  Tests and immunizations  These should be done at least as often as stated below:  · Blood pressure check: every healthcare provider visit  · A1C: at first, every 3 months; if controlled, then every 3 to 6 months   · Cholesterol and blood lipid tests: at least every 12 months.  · Urine tests for kidney function: every 12 months  · Flu shots: once a year  · Pneumonia shots: talk with your healthcare provider about which pneumonia vaccines are right for you  · Hepatitis B shots: as soon as possible if youre under 60, or as advised by your healthcare provider if youre older than 60  · Shingles vaccine after age 60, even if you have already had shingles   · Other tests or vaccines: as advised by your healthcare provider  · Individualized medical nutrition  therapy: at least once, then as needed  · Stop smoking counseling, if you still smoke, at each visit   Regular exams  The following exams help keep you healthy:  · Foot exams. Nerve and blood vessel problems can affect your feet sooner than other parts of your body. Make sure that your healthcare provider checks your feet at every office visit.  · Eye exams. You can have problems with your eyes even if you dont have trouble seeing. An ophthalmologist (eye healthcare provider) or specially trained optometrist will give you a dilated eye exam at least once a year. If you see dark spots, see poorly in dim light, have eye pain or pressure, or notice any other problems, tell your healthcare provider right away.  · Dental exams. Gum disease (also called periodontal disease) and other mouth problems are common in people with diabetes. To help prevent these problems, see your dentist two or more times a year.  Ask your healthcare provider what other exams youll need on a regular basis.  Date Last Reviewed: 6/1/2016 © 2000-2016 [employer]. [address] [place] [address], [place] [address]. All rights reserved. This information is not intended as a substitute for professional medical care. Always follow your healthcare professional's instructions.

## 2018-12-31 NOTE — PROGRESS NOTES
RHEUMATOLOGY OUTPATIENT CLINIC NOTE  Attending Rheumatologist: Romelia Rodriguez  Primary Care Provider: Mohinder Morfin MD   Physician Requesting Consultation: No referring provider defined for this encounter.  Chief Complaint/Reason For Consultation:  Flare in left foot    Subjective:       HPI  HISTORICAL:  Rudy Saldaña is a 78 y.o. White male presents w recent onset acute wrist swelling in bilateral wrists , left midfoot, and right elbow. States he would go to sleep in his usual state of health, and woke up one morning with midfoot and wrist swollen. No prior hx of gout /pseudogout. Has just completed course of indomethacin per pcp. Recently started on allopurinol and colchicine (100mg daily+ colchicine .6mg daily). Completed several medrol dose packs but would have acute worsening of joint pains/swellings once he ran out.   No hx renal stones. +Hx DM and CKD. Uric acid elevated. 8.3    Today:   Since last visit, pt states that overall he has felt well. Reported resolution of previous gout flare w prednisone taper but is concerned that his blood sugars were quite high while on it. He is reluctant to get on insulin to better manage his diabetes. However, presents acutely today w/ gout flare in left midfoot x several days. Pain 8/10 nonradiating constant sharp in nature.  No other joint pains or flares. No other acute issues.    Review of Systems   Constitutional: Negative for chills, fever and weight loss.   HENT: Negative for congestion, ear pain, hearing loss, sinus pain, sore throat and tinnitus.    Eyes: Negative for blurred vision, double vision and photophobia.   Respiratory: Negative for cough, sputum production and shortness of breath.    Cardiovascular: Negative for chest pain and palpitations.   Gastrointestinal: Negative for heartburn, nausea and vomiting.   Genitourinary: Negative for dysuria and frequency.   Musculoskeletal: Positive for joint pain. Negative for back pain, myalgias and neck  pain.   Skin: Negative for itching and rash.   Neurological: Negative for dizziness, tingling, focal weakness and headaches.   Psychiatric/Behavioral: Negative for depression and suicidal ideas.   All other systems reviewed and are negative.      Chronic comorbid conditions affecting medical decision making today:  Past Medical History:   Diagnosis Date    Anemia     BPH (benign prostatic hypertrophy)     Carotid bruit     CVA (cerebral vascular accident)     Diabetes mellitus, type 2     Hyperlipidemia     Hypertension     Hypothyroidism 2/29/2016    Type 2 diabetes mellitus, uncontrolled, with renal complications     The patient presents with diabetes.  The patient denies polyuria, polydipsia, polyphagia, hypoglycemia and paresthesias.  The patient's glucose control has been good.  Home glucose averages are routinely checked.  The patient is without retinopathy currently.  The patient has no history of neuropathy.  The patient currently complains of no podiatric problems.  The patient has excellent compliance.    Type II or unspecified type diabetes mellitus without mention of complication, not stated as uncontrolled     Vitamin D deficiency 2/29/2016     Past Surgical History:   Procedure Laterality Date    COLONOSCOPY  2008    COLONOSCOPY  5/31/2005    repeat in 2015    PILONIDAL CYST DRAINAGE      TRANSURETHRAL RESECTION OF PROSTATE       Family History   Problem Relation Age of Onset    Hypertension Mother     Hypertension Father     Heart attack Maternal Uncle     Brain cancer Sister     Diabetes Neg Hx     Stroke Neg Hx      Social History     Substance and Sexual Activity   Alcohol Use No    Alcohol/week: 0.0 oz     Social History     Tobacco Use   Smoking Status Never Smoker   Smokeless Tobacco Never Used     Social History     Substance and Sexual Activity   Drug Use No       Current Outpatient Medications:     acarbose (PRECOSE) 100 MG Tab, Take 1 tablet (100 mg total) by mouth 3  (three) times daily with meals., Disp: 270 tablet, Rfl: 1    amLODIPine (NORVASC) 5 MG tablet, TAKE 1 TABLET(5 MG) BY MOUTH EVERY DAY, Disp: 90 tablet, Rfl: 1    aspirin (ECOTRIN) 81 MG EC tablet, Take 81 mg by mouth once daily., Disp: , Rfl:     atorvastatin (LIPITOR) 20 MG tablet, Take 1 tablet (20 mg total) by mouth once daily., Disp: 90 tablet, Rfl: 3    benazepril (LOTENSIN) 40 MG tablet, Take 1 tablet (40 mg total) by mouth once daily., Disp: 90 tablet, Rfl: 3    blood sugar diagnostic Strp, Use once daily as directed, Disp: 100 each, Rfl: 0    clopidogrel (PLAVIX) 75 mg tablet, Take 1 tablet (75 mg total) by mouth once daily., Disp: 90 tablet, Rfl: 3    ergocalciferol (ERGOCALCIFEROL) 50,000 unit Cap, Take 1 capsule weekly, Disp: 24 capsule, Rfl: 3    ferrous sulfate 325 mg (65 mg iron) Tab tablet, Take 1 tablet (325 mg total) by mouth 3 (three) times daily., Disp: 270 tablet, Rfl: 3    galantamine (RAZADYNE) 8 MG Tab, Take 1 tablet (8 mg total) by mouth 2 (two) times daily with meals., Disp: 180 tablet, Rfl: 3    glipiZIDE (GLUCOTROL) 10 MG tablet, Take 1 tablet (10 mg total) by mouth 2 (two) times daily before meals., Disp: 180 tablet, Rfl: 1    ketoconazole (NIZORAL) 2 % shampoo, , Disp: , Rfl:     lancets Misc, One touch lancets use once daily as directed, Disp: 100 each, Rfl: 0    levothyroxine (SYNTHROID) 25 MCG tablet, take 1 tablet by mouth every morning ON AN EMPTY STOMACH, Disp: 90 tablet, Rfl: 3    niacin (NIASPAN) 1000 MG CR tablet, Take 1 tablet (1,000 mg total) by mouth every evening., Disp: 90 tablet, Rfl: 3    pioglitazone (ACTOS) 30 MG tablet, Take 1 tablet (30 mg total) by mouth once daily., Disp: 90 tablet, Rfl: 3    predniSONE (DELTASONE) 5 MG tablet, Take 10 mg daily x 1 week, then take 5mg daily x 1 week, Disp: 21 tablet, Rfl: 2    SITagliptin (JANUVIA) 100 MG Tab, Take 1 tablet (100 mg total) by mouth once daily., Disp: 90 tablet, Rfl: 1    ONETOUCH VERIO IQ METER kit,  Use as instructed, Disp: 1 each, Rfl: 0    scopolamine (TRANSDERM-SCOP) 1.3-1.5 mg (1 mg over 3 days), Place 1 patch onto the skin every 72 hours., Disp: 30 patch, Rfl: 1       Objective:         Vitals:    12/17/18 1528   BP: (!) 161/71   Pulse: 68     Physical Exam   Nursing note and vitals reviewed.  Constitutional: He is oriented to person, place, and time and well-developed, well-nourished, and in no distress.   HENT:   Head: Normocephalic and atraumatic.   Eyes: Conjunctivae and EOM are normal. Pupils are equal, round, and reactive to light.   Neck: Normal range of motion. Neck supple.   Cardiovascular: Normal rate and regular rhythm.    Pulmonary/Chest: Effort normal and breath sounds normal.   Abdominal: Soft. Bowel sounds are normal. He exhibits no distension. There is no tenderness. There is no rebound.   Neurological: He is alert and oriented to person, place, and time.   Skin: Skin is warm and dry.     Psychiatric: Affect and judgment normal.   Musculoskeletal:   +left midfoot warmth, swelling and erythema, tender to touch, full rom active and passive, skin intact, no lesions  No synovitis in small joints hands and feet  No effusions over large joints    No tophi/nodules         Reviewed old and all outside pertinent medical records available.    All lab results personally reviewed and interpreted by me.  Lab Results   Component Value Date    WBC 9.95 11/12/2018    HGB 14.7 11/12/2018    HCT 45.3 11/12/2018    MCV 91 11/12/2018    MCH 29.5 11/12/2018    MCHC 32.5 11/12/2018    RDW 14.6 (H) 11/12/2018     11/12/2018    MPV 10.8 11/12/2018       Lab Results   Component Value Date     12/05/2018    K 3.3 (L) 12/05/2018     12/05/2018    CO2 27 12/05/2018     (H) 12/05/2018    BUN 17 12/05/2018    CALCIUM 9.5 12/05/2018    PROT 7.2 07/03/2018    ALBUMIN 3.4 (L) 07/03/2018    BILITOT 1.5 (H) 07/03/2018    AST 12 07/03/2018    ALKPHOS 53 (L) 07/03/2018    ALT 11 07/03/2018       Lab  Results   Component Value Date    COLORU Red (A) 03/23/2016    APPEARANCEUA Cloudy (A) 03/23/2016    SPECGRAV 1.025 03/23/2016    PHUR 6.0 03/23/2016    PROTEINUA Trace (A) 03/23/2016    KETONESU Negative 03/23/2016    LEUKOCYTESUR Negative 03/23/2016    NITRITE Negative 03/23/2016    UROBILINOGEN neg 12/16/2015       Lab Results   Component Value Date    CRP 17.6 (H) 10/29/2018       Lab Results   Component Value Date    SEDRATE 26 (H) 10/29/2018       Lab Results   Component Value Date    FRANDY Negative 06/11/2004    RF <10.0 11/12/2018    SEDRATE 26 (H) 10/29/2018       No components found for: 25OHVITDTOT, 81ZTZUEM6, 67FVTGVJ7, METHODNOTE    Lab Results   Component Value Date    URICACID 8.3 (H) 11/12/2018       No components found for: TSPOTTB    Uric acid 8.3    Imaging:  All imaging reviewed and independently  interpreted by me.    XR Hand 11/18  FINDINGS:  Right hand: Bones are demineralized.  There are stable multi articular degenerative changes noted.  No focal osseous erosions.  No fracture or dislocation.  Chronic appearing deformity at the base of the 5th metacarpal.  Soft tissues are within normal limits.    Left hand: Bones are demineralized.  There are occasions within the soft tissues along the ulnar aspect of the 3rd PIP joint.  No acute fracture or dislocation.  Soft tissues are unremarkable.      Impression       As above     XR Knee 11/18  FINDINGS:  Juxta-articular osteopenia and bilateral tricompartment degenerative changes similar to prior exam.  There is a increased narrowing of the medial compartments, greater on the left.  Bilateral vascular calcifications.  There is prominence of the inferior pole both patella, greater on the right.  Spurring at the anterior tibial tuberosities bilaterally.  No fracture or dislocation.  No significant effusion.      Impression       As above.     XR Elbow 11/18  Impression       Bilateral degenerative changes without acute fracture or  dislocation.    Prominent osteophyte formation bilaterally along the posterior olecranon at the triceps tendon insertion.    Findings most consistent with right olecranon bursitis.     XR Hand 11/18  FINDINGS:  Right hand: Bones are demineralized.  There are stable multi articular degenerative changes noted.  No focal osseous erosions.  No fracture or dislocation.  Chronic appearing deformity at the base of the 5th metacarpal.  Soft tissues are within normal limits.    Left hand: Bones are demineralized.  There are occasions within the soft tissues along the ulnar aspect of the 3rd PIP joint.  No acute fracture or dislocation.  Soft tissues are unremarkable.      Impression       As above              ASSESSMENT / PLAN:     Rudy Saldaña is a 78 y.o. White male with:      1. Crystal arthritis  -FRANDY /RF negative ; uric acid 8.3 ; No hx diagnostic arthrocentesis   -Wrist pain/swelling resolved since last visit w/ prednisone taper, however presents today w/ acute flare in left midfoot ; tx and management complicated by hx DM and CKD (GFR 30)  -Given joint distribution and XR (faint chondrocalcinosis on knee XR), suspect more likely pseudogout  -No role for urate lowering therapy in pseudogout; can consider colchicine (0.6mg 2-3x weekly ) w/ renally adjusted dosing after pt returns from cruise   -Celestone injection given today; re-start prednisone taper as discussed and close monitoring of blood glucose given pts hx of DM  -advised against nsaids 2.2 GI/ CVS / renal risks      . Other specified counseling  - Immunization counseling done  - Weight loss counseling done  - Nutrition and exercise counseling  - Medication counseling provided      F/u 4 weeks    Method of contact with patient concerns: [facility] Rheumatology      Romelia Rodriguez M.D.  Rheumatology Department   [facility] [place] [address], [place] [address]  Phone: [phone]  Fax: [phone]

## 2019-01-03 ENCOUNTER — OFFICE VISIT (OUTPATIENT)
Dept: DIABETES | Facility: CLINIC | Age: 79
End: 2019-01-03
Payer: MEDICARE

## 2019-01-03 VITALS
DIASTOLIC BLOOD PRESSURE: 70 MMHG | WEIGHT: 221 LBS | SYSTOLIC BLOOD PRESSURE: 150 MMHG | BODY MASS INDEX: 29.93 KG/M2 | HEIGHT: 72 IN

## 2019-01-03 DIAGNOSIS — E78.2 COMBINED HYPERLIPIDEMIA ASSOCIATED WITH TYPE 2 DIABETES MELLITUS: ICD-10-CM

## 2019-01-03 DIAGNOSIS — I15.2 HYPERTENSION ASSOCIATED WITH DIABETES: ICD-10-CM

## 2019-01-03 DIAGNOSIS — E11.69 COMBINED HYPERLIPIDEMIA ASSOCIATED WITH TYPE 2 DIABETES MELLITUS: ICD-10-CM

## 2019-01-03 DIAGNOSIS — E11.59 HYPERTENSION ASSOCIATED WITH DIABETES: ICD-10-CM

## 2019-01-03 LAB — GLUCOSE SERPL-MCNC: 138 MG/DL (ref 70–110)

## 2019-01-03 PROCEDURE — 99999 PR PBB SHADOW E&M-EST. PATIENT-LVL III: CPT | Mod: PBBFAC,,, | Performed by: NURSE PRACTITIONER

## 2019-01-03 PROCEDURE — 1101F PT FALLS ASSESS-DOCD LE1/YR: CPT | Mod: CPTII,S$GLB,, | Performed by: NURSE PRACTITIONER

## 2019-01-03 PROCEDURE — 99215 PR OFFICE/OUTPT VISIT, EST, LEVL V, 40-54 MIN: ICD-10-PCS | Mod: S$GLB,,, | Performed by: NURSE PRACTITIONER

## 2019-01-03 PROCEDURE — 3078F PR MOST RECENT DIASTOLIC BLOOD PRESSURE < 80 MM HG: ICD-10-PCS | Mod: CPTII,S$GLB,, | Performed by: NURSE PRACTITIONER

## 2019-01-03 PROCEDURE — 99499 UNLISTED E&M SERVICE: CPT | Mod: S$GLB,,, | Performed by: NURSE PRACTITIONER

## 2019-01-03 PROCEDURE — 3077F PR MOST RECENT SYSTOLIC BLOOD PRESSURE >= 140 MM HG: ICD-10-PCS | Mod: CPTII,S$GLB,, | Performed by: NURSE PRACTITIONER

## 2019-01-03 PROCEDURE — 1101F PR PT FALLS ASSESS DOC 0-1 FALLS W/OUT INJ PAST YR: ICD-10-PCS | Mod: CPTII,S$GLB,, | Performed by: NURSE PRACTITIONER

## 2019-01-03 PROCEDURE — 99215 OFFICE O/P EST HI 40 MIN: CPT | Mod: S$GLB,,, | Performed by: NURSE PRACTITIONER

## 2019-01-03 PROCEDURE — 3077F SYST BP >= 140 MM HG: CPT | Mod: CPTII,S$GLB,, | Performed by: NURSE PRACTITIONER

## 2019-01-03 PROCEDURE — 99499 RISK ADDL DX/OHS AUDIT: ICD-10-PCS | Mod: S$GLB,,, | Performed by: NURSE PRACTITIONER

## 2019-01-03 PROCEDURE — 3078F DIAST BP <80 MM HG: CPT | Mod: CPTII,S$GLB,, | Performed by: NURSE PRACTITIONER

## 2019-01-03 PROCEDURE — 99999 PR PBB SHADOW E&M-EST. PATIENT-LVL III: ICD-10-PCS | Mod: PBBFAC,,, | Performed by: NURSE PRACTITIONER

## 2019-01-03 PROCEDURE — 82962 POCT GLUCOSE, HAND-HELD DEVICE: ICD-10-PCS | Mod: S$GLB,,, | Performed by: NURSE PRACTITIONER

## 2019-01-03 PROCEDURE — 82962 GLUCOSE BLOOD TEST: CPT | Mod: S$GLB,,, | Performed by: NURSE PRACTITIONER

## 2019-01-03 NOTE — PATIENT INSTRUCTIONS
Gout    Gout is an inflammation of a joint due to a build-up of gout crystals in the joint fluid. This occurs when there is an excess of uric acid (a normal waste product) in the body. Uric acid builds up in the body when the kidneys are unable to filter enough of it from the blood. This may occur with age. It is also associated with kidney disease. Gout occurs more often in people with obesity, diabetes, high blood pressure, or high levels of fats in the blood. It may run in families. Gout tends to come and go. A flare up of gout is called an attack. Drinking alcohol or eating certain foods (such as shellfish or foods with additives such as high-fructose corn syrup) may increase uric acid levels in the blood and cause a gout attack.  During a gout attack, the affected joint may become a hot, red, swollen and painful. If you have had one attack of gout, you are likely to have another. An attack of gout can be treated with medicine. If these attacks become frequent, a daily medicine may be prescribed to help the kidneys remove uric acid from the body.  Home care  During a gout attack:  · Rest painful joints. If gout affects the joints of your foot or leg, you may want to use crutches for the first few days to keep from bearing weight on the affected joint.  · When sitting or lying down, raise the painful joint to a level higher than your heart.  · Apply an ice pack (ice cubes in a plastic bag wrapped in a thin towel) over the injured area for 20 minutes every 1 to 2 hours the first day for pain relief. Continue this 3 to 4 times a day for swelling and pain.  · Avoid alcohol and foods listed below (see Preventing attacks) during a gout attack. Drink extra fluid to help flush the uric acid through your kidneys.  · If you were prescribed a medicine to treat gout, take it as your healthcare provider has instructed. Don't skip doses.  · Take anti-inflammatory medicine as directed.   · If pain medicines have been  prescribed, take them exactly as directed.    Preventing attacks  · Minimize or avoid alcohol use. Excess alcohol intake can cause a gout attack.  · Limit these foods and beverages:  ¨ Organ meats, such as kidneys and liver  ¨ Certain seafoods (anchovies, sardines, shrimp, scallops, herring, mackerel)  ¨ Wild game, meat extracts and meat gravies  ¨ Foods and beverages sweetened with high-fructose corn syrup, such as sodas  · Eat a healthy diet including low-fat and nonfat dairy, whole grains, and vegetables.  · If you are overweight, talk to your healthcare provider about a weight reduction plan. Avoid fasting or extreme low calorie diets (less than 900 calories per day). This will increase uric acid levels in the body.  · If you have diabetes or high blood pressure, work with your doctor to manage these conditions.  · Protect the joint from injury. Trauma can trigger a gout attack.  Follow-up care  Follow up with your healthcare provider, or as advised.   When to seek medical advice  Call your healthcare provider if you have any of the following:  · Fever over 100.4°F (38.ºC) with worsening joint pain  · Increasing redness around the joint  · Pain developing in another joint  · Repeated vomiting, abdominal pain, or blood in the vomit or stool (black or red color)  Date Last Reviewed: 3/1/2017  © 8963-7831 [employer]. [address] [place] [address], [place] [address]. All rights reserved. This information is not intended as a substitute for professional medical care. Always follow your healthcare professional's instructions.        What Is Gout?  Gout is a disease that affects the joints. Left untreated, it can lead to painful foot and joint deformities and even kidney problems. But, by treating gout early, you can relieve pain and help prevent future problems. Gout can usually be treated with medication and proper diet. In severe cases, surgery may be needed.  What causes gout?  Gout is caused by an excess  "of uric acid (a waste product made by the body). Uric acid is excreted by the kidneys. If the uric acid level in your blood rises too high, the uric acid may form crystals that collect in the joints, bringing on a gout attack. If you have many gout attacks, crystals may form large deposits called tophi. Tophi can damage joints and cause deformity.  Who is at risk for gout?  Men are more likely to have gout than women. But women can also be affected, mostly after menopause. Some health problems, such as obesity and high cholesterol, make gout more likely. And some medications, such as diuretics (water pills), can trigger a gout attack. People who drink a lot of alcohol are at high risk for gout. Certain foods can also trigger a gout attack.  Substances that may trigger a gout attack  To help prevent a gout attack, avoid these foods:  · Alcohol (particularly beer, but also red wine and spirits)  · Certain meats (red meat, processed meat, turkey)  · Organ meats (kidney, liver, sweetbread)  · Shellfish (lobster, crab, shrimp, scallop, mussel)  · Certain fish (anchovy, sardine, herring, mackerel)   Treatment  · Lifestyle changes, including weight loss, exercise, and quitting tobacco use  · Reducing consumption of the food groups above as well as high fructose corn syrup, found in many foods including sodas and energy drinks  · Changing non-essential medications that may contribute to gout (such as thiazide diuretics--"water pills")  · Medications to reduce the amount of uric acid in the blood, such as allopurinol or others  · Medications to treat acute gout attacks, including NSAIDs (nonsteroidal anti-inflammatory medicines), steroids, and colchicine  Date Last Reviewed: 2/1/2016  © 0595-5378 [employer]. [address] [place] [address], [place] [address]. All rights reserved. This information is not intended as a substitute for professional medical care. Always follow your healthcare professional's " instructions.        Gout Diet  Gout is a painful condition caused by an excess of uric acid, a waste product made by the body. Uric acid forms crystals that collect in the joints. The immune response to these crystals brings on symptoms of joint pain and swelling. This is called a gout attack. Often, medications and diet changes are combined to manage gout. Below are some guidelines for changing your diet to help you manage gout and prevent attacks. Your health care provider will help you determine the best eating plan for you.     Eating to manage gout  Weight loss for those who are overweight may help reduce gout attacks.  Eat less of these foods  Eating too many foods containing purines may raise the levels of uric acid in your body. This raises your risk for a gout attack. Try to limit these foods and drinks:  · Alcohol, such as beer and red wine. You may be told to avoid alcohol completely.  · Soft drinks that contain sugar or high fructose corn syrup  · Certain fish, including anchovies, sardines, fish eggs, and herring  · Shellfish  · Certain meats, such as red meat, hot dogs, luncheon meats, and turkey  · Organ meats, such as liver, kidneys, and sweetbreads  · Legumes, such as dried beans and peas  · Other high fat foods such as gravy, whole milk, and high fat cheeses  · Vegetables such as asparagus, cauliflower, spinach, and mushrooms used to be thought to contribute to an increased risk for a gout attack, but recent studies show that high purine vegetables don't increase the risk for a gout attack.  Eat more of these foods  Other foods may be helpful for people with gout. Add some of these foods to your diet:  · Cherries contain chemicals that may lower uric acid.  · Omega fatty acids. These are found in some fatty fish such as salmon, certain oils (flax, olive, or nut), and nuts themselves. Omega fatty acids may help prevent inflammation due to gout.  · Dairy products that are low-fat or fat-free, such  as cheese and yogurt  · Complex carbohydrate foods, including whole grains, brown rice, oats, and beans  · Coffee, in moderation  · Water, approximately 64 ounces per day  Follow-up care  Follow up with your healthcare provider as advised.  When to seek medical advice  Call your healthcare provider right away if any of these occur:  · Return of gout symptoms, usually at night:  · Severe pain, swelling, and heat in a joint, especially the base of the big toe  · Affected joint is hard to move  · Skin of the affected joint is purple or red  · Fever of 100.4°F (38°C) or higher  · Pain that doesn't get better even with prescribed medicine   Date Last Reviewed: 1/12/2016  © 1943-2666 [employer]. [address] [place] [address], [place] [address]. All rights reserved. This information is not intended as a substitute for professional medical care. Always follow your healthcare professional's instructions.

## 2019-01-04 ENCOUNTER — OFFICE VISIT (OUTPATIENT)
Dept: RHEUMATOLOGY | Facility: CLINIC | Age: 79
End: 2019-01-04
Payer: MEDICARE

## 2019-01-04 VITALS
HEIGHT: 72 IN | SYSTOLIC BLOOD PRESSURE: 188 MMHG | HEART RATE: 87 BPM | BODY MASS INDEX: 29.97 KG/M2 | DIASTOLIC BLOOD PRESSURE: 69 MMHG

## 2019-01-04 DIAGNOSIS — M11.9 CRYSTAL ARTHROPATHY: Primary | ICD-10-CM

## 2019-01-04 PROCEDURE — 99499 UNLISTED E&M SERVICE: CPT | Mod: S$GLB,,, | Performed by: STUDENT IN AN ORGANIZED HEALTH CARE EDUCATION/TRAINING PROGRAM

## 2019-01-04 PROCEDURE — 99214 OFFICE O/P EST MOD 30 MIN: CPT | Mod: 25,S$GLB,, | Performed by: STUDENT IN AN ORGANIZED HEALTH CARE EDUCATION/TRAINING PROGRAM

## 2019-01-04 PROCEDURE — 3078F PR MOST RECENT DIASTOLIC BLOOD PRESSURE < 80 MM HG: ICD-10-PCS | Mod: CPTII,S$GLB,, | Performed by: STUDENT IN AN ORGANIZED HEALTH CARE EDUCATION/TRAINING PROGRAM

## 2019-01-04 PROCEDURE — 96372 THER/PROPH/DIAG INJ SC/IM: CPT | Mod: 59,S$GLB,, | Performed by: STUDENT IN AN ORGANIZED HEALTH CARE EDUCATION/TRAINING PROGRAM

## 2019-01-04 PROCEDURE — 3077F SYST BP >= 140 MM HG: CPT | Mod: CPTII,S$GLB,, | Performed by: STUDENT IN AN ORGANIZED HEALTH CARE EDUCATION/TRAINING PROGRAM

## 2019-01-04 PROCEDURE — 1101F PR PT FALLS ASSESS DOC 0-1 FALLS W/OUT INJ PAST YR: ICD-10-PCS | Mod: CPTII,S$GLB,, | Performed by: STUDENT IN AN ORGANIZED HEALTH CARE EDUCATION/TRAINING PROGRAM

## 2019-01-04 PROCEDURE — 99999 PR PBB SHADOW E&M-EST. PATIENT-LVL IV: CPT | Mod: PBBFAC,,, | Performed by: STUDENT IN AN ORGANIZED HEALTH CARE EDUCATION/TRAINING PROGRAM

## 2019-01-04 PROCEDURE — 96372 PR INJECTION,THERAP/PROPH/DIAG2ST, IM OR SUBCUT: ICD-10-PCS | Mod: 59,S$GLB,, | Performed by: STUDENT IN AN ORGANIZED HEALTH CARE EDUCATION/TRAINING PROGRAM

## 2019-01-04 PROCEDURE — 99214 PR OFFICE/OUTPT VISIT, EST, LEVL IV, 30-39 MIN: ICD-10-PCS | Mod: 25,S$GLB,, | Performed by: STUDENT IN AN ORGANIZED HEALTH CARE EDUCATION/TRAINING PROGRAM

## 2019-01-04 PROCEDURE — 99999 PR PBB SHADOW E&M-EST. PATIENT-LVL IV: ICD-10-PCS | Mod: PBBFAC,,, | Performed by: STUDENT IN AN ORGANIZED HEALTH CARE EDUCATION/TRAINING PROGRAM

## 2019-01-04 PROCEDURE — 99499 RISK ADDL DX/OHS AUDIT: ICD-10-PCS | Mod: S$GLB,,, | Performed by: STUDENT IN AN ORGANIZED HEALTH CARE EDUCATION/TRAINING PROGRAM

## 2019-01-04 PROCEDURE — 3077F PR MOST RECENT SYSTOLIC BLOOD PRESSURE >= 140 MM HG: ICD-10-PCS | Mod: CPTII,S$GLB,, | Performed by: STUDENT IN AN ORGANIZED HEALTH CARE EDUCATION/TRAINING PROGRAM

## 2019-01-04 PROCEDURE — 1101F PT FALLS ASSESS-DOCD LE1/YR: CPT | Mod: CPTII,S$GLB,, | Performed by: STUDENT IN AN ORGANIZED HEALTH CARE EDUCATION/TRAINING PROGRAM

## 2019-01-04 PROCEDURE — 3078F DIAST BP <80 MM HG: CPT | Mod: CPTII,S$GLB,, | Performed by: STUDENT IN AN ORGANIZED HEALTH CARE EDUCATION/TRAINING PROGRAM

## 2019-01-04 RX ORDER — COLCHICINE 0.6 MG/1
0.6 TABLET ORAL
Qty: 30 TABLET | Refills: 6 | Status: SHIPPED | OUTPATIENT
Start: 2019-01-07 | End: 2019-01-09

## 2019-01-04 RX ORDER — BETAMETHASONE SODIUM PHOSPHATE AND BETAMETHASONE ACETATE 3; 3 MG/ML; MG/ML
6 INJECTION, SUSPENSION INTRA-ARTICULAR; INTRALESIONAL; INTRAMUSCULAR; SOFT TISSUE
Status: COMPLETED | OUTPATIENT
Start: 2019-01-04 | End: 2019-01-04

## 2019-01-04 RX ADMIN — BETAMETHASONE SODIUM PHOSPHATE AND BETAMETHASONE ACETATE 6 MG: 3; 3 INJECTION, SUSPENSION INTRA-ARTICULAR; INTRALESIONAL; INTRAMUSCULAR; SOFT TISSUE at 11:01

## 2019-01-04 NOTE — PROGRESS NOTES
Administered 1cc of Betamethasone 6mg/cc to Right Ventrogluteal.  Patient tolerated well. No acute reaction noted to site.  Patient instructed on S/S to report. Patient verbalized understanding.    Lot:[id number]  Exp:10/2019

## 2019-01-04 NOTE — PROGRESS NOTES
RHEUMATOLOGY OUTPATIENT CLINIC NOTE  Attending Rheumatologist: Romelia Rodriguez  Primary Care Provider: Mohinder Morfin MD   Physician Requesting Consultation: No referring provider defined for this encounter.  Chief Complaint/Reason For Consultation:  Flare in right mid foot    Subjective:       HPI  HISTORICAL:  Rudy Saldaña is a 78 y.o. White male presents w recent onset acute wrist swelling in bilateral wrists , left midfoot, and right elbow. States he would go to sleep in his usual state of health, and woke up one morning with midfoot and wrist swollen. No prior hx of gout /pseudogout. Has just completed course of indomethacin per pcp. Recently started on allopurinol and colchicine (100mg daily+ colchicine .6mg daily). Completed several medrol dose packs but would have acute worsening of joint pains/swellings once he ran out.   No hx renal stones. +Hx DM and CKD. Uric acid elevated. 8.3    Today:   Since last visit, pt presents now w/ acute flare in right midfoot. Reported resolution of previous gout flare after celestone injection, but did not complete prednisone taper as previously discussed. He would take it only prn for pain. Concerned that his blood sugars were quite high while on it. He is reluctant to get on insulin to better manage his diabetes. However, presents acutely today w/ gout flare in right midfoot x several days. Pain 10/10 nonradiating constant sharp in nature.  No other joint pains or flares. No other acute issues.    Review of Systems   Constitutional: Negative for chills, fever and weight loss.   HENT: Negative for congestion, ear pain, hearing loss, sinus pain, sore throat and tinnitus.    Eyes: Negative for blurred vision, double vision and photophobia.   Respiratory: Negative for cough, sputum production and shortness of breath.    Cardiovascular: Negative for chest pain and palpitations.   Gastrointestinal: Negative for heartburn, nausea and vomiting.   Genitourinary: Negative  for dysuria and frequency.   Musculoskeletal: Positive for joint pain. Negative for back pain, myalgias and neck pain.   Skin: Negative for itching and rash.   Neurological: Negative for dizziness, tingling, focal weakness and headaches.   Psychiatric/Behavioral: Negative for depression and suicidal ideas.   All other systems reviewed and are negative.      Chronic comorbid conditions affecting medical decision making today:  Past Medical History:   Diagnosis Date    Anemia     BPH (benign prostatic hypertrophy)     Carotid bruit     CVA (cerebral vascular accident)     Diabetes mellitus, type 2     Hyperlipidemia     Hypertension     Hypothyroidism 2/29/2016    Type 2 diabetes mellitus, uncontrolled, with renal complications     The patient presents with diabetes.  The patient denies polyuria, polydipsia, polyphagia, hypoglycemia and paresthesias.  The patient's glucose control has been good.  Home glucose averages are routinely checked.  The patient is without retinopathy currently.  The patient has no history of neuropathy.  The patient currently complains of no podiatric problems.  The patient has excellent compliance.    Type II or unspecified type diabetes mellitus without mention of complication, not stated as uncontrolled     Vitamin D deficiency 2/29/2016     Past Surgical History:   Procedure Laterality Date    COLONOSCOPY  2008    COLONOSCOPY  5/31/2005    repeat in 2015    PILONIDAL CYST DRAINAGE      TRANSURETHRAL RESECTION OF PROSTATE       Family History   Problem Relation Age of Onset    Hypertension Mother     Hypertension Father     Heart attack Maternal Uncle     Brain cancer Sister     Diabetes Neg Hx     Stroke Neg Hx      Social History     Substance and Sexual Activity   Alcohol Use No    Alcohol/week: 0.0 oz     Social History     Tobacco Use   Smoking Status Never Smoker   Smokeless Tobacco Never Used     Social History     Substance and Sexual Activity   Drug Use No        Current Outpatient Medications:     acarbose (PRECOSE) 100 MG Tab, Take 1 tablet (100 mg total) by mouth 3 (three) times daily with meals., Disp: 270 tablet, Rfl: 1    amLODIPine (NORVASC) 5 MG tablet, TAKE 1 TABLET(5 MG) BY MOUTH EVERY DAY, Disp: 90 tablet, Rfl: 1    aspirin (ECOTRIN) 81 MG EC tablet, Take 81 mg by mouth once daily., Disp: , Rfl:     atorvastatin (LIPITOR) 20 MG tablet, Take 1 tablet (20 mg total) by mouth once daily., Disp: 90 tablet, Rfl: 3    benazepril (LOTENSIN) 40 MG tablet, Take 1 tablet (40 mg total) by mouth once daily., Disp: 90 tablet, Rfl: 3    blood sugar diagnostic Strp, Use once daily as directed, Disp: 100 each, Rfl: 0    clopidogrel (PLAVIX) 75 mg tablet, Take 1 tablet (75 mg total) by mouth once daily., Disp: 90 tablet, Rfl: 3    ergocalciferol (ERGOCALCIFEROL) 50,000 unit Cap, Take 1 capsule weekly, Disp: 24 capsule, Rfl: 3    ferrous sulfate 325 mg (65 mg iron) Tab tablet, Take 1 tablet (325 mg total) by mouth 3 (three) times daily., Disp: 270 tablet, Rfl: 3    galantamine (RAZADYNE) 8 MG Tab, Take 1 tablet (8 mg total) by mouth 2 (two) times daily with meals., Disp: 180 tablet, Rfl: 3    glipiZIDE (GLUCOTROL) 10 MG tablet, Take 1 tablet (10 mg total) by mouth 2 (two) times daily before meals., Disp: 180 tablet, Rfl: 1    ketoconazole (NIZORAL) 2 % shampoo, , Disp: , Rfl:     lancets Misc, One touch lancets use once daily as directed, Disp: 100 each, Rfl: 0    levothyroxine (SYNTHROID) 25 MCG tablet, take 1 tablet by mouth every morning ON AN EMPTY STOMACH, Disp: 90 tablet, Rfl: 3    niacin (NIASPAN) 1000 MG CR tablet, Take 1 tablet (1,000 mg total) by mouth every evening., Disp: 90 tablet, Rfl: 3    pioglitazone (ACTOS) 30 MG tablet, Take 1 tablet (30 mg total) by mouth once daily., Disp: 90 tablet, Rfl: 3    predniSONE (DELTASONE) 5 MG tablet, Take 10 mg daily x 1 week, then take 5mg daily x 1 week, Disp: 21 tablet, Rfl: 2    scopolamine  (TRANSDERM-SCOP) 1.3-1.5 mg (1 mg over 3 days), Place 1 patch onto the skin every 72 hours., Disp: 30 patch, Rfl: 1    SITagliptin (JANUVIA) 100 MG Tab, Take 1 tablet (100 mg total) by mouth once daily., Disp: 90 tablet, Rfl: 1    [START ON 1/7/2019] colchicine (COLCRYS) 0.6 mg tablet, Take 1 tablet (0.6 mg total) by mouth twice a week., Disp: 30 tablet, Rfl: 6    ONETOUCH VERIO IQ METER kit, Use as instructed, Disp: 1 each, Rfl: 0  No current facility-administered medications for this visit.        Objective:         Vitals:    01/04/19 1111   BP: (!) 188/69   Pulse: 87     Physical Exam   Nursing note and vitals reviewed.  Constitutional: He is oriented to person, place, and time and well-developed, well-nourished, and in no distress.   HENT:   Head: Normocephalic and atraumatic.   Eyes: Conjunctivae and EOM are normal. Pupils are equal, round, and reactive to light.   Neck: Normal range of motion. Neck supple.   Cardiovascular: Normal rate and regular rhythm.    Pulmonary/Chest: Effort normal and breath sounds normal.   Abdominal: Soft. Bowel sounds are normal. He exhibits no distension. There is no tenderness. There is no rebound.   Neurological: He is alert and oriented to person, place, and time.   Skin: Skin is warm and dry.     Psychiatric: Affect and judgment normal.   Musculoskeletal:   +right midfoot warmth, swelling and erythema, tender to touch, full rom active and passive, skin intact, no lesions  No synovitis in small joints hands and feet  No effusions over large joints    No tophi/nodules         Reviewed old and all outside pertinent medical records available.    All lab results personally reviewed and interpreted by me.  Lab Results   Component Value Date    WBC 9.95 11/12/2018    HGB 14.7 11/12/2018    HCT 45.3 11/12/2018    MCV 91 11/12/2018    MCH 29.5 11/12/2018    MCHC 32.5 11/12/2018    RDW 14.6 (H) 11/12/2018     11/12/2018    MPV 10.8 11/12/2018       Lab Results   Component  Value Date     12/05/2018    K 3.3 (L) 12/05/2018     12/05/2018    CO2 27 12/05/2018     (H) 12/05/2018    BUN 17 12/05/2018    CALCIUM 9.5 12/05/2018    PROT 7.2 07/03/2018    ALBUMIN 3.4 (L) 07/03/2018    BILITOT 1.5 (H) 07/03/2018    AST 12 07/03/2018    ALKPHOS 53 (L) 07/03/2018    ALT 11 07/03/2018       Lab Results   Component Value Date    COLORU Red (A) 03/23/2016    APPEARANCEUA Cloudy (A) 03/23/2016    SPECGRAV 1.025 03/23/2016    PHUR 6.0 03/23/2016    PROTEINUA Trace (A) 03/23/2016    KETONESU Negative 03/23/2016    LEUKOCYTESUR Negative 03/23/2016    NITRITE Negative 03/23/2016    UROBILINOGEN neg 12/16/2015       Lab Results   Component Value Date    CRP 17.6 (H) 10/29/2018       Lab Results   Component Value Date    SEDRATE 26 (H) 10/29/2018       Lab Results   Component Value Date    FRANDY Negative 06/11/2004    RF <10.0 11/12/2018    SEDRATE 26 (H) 10/29/2018       No components found for: 25OHVITDTOT, 50NJUWRA1, 72GTMQSD2, METHODNOTE    Lab Results   Component Value Date    URICACID 8.3 (H) 11/12/2018       No components found for: TSPOTTB    Uric acid 8.3    Imaging:  All imaging reviewed and independently  interpreted by me.    XR Hand 11/18  FINDINGS:  Right hand: Bones are demineralized.  There are stable multi articular degenerative changes noted.  No focal osseous erosions.  No fracture or dislocation.  Chronic appearing deformity at the base of the 5th metacarpal.  Soft tissues are within normal limits.    Left hand: Bones are demineralized.  There are occasions within the soft tissues along the ulnar aspect of the 3rd PIP joint.  No acute fracture or dislocation.  Soft tissues are unremarkable.      Impression       As above     XR Knee 11/18  FINDINGS:  Juxta-articular osteopenia and bilateral tricompartment degenerative changes similar to prior exam.  There is a increased narrowing of the medial compartments, greater on the left.  Bilateral vascular calcifications.   There is prominence of the inferior pole both patella, greater on the right.  Spurring at the anterior tibial tuberosities bilaterally.  No fracture or dislocation.  No significant effusion.      Impression       As above.     XR Elbow 11/18  Impression       Bilateral degenerative changes without acute fracture or dislocation.    Prominent osteophyte formation bilaterally along the posterior olecranon at the triceps tendon insertion.    Findings most consistent with right olecranon bursitis.     XR Hand 11/18  FINDINGS:  Right hand: Bones are demineralized.  There are stable multi articular degenerative changes noted.  No focal osseous erosions.  No fracture or dislocation.  Chronic appearing deformity at the base of the 5th metacarpal.  Soft tissues are within normal limits.    Left hand: Bones are demineralized.  There are occasions within the soft tissues along the ulnar aspect of the 3rd PIP joint.  No acute fracture or dislocation.  Soft tissues are unremarkable.      Impression       As above              ASSESSMENT / PLAN:     Rudy Saldaña is a 78 y.o. White male with:      1. Crystal arthritis  -FRANDY /RF negative ; uric acid 8.3 ; No hx diagnostic arthrocentesis   -Left midfoot pain/swelling resolved since last visit w/ celestone injection (did not complete prednisone taper as previously discussed) however presents today w/ acute flare in right midfoot ; tx and management complicated by hx DM and CKD (GFR 30)  -Re-start prednisone taper x 14 days; celestone injection given today  and close monitoring of blood glucose given pts hx of DM  -repeat labs today to assess for renal function, pt reports gfr >60 per outside labs and recent appt w nephrologist? Likely start urate lowering therapy after reviewing renal function  -advised against nsaids 2.2 GI/ CVS / renal risks        . Other specified counseling  - Immunization counseling done  - Weight loss counseling done  - Nutrition and exercise counseling  -  Medication counseling provided      F/u 4 weeks    Method of contact with patient concerns: [facility]      Romelia Rodriguez M.D.  Rheumatology Department   [facility] [place] [address], [place] [address]  Phone: [phone]  Fax: [phone]

## 2019-01-04 NOTE — LETTER
January 4, 2019      [facility] - Rheumatology  [address] [facility] [place] [address]  Phone: [phone]  Fax: [phone]       Patient: Rudy Conner Saldaña   YOB: 1940  Date of Visit: 01/04/2019    To Whom It May Concern:    Mr. Grant has been under my medical care and being treated for a medical condition currently. He is not medically stable to go on his previously planned cruise/ trip and I would advise against it at this time.    If you have any questions or concerns, or if I can be of further assistance, please do not hesitate to contact me.    Sincerely,    Dr. Michael M.D.

## 2019-01-05 ENCOUNTER — NURSE TRIAGE (OUTPATIENT)
Dept: ADMINISTRATIVE | Facility: CLINIC | Age: 79
End: 2019-01-05

## 2019-01-05 NOTE — TELEPHONE ENCOUNTER
Had an MD appt on yesterday and was suppose to have lab work done on yesterday. Orders noted in chart. Renal function Panel and Uric acid were ordered. Caller states she called and was told that orders were not in chart. Was told labs were needed before starting colchicine. Advised to go to lab and call back if more information is needed. Orders are noted in chart.    Reason for Disposition   [1] Follow-up call to recent contact AND [2] information only call, no triage required    Protocols used: ST INFORMATION ONLY CALL-A-

## 2019-01-07 ENCOUNTER — LAB VISIT (OUTPATIENT)
Dept: LAB | Facility: HOSPITAL | Age: 79
End: 2019-01-07
Attending: STUDENT IN AN ORGANIZED HEALTH CARE EDUCATION/TRAINING PROGRAM
Payer: MEDICARE

## 2019-01-07 DIAGNOSIS — M11.9 CRYSTAL ARTHROPATHY: ICD-10-CM

## 2019-01-07 LAB
ALBUMIN SERPL BCP-MCNC: 3.3 G/DL
ANION GAP SERPL CALC-SCNC: 9 MMOL/L
BUN SERPL-MCNC: 25 MG/DL
CALCIUM SERPL-MCNC: 9.8 MG/DL
CHLORIDE SERPL-SCNC: 106 MMOL/L
CO2 SERPL-SCNC: 27 MMOL/L
CREAT SERPL-MCNC: 1.1 MG/DL
EST. GFR  (AFRICAN AMERICAN): >60 ML/MIN/1.73 M^2
EST. GFR  (NON AFRICAN AMERICAN): >60 ML/MIN/1.73 M^2
GLUCOSE SERPL-MCNC: 176 MG/DL
PHOSPHATE SERPL-MCNC: 2.3 MG/DL
POTASSIUM SERPL-SCNC: 4.3 MMOL/L
SODIUM SERPL-SCNC: 142 MMOL/L
URATE SERPL-MCNC: 6.9 MG/DL

## 2019-01-07 PROCEDURE — 36415 COLL VENOUS BLD VENIPUNCTURE: CPT | Mod: PO

## 2019-01-07 PROCEDURE — 84550 ASSAY OF BLOOD/URIC ACID: CPT

## 2019-01-07 PROCEDURE — 80069 RENAL FUNCTION PANEL: CPT

## 2019-01-08 ENCOUNTER — PATIENT MESSAGE (OUTPATIENT)
Dept: RHEUMATOLOGY | Facility: CLINIC | Age: 79
End: 2019-01-08

## 2019-01-09 ENCOUNTER — PATIENT MESSAGE (OUTPATIENT)
Dept: FAMILY MEDICINE | Facility: CLINIC | Age: 79
End: 2019-01-09

## 2019-01-09 ENCOUNTER — TELEPHONE (OUTPATIENT)
Dept: RHEUMATOLOGY | Facility: CLINIC | Age: 79
End: 2019-01-09

## 2019-01-09 RX ORDER — LANCETS
EACH MISCELLANEOUS
Qty: 100 EACH | Refills: 11 | Status: SHIPPED | OUTPATIENT
Start: 2019-01-09 | End: 2019-01-21 | Stop reason: ALTCHOICE

## 2019-01-09 RX ORDER — COLCHICINE 0.6 MG/1
0.6 TABLET ORAL DAILY
Qty: 30 TABLET | Refills: 11 | Status: SHIPPED | OUTPATIENT
Start: 2019-01-09 | End: 2019-01-21

## 2019-01-09 NOTE — TELEPHONE ENCOUNTER
Returned pts request to call him. Still w/ gout flare and joint pain. Increase prednisone to 30mg daily. Start colchicine daily. Labs reviewed, cr/gfr normalized and acceptable. Pt to message and notify on Friday and will give further instructions on prednisone tapering at that time. Pt also agreed to closely monitor blood sugars at least several times a day as prednisone can increase blood glucose. Is on insulin. Pt is to notify PCP if bg increases and to adjust insulin as per PCP.

## 2019-01-09 NOTE — TELEPHONE ENCOUNTER
Called pt back stated he still can not put weight on right foot. Taking Prednisone and does not seem to be helping, please advise.

## 2019-01-15 ENCOUNTER — PATIENT MESSAGE (OUTPATIENT)
Dept: FAMILY MEDICINE | Facility: CLINIC | Age: 79
End: 2019-01-15

## 2019-01-17 ENCOUNTER — PATIENT MESSAGE (OUTPATIENT)
Dept: RHEUMATOLOGY | Facility: CLINIC | Age: 79
End: 2019-01-17

## 2019-01-17 RX ORDER — LANCETS
EACH MISCELLANEOUS
Qty: 300 EACH | Refills: 11 | Status: CANCELLED | OUTPATIENT
Start: 2019-01-17

## 2019-01-17 NOTE — TELEPHONE ENCOUNTER
Called pt about appt.  Advise pt Dr. ANAYA is not here on Tuesday pt cancelled the appt for the 18th but will keep the appt for the 24th.

## 2019-01-17 NOTE — TELEPHONE ENCOUNTER
Pt needs prescription for one touch ultra 2 strips and lancets. He is testing before meals and at bedtime at present due to being on slow release insulin. He needs prescription to give to [facility] for these supplies.

## 2019-01-21 ENCOUNTER — OFFICE VISIT (OUTPATIENT)
Dept: FAMILY MEDICINE | Facility: CLINIC | Age: 79
End: 2019-01-21
Payer: MEDICARE

## 2019-01-21 ENCOUNTER — PATIENT MESSAGE (OUTPATIENT)
Dept: FAMILY MEDICINE | Facility: CLINIC | Age: 79
End: 2019-01-21

## 2019-01-21 VITALS
WEIGHT: 221 LBS | TEMPERATURE: 98 F | DIASTOLIC BLOOD PRESSURE: 64 MMHG | BODY MASS INDEX: 29.93 KG/M2 | HEART RATE: 74 BPM | SYSTOLIC BLOOD PRESSURE: 126 MMHG | HEIGHT: 72 IN

## 2019-01-21 DIAGNOSIS — M10.9 ACUTE GOUT OF RIGHT FOOT, UNSPECIFIED CAUSE: Primary | ICD-10-CM

## 2019-01-21 DIAGNOSIS — Z79.52 CURRENT USE OF STEROID MEDICATION: ICD-10-CM

## 2019-01-21 PROCEDURE — 99999 PR PBB SHADOW E&M-EST. PATIENT-LVL III: ICD-10-PCS | Mod: PBBFAC,,, | Performed by: NURSE PRACTITIONER

## 2019-01-21 PROCEDURE — 1101F PT FALLS ASSESS-DOCD LE1/YR: CPT | Mod: CPTII,S$GLB,, | Performed by: NURSE PRACTITIONER

## 2019-01-21 PROCEDURE — 1101F PR PT FALLS ASSESS DOC 0-1 FALLS W/OUT INJ PAST YR: ICD-10-PCS | Mod: CPTII,S$GLB,, | Performed by: NURSE PRACTITIONER

## 2019-01-21 PROCEDURE — 3074F SYST BP LT 130 MM HG: CPT | Mod: CPTII,S$GLB,, | Performed by: NURSE PRACTITIONER

## 2019-01-21 PROCEDURE — 99999 PR PBB SHADOW E&M-EST. PATIENT-LVL III: CPT | Mod: PBBFAC,,, | Performed by: NURSE PRACTITIONER

## 2019-01-21 PROCEDURE — 3074F PR MOST RECENT SYSTOLIC BLOOD PRESSURE < 130 MM HG: ICD-10-PCS | Mod: CPTII,S$GLB,, | Performed by: NURSE PRACTITIONER

## 2019-01-21 PROCEDURE — 99214 OFFICE O/P EST MOD 30 MIN: CPT | Mod: S$GLB,,, | Performed by: NURSE PRACTITIONER

## 2019-01-21 PROCEDURE — 3078F DIAST BP <80 MM HG: CPT | Mod: CPTII,S$GLB,, | Performed by: NURSE PRACTITIONER

## 2019-01-21 PROCEDURE — 99214 PR OFFICE/OUTPT VISIT, EST, LEVL IV, 30-39 MIN: ICD-10-PCS | Mod: S$GLB,,, | Performed by: NURSE PRACTITIONER

## 2019-01-21 PROCEDURE — 3078F PR MOST RECENT DIASTOLIC BLOOD PRESSURE < 80 MM HG: ICD-10-PCS | Mod: CPTII,S$GLB,, | Performed by: NURSE PRACTITIONER

## 2019-01-21 RX ORDER — COLCHICINE 0.6 MG/1
0.6 TABLET ORAL DAILY
Qty: 6 TABLET | Refills: 1 | Status: SHIPPED | OUTPATIENT
Start: 2019-01-21 | End: 2019-02-19

## 2019-01-21 RX ORDER — LANCETS 33 GAUGE
1 EACH MISCELLANEOUS DAILY
Qty: 100 EACH | Refills: 5 | Status: SHIPPED | OUTPATIENT
Start: 2019-01-21

## 2019-01-21 NOTE — PROGRESS NOTES
Subjective:       Patient ID: Rudy Saldaña is a 78 y.o. male.    Chief Complaint: Foot Pain and Gout    Foot Injury    The incident occurred more than 1 week ago. There was no injury mechanism. The pain is present in the right foot. The quality of the pain is described as aching. The pain has been constant since onset. Associated symptoms include an inability to bear weight. Associated symptoms comments: Redness, edema. The symptoms are aggravated by weight bearing. He has tried acetaminophen and NSAIDs (prednisone) for the symptoms. The treatment provided no relief.     He is requesting a new prescription for diabetic test strips, he has a 1 touch ultra 2 meter. He has been monitoring his glucose 3 times daily while on prednisone because of elevated readings.  Lancets were sent to the local pharmacy but for the wrong device, he has a true + lancet device and would like these also sent to [facility] mail in     Review of Systems   Constitutional: Negative for fatigue, fever and unexpected weight change.   HENT: Negative for ear pain and sore throat.    Eyes: Negative.  Negative for pain and visual disturbance.   Respiratory: Negative for cough and shortness of breath.    Cardiovascular: Negative for chest pain and palpitations.   Gastrointestinal: Negative for abdominal pain, diarrhea, nausea and vomiting.   Genitourinary: Negative for dysuria and frequency.   Musculoskeletal: Positive for arthralgias, gait problem and joint swelling. Negative for myalgias.   Skin: Negative for color change and rash.   Neurological: Negative for dizziness and headaches.   Psychiatric/Behavioral: Negative for sleep disturbance. The patient is not nervous/anxious.        Vitals:    01/21/19 1534   BP: 126/64   Pulse: 74   Temp: 97.8 °F (36.6 °C)       Objective:     Current Outpatient Medications   Medication Sig Dispense Refill    acarbose (PRECOSE) 100 MG Tab Take 1 tablet (100 mg total) by mouth 3 (three) times daily with meals.  270 tablet 1    amLODIPine (NORVASC) 5 MG tablet TAKE 1 TABLET(5 MG) BY MOUTH EVERY DAY 90 tablet 1    aspirin (ECOTRIN) 81 MG EC tablet Take 81 mg by mouth once daily.      atorvastatin (LIPITOR) 20 MG tablet Take 1 tablet (20 mg total) by mouth once daily. 90 tablet 3    benazepril (LOTENSIN) 40 MG tablet Take 1 tablet (40 mg total) by mouth once daily. 90 tablet 3    clopidogrel (PLAVIX) 75 mg tablet Take 1 tablet (75 mg total) by mouth once daily. 90 tablet 3    ergocalciferol (ERGOCALCIFEROL) 50,000 unit Cap Take 1 capsule weekly 24 capsule 3    ferrous sulfate 325 mg (65 mg iron) Tab tablet Take 1 tablet (325 mg total) by mouth 3 (three) times daily. 270 tablet 3    galantamine (RAZADYNE) 8 MG Tab Take 1 tablet (8 mg total) by mouth 2 (two) times daily with meals. 180 tablet 3    glipiZIDE (GLUCOTROL) 10 MG tablet Take 1 tablet (10 mg total) by mouth 2 (two) times daily before meals. 180 tablet 1    ketoconazole (NIZORAL) 2 % shampoo       levothyroxine (SYNTHROID) 25 MCG tablet take 1 tablet by mouth every morning ON AN EMPTY STOMACH 90 tablet 3    niacin (NIASPAN) 1000 MG CR tablet Take 1 tablet (1,000 mg total) by mouth every evening. 90 tablet 3    ONETOUCH VERIO IQ METER kit Use as instructed 1 each 0    pioglitazone (ACTOS) 30 MG tablet Take 1 tablet (30 mg total) by mouth once daily. 90 tablet 3    predniSONE (DELTASONE) 5 MG tablet Take 10 mg daily x 1 week, then take 5mg daily x 1 week 21 tablet 2    SITagliptin (JANUVIA) 100 MG Tab Take 1 tablet (100 mg total) by mouth once daily. 90 tablet 1    blood sugar diagnostic Strp 1 each by Misc.(Non-Drug; Combo Route) route 3 (three) times daily as needed. One touch ultra 2 test strips 100 each 5    colchicine (COLCRYS) 0.6 mg tablet Take 1 tablet (0.6 mg total) by mouth once daily. 6 tablet 1    lancets (TRUEPLUS LANCETS) 33 gauge Misc 1 lancet by Misc.(Non-Drug; Combo Route) route once daily. 100 each 5    scopolamine (TRANSDERM-SCOP)  1.3-1.5 mg (1 mg over 3 days) Place 1 patch onto the skin every 72 hours. 30 patch 1     No current facility-administered medications for this visit.        Physical Exam   Constitutional: He is oriented to person, place, and time. He appears well-developed. No distress.   HENT:   Head: Normocephalic and atraumatic.   Eyes: EOM are normal. Pupils are equal, round, and reactive to light.   Neck: Normal range of motion. Neck supple.   Cardiovascular: Normal rate and regular rhythm.   Pulses:       Dorsalis pedis pulses are 2+ on the right side, and 2+ on the left side.   Pulmonary/Chest: Effort normal and breath sounds normal.   Musculoskeletal: Normal range of motion.        Left foot: There is tenderness and swelling. There is normal range of motion and normal capillary refill.   Neurological: He is alert and oriented to person, place, and time.   Skin: Skin is warm and dry. No rash noted.   Psychiatric: He has a normal mood and affect. Thought content normal.   Nursing note and vitals reviewed.      Assessment:       1. Acute gout of right foot, unspecified cause    2. Type 2 diabetes mellitus, uncontrolled, with renal complications    3. Current use of steroid medication        Plan:   Acute gout of right foot, unspecified cause    Type 2 diabetes mellitus, uncontrolled, with renal complications  -     blood sugar diagnostic Strp; 1 each by Misc.(Non-Drug; Combo Route) route 3 (three) times daily as needed. One touch ultra 2 test strips  Dispense: 100 each; Refill: 5  -     lancets (TRUEPLUS LANCETS) 33 gauge Misc; 1 lancet by Misc.(Non-Drug; Combo Route) route once daily.  Dispense: 100 each; Refill: 5    Current use of steroid medication  -     blood sugar diagnostic Strp; 1 each by Misc.(Non-Drug; Combo Route) route 3 (three) times daily as needed. One touch ultra 2 test strips  Dispense: 100 each; Refill: 5  -     lancets (TRUEPLUS LANCETS) 33 gauge Misc; 1 lancet by Misc.(Non-Drug; Combo Route) route once  daily.  Dispense: 100 each; Refill: 5    Other orders  -     colchicine (COLCRYS) 0.6 mg tablet; Take 1 tablet (0.6 mg total) by mouth once daily.  Dispense: 6 tablet; Refill: 1        Follow-up if symptoms worsen or fail to improve.    There are no Patient Instructions on file for this visit.

## 2019-01-24 ENCOUNTER — HOSPITAL ENCOUNTER (OUTPATIENT)
Dept: RADIOLOGY | Facility: HOSPITAL | Age: 79
Discharge: HOME | End: 2019-01-24
Attending: STUDENT IN AN ORGANIZED HEALTH CARE EDUCATION/TRAINING PROGRAM
Payer: MEDICARE

## 2019-01-24 ENCOUNTER — OFFICE VISIT (OUTPATIENT)
Dept: RHEUMATOLOGY | Facility: CLINIC | Age: 79
End: 2019-01-24
Payer: MEDICARE

## 2019-01-24 VITALS
SYSTOLIC BLOOD PRESSURE: 150 MMHG | DIASTOLIC BLOOD PRESSURE: 72 MMHG | HEART RATE: 68 BPM | BODY MASS INDEX: 28.17 KG/M2 | WEIGHT: 207.69 LBS

## 2019-01-24 DIAGNOSIS — M79.605 PAIN IN BOTH LOWER EXTREMITIES: ICD-10-CM

## 2019-01-24 DIAGNOSIS — M79.605 PAIN IN BOTH LOWER EXTREMITIES: Primary | ICD-10-CM

## 2019-01-24 DIAGNOSIS — M11.9 CRYSTAL ARTHRITIS: ICD-10-CM

## 2019-01-24 DIAGNOSIS — M79.604 PAIN IN BOTH LOWER EXTREMITIES: Primary | ICD-10-CM

## 2019-01-24 DIAGNOSIS — M79.604 PAIN IN BOTH LOWER EXTREMITIES: ICD-10-CM

## 2019-01-24 PROCEDURE — 3078F DIAST BP <80 MM HG: CPT | Mod: CPTII,S$GLB,, | Performed by: STUDENT IN AN ORGANIZED HEALTH CARE EDUCATION/TRAINING PROGRAM

## 2019-01-24 PROCEDURE — 3077F PR MOST RECENT SYSTOLIC BLOOD PRESSURE >= 140 MM HG: ICD-10-PCS | Mod: CPTII,S$GLB,, | Performed by: STUDENT IN AN ORGANIZED HEALTH CARE EDUCATION/TRAINING PROGRAM

## 2019-01-24 PROCEDURE — 73610 XR ANKLE COMPLETE 3 VIEW BILATERAL: ICD-10-PCS | Mod: 26,50,, | Performed by: RADIOLOGY

## 2019-01-24 PROCEDURE — 1101F PT FALLS ASSESS-DOCD LE1/YR: CPT | Mod: CPTII,S$GLB,, | Performed by: STUDENT IN AN ORGANIZED HEALTH CARE EDUCATION/TRAINING PROGRAM

## 2019-01-24 PROCEDURE — 99213 PR OFFICE/OUTPT VISIT, EST, LEVL III, 20-29 MIN: ICD-10-PCS | Mod: S$GLB,,, | Performed by: STUDENT IN AN ORGANIZED HEALTH CARE EDUCATION/TRAINING PROGRAM

## 2019-01-24 PROCEDURE — 87070 CULTURE OTHR SPECIMN AEROBIC: CPT

## 2019-01-24 PROCEDURE — 73610 X-RAY EXAM OF ANKLE: CPT | Mod: 50,TC

## 2019-01-24 PROCEDURE — 99999 PR PBB SHADOW E&M-EST. PATIENT-LVL IV: ICD-10-PCS | Mod: PBBFAC,,, | Performed by: STUDENT IN AN ORGANIZED HEALTH CARE EDUCATION/TRAINING PROGRAM

## 2019-01-24 PROCEDURE — 73130 X-RAY EXAM OF HAND: CPT | Mod: 26,50,, | Performed by: RADIOLOGY

## 2019-01-24 PROCEDURE — 99999 PR PBB SHADOW E&M-EST. PATIENT-LVL IV: CPT | Mod: PBBFAC,,, | Performed by: STUDENT IN AN ORGANIZED HEALTH CARE EDUCATION/TRAINING PROGRAM

## 2019-01-24 PROCEDURE — 87205 SMEAR GRAM STAIN: CPT

## 2019-01-24 PROCEDURE — 1101F PR PT FALLS ASSESS DOC 0-1 FALLS W/OUT INJ PAST YR: ICD-10-PCS | Mod: CPTII,S$GLB,, | Performed by: STUDENT IN AN ORGANIZED HEALTH CARE EDUCATION/TRAINING PROGRAM

## 2019-01-24 PROCEDURE — 73610 X-RAY EXAM OF ANKLE: CPT | Mod: 26,50,, | Performed by: RADIOLOGY

## 2019-01-24 PROCEDURE — 3078F PR MOST RECENT DIASTOLIC BLOOD PRESSURE < 80 MM HG: ICD-10-PCS | Mod: CPTII,S$GLB,, | Performed by: STUDENT IN AN ORGANIZED HEALTH CARE EDUCATION/TRAINING PROGRAM

## 2019-01-24 PROCEDURE — 73130 XR HAND COMPLETE 3 VIEWS BILATERAL: ICD-10-PCS | Mod: 26,50,, | Performed by: RADIOLOGY

## 2019-01-24 PROCEDURE — 93970 EXTREMITY STUDY: CPT | Mod: 26,,, | Performed by: RADIOLOGY

## 2019-01-24 PROCEDURE — 93970 EXTREMITY STUDY: CPT | Mod: TC

## 2019-01-24 PROCEDURE — 99213 OFFICE O/P EST LOW 20 MIN: CPT | Mod: S$GLB,,, | Performed by: STUDENT IN AN ORGANIZED HEALTH CARE EDUCATION/TRAINING PROGRAM

## 2019-01-24 PROCEDURE — 3077F SYST BP >= 140 MM HG: CPT | Mod: CPTII,S$GLB,, | Performed by: STUDENT IN AN ORGANIZED HEALTH CARE EDUCATION/TRAINING PROGRAM

## 2019-01-24 PROCEDURE — 73130 X-RAY EXAM OF HAND: CPT | Mod: 50,TC

## 2019-01-24 PROCEDURE — 93970 US LOWER EXTREMITY VEINS BILATERAL: ICD-10-PCS | Mod: 26,,, | Performed by: RADIOLOGY

## 2019-01-24 RX ORDER — COLCHICINE 0.6 MG/1
0.6 CAPSULE ORAL DAILY
Qty: 90 CAPSULE | Refills: 2 | Status: SHIPPED | OUTPATIENT
Start: 2019-01-24 | End: 2019-02-19

## 2019-01-24 RX ORDER — COLCHICINE 0.6 MG/1
0.6 CAPSULE ORAL DAILY
Qty: 30 CAPSULE | Refills: 5 | Status: SHIPPED | OUTPATIENT
Start: 2019-01-24 | End: 2019-02-19

## 2019-01-29 LAB
BACTERIA FLD AEROBE CULT: NO GROWTH
GRAM STN SPEC: NORMAL
GRAM STN SPEC: NORMAL

## 2019-01-31 DIAGNOSIS — N18.30 CONTROLLED TYPE 2 DIABETES MELLITUS WITH STAGE 3 CHRONIC KIDNEY DISEASE, WITHOUT LONG-TERM CURRENT USE OF INSULIN: ICD-10-CM

## 2019-01-31 DIAGNOSIS — E11.22 CONTROLLED TYPE 2 DIABETES MELLITUS WITH STAGE 3 CHRONIC KIDNEY DISEASE, WITHOUT LONG-TERM CURRENT USE OF INSULIN: ICD-10-CM

## 2019-02-01 RX ORDER — GLIPIZIDE 10 MG/1
TABLET ORAL
Qty: 180 TABLET | Refills: 0 | Status: SHIPPED | OUTPATIENT
Start: 2019-02-01 | End: 2019-03-21 | Stop reason: SDUPTHER

## 2019-02-06 DIAGNOSIS — E11.22 CONTROLLED TYPE 2 DIABETES MELLITUS WITH STAGE 3 CHRONIC KIDNEY DISEASE, WITHOUT LONG-TERM CURRENT USE OF INSULIN: ICD-10-CM

## 2019-02-06 DIAGNOSIS — N18.30 CONTROLLED TYPE 2 DIABETES MELLITUS WITH STAGE 3 CHRONIC KIDNEY DISEASE, WITHOUT LONG-TERM CURRENT USE OF INSULIN: ICD-10-CM

## 2019-02-06 RX ORDER — SITAGLIPTIN 100 MG/1
TABLET, FILM COATED ORAL
Qty: 90 TABLET | Refills: 0 | Status: SHIPPED | OUTPATIENT
Start: 2019-02-06 | End: 2019-03-21 | Stop reason: SDUPTHER

## 2019-02-06 RX ORDER — ACARBOSE 100 MG/1
TABLET ORAL
Qty: 270 TABLET | Refills: 0 | Status: SHIPPED | OUTPATIENT
Start: 2019-02-06 | End: 2019-05-04 | Stop reason: SDUPTHER

## 2019-02-07 ENCOUNTER — OFFICE VISIT (OUTPATIENT)
Dept: RHEUMATOLOGY | Facility: CLINIC | Age: 79
End: 2019-02-07
Payer: MEDICARE

## 2019-02-07 VITALS
SYSTOLIC BLOOD PRESSURE: 139 MMHG | DIASTOLIC BLOOD PRESSURE: 69 MMHG | WEIGHT: 211.19 LBS | BODY MASS INDEX: 28.6 KG/M2 | HEART RATE: 62 BPM | HEIGHT: 72 IN

## 2019-02-07 DIAGNOSIS — M11.9 CRYSTAL ARTHRITIS: Primary | ICD-10-CM

## 2019-02-07 PROCEDURE — 99999 PR PBB SHADOW E&M-EST. PATIENT-LVL III: ICD-10-PCS | Mod: PBBFAC,,, | Performed by: STUDENT IN AN ORGANIZED HEALTH CARE EDUCATION/TRAINING PROGRAM

## 2019-02-07 PROCEDURE — 3078F PR MOST RECENT DIASTOLIC BLOOD PRESSURE < 80 MM HG: ICD-10-PCS | Mod: CPTII,S$GLB,, | Performed by: STUDENT IN AN ORGANIZED HEALTH CARE EDUCATION/TRAINING PROGRAM

## 2019-02-07 PROCEDURE — 1101F PT FALLS ASSESS-DOCD LE1/YR: CPT | Mod: CPTII,S$GLB,, | Performed by: STUDENT IN AN ORGANIZED HEALTH CARE EDUCATION/TRAINING PROGRAM

## 2019-02-07 PROCEDURE — 3075F SYST BP GE 130 - 139MM HG: CPT | Mod: CPTII,S$GLB,, | Performed by: STUDENT IN AN ORGANIZED HEALTH CARE EDUCATION/TRAINING PROGRAM

## 2019-02-07 PROCEDURE — 99999 PR PBB SHADOW E&M-EST. PATIENT-LVL III: CPT | Mod: PBBFAC,,, | Performed by: STUDENT IN AN ORGANIZED HEALTH CARE EDUCATION/TRAINING PROGRAM

## 2019-02-07 PROCEDURE — 99213 OFFICE O/P EST LOW 20 MIN: CPT | Mod: S$GLB,,, | Performed by: STUDENT IN AN ORGANIZED HEALTH CARE EDUCATION/TRAINING PROGRAM

## 2019-02-07 PROCEDURE — 1101F PR PT FALLS ASSESS DOC 0-1 FALLS W/OUT INJ PAST YR: ICD-10-PCS | Mod: CPTII,S$GLB,, | Performed by: STUDENT IN AN ORGANIZED HEALTH CARE EDUCATION/TRAINING PROGRAM

## 2019-02-07 PROCEDURE — 3075F PR MOST RECENT SYSTOLIC BLOOD PRESS GE 130-139MM HG: ICD-10-PCS | Mod: CPTII,S$GLB,, | Performed by: STUDENT IN AN ORGANIZED HEALTH CARE EDUCATION/TRAINING PROGRAM

## 2019-02-07 PROCEDURE — 99213 PR OFFICE/OUTPT VISIT, EST, LEVL III, 20-29 MIN: ICD-10-PCS | Mod: S$GLB,,, | Performed by: STUDENT IN AN ORGANIZED HEALTH CARE EDUCATION/TRAINING PROGRAM

## 2019-02-07 PROCEDURE — 3078F DIAST BP <80 MM HG: CPT | Mod: CPTII,S$GLB,, | Performed by: STUDENT IN AN ORGANIZED HEALTH CARE EDUCATION/TRAINING PROGRAM

## 2019-02-07 RX ORDER — COLCHICINE 0.6 MG/1
1 CAPSULE ORAL DAILY
Qty: 90 CAPSULE | Refills: 3 | Status: SHIPPED | OUTPATIENT
Start: 2019-02-07 | End: 2019-02-19

## 2019-02-11 ENCOUNTER — PATIENT MESSAGE (OUTPATIENT)
Dept: RHEUMATOLOGY | Facility: CLINIC | Age: 79
End: 2019-02-11

## 2019-02-13 ENCOUNTER — TELEPHONE (OUTPATIENT)
Dept: RHEUMATOLOGY | Facility: CLINIC | Age: 79
End: 2019-02-13

## 2019-02-18 DIAGNOSIS — M11.9 CRYSTAL ARTHRITIS: Primary | ICD-10-CM

## 2019-02-18 NOTE — PROGRESS NOTES
RHEUMATOLOGY OUTPATIENT CLINIC NOTE  Attending Rheumatologist: Romelia Rodriguez  Primary Care Provider: Mohinder Morfin MD   Physician Requesting Consultation: No referring provider defined for this encounter.  Chief Complaint/Reason For Consultation:  Flare in right mid foot    Subjective:       HPI  HISTORICAL:  Rudy Saldaña is a 78 y.o. White male presents w recent onset acute wrist swelling in bilateral wrists , left midfoot, and right elbow. States he would go to sleep in his usual state of health, and woke up one morning with midfoot and wrist swollen. No prior hx of gout /pseudogout. Has just completed course of indomethacin per pcp. Recently started on allopurinol and colchicine (100mg daily+ colchicine .6mg daily). Completed several medrol dose packs but would have acute worsening of joint pains/swellings once he ran out.   No hx renal stones. +Hx DM and CKD. Uric acid elevated. 8.3    Today:   Since last visit, pt presents w/ diffsely swollen right foot. Previous visit pw acute flare in right midfoot. Has been on prednisone taper since and reports significant improvement in pain, however now w diffuse foot swelling x several days. No significant associated pain or worsening pain. No further flares. Is now on insulin for DM while on prednisone. No hx trauma/falls. No calf pain, chest pain, palpitations, or sob. No previous hx blood clots.  No fevers, chills, weightloss. No other acute joint complaints.     Review of Systems   Constitutional: Negative for chills, fever and weight loss.   HENT: Negative for congestion, ear pain, hearing loss, sinus pain, sore throat and tinnitus.    Eyes: Negative for blurred vision, double vision and photophobia.   Respiratory: Negative for cough, sputum production and shortness of breath.    Cardiovascular: Negative for chest pain and palpitations.   Gastrointestinal: Negative for heartburn, nausea and vomiting.   Genitourinary: Negative for dysuria and frequency.    Musculoskeletal: Positive for joint pain. Negative for back pain, myalgias and neck pain.   Skin: Negative for itching and rash.   Neurological: Negative for dizziness, tingling, focal weakness and headaches.   Psychiatric/Behavioral: Negative for depression and suicidal ideas.   All other systems reviewed and are negative.      Chronic comorbid conditions affecting medical decision making today:  Past Medical History:   Diagnosis Date    Anemia     BPH (benign prostatic hypertrophy)     Carotid bruit     CVA (cerebral vascular accident)     Diabetes mellitus, type 2     Hyperlipidemia     Hypertension     Hypothyroidism 2/29/2016    Type 2 diabetes mellitus, uncontrolled, with renal complications     The patient presents with diabetes.  The patient denies polyuria, polydipsia, polyphagia, hypoglycemia and paresthesias.  The patient's glucose control has been good.  Home glucose averages are routinely checked.  The patient is without retinopathy currently.  The patient has no history of neuropathy.  The patient currently complains of no podiatric problems.  The patient has excellent compliance.    Type II or unspecified type diabetes mellitus without mention of complication, not stated as uncontrolled     Vitamin D deficiency 2/29/2016     Past Surgical History:   Procedure Laterality Date    COLONOSCOPY  2008    COLONOSCOPY  5/31/2005    repeat in 2015    PILONIDAL CYST DRAINAGE      TRANSURETHRAL RESECTION OF PROSTATE       Family History   Problem Relation Age of Onset    Hypertension Mother     Hypertension Father     Heart attack Maternal Uncle     Brain cancer Sister     Diabetes Neg Hx     Stroke Neg Hx      Social History     Substance and Sexual Activity   Alcohol Use No    Alcohol/week: 0.0 oz     Social History     Tobacco Use   Smoking Status Never Smoker   Smokeless Tobacco Never Used     Social History     Substance and Sexual Activity   Drug Use No       Current Outpatient  Medications:     amLODIPine (NORVASC) 5 MG tablet, TAKE 1 TABLET(5 MG) BY MOUTH EVERY DAY, Disp: 90 tablet, Rfl: 1    aspirin (ECOTRIN) 81 MG EC tablet, Take 81 mg by mouth once daily., Disp: , Rfl:     atorvastatin (LIPITOR) 20 MG tablet, Take 1 tablet (20 mg total) by mouth once daily., Disp: 90 tablet, Rfl: 3    benazepril (LOTENSIN) 40 MG tablet, Take 1 tablet (40 mg total) by mouth once daily., Disp: 90 tablet, Rfl: 3    blood sugar diagnostic Strp, 1 each by Misc.(Non-Drug; Combo Route) route 3 (three) times daily as needed. One touch ultra 2 test strips, Disp: 100 each, Rfl: 5    clopidogrel (PLAVIX) 75 mg tablet, Take 1 tablet (75 mg total) by mouth once daily., Disp: 90 tablet, Rfl: 3    colchicine (COLCRYS) 0.6 mg tablet, Take 1 tablet (0.6 mg total) by mouth once daily., Disp: 6 tablet, Rfl: 1    ergocalciferol (ERGOCALCIFEROL) 50,000 unit Cap, Take 1 capsule weekly, Disp: 24 capsule, Rfl: 3    ferrous sulfate 325 mg (65 mg iron) Tab tablet, Take 1 tablet (325 mg total) by mouth 3 (three) times daily., Disp: 270 tablet, Rfl: 3    galantamine (RAZADYNE) 8 MG Tab, Take 1 tablet (8 mg total) by mouth 2 (two) times daily with meals., Disp: 180 tablet, Rfl: 3    ketoconazole (NIZORAL) 2 % shampoo, , Disp: , Rfl:     lancets (TRUEPLUS LANCETS) 33 gauge Misc, 1 lancet by Misc.(Non-Drug; Combo Route) route once daily., Disp: 100 each, Rfl: 5    levothyroxine (SYNTHROID) 25 MCG tablet, take 1 tablet by mouth every morning ON AN EMPTY STOMACH, Disp: 90 tablet, Rfl: 3    niacin (NIASPAN) 1000 MG CR tablet, Take 1 tablet (1,000 mg total) by mouth every evening., Disp: 90 tablet, Rfl: 3    pioglitazone (ACTOS) 30 MG tablet, Take 1 tablet (30 mg total) by mouth once daily., Disp: 90 tablet, Rfl: 3    acarbose (PRECOSE) 100 MG Tab, TAKE 1 TABLET(100 MG) BY MOUTH THREE TIMES DAILY WITH MEALS, Disp: 270 tablet, Rfl: 0    colchicine (MITIGARE) 0.6 mg Cap, Take 1 capsule (0.6 mg total) by mouth once daily.,  Disp: 30 capsule, Rfl: 5    colchicine (MITIGARE) 0.6 mg Cap, Take 1 capsule (0.6 mg total) by mouth once daily., Disp: 90 capsule, Rfl: 2    colchicine (MITIGARE) 0.6 mg Cap, Take 1 capsule (0.6 mg total) by mouth once daily., Disp: 90 capsule, Rfl: 3    glipiZIDE (GLUCOTROL) 10 MG tablet, TAKE 1 TABLET(10 MG) BY MOUTH TWICE DAILY BEFORE MEALS, Disp: 180 tablet, Rfl: 0    JANUVIA 100 mg Tab, TAKE 1 TABLET(100 MG) BY MOUTH EVERY DAY, Disp: 90 tablet, Rfl: 0    ONETOUCH VERIO IQ METER kit, Use as instructed, Disp: 1 each, Rfl: 0    predniSONE (DELTASONE) 5 MG tablet, Take 10 mg daily x 1 week, then take 5mg daily x 1 week, Disp: 21 tablet, Rfl: 2       Objective:         Vitals:    01/24/19 1304   BP: (!) 150/72   Pulse: 68     Physical Exam   Nursing note and vitals reviewed.  Constitutional: He is oriented to person, place, and time and well-developed, well-nourished, and in no distress.   HENT:   Head: Normocephalic and atraumatic.   Eyes: Conjunctivae and EOM are normal. Pupils are equal, round, and reactive to light.   Neck: Normal range of motion. Neck supple.   Cardiovascular: Normal rate and regular rhythm.    Pulmonary/Chest: Effort normal and breath sounds normal.   Abdominal: Soft. Bowel sounds are normal. He exhibits no distension. There is no tenderness. There is no rebound.   Neurological: He is alert and oriented to person, place, and time.   Skin: Skin is warm and dry.     Psychiatric: Affect and judgment normal.   Musculoskeletal:   Diffuse right foot swelling and erythema, no tenderness   full rom active and passive, skin intact, no lesions  No synovitis in small joints hands and feet  No effusions over large joints. Pedal pulses 2+ and equal    No tophi/nodules         Reviewed old and all outside pertinent medical records available.    All lab results personally reviewed and interpreted by me.  Lab Results   Component Value Date    WBC 9.95 11/12/2018    HGB 14.7 11/12/2018    HCT 45.3  11/12/2018    MCV 91 11/12/2018    MCH 29.5 11/12/2018    MCHC 32.5 11/12/2018    RDW 14.6 (H) 11/12/2018     11/12/2018    MPV 10.8 11/12/2018       Lab Results   Component Value Date     01/07/2019    K 4.3 01/07/2019     01/07/2019    CO2 27 01/07/2019     (H) 01/07/2019    BUN 25 (H) 01/07/2019    CALCIUM 9.8 01/07/2019    PROT 7.2 07/03/2018    ALBUMIN 3.3 (L) 01/07/2019    BILITOT 1.5 (H) 07/03/2018    AST 12 07/03/2018    ALKPHOS 53 (L) 07/03/2018    ALT 11 07/03/2018       Lab Results   Component Value Date    COLORU Red (A) 03/23/2016    APPEARANCEUA Cloudy (A) 03/23/2016    SPECGRAV 1.025 03/23/2016    PHUR 6.0 03/23/2016    PROTEINUA Trace (A) 03/23/2016    KETONESU Negative 03/23/2016    LEUKOCYTESUR Negative 03/23/2016    NITRITE Negative 03/23/2016    UROBILINOGEN neg 12/16/2015       Lab Results   Component Value Date    CRP 17.6 (H) 10/29/2018       Lab Results   Component Value Date    SEDRATE 26 (H) 10/29/2018       Lab Results   Component Value Date    FRANDY Negative 06/11/2004    RF <10.0 11/12/2018    SEDRATE 26 (H) 10/29/2018       No components found for: 25OHVITDTOT, 89GPUUFD4, 50NRMVYW5, METHODNOTE    Lab Results   Component Value Date    URICACID 6.9 01/07/2019       No components found for: TSPOTTB    Uric acid 8.3    Imaging:  All imaging reviewed and independently  interpreted by me.    XR Hand 11/18  FINDINGS:  Right hand: Bones are demineralized.  There are stable multi articular degenerative changes noted.  No focal osseous erosions.  No fracture or dislocation.  Chronic appearing deformity at the base of the 5th metacarpal.  Soft tissues are within normal limits.    Left hand: Bones are demineralized.  There are occasions within the soft tissues along the ulnar aspect of the 3rd PIP joint.  No acute fracture or dislocation.  Soft tissues are unremarkable.      Impression       As above     XR Knee 11/18  FINDINGS:  Juxta-articular osteopenia and bilateral  tricompartment degenerative changes similar to prior exam.  There is a increased narrowing of the medial compartments, greater on the left.  Bilateral vascular calcifications.  There is prominence of the inferior pole both patella, greater on the right.  Spurring at the anterior tibial tuberosities bilaterally.  No fracture or dislocation.  No significant effusion.      Impression       As above.     XR Elbow 11/18  Impression       Bilateral degenerative changes without acute fracture or dislocation.    Prominent osteophyte formation bilaterally along the posterior olecranon at the triceps tendon insertion.    Findings most consistent with right olecranon bursitis.     XR Hand 11/18  FINDINGS:  Right hand: Bones are demineralized.  There are stable multi articular degenerative changes noted.  No focal osseous erosions.  No fracture or dislocation.  Chronic appearing deformity at the base of the 5th metacarpal.  Soft tissues are within normal limits.    Left hand: Bones are demineralized.  There are occasions within the soft tissues along the ulnar aspect of the 3rd PIP joint.  No acute fracture or dislocation.  Soft tissues are unremarkable.      Impression       As above          PROCEDURE NOTE:-  Name of the procedure: Aspiration of right ankle  Patient consent:   Indication, risks(including skin depigmentation, fat atrophy, infection, bleeding, nerve or tendon injury) and alternative to the procedure were explained to to the patient. Patient was given opportunity to ask questions . Then patient gave a verbal consent for the procedure.   Pertinent lab values: None indicated  Type of anesthesia: 2% Lidocaine   Description of procedure : Using sterile technique, the skin over joint was cleaned with alcohol swab and then with chlorhexidine solution. After applying cold spray , the needle was inserted into the skin and into the joint space without any resistance . Minimal fluid able to be aspirated,  <2cc.  Complication: None  Estimated blood loss: None  Disposition: Patient tolerated the procedure without any complains and the site was dressed.  Discharge instructions of icing and stretching given.              ASSESSMENT / PLAN:     Rudy Saldaña is a 78 y.o. White male with:      1. Crystal arthritis  -FRANDY /RF negative ; uric acid 8.3 ; No hx diagnostic arthrocentesis   -Reports overall significant improvement in right foot flare since last visit, completed prednisone taper. However, now w/ acute diffuse foot swelling x several days, but   still not a/w any worsening pain/fevers.   -Joint aspiration performed today, f/u gram stain and culture. Low suspicion for septic arthritis  -Will get LE US r/o thrombosis  -Previously did not start colchicine as discussed. Will start colchicine today  -Consider MRI after reviewing above workup            Method of contact with patient concerns: MyChart attn Rheumatology      Romelia Rodriguez M.D.  Rheumatology Department   [facility] [place] [address], [place] [address]  Phone: [phone]  Fax: [phone]

## 2019-02-20 RX ORDER — COLCHICINE 0.6 MG/1
1 CAPSULE ORAL DAILY
Qty: 90 CAPSULE | Refills: 3 | Status: SHIPPED | OUTPATIENT
Start: 2019-02-20 | End: 2019-06-10

## 2019-02-21 ENCOUNTER — DOCUMENTATION ONLY (OUTPATIENT)
Dept: RHEUMATOLOGY | Facility: CLINIC | Age: 79
End: 2019-02-21

## 2019-02-21 NOTE — PROGRESS NOTES
This nurse called [facility] in [place] to obtain a fax number to send an order per Dr Rodriguez. The fax number given was [phone] a confirmation was received which confirmed the fax was completed and received.  The staff in radiology department stated they will notify the patient once the faxed order and demographics have been received.   Mr. Grant has been notified of the process thus far, no additional questions or concerns was addressed prior to call ending.

## 2019-02-25 NOTE — PROGRESS NOTES
RHEUMATOLOGY OUTPATIENT CLINIC NOTE  Attending Rheumatologist: Romelia Rodriguez  Primary Care Provider: Mohinder Morfin MD   Physician Requesting Consultation: No referring provider defined for this encounter.  Chief Complaint/Reason For Consultation:  Swollen right foot    Subjective:       HPI  HISTORICAL:  Rudy Saldaña is a 78 y.o. White male presents w recent onset acute wrist swelling in bilateral wrists , left midfoot, and right elbow. States he would go to sleep in his usual state of health, and woke up one morning with midfoot and wrist swollen. No prior hx of gout /pseudogout. Has just completed course of indomethacin per pcp. Recently started on allopurinol and colchicine (100mg daily+ colchicine .6mg daily). Completed several medrol dose packs but would have acute worsening of joint pains/swellings once he ran out.   No hx renal stones. +Hx DM and CKD. Uric acid elevated. 8.3    Today:   Previous visit p/w diffusely swollen right foot. Since 1/24/19, pt reports significantly improved. No pain today. Still slightly swollen. No fevers. No further flares.     Review of Systems   Constitutional: Negative for chills, fever and weight loss.   HENT: Negative for congestion, ear pain, hearing loss, sinus pain, sore throat and tinnitus.    Eyes: Negative for blurred vision, double vision and photophobia.   Respiratory: Negative for cough, sputum production and shortness of breath.    Cardiovascular: Negative for chest pain and palpitations.   Gastrointestinal: Negative for heartburn, nausea and vomiting.   Genitourinary: Negative for dysuria and frequency.   Musculoskeletal: Positive for joint pain. Negative for back pain, myalgias and neck pain.   Skin: Negative for itching and rash.   Neurological: Negative for dizziness, tingling, focal weakness and headaches.   Psychiatric/Behavioral: Negative for depression and suicidal ideas.   All other systems reviewed and are negative.      Chronic comorbid  conditions affecting medical decision making today:  Past Medical History:   Diagnosis Date    Anemia     BPH (benign prostatic hypertrophy)     Carotid bruit     CVA (cerebral vascular accident)     Diabetes mellitus, type 2     Hyperlipidemia     Hypertension     Hypothyroidism 2/29/2016    Type 2 diabetes mellitus, uncontrolled, with renal complications     The patient presents with diabetes.  The patient denies polyuria, polydipsia, polyphagia, hypoglycemia and paresthesias.  The patient's glucose control has been good.  Home glucose averages are routinely checked.  The patient is without retinopathy currently.  The patient has no history of neuropathy.  The patient currently complains of no podiatric problems.  The patient has excellent compliance.    Type II or unspecified type diabetes mellitus without mention of complication, not stated as uncontrolled     Vitamin D deficiency 2/29/2016     Past Surgical History:   Procedure Laterality Date    COLONOSCOPY  2008    COLONOSCOPY  5/31/2005    repeat in 2015    PILONIDAL CYST DRAINAGE      TRANSURETHRAL RESECTION OF PROSTATE       Family History   Problem Relation Age of Onset    Hypertension Mother     Hypertension Father     Heart attack Maternal Uncle     Brain cancer Sister     Diabetes Neg Hx     Stroke Neg Hx      Social History     Substance and Sexual Activity   Alcohol Use No    Alcohol/week: 0.0 oz     Social History     Tobacco Use   Smoking Status Never Smoker   Smokeless Tobacco Never Used     Social History     Substance and Sexual Activity   Drug Use No       Current Outpatient Medications:     acarbose (PRECOSE) 100 MG Tab, TAKE 1 TABLET(100 MG) BY MOUTH THREE TIMES DAILY WITH MEALS, Disp: 270 tablet, Rfl: 0    amLODIPine (NORVASC) 5 MG tablet, TAKE 1 TABLET(5 MG) BY MOUTH EVERY DAY, Disp: 90 tablet, Rfl: 1    aspirin (ECOTRIN) 81 MG EC tablet, Take 81 mg by mouth once daily., Disp: , Rfl:     atorvastatin (LIPITOR) 20  MG tablet, Take 1 tablet (20 mg total) by mouth once daily., Disp: 90 tablet, Rfl: 3    benazepril (LOTENSIN) 40 MG tablet, Take 1 tablet (40 mg total) by mouth once daily., Disp: 90 tablet, Rfl: 3    blood sugar diagnostic Strp, 1 each by Misc.(Non-Drug; Combo Route) route 3 (three) times daily as needed. One touch ultra 2 test strips, Disp: 100 each, Rfl: 5    clopidogrel (PLAVIX) 75 mg tablet, Take 1 tablet (75 mg total) by mouth once daily., Disp: 90 tablet, Rfl: 3    ergocalciferol (ERGOCALCIFEROL) 50,000 unit Cap, Take 1 capsule weekly, Disp: 24 capsule, Rfl: 3    ferrous sulfate 325 mg (65 mg iron) Tab tablet, Take 1 tablet (325 mg total) by mouth 3 (three) times daily., Disp: 270 tablet, Rfl: 3    galantamine (RAZADYNE) 8 MG Tab, Take 1 tablet (8 mg total) by mouth 2 (two) times daily with meals., Disp: 180 tablet, Rfl: 3    glipiZIDE (GLUCOTROL) 10 MG tablet, TAKE 1 TABLET(10 MG) BY MOUTH TWICE DAILY BEFORE MEALS, Disp: 180 tablet, Rfl: 0    JANUVIA 100 mg Tab, TAKE 1 TABLET(100 MG) BY MOUTH EVERY DAY, Disp: 90 tablet, Rfl: 0    ketoconazole (NIZORAL) 2 % shampoo, , Disp: , Rfl:     lancets (TRUEPLUS LANCETS) 33 gauge Misc, 1 lancet by Misc.(Non-Drug; Combo Route) route once daily., Disp: 100 each, Rfl: 5    levothyroxine (SYNTHROID) 25 MCG tablet, take 1 tablet by mouth every morning ON AN EMPTY STOMACH, Disp: 90 tablet, Rfl: 3    niacin (NIASPAN) 1000 MG CR tablet, Take 1 tablet (1,000 mg total) by mouth every evening., Disp: 90 tablet, Rfl: 3    pioglitazone (ACTOS) 30 MG tablet, Take 1 tablet (30 mg total) by mouth once daily., Disp: 90 tablet, Rfl: 3    MITIGARE 0.6 mg Cap, Take 1 capsule (0.6 mg total) by mouth Daily., Disp: 90 capsule, Rfl: 3    ONETOUCH VERIO IQ METER kit, Use as instructed, Disp: 1 each, Rfl: 0    predniSONE (DELTASONE) 5 MG tablet, Take 10 mg daily x 1 week, then take 5mg daily x 1 week, Disp: 21 tablet, Rfl: 2       Objective:         Vitals:    02/07/19 1131    BP: 139/69   Pulse: 62     Physical Exam   Nursing note and vitals reviewed.  Constitutional: He is oriented to person, place, and time and well-developed, well-nourished, and in no distress.   HENT:   Head: Normocephalic and atraumatic.   Eyes: Conjunctivae and EOM are normal. Pupils are equal, round, and reactive to light.   Neck: Normal range of motion. Neck supple.   Cardiovascular: Normal rate and regular rhythm.    Pulmonary/Chest: Effort normal and breath sounds normal.   Abdominal: Soft. Bowel sounds are normal. He exhibits no distension. There is no tenderness. There is no rebound.   Neurological: He is alert and oriented to person, place, and time.   Skin: Skin is warm and dry.     Psychiatric: Affect and judgment normal.   Musculoskeletal:   Significantly improved right foot swelling and erythema, no tenderness   full rom active and passive, skin intact, no lesions  No synovitis in small joints hands and feet  No effusions over large joints. Pedal pulses 2+ and equal    No tophi/nodules         Reviewed old and all outside pertinent medical records available.    All lab results personally reviewed and interpreted by me.  Lab Results   Component Value Date    WBC 9.95 11/12/2018    HGB 14.7 11/12/2018    HCT 45.3 11/12/2018    MCV 91 11/12/2018    MCH 29.5 11/12/2018    MCHC 32.5 11/12/2018    RDW 14.6 (H) 11/12/2018     11/12/2018    MPV 10.8 11/12/2018       Lab Results   Component Value Date     01/07/2019    K 4.3 01/07/2019     01/07/2019    CO2 27 01/07/2019     (H) 01/07/2019    BUN 25 (H) 01/07/2019    CALCIUM 9.8 01/07/2019    PROT 7.2 07/03/2018    ALBUMIN 3.3 (L) 01/07/2019    BILITOT 1.5 (H) 07/03/2018    AST 12 07/03/2018    ALKPHOS 53 (L) 07/03/2018    ALT 11 07/03/2018       Lab Results   Component Value Date    COLORU Red (A) 03/23/2016    APPEARANCEUA Cloudy (A) 03/23/2016    SPECGRAV 1.025 03/23/2016    PHUR 6.0 03/23/2016    PROTEINUA Trace (A) 03/23/2016     KETONESU Negative 03/23/2016    LEUKOCYTESUR Negative 03/23/2016    NITRITE Negative 03/23/2016    UROBILINOGEN neg 12/16/2015       Lab Results   Component Value Date    CRP 17.6 (H) 10/29/2018       Lab Results   Component Value Date    SEDRATE 26 (H) 10/29/2018       Lab Results   Component Value Date    FRANDY Negative 06/11/2004    RF <10.0 11/12/2018    SEDRATE 26 (H) 10/29/2018       No components found for: 25OHVITDTOT, 81CSYQUZ5, 87NONUGJ8, METHODNOTE    Lab Results   Component Value Date    URICACID 6.9 01/07/2019       No components found for: TSPOTTB    Uric acid 8.3    Imaging:  All imaging reviewed and independently  interpreted by me.    XR Hand 11/18  FINDINGS:  Right hand: Bones are demineralized.  There are stable multi articular degenerative changes noted.  No focal osseous erosions.  No fracture or dislocation.  Chronic appearing deformity at the base of the 5th metacarpal.  Soft tissues are within normal limits.    Left hand: Bones are demineralized.  There are occasions within the soft tissues along the ulnar aspect of the 3rd PIP joint.  No acute fracture or dislocation.  Soft tissues are unremarkable.      Impression       As above     XR Knee 11/18  FINDINGS:  Juxta-articular osteopenia and bilateral tricompartment degenerative changes similar to prior exam.  There is a increased narrowing of the medial compartments, greater on the left.  Bilateral vascular calcifications.  There is prominence of the inferior pole both patella, greater on the right.  Spurring at the anterior tibial tuberosities bilaterally.  No fracture or dislocation.  No significant effusion.      Impression       As above.     XR Elbow 11/18  Impression       Bilateral degenerative changes without acute fracture or dislocation.    Prominent osteophyte formation bilaterally along the posterior olecranon at the triceps tendon insertion.    Findings most consistent with right olecranon bursitis.     XR Hand  11/18  FINDINGS:  Right hand: Bones are demineralized.  There are stable multi articular degenerative changes noted.  No focal osseous erosions.  No fracture or dislocation.  Chronic appearing deformity at the base of the 5th metacarpal.  Soft tissues are within normal limits.    Left hand: Bones are demineralized.  There are occasions within the soft tissues along the ulnar aspect of the 3rd PIP joint.  No acute fracture or dislocation.  Soft tissues are unremarkable.      Impression       As above                    ASSESSMENT / PLAN:     Rudy Saldaña is a 78 y.o. White male with:      1. Crystal arthritis  -FRANDY /RF negative ; uric acid 8.3 ; No hx diagnostic arthrocentesis   -Reports overall significant improvement in right foot flare/ swelling since last visit,   -Previously discussed initiating urate lowering therapy 2.2 hyperuricemia, however pt deferred.   -Continue colchicine daily  -Will get dual energy CT for evaluation crystal arthritis and more definitive differentiation gout vs pseudogout prior to initiating urate lowering therapy  -Off prednisone currently, no further flares. For future flares, prednisone prn             Method of contact with patient concerns: Jj attn Rheumatology      Romelia Rodriguez M.D.  Rheumatology Department   [facility] [place] [address], [place] [address]  Phone: [phone]  Fax: [phone]

## 2019-03-13 ENCOUNTER — PATIENT MESSAGE (OUTPATIENT)
Dept: FAMILY MEDICINE | Facility: CLINIC | Age: 79
End: 2019-03-13

## 2019-03-13 ENCOUNTER — PATIENT MESSAGE (OUTPATIENT)
Dept: PODIATRY | Facility: CLINIC | Age: 79
End: 2019-03-13

## 2019-03-19 ENCOUNTER — LAB VISIT (OUTPATIENT)
Dept: LAB | Facility: HOSPITAL | Age: 79
End: 2019-03-19
Attending: FAMILY MEDICINE
Payer: MEDICARE

## 2019-03-19 DIAGNOSIS — E11.22 CONTROLLED TYPE 2 DIABETES MELLITUS WITH STAGE 3 CHRONIC KIDNEY DISEASE, WITHOUT LONG-TERM CURRENT USE OF INSULIN: ICD-10-CM

## 2019-03-19 DIAGNOSIS — N18.30 CONTROLLED TYPE 2 DIABETES MELLITUS WITH STAGE 3 CHRONIC KIDNEY DISEASE, WITHOUT LONG-TERM CURRENT USE OF INSULIN: ICD-10-CM

## 2019-03-19 LAB
BNP SERPL-MCNC: 201 PG/ML
C PEPTIDE SERPL-MCNC: 2.11 NG/ML
ESTIMATED AVG GLUCOSE: 143 MG/DL
HBA1C MFR BLD HPLC: 6.6 %

## 2019-03-19 PROCEDURE — 83036 HEMOGLOBIN GLYCOSYLATED A1C: CPT

## 2019-03-19 PROCEDURE — 83880 ASSAY OF NATRIURETIC PEPTIDE: CPT

## 2019-03-19 PROCEDURE — 84681 ASSAY OF C-PEPTIDE: CPT

## 2019-03-19 PROCEDURE — 86341 ISLET CELL ANTIBODY: CPT

## 2019-03-21 DIAGNOSIS — E11.22 CONTROLLED TYPE 2 DIABETES MELLITUS WITH STAGE 3 CHRONIC KIDNEY DISEASE, WITHOUT LONG-TERM CURRENT USE OF INSULIN: ICD-10-CM

## 2019-03-21 DIAGNOSIS — N18.30 CONTROLLED TYPE 2 DIABETES MELLITUS WITH STAGE 3 CHRONIC KIDNEY DISEASE, WITHOUT LONG-TERM CURRENT USE OF INSULIN: ICD-10-CM

## 2019-03-21 LAB — GAD65 AB SER-SCNC: 0 NMOL/L

## 2019-03-21 RX ORDER — GLIPIZIDE 10 MG/1
TABLET ORAL
Qty: 180 TABLET | Refills: 2 | Status: SHIPPED | OUTPATIENT
Start: 2019-03-21 | End: 2019-05-06

## 2019-03-21 RX ORDER — PIOGLITAZONEHYDROCHLORIDE 30 MG/1
30 TABLET ORAL DAILY
Qty: 90 TABLET | Refills: 2 | Status: SHIPPED | OUTPATIENT
Start: 2019-03-21 | End: 2020-01-13

## 2019-03-21 NOTE — PROGRESS NOTES
The bnp is increased.  He should be seen for exacerbating CHF.  Get him seen soon by Mechelle in my office if needed.  She is there today and tomorrow.    The cpeptide is normal.     The a1c is controlled.  This is good.  Recheck it in 6 months. Continue meds.

## 2019-03-21 NOTE — TELEPHONE ENCOUNTER
----- Message from Mohinder Morfin MD sent at 3/21/2019  3:03 PM CDT -----  Book a1c in 6 months.  Notify of the appointment via MyChart or letter.   Refill Diabetes Meds x 6 months.    The patient's Health Maintenance that is due is below:  Eye Exam due on 11/08/2018

## 2019-03-22 ENCOUNTER — OFFICE VISIT (OUTPATIENT)
Dept: FAMILY MEDICINE | Facility: CLINIC | Age: 79
End: 2019-03-22
Payer: MEDICARE

## 2019-03-22 VITALS
HEIGHT: 72 IN | DIASTOLIC BLOOD PRESSURE: 70 MMHG | BODY MASS INDEX: 29.15 KG/M2 | HEART RATE: 56 BPM | SYSTOLIC BLOOD PRESSURE: 155 MMHG | TEMPERATURE: 98 F | WEIGHT: 215.19 LBS

## 2019-03-22 DIAGNOSIS — R79.89 ELEVATED BRAIN NATRIURETIC PEPTIDE (BNP) LEVEL: Primary | ICD-10-CM

## 2019-03-22 DIAGNOSIS — E87.6 HYPOKALEMIA: ICD-10-CM

## 2019-03-22 DIAGNOSIS — N18.30 CKD STAGE 3 SECONDARY TO DIABETES: ICD-10-CM

## 2019-03-22 DIAGNOSIS — E11.22 CKD STAGE 3 SECONDARY TO DIABETES: ICD-10-CM

## 2019-03-22 DIAGNOSIS — R60.0 BILATERAL LOWER EXTREMITY EDEMA: ICD-10-CM

## 2019-03-22 PROCEDURE — 99999 PR PBB SHADOW E&M-EST. PATIENT-LVL III: ICD-10-PCS | Mod: PBBFAC,,, | Performed by: NURSE PRACTITIONER

## 2019-03-22 PROCEDURE — 3077F PR MOST RECENT SYSTOLIC BLOOD PRESSURE >= 140 MM HG: ICD-10-PCS | Mod: CPTII,S$GLB,, | Performed by: NURSE PRACTITIONER

## 2019-03-22 PROCEDURE — 1101F PT FALLS ASSESS-DOCD LE1/YR: CPT | Mod: CPTII,S$GLB,, | Performed by: NURSE PRACTITIONER

## 2019-03-22 PROCEDURE — 99214 PR OFFICE/OUTPT VISIT, EST, LEVL IV, 30-39 MIN: ICD-10-PCS | Mod: S$GLB,,, | Performed by: NURSE PRACTITIONER

## 2019-03-22 PROCEDURE — 99999 PR PBB SHADOW E&M-EST. PATIENT-LVL III: CPT | Mod: PBBFAC,,, | Performed by: NURSE PRACTITIONER

## 2019-03-22 PROCEDURE — 3078F DIAST BP <80 MM HG: CPT | Mod: CPTII,S$GLB,, | Performed by: NURSE PRACTITIONER

## 2019-03-22 PROCEDURE — 3078F PR MOST RECENT DIASTOLIC BLOOD PRESSURE < 80 MM HG: ICD-10-PCS | Mod: CPTII,S$GLB,, | Performed by: NURSE PRACTITIONER

## 2019-03-22 PROCEDURE — 1101F PR PT FALLS ASSESS DOC 0-1 FALLS W/OUT INJ PAST YR: ICD-10-PCS | Mod: CPTII,S$GLB,, | Performed by: NURSE PRACTITIONER

## 2019-03-22 PROCEDURE — 99214 OFFICE O/P EST MOD 30 MIN: CPT | Mod: S$GLB,,, | Performed by: NURSE PRACTITIONER

## 2019-03-22 PROCEDURE — 3077F SYST BP >= 140 MM HG: CPT | Mod: CPTII,S$GLB,, | Performed by: NURSE PRACTITIONER

## 2019-03-22 RX ORDER — POTASSIUM CHLORIDE 750 MG/1
10 CAPSULE, EXTENDED RELEASE ORAL EVERY OTHER DAY
Qty: 15 CAPSULE | Refills: 1 | Status: SHIPPED | OUTPATIENT
Start: 2019-03-22 | End: 2019-04-12

## 2019-03-22 RX ORDER — POTASSIUM CHLORIDE 750 MG/1
TABLET, EXTENDED RELEASE ORAL
Qty: 45 TABLET | Refills: 1 | OUTPATIENT
Start: 2019-03-22

## 2019-03-22 RX ORDER — FUROSEMIDE 20 MG/1
20 TABLET ORAL EVERY OTHER DAY
Qty: 15 TABLET | Refills: 1 | Status: SHIPPED | OUTPATIENT
Start: 2019-03-22 | End: 2019-04-12 | Stop reason: SDUPTHER

## 2019-03-22 RX ORDER — FUROSEMIDE 20 MG/1
TABLET ORAL
Qty: 45 TABLET | Refills: 1 | OUTPATIENT
Start: 2019-03-22

## 2019-03-22 RX ORDER — TRIAMCINOLONE ACETONIDE 5 MG/G
CREAM TOPICAL
COMMUNITY
Start: 2019-03-14 | End: 2019-04-15

## 2019-03-26 ENCOUNTER — PATIENT MESSAGE (OUTPATIENT)
Dept: DIABETES | Facility: CLINIC | Age: 79
End: 2019-03-26

## 2019-03-28 ENCOUNTER — CLINICAL SUPPORT (OUTPATIENT)
Dept: CARDIOLOGY | Facility: CLINIC | Age: 79
End: 2019-03-28
Attending: NURSE PRACTITIONER
Payer: MEDICARE

## 2019-03-28 DIAGNOSIS — R79.89 ELEVATED BRAIN NATRIURETIC PEPTIDE (BNP) LEVEL: ICD-10-CM

## 2019-03-28 DIAGNOSIS — R60.0 BILATERAL LOWER EXTREMITY EDEMA: ICD-10-CM

## 2019-03-28 PROCEDURE — 93306 2D ECHO WITH COLOR FLOW DOPPLER: ICD-10-PCS | Mod: S$GLB,,, | Performed by: INTERNAL MEDICINE

## 2019-03-28 PROCEDURE — 93306 TTE W/DOPPLER COMPLETE: CPT | Mod: S$GLB,,, | Performed by: INTERNAL MEDICINE

## 2019-03-28 NOTE — TELEPHONE ENCOUNTER
Called patient to schedule appointment for Diabetes Management, Patient stated he does not want to make any appointment at the moment. Advise patient to call to schedule appointment when he has time. Patient verbalized understanding.

## 2019-03-29 LAB
DIASTOLIC DYSFUNCTION: YES
ESTIMATED PA SYSTOLIC PRESSURE: 40.96
MITRAL VALVE REGURGITATION: ABNORMAL
RETIRED EF AND QEF - SEE NOTES: 60 (ref 55–65)

## 2019-03-30 ENCOUNTER — TELEPHONE (OUTPATIENT)
Dept: FAMILY MEDICINE | Facility: CLINIC | Age: 79
End: 2019-03-30

## 2019-03-30 DIAGNOSIS — R93.1 ABNORMAL ECHOCARDIOGRAM: ICD-10-CM

## 2019-03-30 DIAGNOSIS — R79.89 ELEVATED BRAIN NATRIURETIC PEPTIDE (BNP) LEVEL: Primary | ICD-10-CM

## 2019-03-30 NOTE — TELEPHONE ENCOUNTER
----- Message from Mechelle Spain NP sent at 3/30/2019  8:35 AM CDT -----  Schedule referral to cardiology- Dr Palma

## 2019-03-31 NOTE — PROGRESS NOTES
Subjective:       Patient ID: Rudy Saldaña is a 78 y.o. male.    Chief Complaint: Follow-up and review labs    HPI     He was referred for follow up from Marina Gordon who is treating him for diabetes. He was told he had some abnormal lab work including elevated BNP and renal function changes. He complains of lower extremity edema. He denies shortness of breath or chest pain, no dizziness or syncope, no headache or blurry vision. He has a history of hypertension.     Review of Systems   Constitutional: Negative for fatigue, fever and unexpected weight change.   HENT: Negative for ear pain and sore throat.    Eyes: Negative.  Negative for pain and visual disturbance.   Respiratory: Negative for cough and shortness of breath.    Cardiovascular: Negative for chest pain and palpitations.   Gastrointestinal: Negative for abdominal pain, diarrhea, nausea and vomiting.   Genitourinary: Negative for dysuria and frequency.   Musculoskeletal: Negative for arthralgias and myalgias.   Skin: Negative for color change and rash.   Neurological: Negative for dizziness and headaches.   Psychiatric/Behavioral: Negative for sleep disturbance. The patient is not nervous/anxious.        Vitals:    03/22/19 1351   BP: (!) 155/70   Pulse:    Temp:        Objective:     Current Outpatient Medications   Medication Sig Dispense Refill    acarbose (PRECOSE) 100 MG Tab TAKE 1 TABLET(100 MG) BY MOUTH THREE TIMES DAILY WITH MEALS 270 tablet 0    amLODIPine (NORVASC) 5 MG tablet TAKE 1 TABLET(5 MG) BY MOUTH EVERY DAY 90 tablet 1    aspirin (ECOTRIN) 81 MG EC tablet Take 81 mg by mouth once daily.      atorvastatin (LIPITOR) 20 MG tablet Take 1 tablet (20 mg total) by mouth once daily. 90 tablet 3    benazepril (LOTENSIN) 40 MG tablet Take 1 tablet (40 mg total) by mouth once daily. 90 tablet 3    blood sugar diagnostic Strp 1 each by Misc.(Non-Drug; Combo Route) route 3 (three) times daily as needed. One touch ultra 2 test strips 100 each  5    clopidogrel (PLAVIX) 75 mg tablet Take 1 tablet (75 mg total) by mouth once daily. 90 tablet 3    ergocalciferol (ERGOCALCIFEROL) 50,000 unit Cap Take 1 capsule weekly 24 capsule 3    ferrous sulfate 325 mg (65 mg iron) Tab tablet Take 1 tablet (325 mg total) by mouth 3 (three) times daily. 270 tablet 3    galantamine (RAZADYNE) 8 MG Tab Take 1 tablet (8 mg total) by mouth 2 (two) times daily with meals. 180 tablet 3    glipiZIDE (GLUCOTROL) 10 MG tablet TAKE 1 TABLET(10 MG) BY MOUTH TWICE DAILY BEFORE MEALS 180 tablet 2    ketoconazole (NIZORAL) 2 % shampoo       lancets (TRUEPLUS LANCETS) 33 gauge Misc 1 lancet by Misc.(Non-Drug; Combo Route) route once daily. 100 each 5    levothyroxine (SYNTHROID) 25 MCG tablet take 1 tablet by mouth every morning ON AN EMPTY STOMACH 90 tablet 3    MITIGARE 0.6 mg Cap Take 1 capsule (0.6 mg total) by mouth Daily. 90 capsule 3    niacin (NIASPAN) 1000 MG CR tablet Take 1 tablet (1,000 mg total) by mouth every evening. 90 tablet 3    ONETOUCH VERIO IQ METER kit Use as instructed 1 each 0    pioglitazone (ACTOS) 30 MG tablet Take 1 tablet (30 mg total) by mouth once daily. 90 tablet 2    predniSONE (DELTASONE) 5 MG tablet Take 10 mg daily x 1 week, then take 5mg daily x 1 week 21 tablet 2    SITagliptin (JANUVIA) 100 MG Tab TAKE 1 TABLET(100 MG) BY MOUTH EVERY DAY 90 tablet 2    triamcinolone acetonide 0.5% (KENALOG) 0.5 % Crea       furosemide (LASIX) 20 MG tablet Take 1 tablet (20 mg total) by mouth every other day. 15 tablet 1    potassium chloride (MICRO-K) 10 MEQ CpSR Take 1 capsule (10 mEq total) by mouth every other day. 15 capsule 1     No current facility-administered medications for this visit.        Physical Exam   Constitutional: He is oriented to person, place, and time. He appears well-developed. No distress.   HENT:   Head: Normocephalic and atraumatic.   Eyes: Pupils are equal, round, and reactive to light. EOM are normal.   Neck: Normal range  of motion. Neck supple.   Cardiovascular: Normal rate and regular rhythm.   BLE 2+ edema, + capillary refill, 2+ pedal pulses   Pulmonary/Chest: Effort normal and breath sounds normal.   Musculoskeletal: Normal range of motion.   Neurological: He is alert and oriented to person, place, and time.   Skin: Skin is warm and dry. No rash noted.   Psychiatric: He has a normal mood and affect. Thought content normal.   Nursing note and vitals reviewed.      Assessment:       1. Elevated brain natriuretic peptide (BNP) level    2. Bilateral lower extremity edema    3. CKD stage 3 secondary to diabetes    4. Hypokalemia        Plan:   Elevated brain natriuretic peptide (BNP) level  -     2D Echo w/ Color Flow Doppler; Future    Bilateral lower extremity edema  -     2D Echo w/ Color Flow Doppler; Future    CKD stage 3 secondary to diabetes    Hypokalemia    Other orders  -     furosemide (LASIX) 20 MG tablet; Take 1 tablet (20 mg total) by mouth every other day.  Dispense: 15 tablet; Refill: 1  -     potassium chloride (MICRO-K) 10 MEQ CpSR; Take 1 capsule (10 mEq total) by mouth every other day.  Dispense: 15 capsule; Refill: 1        Follow-up in about 4 weeks (around 4/19/2019).    There are no Patient Instructions on file for this visit.

## 2019-04-02 ENCOUNTER — OFFICE VISIT (OUTPATIENT)
Dept: CARDIOLOGY | Facility: CLINIC | Age: 79
End: 2019-04-02
Payer: MEDICARE

## 2019-04-02 VITALS
WEIGHT: 210 LBS | DIASTOLIC BLOOD PRESSURE: 71 MMHG | BODY MASS INDEX: 28.44 KG/M2 | SYSTOLIC BLOOD PRESSURE: 160 MMHG | HEART RATE: 60 BPM | HEIGHT: 72 IN

## 2019-04-02 DIAGNOSIS — E11.22 CKD STAGE 3 SECONDARY TO DIABETES: ICD-10-CM

## 2019-04-02 DIAGNOSIS — E11.59 HYPERTENSION ASSOCIATED WITH DIABETES: Primary | ICD-10-CM

## 2019-04-02 DIAGNOSIS — N18.30 CKD STAGE 3 SECONDARY TO DIABETES: ICD-10-CM

## 2019-04-02 DIAGNOSIS — I15.2 HYPERTENSION ASSOCIATED WITH DIABETES: Primary | ICD-10-CM

## 2019-04-02 PROCEDURE — 3078F DIAST BP <80 MM HG: CPT | Mod: CPTII,S$GLB,, | Performed by: INTERNAL MEDICINE

## 2019-04-02 PROCEDURE — 3077F PR MOST RECENT SYSTOLIC BLOOD PRESSURE >= 140 MM HG: ICD-10-PCS | Mod: CPTII,S$GLB,, | Performed by: INTERNAL MEDICINE

## 2019-04-02 PROCEDURE — 99499 UNLISTED E&M SERVICE: CPT | Mod: S$GLB,,, | Performed by: INTERNAL MEDICINE

## 2019-04-02 PROCEDURE — 99204 PR OFFICE/OUTPT VISIT, NEW, LEVL IV, 45-59 MIN: ICD-10-PCS | Mod: S$GLB,,, | Performed by: INTERNAL MEDICINE

## 2019-04-02 PROCEDURE — 1101F PT FALLS ASSESS-DOCD LE1/YR: CPT | Mod: CPTII,S$GLB,, | Performed by: INTERNAL MEDICINE

## 2019-04-02 PROCEDURE — 99999 PR PBB SHADOW E&M-EST. PATIENT-LVL III: CPT | Mod: PBBFAC,,, | Performed by: INTERNAL MEDICINE

## 2019-04-02 PROCEDURE — 99204 OFFICE O/P NEW MOD 45 MIN: CPT | Mod: S$GLB,,, | Performed by: INTERNAL MEDICINE

## 2019-04-02 PROCEDURE — 99499 RISK ADDL DX/OHS AUDIT: ICD-10-PCS | Mod: S$GLB,,, | Performed by: INTERNAL MEDICINE

## 2019-04-02 PROCEDURE — 1101F PR PT FALLS ASSESS DOC 0-1 FALLS W/OUT INJ PAST YR: ICD-10-PCS | Mod: CPTII,S$GLB,, | Performed by: INTERNAL MEDICINE

## 2019-04-02 PROCEDURE — 99999 PR PBB SHADOW E&M-EST. PATIENT-LVL III: ICD-10-PCS | Mod: PBBFAC,,, | Performed by: INTERNAL MEDICINE

## 2019-04-02 PROCEDURE — 3077F SYST BP >= 140 MM HG: CPT | Mod: CPTII,S$GLB,, | Performed by: INTERNAL MEDICINE

## 2019-04-02 PROCEDURE — 3078F PR MOST RECENT DIASTOLIC BLOOD PRESSURE < 80 MM HG: ICD-10-PCS | Mod: CPTII,S$GLB,, | Performed by: INTERNAL MEDICINE

## 2019-04-02 RX ORDER — BENAZEPRIL HYDROCHLORIDE 40 MG/1
40 TABLET ORAL 2 TIMES DAILY
Qty: 180 TABLET | Refills: 3 | Status: SHIPPED | OUTPATIENT
Start: 2019-04-02 | End: 2019-05-14 | Stop reason: SDUPTHER

## 2019-04-02 NOTE — LETTER
April 2, 2019      [place]  [address]  [place] [address]           [facility]  [address] [facility]  [place] [id number]  Phone: [phone]  Fax: [phone]          Patient: Rudy Saldaña   MR Number: [id number]   YOB: 1940   Date of Visit: 4/2/2019       Dear Mechelle Spain:    Thank you for referring Rudy Saldaña to me for evaluation. Attached you will find relevant portions of my assessment and plan of care.    If you have questions, please do not hesitate to call me. I look forward to following Rudy Saldaña along with you.    Sincerely,    Oswald Heath Jr., MD    Enclosure  CC:  No Recipients    If you would like to receive this communication electronically, please contact [email or web address] or [phone] to request more information on [employer] access.    For providers and/or their staff who would like to refer a patient to [facility], please contact us through our one-stop-shop provider referral line, [facility], at [phone].    If you feel you have received this communication in error or would no longer like to receive these types of communications, please e-mail [email or web address]

## 2019-04-12 ENCOUNTER — OFFICE VISIT (OUTPATIENT)
Dept: NEPHROLOGY | Facility: CLINIC | Age: 79
End: 2019-04-12
Payer: MEDICARE

## 2019-04-12 VITALS
SYSTOLIC BLOOD PRESSURE: 128 MMHG | WEIGHT: 214.75 LBS | HEIGHT: 72 IN | DIASTOLIC BLOOD PRESSURE: 60 MMHG | HEART RATE: 63 BPM | OXYGEN SATURATION: 99 % | BODY MASS INDEX: 29.09 KG/M2

## 2019-04-12 DIAGNOSIS — Z86.73 HISTORY OF STROKE: ICD-10-CM

## 2019-04-12 DIAGNOSIS — E11.22 CKD STAGE 3 SECONDARY TO DIABETES: ICD-10-CM

## 2019-04-12 DIAGNOSIS — I15.2 HYPERTENSION ASSOCIATED WITH DIABETES: ICD-10-CM

## 2019-04-12 DIAGNOSIS — M11.9 CRYSTAL ARTHRITIS: ICD-10-CM

## 2019-04-12 DIAGNOSIS — E11.69 COMBINED HYPERLIPIDEMIA ASSOCIATED WITH TYPE 2 DIABETES MELLITUS: ICD-10-CM

## 2019-04-12 DIAGNOSIS — E11.59 HYPERTENSION ASSOCIATED WITH DIABETES: ICD-10-CM

## 2019-04-12 DIAGNOSIS — E78.2 COMBINED HYPERLIPIDEMIA ASSOCIATED WITH TYPE 2 DIABETES MELLITUS: ICD-10-CM

## 2019-04-12 DIAGNOSIS — E55.9 VITAMIN D DEFICIENCY: ICD-10-CM

## 2019-04-12 DIAGNOSIS — G47.33 OSA ON CPAP: Chronic | ICD-10-CM

## 2019-04-12 DIAGNOSIS — E03.9 HYPOTHYROIDISM, UNSPECIFIED TYPE: ICD-10-CM

## 2019-04-12 DIAGNOSIS — N18.30 CKD (CHRONIC KIDNEY DISEASE) STAGE 3, GFR 30-59 ML/MIN: Primary | ICD-10-CM

## 2019-04-12 DIAGNOSIS — N18.30 CKD STAGE 3 SECONDARY TO DIABETES: ICD-10-CM

## 2019-04-12 DIAGNOSIS — E13.42 POLYNEUROPATHY DUE TO SECONDARY DIABETES: ICD-10-CM

## 2019-04-12 DIAGNOSIS — D50.9 IRON DEFICIENCY ANEMIA, UNSPECIFIED IRON DEFICIENCY ANEMIA TYPE: ICD-10-CM

## 2019-04-12 PROCEDURE — 3074F PR MOST RECENT SYSTOLIC BLOOD PRESSURE < 130 MM HG: ICD-10-PCS | Mod: CPTII,S$GLB,, | Performed by: INTERNAL MEDICINE

## 2019-04-12 PROCEDURE — 3074F SYST BP LT 130 MM HG: CPT | Mod: CPTII,S$GLB,, | Performed by: INTERNAL MEDICINE

## 2019-04-12 PROCEDURE — 99214 OFFICE O/P EST MOD 30 MIN: CPT | Mod: S$GLB,,, | Performed by: INTERNAL MEDICINE

## 2019-04-12 PROCEDURE — 1101F PR PT FALLS ASSESS DOC 0-1 FALLS W/OUT INJ PAST YR: ICD-10-PCS | Mod: CPTII,S$GLB,, | Performed by: INTERNAL MEDICINE

## 2019-04-12 PROCEDURE — 3078F PR MOST RECENT DIASTOLIC BLOOD PRESSURE < 80 MM HG: ICD-10-PCS | Mod: CPTII,S$GLB,, | Performed by: INTERNAL MEDICINE

## 2019-04-12 PROCEDURE — 1101F PT FALLS ASSESS-DOCD LE1/YR: CPT | Mod: CPTII,S$GLB,, | Performed by: INTERNAL MEDICINE

## 2019-04-12 PROCEDURE — 3078F DIAST BP <80 MM HG: CPT | Mod: CPTII,S$GLB,, | Performed by: INTERNAL MEDICINE

## 2019-04-12 PROCEDURE — 99499 RISK ADDL DX/OHS AUDIT: ICD-10-PCS | Mod: S$GLB,,, | Performed by: INTERNAL MEDICINE

## 2019-04-12 PROCEDURE — 99999 PR PBB SHADOW E&M-EST. PATIENT-LVL III: ICD-10-PCS | Mod: PBBFAC,,, | Performed by: INTERNAL MEDICINE

## 2019-04-12 PROCEDURE — 99499 UNLISTED E&M SERVICE: CPT | Mod: S$GLB,,, | Performed by: INTERNAL MEDICINE

## 2019-04-12 PROCEDURE — 99999 PR PBB SHADOW E&M-EST. PATIENT-LVL III: CPT | Mod: PBBFAC,,, | Performed by: INTERNAL MEDICINE

## 2019-04-12 PROCEDURE — 99214 PR OFFICE/OUTPT VISIT, EST, LEVL IV, 30-39 MIN: ICD-10-PCS | Mod: S$GLB,,, | Performed by: INTERNAL MEDICINE

## 2019-04-12 RX ORDER — POTASSIUM CHLORIDE 750 MG/1
10 CAPSULE, EXTENDED RELEASE ORAL DAILY
Qty: 90 CAPSULE | Refills: 1 | Status: SHIPPED | OUTPATIENT
Start: 2019-04-12 | End: 2019-09-30 | Stop reason: SDUPTHER

## 2019-04-12 RX ORDER — POTASSIUM CHLORIDE 750 MG/1
10 CAPSULE, EXTENDED RELEASE ORAL DAILY
Qty: 15 CAPSULE | Refills: 1
Start: 2019-04-12 | End: 2019-04-12 | Stop reason: SDUPTHER

## 2019-04-12 RX ORDER — FUROSEMIDE 20 MG/1
20 TABLET ORAL EVERY OTHER DAY
Qty: 45 TABLET | Refills: 5 | Status: SHIPPED | OUTPATIENT
Start: 2019-04-12 | End: 2019-05-14 | Stop reason: SDUPTHER

## 2019-04-15 ENCOUNTER — LAB VISIT (OUTPATIENT)
Dept: LAB | Facility: HOSPITAL | Age: 79
End: 2019-04-15
Attending: FAMILY MEDICINE
Payer: MEDICARE

## 2019-04-15 ENCOUNTER — OFFICE VISIT (OUTPATIENT)
Dept: FAMILY MEDICINE | Facility: CLINIC | Age: 79
End: 2019-04-15
Payer: MEDICARE

## 2019-04-15 VITALS
DIASTOLIC BLOOD PRESSURE: 61 MMHG | HEART RATE: 58 BPM | TEMPERATURE: 98 F | WEIGHT: 212.81 LBS | HEIGHT: 72 IN | SYSTOLIC BLOOD PRESSURE: 121 MMHG | BODY MASS INDEX: 28.82 KG/M2

## 2019-04-15 DIAGNOSIS — I27.20 PULMONARY HYPERTENSION: Primary | ICD-10-CM

## 2019-04-15 DIAGNOSIS — R79.89 ELEVATED BRAIN NATRIURETIC PEPTIDE (BNP) LEVEL: ICD-10-CM

## 2019-04-15 DIAGNOSIS — I27.20 PULMONARY HYPERTENSION: ICD-10-CM

## 2019-04-15 LAB
ANION GAP SERPL CALC-SCNC: 7 MMOL/L (ref 8–16)
BNP SERPL-MCNC: 210 PG/ML (ref 0–99)
BUN SERPL-MCNC: 18 MG/DL (ref 8–23)
CALCIUM SERPL-MCNC: 9.5 MG/DL (ref 8.7–10.5)
CHLORIDE SERPL-SCNC: 105 MMOL/L (ref 95–110)
CO2 SERPL-SCNC: 30 MMOL/L (ref 23–29)
CREAT SERPL-MCNC: 1.2 MG/DL (ref 0.5–1.4)
EST. GFR  (AFRICAN AMERICAN): >60 ML/MIN/1.73 M^2
EST. GFR  (NON AFRICAN AMERICAN): 56.8 ML/MIN/1.73 M^2
GLUCOSE SERPL-MCNC: 115 MG/DL (ref 70–110)
POTASSIUM SERPL-SCNC: 3.9 MMOL/L (ref 3.5–5.1)
SODIUM SERPL-SCNC: 142 MMOL/L (ref 136–145)

## 2019-04-15 PROCEDURE — 99214 OFFICE O/P EST MOD 30 MIN: CPT | Mod: S$GLB,,, | Performed by: FAMILY MEDICINE

## 2019-04-15 PROCEDURE — 36415 COLL VENOUS BLD VENIPUNCTURE: CPT | Mod: PO

## 2019-04-15 PROCEDURE — 1101F PT FALLS ASSESS-DOCD LE1/YR: CPT | Mod: CPTII,S$GLB,, | Performed by: FAMILY MEDICINE

## 2019-04-15 PROCEDURE — 99999 PR PBB SHADOW E&M-EST. PATIENT-LVL IV: ICD-10-PCS | Mod: PBBFAC,,, | Performed by: FAMILY MEDICINE

## 2019-04-15 PROCEDURE — 3074F SYST BP LT 130 MM HG: CPT | Mod: CPTII,S$GLB,, | Performed by: FAMILY MEDICINE

## 2019-04-15 PROCEDURE — 3074F PR MOST RECENT SYSTOLIC BLOOD PRESSURE < 130 MM HG: ICD-10-PCS | Mod: CPTII,S$GLB,, | Performed by: FAMILY MEDICINE

## 2019-04-15 PROCEDURE — 1101F PR PT FALLS ASSESS DOC 0-1 FALLS W/OUT INJ PAST YR: ICD-10-PCS | Mod: CPTII,S$GLB,, | Performed by: FAMILY MEDICINE

## 2019-04-15 PROCEDURE — 99999 PR PBB SHADOW E&M-EST. PATIENT-LVL IV: CPT | Mod: PBBFAC,,, | Performed by: FAMILY MEDICINE

## 2019-04-15 PROCEDURE — 3078F DIAST BP <80 MM HG: CPT | Mod: CPTII,S$GLB,, | Performed by: FAMILY MEDICINE

## 2019-04-15 PROCEDURE — 80048 BASIC METABOLIC PNL TOTAL CA: CPT

## 2019-04-15 PROCEDURE — 99214 PR OFFICE/OUTPT VISIT, EST, LEVL IV, 30-39 MIN: ICD-10-PCS | Mod: S$GLB,,, | Performed by: FAMILY MEDICINE

## 2019-04-15 PROCEDURE — 3078F PR MOST RECENT DIASTOLIC BLOOD PRESSURE < 80 MM HG: ICD-10-PCS | Mod: CPTII,S$GLB,, | Performed by: FAMILY MEDICINE

## 2019-04-15 PROCEDURE — 83880 ASSAY OF NATRIURETIC PEPTIDE: CPT

## 2019-04-15 NOTE — PROGRESS NOTES
Subjective:      Patient ID: Rudy Saldaña is a 78 y.o. male.    Chief Complaint: Follow-up    HPI he is f/u on his heart. He was seen by the NP who showed he had an increased BNP and he was referred to Dr. Heath.  He had an Echo. He sometimes has a cough but he has not had Cp or SOB noted.    CONCLUSIONS     1 - Moderate left atrial enlargement.     2 - Concentric hypertrophy.     3 - No wall motion abnormalities.     4 - Normal left ventricular systolic function (EF 60-65%).     5 - Impaired LV relaxation, elevated LAP (grade 2 diastolic dysfunction).     6 - Normal right ventricular systolic function .     7 - Pulmonary hypertension. The estimated PA systolic pressure is greater than 41 mmHg.     He was asked to have labs but they have not been entered in the system yet.  I will do that.    The patient presents with essential hypertension.  The patient is tolerating the medication well and is in excellent compliance.  The patient is experiencing no side effects.  Counseling was offered regarding low salt diets.  The patient has a reduced salt intake.  The patient denies chest pain, palpitations, shortness of breath, dyspnea on exertion, left or murmur neck pain, nausea, vomiting, diaphoresis, paroxysmal nocturnal dyspnea, and orthopnea.   Hypertension Medications             benazepril (LOTENSIN) 40 MG tablet Take 1 tablet (40 mg total) by mouth 2 (two) times daily.    furosemide (LASIX) 20 MG tablet Take 1 tablet (20 mg total) by mouth every other day.             Health Maintenance Due   Topic Date Due    Eye Exam  11/08/2018       Past Medical History:  Past Medical History:   Diagnosis Date    Anemia     BPH (benign prostatic hypertrophy)     Carotid bruit     CVA (cerebral vascular accident)     Diabetes mellitus, type 2     Hyperlipidemia     Hypertension     Hypothyroidism 2/29/2016    Type 2 diabetes mellitus, uncontrolled, with renal complications     The patient presents with diabetes.   The patient denies polyuria, polydipsia, polyphagia, hypoglycemia and paresthesias.  The patient's glucose control has been good.  Home glucose averages are routinely checked.  The patient is without retinopathy currently.  The patient has no history of neuropathy.  The patient currently complains of no podiatric problems.  The patient has excellent compliance.    Type II or unspecified type diabetes mellitus without mention of complication, not stated as uncontrolled     Vitamin D deficiency 2/29/2016     Past Surgical History:   Procedure Laterality Date    COLONOSCOPY  2008    COLONOSCOPY  5/31/2005    repeat in 2015    PILONIDAL CYST DRAINAGE      TRANSURETHRAL RESECTION OF PROSTATE       Review of patient's allergies indicates:   Allergen Reactions    No known drug allergies      Current Outpatient Medications on File Prior to Visit   Medication Sig Dispense Refill    acarbose (PRECOSE) 100 MG Tab TAKE 1 TABLET(100 MG) BY MOUTH THREE TIMES DAILY WITH MEALS 270 tablet 0    aspirin (ECOTRIN) 81 MG EC tablet Take 81 mg by mouth once daily.      atorvastatin (LIPITOR) 20 MG tablet Take 1 tablet (20 mg total) by mouth once daily. 90 tablet 3    benazepril (LOTENSIN) 40 MG tablet Take 1 tablet (40 mg total) by mouth 2 (two) times daily. (Patient taking differently: Take 40 mg by mouth once daily. ) 180 tablet 3    blood sugar diagnostic Strp 1 each by Misc.(Non-Drug; Combo Route) route 3 (three) times daily as needed. One touch ultra 2 test strips 100 each 5    clopidogrel (PLAVIX) 75 mg tablet Take 1 tablet (75 mg total) by mouth once daily. 90 tablet 3    ergocalciferol (ERGOCALCIFEROL) 50,000 unit Cap Take 1 capsule weekly 24 capsule 3    ferrous sulfate 325 mg (65 mg iron) Tab tablet Take 1 tablet (325 mg total) by mouth 3 (three) times daily. 270 tablet 3    furosemide (LASIX) 20 MG tablet Take 1 tablet (20 mg total) by mouth every other day. 45 tablet 5    galantamine (RAZADYNE) 8 MG Tab Take 1  tablet (8 mg total) by mouth 2 (two) times daily with meals. 180 tablet 3    glipiZIDE (GLUCOTROL) 10 MG tablet TAKE 1 TABLET(10 MG) BY MOUTH TWICE DAILY BEFORE MEALS 180 tablet 2    ketoconazole (NIZORAL) 2 % shampoo       lancets (TRUEPLUS LANCETS) 33 gauge Misc 1 lancet by Misc.(Non-Drug; Combo Route) route once daily. 100 each 5    levothyroxine (SYNTHROID) 25 MCG tablet take 1 tablet by mouth every morning ON AN EMPTY STOMACH 90 tablet 3    MITIGARE 0.6 mg Cap Take 1 capsule (0.6 mg total) by mouth Daily. 90 capsule 3    niacin (NIASPAN) 1000 MG CR tablet Take 1 tablet (1,000 mg total) by mouth every evening. 90 tablet 3    pioglitazone (ACTOS) 30 MG tablet Take 1 tablet (30 mg total) by mouth once daily. 90 tablet 2    potassium chloride (MICRO-K) 10 MEQ CpSR Take 1 capsule (10 mEq total) by mouth once daily. 90 capsule 1    SITagliptin (JANUVIA) 100 MG Tab TAKE 1 TABLET(100 MG) BY MOUTH EVERY DAY 90 tablet 2    ONETOUCH VERIO IQ METER kit Use as instructed 1 each 0    [DISCONTINUED] triamcinolone acetonide 0.5% (KENALOG) 0.5 % Crea        No current facility-administered medications on file prior to visit.      Social History     Socioeconomic History    Marital status:      Spouse name: Not on file    Number of children: Not on file    Years of education: Not on file    Highest education level: Not on file   Occupational History    Not on file   Social Needs    Financial resource strain: Not on file    Food insecurity:     Worry: Not on file     Inability: Not on file    Transportation needs:     Medical: Not on file     Non-medical: Not on file   Tobacco Use    Smoking status: Never Smoker    Smokeless tobacco: Never Used   Substance and Sexual Activity    Alcohol use: No     Alcohol/week: 0.0 oz    Drug use: No    Sexual activity: Not on file   Lifestyle    Physical activity:     Days per week: Not on file     Minutes per session: Not on file    Stress: Not on file    Relationships    Social connections:     Talks on phone: Not on file     Gets together: Not on file     Attends Synagogue service: Not on file     Active member of club or organization: Not on file     Attends meetings of clubs or organizations: Not on file     Relationship status: Not on file   Other Topics Concern    Not on file   Social History Narrative    Not on file     Family History   Problem Relation Age of Onset    Hypertension Mother     Hypertension Father     Heart attack Maternal Uncle     Brain cancer Sister     Diabetes Neg Hx     Stroke Neg Hx              Review of Systems   Constitutional: Negative.  Negative for chills, diaphoresis and fever.   HENT: Negative for congestion, hearing loss, mouth sores, postnasal drip and sore throat.    Eyes: Negative for pain and visual disturbance.   Respiratory: Negative for cough, chest tightness, shortness of breath and wheezing.    Cardiovascular: Negative for chest pain.   Gastrointestinal: Negative for abdominal pain, anal bleeding, blood in stool, constipation, diarrhea, nausea and vomiting.   Genitourinary: Negative for dysuria and hematuria.   Musculoskeletal: Negative for back pain, neck pain and neck stiffness.   Skin: Negative for rash.   Neurological: Negative for dizziness and weakness.       Objective:   /61   Pulse (!) 58   Temp 97.6 °F (36.4 °C) (Oral)   Ht 6' (1.829 m)   Wt 96.5 kg (212 lb 12.8 oz)   BMI 28.86 kg/m²     Physical Exam   Constitutional: He is oriented to person, place, and time. He appears well-developed and well-nourished.   HENT:   Head: Normocephalic and atraumatic.   Right Ear: External ear normal.   Left Ear: External ear normal.   Nose: Nose normal.   Mouth/Throat: Oropharynx is clear and moist. No oropharyngeal exudate.   Eyes: Pupils are equal, round, and reactive to light. Conjunctivae and EOM are normal. Right eye exhibits no discharge. Left eye exhibits no discharge. No scleral icterus.   Neck:  Normal range of motion. Neck supple. No JVD present. No thyromegaly present.   Cardiovascular: Normal rate, regular rhythm, normal heart sounds and intact distal pulses. Exam reveals no gallop and no friction rub.   No murmur heard.  Pulmonary/Chest: Effort normal and breath sounds normal. No respiratory distress. He has no wheezes. He has no rales. He exhibits no tenderness.   Abdominal: Soft. Bowel sounds are normal. He exhibits no distension and no mass. There is no tenderness. There is no rebound and no guarding.   Musculoskeletal: Normal range of motion. He exhibits no edema or tenderness.   Lymphadenopathy:     He has no cervical adenopathy.   Neurological: He is alert and oriented to person, place, and time. No cranial nerve deficit. Coordination normal.   Skin: Skin is warm and dry. He is not diaphoretic.   Psychiatric: He has a normal mood and affect.       Assessment:     1. Pulmonary hypertension    2. Elevated brain natriuretic peptide (BNP) level        Plan:   Rudy was seen today for follow-up.    Diagnoses and all orders for this visit:    Pulmonary hypertension  -     Brain natriuretic peptide; Future  -     Basic metabolic panel; Future    Elevated brain natriuretic peptide (BNP) level  -     Brain natriuretic peptide; Future      I will make recommendations when I GET THE RESULTS BACK.

## 2019-04-15 NOTE — Clinical Note
Dr. Rodriguez, I am writing to you on behalf of our mutual patient, Mr. Saldaña.  He has had trouble with scheduling the CT that you wanted him to have in Feb.  He has never heard anything from [facility] about this scan that I see your MA had done some work on to fax orders.  He had come to see one of our NP's in March and he asked about this study and she called [facility] and they did not know anything about the testing that you are requesting.  They also had no evidence of the referral request.  I see the notes from your MA confirming receipt of this.  However, at this point, would you resubmit what you are wanting to have arranged and have your staff help this patient in the scheduling process of this test that you are wanting so that we can help him get the diagnosis that you are needing to give him some relief? [facility]

## 2019-04-16 ENCOUNTER — OFFICE VISIT (OUTPATIENT)
Dept: PODIATRY | Facility: CLINIC | Age: 79
End: 2019-04-16
Payer: MEDICARE

## 2019-04-16 VITALS
HEIGHT: 72 IN | WEIGHT: 213.63 LBS | HEART RATE: 56 BPM | SYSTOLIC BLOOD PRESSURE: 160 MMHG | TEMPERATURE: 98 F | BODY MASS INDEX: 28.93 KG/M2 | DIASTOLIC BLOOD PRESSURE: 76 MMHG

## 2019-04-16 DIAGNOSIS — E11.49 TYPE II DIABETES MELLITUS WITH NEUROLOGICAL MANIFESTATIONS: Primary | ICD-10-CM

## 2019-04-16 DIAGNOSIS — M20.41 HAMMER TOES OF BOTH FEET: ICD-10-CM

## 2019-04-16 DIAGNOSIS — M20.42 HAMMER TOES OF BOTH FEET: ICD-10-CM

## 2019-04-16 DIAGNOSIS — M1A.0710 CHRONIC GOUT OF RIGHT FOOT, UNSPECIFIED CAUSE: ICD-10-CM

## 2019-04-16 DIAGNOSIS — B35.1 DERMATOPHYTOSIS OF NAIL: ICD-10-CM

## 2019-04-16 PROCEDURE — 99214 OFFICE O/P EST MOD 30 MIN: CPT | Mod: 25,S$GLB,, | Performed by: PODIATRIST

## 2019-04-16 PROCEDURE — 3077F SYST BP >= 140 MM HG: CPT | Mod: CPTII,S$GLB,, | Performed by: PODIATRIST

## 2019-04-16 PROCEDURE — 1101F PT FALLS ASSESS-DOCD LE1/YR: CPT | Mod: CPTII,S$GLB,, | Performed by: PODIATRIST

## 2019-04-16 PROCEDURE — 11721 DEBRIDE NAIL 6 OR MORE: CPT | Mod: Q9,S$GLB,, | Performed by: PODIATRIST

## 2019-04-16 PROCEDURE — 3078F DIAST BP <80 MM HG: CPT | Mod: CPTII,S$GLB,, | Performed by: PODIATRIST

## 2019-04-16 PROCEDURE — 1101F PR PT FALLS ASSESS DOC 0-1 FALLS W/OUT INJ PAST YR: ICD-10-PCS | Mod: CPTII,S$GLB,, | Performed by: PODIATRIST

## 2019-04-16 PROCEDURE — 99214 PR OFFICE/OUTPT VISIT, EST, LEVL IV, 30-39 MIN: ICD-10-PCS | Mod: 25,S$GLB,, | Performed by: PODIATRIST

## 2019-04-16 PROCEDURE — 99999 PR PBB SHADOW E&M-EST. PATIENT-LVL III: ICD-10-PCS | Mod: PBBFAC,,, | Performed by: PODIATRIST

## 2019-04-16 PROCEDURE — 3077F PR MOST RECENT SYSTOLIC BLOOD PRESSURE >= 140 MM HG: ICD-10-PCS | Mod: CPTII,S$GLB,, | Performed by: PODIATRIST

## 2019-04-16 PROCEDURE — 11721 PR DEBRIDEMENT OF NAILS, 6 OR MORE: ICD-10-PCS | Mod: Q9,S$GLB,, | Performed by: PODIATRIST

## 2019-04-16 PROCEDURE — 99999 PR PBB SHADOW E&M-EST. PATIENT-LVL III: CPT | Mod: PBBFAC,,, | Performed by: PODIATRIST

## 2019-04-16 PROCEDURE — 3078F PR MOST RECENT DIASTOLIC BLOOD PRESSURE < 80 MM HG: ICD-10-PCS | Mod: CPTII,S$GLB,, | Performed by: PODIATRIST

## 2019-04-16 NOTE — PROGRESS NOTES
The electrolytes are stable at this time.  The glucose is a little high but this was not fasting.  The BNP is also stable at this time.  I would not change anything as the blood pressure was controlled today and there aren't symptoms or chest pain, shortness of breath or major swelling being noted.  Let me know if there are any changes.

## 2019-04-16 NOTE — PROGRESS NOTES
Subjective:     Patient ID: Rudy Saldaña is a 78 y.o. male.    Chief Complaint: Follow-up (6 month follow up ) and Nail Care (routine nail care )    Rudy is a 78 y.o. male who presents to the clinic for evaluation and treatment of high risk feet. Rudy has a past medical history of Anemia, BPH (benign prostatic hypertrophy), Carotid bruit, CKD (chronic kidney disease) stage 2/3, CVA (cerebral vascular accident), Diabetes mellitus, type 2, Hyperlipidemia, Hypertension, Hypothyroidism (2/29/2016), Type 2 diabetes mellitus, uncontrolled, with renal complications, Type II or unspecified type diabetes mellitus without mention of complication, not stated as uncontrolled, and Vitamin D deficiency (2/29/2016). The patient's chief complaint is long, thick toenails. This patient has documented high risk feet requiring routine maintenance secondary to peripheral neuropathy. Patient states he still has pain in both feet and he did see rheumatologist who took him off gout medication. Patient states he was suppose to have a test(CT) done but has not heard from the office.     PCP: Mohinder Morfin MD    Date Last Seen by PCP: 04/15/19    Current shoe gear:  Affected Foot: Tennis shoes     Unaffected Foot: tennis  shoes    Hemoglobin A1C   Date Value Ref Range Status   03/19/2019 6.6 (H) 4.0 - 5.6 % Final     Comment:     ADA Screening Guidelines:  5.7-6.4%  Consistent with prediabetes  >or=6.5%  Consistent with diabetes  High levels of fetal hemoglobin interfere with the HbA1C  assay. Heterozygous hemoglobin variants (HbS, HgC, etc)do  not significantly interfere with this assay.   However, presence of multiple variants may affect accuracy.     09/14/2018 6.9 (H) 4.0 - 5.6 % Final     Comment:     ADA Screening Guidelines:  5.7-6.4%  Consistent with prediabetes  >or=6.5%  Consistent with diabetes  High levels of fetal hemoglobin interfere with the HbA1C  assay. Heterozygous hemoglobin variants (HbS, HgC, etc)do  not  significantly interfere with this assay.   However, presence of multiple variants may affect accuracy.     03/13/2018 6.9 (H) 4.0 - 5.6 % Final     Comment:     According to ADA guidelines, hemoglobin A1c <7.0% represents  optimal control in non-pregnant diabetic patients. Different  metrics may apply to specific patient populations.   Standards of Medical Care in Diabetes-2016.  For the purpose of screening for the presence of diabetes:  <5.7%     Consistent with the absence of diabetes  5.7-6.4%  Consistent with increasing risk for diabetes   (prediabetes)  >or=6.5%  Consistent with diabetes  Currently, no consensus exists for use of hemoglobin A1c  for diagnosis of diabetes for children.  This Hemoglobin A1c assay has significant interference with fetal   hemoglobin   (HbF). The results are invalid for patients with abnormal amounts of   HbF,   including those with known Hereditary Persistence   of Fetal Hemoglobin. Heterozygous hemoglobin variants (HbAS, HbAC,   HbAD, HbAE, HbA2) do not significantly interfere with this assay;   however, presence of multiple variants in a sample may impact the %   interference.             Patient Active Problem List   Diagnosis    Combined hyperlipidemia associated with type 2 diabetes mellitus    Hypertension associated with diabetes    Type 2 diabetes mellitus, uncontrolled, with renal complications    History of stroke    BPH (benign prostatic hypertrophy)    History of colon polyps    Iron deficiency anemia    Polyneuropathy due to secondary diabetes    DEVIKA on CPAP    Family history of colonic polyps    Vitamin D deficiency    Hypothyroidism    Anemia    Gross hematuria    CKD stage 3 secondary to diabetes    Facet arthropathy, lumbar    Right hip pain    Primary osteoarthritis of right hip    Osteoarthritis of spine with radiculopathy, lumbar region    Acute idiopathic gout of left hand    Chronic fatigue    Crystal arthritis       Medication List  with Changes/Refills   Current Medications    ACARBOSE (PRECOSE) 100 MG TAB    TAKE 1 TABLET(100 MG) BY MOUTH THREE TIMES DAILY WITH MEALS    ASPIRIN (ECOTRIN) 81 MG EC TABLET    Take 81 mg by mouth once daily.    ATORVASTATIN (LIPITOR) 20 MG TABLET    Take 1 tablet (20 mg total) by mouth once daily.    BENAZEPRIL (LOTENSIN) 40 MG TABLET    Take 1 tablet (40 mg total) by mouth 2 (two) times daily.    BLOOD SUGAR DIAGNOSTIC STRP    1 each by Misc.(Non-Drug; Combo Route) route 3 (three) times daily as needed. One touch ultra 2 test strips    CLOPIDOGREL (PLAVIX) 75 MG TABLET    Take 1 tablet (75 mg total) by mouth once daily.    FERROUS SULFATE 325 MG (65 MG IRON) TAB TABLET    Take 1 tablet (325 mg total) by mouth 3 (three) times daily.    FUROSEMIDE (LASIX) 20 MG TABLET    Take 1 tablet (20 mg total) by mouth every other day.    GALANTAMINE (RAZADYNE) 8 MG TAB    Take 1 tablet (8 mg total) by mouth 2 (two) times daily with meals.    GLIPIZIDE (GLUCOTROL) 10 MG TABLET    TAKE 1 TABLET(10 MG) BY MOUTH TWICE DAILY BEFORE MEALS    KETOCONAZOLE (NIZORAL) 2 % SHAMPOO        LANCETS (TRUEPLUS LANCETS) 33 GAUGE MISC    1 lancet by Misc.(Non-Drug; Combo Route) route once daily.    LEVOTHYROXINE (SYNTHROID) 25 MCG TABLET    take 1 tablet by mouth every morning ON AN EMPTY STOMACH    MITIGARE 0.6 MG CAP    Take 1 capsule (0.6 mg total) by mouth Daily.    NIACIN (NIASPAN) 1000 MG CR TABLET    Take 1 tablet (1,000 mg total) by mouth every evening.    ONETOUCH VERIO IQ METER KIT    Use as instructed    PIOGLITAZONE (ACTOS) 30 MG TABLET    Take 1 tablet (30 mg total) by mouth once daily.    POTASSIUM CHLORIDE (MICRO-K) 10 MEQ CPSR    Take 1 capsule (10 mEq total) by mouth once daily.    SITAGLIPTIN (JANUVIA) 100 MG TAB    TAKE 1 TABLET(100 MG) BY MOUTH EVERY DAY   Changed and/or Refilled Medications    Modified Medication Previous Medication    ERGOCALCIFEROL (ERGOCALCIFEROL) 50,000 UNIT CAP ergocalciferol (ERGOCALCIFEROL) 50,000  unit Cap       TAKE 1 CAPSULE BY MOUTH 2 TIMES A WEEK    Take 1 capsule twice weekly   Discontinued Medications    ERGOCALCIFEROL (ERGOCALCIFEROL) 50,000 UNIT CAP    Take 1 capsule weekly       Review of patient's allergies indicates:   Allergen Reactions    No known drug allergies        Past Surgical History:   Procedure Laterality Date    COLONOSCOPY  2008    COLONOSCOPY  5/31/2005    repeat in 2015    PILONIDAL CYST DRAINAGE      TRANSURETHRAL RESECTION OF PROSTATE         Family History   Problem Relation Age of Onset    Hypertension Mother     Hypertension Father     Heart attack Maternal Uncle     Brain cancer Sister     Diabetes Neg Hx     Stroke Neg Hx        Social History     Socioeconomic History    Marital status:      Spouse name: Not on file    Number of children: Not on file    Years of education: Not on file    Highest education level: Not on file   Occupational History    Not on file   Social Needs    Financial resource strain: Not on file    Food insecurity:     Worry: Not on file     Inability: Not on file    Transportation needs:     Medical: Not on file     Non-medical: Not on file   Tobacco Use    Smoking status: Never Smoker    Smokeless tobacco: Never Used   Substance and Sexual Activity    Alcohol use: No     Alcohol/week: 0.0 oz    Drug use: No    Sexual activity: Not on file   Lifestyle    Physical activity:     Days per week: Not on file     Minutes per session: Not on file    Stress: Not on file   Relationships    Social connections:     Talks on phone: Not on file     Gets together: Not on file     Attends Church service: Not on file     Active member of club or organization: Not on file     Attends meetings of clubs or organizations: Not on file     Relationship status: Not on file   Other Topics Concern    Not on file   Social History Narrative    Not on file       Vitals:    04/16/19 1123   BP: (!) 160/76   Pulse: (!) 56   Temp: 97.5 °F (36.4  °C)   TempSrc: Oral   Weight: 96.9 kg (213 lb 9.6 oz)   Height: 6' (1.829 m)   PainSc:   2       Hemoglobin A1C   Date Value Ref Range Status   03/19/2019 6.6 (H) 4.0 - 5.6 % Final     Comment:     ADA Screening Guidelines:  5.7-6.4%  Consistent with prediabetes  >or=6.5%  Consistent with diabetes  High levels of fetal hemoglobin interfere with the HbA1C  assay. Heterozygous hemoglobin variants (HbS, HgC, etc)do  not significantly interfere with this assay.   However, presence of multiple variants may affect accuracy.     09/14/2018 6.9 (H) 4.0 - 5.6 % Final     Comment:     ADA Screening Guidelines:  5.7-6.4%  Consistent with prediabetes  >or=6.5%  Consistent with diabetes  High levels of fetal hemoglobin interfere with the HbA1C  assay. Heterozygous hemoglobin variants (HbS, HgC, etc)do  not significantly interfere with this assay.   However, presence of multiple variants may affect accuracy.     03/13/2018 6.9 (H) 4.0 - 5.6 % Final     Comment:     According to ADA guidelines, hemoglobin A1c <7.0% represents  optimal control in non-pregnant diabetic patients. Different  metrics may apply to specific patient populations.   Standards of Medical Care in Diabetes-2016.  For the purpose of screening for the presence of diabetes:  <5.7%     Consistent with the absence of diabetes  5.7-6.4%  Consistent with increasing risk for diabetes   (prediabetes)  >or=6.5%  Consistent with diabetes  Currently, no consensus exists for use of hemoglobin A1c  for diagnosis of diabetes for children.  This Hemoglobin A1c assay has significant interference with fetal   hemoglobin   (HbF). The results are invalid for patients with abnormal amounts of   HbF,   including those with known Hereditary Persistence   of Fetal Hemoglobin. Heterozygous hemoglobin variants (HbAS, HbAC,   HbAD, HbAE, HbA2) do not significantly interfere with this assay;   however, presence of multiple variants in a sample may impact the %   interference.          Review of Systems   Constitutional: Negative for chills and fever.   Respiratory: Negative for shortness of breath.    Cardiovascular: Negative for chest pain, palpitations, orthopnea, claudication and leg swelling.   Gastrointestinal: Negative for diarrhea, nausea and vomiting.   Musculoskeletal: Negative for joint pain.   Skin: Negative for rash.   Neurological: Positive for tingling and sensory change. Negative for dizziness, focal weakness and weakness.   Psychiatric/Behavioral: Negative.              Objective:   PHYSICAL EXAM: Apperance: Alert and orient in no distress,well developed, and with good attention to grooming and body habits  Patient presents ambulating in tennis shoes.   LOWER EXTREMITY EXAM:  VASCULAR: Dorsalis pedis pulses 2/4 bilateral and Posterior Tibial pulses 2/4 bilateral. Capillary fill time <4 seconds bilateral. No edema observed bilateral. Varicosities present bilateral. Skin temperature of the lower extremities is warm to warm, proximal to distal. Hair growth WNL bilateral.  DERMATOLOGICAL: No skin rashes, subcutaneous nodules, lesions, or ulcers observed bilateral. Nails 1,2,3,4,5 right and 2,3,4,5 left thickened. Left hallux nail absent. Webspaces 1-4 clean, dry and without evidence of break in skin integrity bilateral.   NEUROLOGICAL: Light touch, sharp-dull, proprioception all present and equal bilaterally.  Vibratory sensation absent at bilateral hallux and diminished at bilateral navicular . Protective sensation absent at 4/10 sites as tested with a Crawford-Chelsy 5.07 monofilament.   MUSCULOSKELETAL: Muscle strength is 5/5 for foot inverters, everters, plantarflexors, and dorsiflexors. Muscle tone is normal. No pain on palpation left lateral midfoot. Minimal bony prominence noted to bilateral lateral midfoot at cuboid-metatarsal joint.       Assessment:   Type II diabetes mellitus with neurological manifestations  -     DIABETIC SHOES FOR HOME USE    Hammer toes of both  feet  -     DIABETIC SHOES FOR HOME USE    Chronic gout of right foot, unspecified cause  -     DIABETIC SHOES FOR HOME USE  -     Sedimentation rate; Future; Expected date: 04/16/2019  -     C-reactive protein; Future; Expected date: 04/16/2019  -     Uric acid; Future; Expected date: 04/16/2019    Dermatophytosis of nail          Plan:   Type II diabetes mellitus with neurological manifestations  -     DIABETIC SHOES FOR HOME USE    Hammer toes of both feet  -     DIABETIC SHOES FOR HOME USE    Chronic gout of right foot, unspecified cause  -     DIABETIC SHOES FOR HOME USE  -     Sedimentation rate; Future; Expected date: 04/16/2019  -     C-reactive protein; Future; Expected date: 04/16/2019  -     Uric acid; Future; Expected date: 04/16/2019    Dermatophytosis of nail      I counseled the patient on his conditions, regarding findings of my examination, my impressions, and usual treatment plan.   Greater than 50% of this visit spent on counseling and coordination of care.  Greater than 15 minutes of a 20 minute appointment spent on education about the diabetic foot, neuropathy, and prevention of limb loss.  Shoe inspection. Diabetic Foot Education. Patient reminded of the importance of good nutrition and blood sugar control to help prevent podiatric complications of diabetes. Patient instructed on proper foot hygeine. We discussed wearing proper shoe gear, daily foot inspections, never walking without protective shoe gear, never putting sharp instruments to feet.    With patient's permission, nails 1-5 bilateral were debrided/trimmed in length and thickness aggressively to their soft tissue attachment mechanically and with electric , removing all offending nail and debris. Patient relates relief following the procedure.  Prescription written for Diabetic shoes and inserts.   Ordered uric acid, crp, and esr testing to be completed.  Patient  will continue to monitor the areas daily, inspect feet, wear  protective shoe gear when ambulatory, moisturizer to maintain skin integrity. Patient reminded of the importance of good nutrition and blood sugar control to help prevent podiatric complications of diabetes.  Patient to return 4 months or sooner if needed.                 Padmaja Dave DPM  [facility]

## 2019-04-18 DIAGNOSIS — E55.9 VITAMIN D DEFICIENCY: ICD-10-CM

## 2019-04-18 RX ORDER — ERGOCALCIFEROL 1.25 MG/1
CAPSULE ORAL
Qty: 26 CAPSULE | Refills: 3 | Status: SHIPPED | OUTPATIENT
Start: 2019-04-18 | End: 2020-03-16

## 2019-04-20 NOTE — PROGRESS NOTES
Subjective:       Patient ID: Rudy Saldaña is a 78 y.o. White male who presents for new evaluation of Chronic Kidney Disease and Hypokalemia    HPI     Initial consult--7/2018: He is referred by his PCP for CKD  He has a significant history of HTN, DM and gout, last Hba1c was 6.9, highest 7.6 in 2015.   He denies foamy urine, no hematuria and no flank pain. He admits to a high sodium diet but feels he stays hydrated. No LE edema and no SOB. No NSAID use and no herbal medications. He's quite sedentary, no routine exercise. No known family history of kidney disease    Interval history: He complains of various joint pains and saw Rheumatology. He is on prednisone and notes his BS are running high. He was scripted Insulin Lantus plus ISS but he has never given himself an injection and is wary of using it. No NSAID use for his arthralgias, Tylenol use but he is even concerned with using that. No LE edema and no SOB. Nocturia os 0-2X. He denies calcium supplements, Tums, Rolaids, large amounts of calcium based foods. His wife has a BP wrist type at home that is several years old.   Sister accompanies him today. He received the flu vaccine    Interval history April 2019: he developed 'gout' in various joints which basically crippled him and now he lower body is deconditioned--he is having trouble standing without using his arms for support. He is attending a gym and using equipment to strengthen LE. Otherwise he feels OK.     Review of Systems   Constitutional: Positive for fatigue. Negative for activity change, appetite change and unexpected weight change.   HENT: Negative for facial swelling.    Respiratory: Negative for cough and shortness of breath.    Cardiovascular: Negative for chest pain and leg swelling.   Gastrointestinal: Positive for constipation (uses stool softeners).   Genitourinary: Positive for frequency. Negative for difficulty urinating, dysuria, hematuria and urgency.   Musculoskeletal: Positive for  arthralgias.   Neurological: Negative for weakness and headaches.   Hematological: Does not bruise/bleed easily.   Psychiatric/Behavioral: Negative for confusion.       Objective:      Physical Exam   Constitutional: He is oriented to person, place, and time. He appears well-developed and well-nourished. No distress.   Neck: No JVD present.   Cardiovascular: S1 normal and S2 normal. Exam reveals no friction rub.   Pulmonary/Chest: Breath sounds normal. He has no wheezes. He has no rales.   Abdominal: Soft.   Musculoskeletal: He exhibits no edema.   Neurological: He is alert and oriented to person, place, and time.   Skin: Skin is warm and dry.   Psychiatric: He has a normal mood and affect.       Assessment:       1. CKD (chronic kidney disease) stage 3, GFR 30-59 ml/min    2. CKD stage 3 secondary to diabetes    3. Type 2 diabetes mellitus, uncontrolled, with renal complications    4. Vitamin D deficiency    5. Iron deficiency anemia, unspecified iron deficiency anemia type    6. Hypertension associated with diabetes    7. History of stroke    8. Polyneuropathy due to secondary diabetes    9. Combined hyperlipidemia associated with type 2 diabetes mellitus    10. Hypothyroidism, unspecified type    11. Crystal arthritis    12. DEVIKA on CPAP        Plan:              CKD appearing to be at Stage 3 with multiple risk factors. He displays typical progression over the past few years. Continue RAAS blockade for renal preservation    HTN--controlled. Discussed ace an lung cancer association. He is comfortable maintaining benazepril    DM--controlled    Hypercalcemia/Mineral and Bone Disease--hypercalcemia resolved with decreasing D2 to q week from twice a week.    Hypokalemia--increase KCl to QD and decrease Lasix to QOD    Patient was advised to avoid NSAIDs, continue a low sodium diet, and ensure hydration      RTC 6 mo

## 2019-04-29 DIAGNOSIS — M11.9 CRYSTAL ARTHRITIS: Primary | ICD-10-CM

## 2019-04-29 DIAGNOSIS — M79.671 RIGHT FOOT PAIN: ICD-10-CM

## 2019-04-30 ENCOUNTER — PATIENT MESSAGE (OUTPATIENT)
Dept: FAMILY MEDICINE | Facility: CLINIC | Age: 79
End: 2019-04-30

## 2019-05-04 DIAGNOSIS — N18.30 CONTROLLED TYPE 2 DIABETES MELLITUS WITH STAGE 3 CHRONIC KIDNEY DISEASE, WITHOUT LONG-TERM CURRENT USE OF INSULIN: ICD-10-CM

## 2019-05-04 DIAGNOSIS — E11.22 CONTROLLED TYPE 2 DIABETES MELLITUS WITH STAGE 3 CHRONIC KIDNEY DISEASE, WITHOUT LONG-TERM CURRENT USE OF INSULIN: ICD-10-CM

## 2019-05-06 ENCOUNTER — LAB VISIT (OUTPATIENT)
Dept: LAB | Facility: HOSPITAL | Age: 79
End: 2019-05-06
Attending: INTERNAL MEDICINE
Payer: MEDICARE

## 2019-05-06 DIAGNOSIS — M11.9 CRYSTAL ARTHRITIS: ICD-10-CM

## 2019-05-06 LAB
ALBUMIN SERPL BCP-MCNC: 3.6 G/DL (ref 3.5–5.2)
ALP SERPL-CCNC: 72 U/L (ref 55–135)
ALT SERPL W/O P-5'-P-CCNC: 11 U/L (ref 10–44)
ANION GAP SERPL CALC-SCNC: 7 MMOL/L (ref 8–16)
AST SERPL-CCNC: 16 U/L (ref 10–40)
BILIRUB SERPL-MCNC: 1.1 MG/DL (ref 0.1–1)
BUN SERPL-MCNC: 20 MG/DL (ref 8–23)
CALCIUM SERPL-MCNC: 9.5 MG/DL (ref 8.7–10.5)
CHLORIDE SERPL-SCNC: 107 MMOL/L (ref 95–110)
CO2 SERPL-SCNC: 27 MMOL/L (ref 23–29)
CREAT SERPL-MCNC: 1.3 MG/DL (ref 0.5–1.4)
CRP SERPL-MCNC: 1.7 MG/L (ref 0–8.2)
ERYTHROCYTE [SEDIMENTATION RATE] IN BLOOD BY WESTERGREN METHOD: 13 MM/HR (ref 0–10)
EST. GFR  (AFRICAN AMERICAN): 59.6 ML/MIN/1.73 M^2
EST. GFR  (NON AFRICAN AMERICAN): 51.5 ML/MIN/1.73 M^2
GLUCOSE SERPL-MCNC: 117 MG/DL (ref 70–110)
POTASSIUM SERPL-SCNC: 3.8 MMOL/L (ref 3.5–5.1)
PROT SERPL-MCNC: 6.6 G/DL (ref 6–8.4)
SODIUM SERPL-SCNC: 141 MMOL/L (ref 136–145)
URATE SERPL-MCNC: 7.4 MG/DL (ref 3.4–7)

## 2019-05-06 PROCEDURE — 80053 COMPREHEN METABOLIC PANEL: CPT

## 2019-05-06 PROCEDURE — 84550 ASSAY OF BLOOD/URIC ACID: CPT

## 2019-05-06 PROCEDURE — 36415 COLL VENOUS BLD VENIPUNCTURE: CPT | Mod: PO

## 2019-05-06 PROCEDURE — 85651 RBC SED RATE NONAUTOMATED: CPT | Mod: PO

## 2019-05-06 PROCEDURE — 86140 C-REACTIVE PROTEIN: CPT

## 2019-05-06 RX ORDER — GALANTAMINE 8 MG/1
TABLET, FILM COATED ORAL
Qty: 180 TABLET | Refills: 0 | Status: SHIPPED | OUTPATIENT
Start: 2019-05-06 | End: 2019-08-02 | Stop reason: SDUPTHER

## 2019-05-06 RX ORDER — ACARBOSE 100 MG/1
TABLET ORAL
Qty: 270 TABLET | Refills: 0 | Status: SHIPPED | OUTPATIENT
Start: 2019-05-06 | End: 2019-08-19 | Stop reason: SDUPTHER

## 2019-05-06 RX ORDER — SITAGLIPTIN 100 MG/1
TABLET, FILM COATED ORAL
Qty: 90 TABLET | Refills: 0 | Status: SHIPPED | OUTPATIENT
Start: 2019-05-06 | End: 2020-02-03 | Stop reason: SDUPTHER

## 2019-05-06 RX ORDER — GLIPIZIDE 10 MG/1
TABLET ORAL
Qty: 180 TABLET | Refills: 0 | Status: SHIPPED | OUTPATIENT
Start: 2019-05-06 | End: 2020-03-16

## 2019-05-08 ENCOUNTER — OFFICE VISIT (OUTPATIENT)
Dept: RHEUMATOLOGY | Facility: CLINIC | Age: 79
End: 2019-05-08
Payer: MEDICARE

## 2019-05-08 VITALS
WEIGHT: 211.19 LBS | BODY MASS INDEX: 28.6 KG/M2 | HEIGHT: 72 IN | HEART RATE: 54 BPM | DIASTOLIC BLOOD PRESSURE: 79 MMHG | SYSTOLIC BLOOD PRESSURE: 162 MMHG

## 2019-05-08 DIAGNOSIS — M1A.09X1 IDIOPATHIC CHRONIC GOUT OF MULTIPLE SITES WITH TOPHUS: Primary | ICD-10-CM

## 2019-05-08 PROCEDURE — 99214 PR OFFICE/OUTPT VISIT, EST, LEVL IV, 30-39 MIN: ICD-10-PCS | Mod: S$GLB,,, | Performed by: INTERNAL MEDICINE

## 2019-05-08 PROCEDURE — 1101F PR PT FALLS ASSESS DOC 0-1 FALLS W/OUT INJ PAST YR: ICD-10-PCS | Mod: CPTII,S$GLB,, | Performed by: INTERNAL MEDICINE

## 2019-05-08 PROCEDURE — 99999 PR PBB SHADOW E&M-EST. PATIENT-LVL III: ICD-10-PCS | Mod: PBBFAC,,, | Performed by: INTERNAL MEDICINE

## 2019-05-08 PROCEDURE — 3078F DIAST BP <80 MM HG: CPT | Mod: CPTII,S$GLB,, | Performed by: INTERNAL MEDICINE

## 2019-05-08 PROCEDURE — 1101F PT FALLS ASSESS-DOCD LE1/YR: CPT | Mod: CPTII,S$GLB,, | Performed by: INTERNAL MEDICINE

## 2019-05-08 PROCEDURE — 3078F PR MOST RECENT DIASTOLIC BLOOD PRESSURE < 80 MM HG: ICD-10-PCS | Mod: CPTII,S$GLB,, | Performed by: INTERNAL MEDICINE

## 2019-05-08 PROCEDURE — 3077F PR MOST RECENT SYSTOLIC BLOOD PRESSURE >= 140 MM HG: ICD-10-PCS | Mod: CPTII,S$GLB,, | Performed by: INTERNAL MEDICINE

## 2019-05-08 PROCEDURE — 99214 OFFICE O/P EST MOD 30 MIN: CPT | Mod: S$GLB,,, | Performed by: INTERNAL MEDICINE

## 2019-05-08 PROCEDURE — 99999 PR PBB SHADOW E&M-EST. PATIENT-LVL III: CPT | Mod: PBBFAC,,, | Performed by: INTERNAL MEDICINE

## 2019-05-08 PROCEDURE — 3077F SYST BP >= 140 MM HG: CPT | Mod: CPTII,S$GLB,, | Performed by: INTERNAL MEDICINE

## 2019-05-08 RX ORDER — METHYLPREDNISOLONE 4 MG/1
TABLET ORAL
Qty: 1 PACKAGE | Refills: 2 | Status: SHIPPED | OUTPATIENT
Start: 2019-05-08 | End: 2019-05-14

## 2019-05-08 RX ORDER — ALLOPURINOL 100 MG/1
100 TABLET ORAL DAILY
Qty: 30 TABLET | Refills: 2 | Status: SHIPPED | OUTPATIENT
Start: 2019-05-08 | End: 2019-06-10

## 2019-05-09 NOTE — PROGRESS NOTES
RHEUMATOLOGY CLINIC FOLLOW UP VISIT-initial visit with me.  Chief complaints:-  To follow up for arthritis.    HPI:-  Rudy Rolle a 78 y.o. pleasant male comes in for a follow up visit.  He denies any flare-up of gout since seeing my associate few weeks ago.  He is taking daily colchicine.  He have not started allopurinol yet.  He got dual CT done at [facility] yesterday.  He has minor aches and pains around large joints related to activity.  He has mild lower back pain.  His gout flare-up usually involves toes, ankles, wrists.    Review of Systems   Constitutional: Negative for chills and fever.   HENT: Negative for congestion and sore throat.    Eyes: Negative for blurred vision and redness.   Respiratory: Negative for cough and shortness of breath.    Cardiovascular: Negative for chest pain and leg swelling.   Gastrointestinal: Negative for abdominal pain.   Genitourinary: Negative for dysuria.   Musculoskeletal: Positive for back pain, joint pain, myalgias and neck pain. Negative for falls.   Skin: Negative for rash.   Neurological: Negative for headaches.   Endo/Heme/Allergies: Does not bruise/bleed easily.   Psychiatric/Behavioral: Negative for memory loss. The patient does not have insomnia.        Past Medical History:   Diagnosis Date    Anemia     BPH (benign prostatic hypertrophy)     Carotid bruit     CKD (chronic kidney disease) stage 2/3     Dr. Nails    CVA (cerebral vascular accident)     Diabetes mellitus, type 2     Hyperlipidemia     Hypertension     Hypothyroidism 2/29/2016    Type 2 diabetes mellitus, uncontrolled, with renal complications     The patient presents with diabetes.  The patient denies polyuria, polydipsia, polyphagia, hypoglycemia and paresthesias.  The patient's glucose control has been good.  Home glucose averages are routinely checked.  The patient is without retinopathy currently.  The  patient has no history of neuropathy.  The patient currently complains of no podiatric problems.  The patient has excellent compliance.    Type II or unspecified type diabetes mellitus without mention of complication, not stated as uncontrolled     Vitamin D deficiency 2/29/2016       Past Surgical History:   Procedure Laterality Date    COLONOSCOPY  2008    COLONOSCOPY  5/31/2005    repeat in 2015    PILONIDAL CYST DRAINAGE      TRANSURETHRAL RESECTION OF PROSTATE          Social History     Tobacco Use    Smoking status: Never Smoker    Smokeless tobacco: Never Used   Substance Use Topics    Alcohol use: No     Alcohol/week: 0.0 oz    Drug use: No       Family History   Problem Relation Age of Onset    Hypertension Mother     Hypertension Father     Heart attack Maternal Uncle     Brain cancer Sister     Diabetes Neg Hx     Stroke Neg Hx        Review of patient's allergies indicates:   Allergen Reactions    No known drug allergies        Vitals:    05/08/19 1213   BP: (!) 162/79   Pulse: (!) 54   Weight: 95.8 kg (211 lb 3.2 oz)   Height: 6' (1.829 m)   PainSc: 0-No pain       Physical Exam   Constitutional: He is oriented to person, place, and time and well-developed, well-nourished, and in no distress. No distress.   HENT:   Head: Normocephalic.   Mouth/Throat: Oropharynx is clear and moist.   Eyes: Pupils are equal, round, and reactive to light. Conjunctivae are normal.   Neck: Normal range of motion. Neck supple.   Cardiovascular: Normal rate and intact distal pulses.   Pulmonary/Chest: Effort normal. No respiratory distress.   Abdominal: Soft. There is no tenderness.   Musculoskeletal:   No synovitis in small joints of hands or feet.  Good range of motion in large joints.     Neurological: He is alert and oriented to person, place, and time. Gait normal.   Skin: Skin is warm. No rash noted. No erythema.   Psychiatric: Mood and affect normal.   Nursing note and vitals reviewed.      Medication  List with Changes/Refills   New Medications    ALLOPURINOL (ZYLOPRIM) 100 MG TABLET    Take 1 tablet (100 mg total) by mouth once daily.    METHYLPREDNISOLONE (MEDROL DOSEPACK) 4 MG TABLET    use as directed   Current Medications    ACARBOSE (PRECOSE) 100 MG TAB    TAKE 1 TABLET(100 MG) BY MOUTH THREE TIMES DAILY WITH MEALS    ASPIRIN (ECOTRIN) 81 MG EC TABLET    Take 81 mg by mouth once daily.    ATORVASTATIN (LIPITOR) 20 MG TABLET    Take 1 tablet (20 mg total) by mouth once daily.    BENAZEPRIL (LOTENSIN) 40 MG TABLET    Take 1 tablet (40 mg total) by mouth 2 (two) times daily.    BLOOD SUGAR DIAGNOSTIC STRP    1 each by Misc.(Non-Drug; Combo Route) route 3 (three) times daily as needed. One touch ultra 2 test strips    CLOPIDOGREL (PLAVIX) 75 MG TABLET    Take 1 tablet (75 mg total) by mouth once daily.    ERGOCALCIFEROL (ERGOCALCIFEROL) 50,000 UNIT CAP    TAKE 1 CAPSULE BY MOUTH 2 TIMES A WEEK    FERROUS SULFATE 325 MG (65 MG IRON) TAB TABLET    Take 1 tablet (325 mg total) by mouth 3 (three) times daily.    FUROSEMIDE (LASIX) 20 MG TABLET    Take 1 tablet (20 mg total) by mouth every other day.    GALANTAMINE (RAZADYNE) 8 MG TAB    TAKE ONE TABLET BY MOUTH TWICE DAILY WITH MEALS    GLIPIZIDE (GLUCOTROL) 10 MG TABLET    TAKE 1 TABLET(10 MG) BY MOUTH TWICE DAILY BEFORE MEALS    JANUVIA 100 MG TAB    TAKE 1 TABLET(100 MG) BY MOUTH EVERY DAY    KETOCONAZOLE (NIZORAL) 2 % SHAMPOO        LANCETS (TRUEPLUS LANCETS) 33 GAUGE MISC    1 lancet by Misc.(Non-Drug; Combo Route) route once daily.    LEVOTHYROXINE (SYNTHROID) 25 MCG TABLET    take 1 tablet by mouth every morning ON AN EMPTY STOMACH    MITIGARE 0.6 MG CAP    Take 1 capsule (0.6 mg total) by mouth Daily.    NIACIN (NIASPAN) 1000 MG CR TABLET    Take 1 tablet (1,000 mg total) by mouth every evening.    ONETOUCH VERIO IQ METER KIT    Use as instructed    PIOGLITAZONE (ACTOS) 30 MG TABLET    Take 1 tablet (30 mg total) by mouth once daily.    POTASSIUM CHLORIDE  (MICRO-K) 10 MEQ CPSR    Take 1 capsule (10 mEq total) by mouth once daily.       Assessment/Plans:-  First visit with me.  All problems new to me.  1. Idiopathic chronic gout of multiple sites with tophus      Problem List Items Addressed This Visit        Orthopedic    Idiopathic chronic gout of multiple sites with tophus - Primary    Current Assessment & Plan     Dual CT proven the issues polyarticular and very tenderness gout disease with intermittent flare-ups and high uric acid.  With tophi is a found on dual CT, his goal uric acid would be less than 4.  Start allopurinol 100 mg daily and titrate up every 4 weeks based on uric acid levels.  Take colchicine once daily to proven flare-ups.  Use Medrol Dosepak to treat flare-ups         Relevant Medications    allopurinol (ZYLOPRIM) 100 MG tablet    methylPREDNISolone (MEDROL DOSEPACK) 4 mg tablet    Other Relevant Orders    Comprehensive metabolic panel    Uric acid        # Follow up in about 2 months (around 7/8/2019).      Disclaimer: This note was prepared using voice recognition system and is likely to have sound alike errors and is not proof read.  Please call me with any questions.

## 2019-05-09 NOTE — ASSESSMENT & PLAN NOTE
Dual CT proven the issues polyarticular and very tenderness gout disease with intermittent flare-ups and high uric acid.  With tophi is a found on dual CT, his goal uric acid would be less than 4.  Start allopurinol 100 mg daily and titrate up every 4 weeks based on uric acid levels.  Take colchicine once daily to proven flare-ups.  Use Medrol Dosepak to treat flare-ups

## 2019-05-14 ENCOUNTER — OFFICE VISIT (OUTPATIENT)
Dept: CARDIOLOGY | Facility: CLINIC | Age: 79
End: 2019-05-14
Payer: MEDICARE

## 2019-05-14 VITALS
WEIGHT: 216 LBS | HEART RATE: 63 BPM | DIASTOLIC BLOOD PRESSURE: 76 MMHG | BODY MASS INDEX: 29.26 KG/M2 | SYSTOLIC BLOOD PRESSURE: 179 MMHG | HEIGHT: 72 IN

## 2019-05-14 DIAGNOSIS — N18.30 CKD STAGE 3 SECONDARY TO DIABETES: ICD-10-CM

## 2019-05-14 DIAGNOSIS — E11.59 HYPERTENSION ASSOCIATED WITH DIABETES: Primary | ICD-10-CM

## 2019-05-14 DIAGNOSIS — E11.22 CKD STAGE 3 SECONDARY TO DIABETES: ICD-10-CM

## 2019-05-14 DIAGNOSIS — G47.33 OSA ON CPAP: Chronic | ICD-10-CM

## 2019-05-14 DIAGNOSIS — I15.2 HYPERTENSION ASSOCIATED WITH DIABETES: Primary | ICD-10-CM

## 2019-05-14 PROCEDURE — 1101F PT FALLS ASSESS-DOCD LE1/YR: CPT | Mod: CPTII,S$GLB,, | Performed by: INTERNAL MEDICINE

## 2019-05-14 PROCEDURE — 99999 PR PBB SHADOW E&M-EST. PATIENT-LVL III: ICD-10-PCS | Mod: PBBFAC,,, | Performed by: INTERNAL MEDICINE

## 2019-05-14 PROCEDURE — 3077F PR MOST RECENT SYSTOLIC BLOOD PRESSURE >= 140 MM HG: ICD-10-PCS | Mod: CPTII,S$GLB,, | Performed by: INTERNAL MEDICINE

## 2019-05-14 PROCEDURE — 3078F PR MOST RECENT DIASTOLIC BLOOD PRESSURE < 80 MM HG: ICD-10-PCS | Mod: CPTII,S$GLB,, | Performed by: INTERNAL MEDICINE

## 2019-05-14 PROCEDURE — 99214 PR OFFICE/OUTPT VISIT, EST, LEVL IV, 30-39 MIN: ICD-10-PCS | Mod: S$GLB,,, | Performed by: INTERNAL MEDICINE

## 2019-05-14 PROCEDURE — 1101F PR PT FALLS ASSESS DOC 0-1 FALLS W/OUT INJ PAST YR: ICD-10-PCS | Mod: CPTII,S$GLB,, | Performed by: INTERNAL MEDICINE

## 2019-05-14 PROCEDURE — 3077F SYST BP >= 140 MM HG: CPT | Mod: CPTII,S$GLB,, | Performed by: INTERNAL MEDICINE

## 2019-05-14 PROCEDURE — 99214 OFFICE O/P EST MOD 30 MIN: CPT | Mod: S$GLB,,, | Performed by: INTERNAL MEDICINE

## 2019-05-14 PROCEDURE — 99999 PR PBB SHADOW E&M-EST. PATIENT-LVL III: CPT | Mod: PBBFAC,,, | Performed by: INTERNAL MEDICINE

## 2019-05-14 PROCEDURE — 3078F DIAST BP <80 MM HG: CPT | Mod: CPTII,S$GLB,, | Performed by: INTERNAL MEDICINE

## 2019-05-14 RX ORDER — BENAZEPRIL HYDROCHLORIDE 40 MG/1
40 TABLET ORAL 2 TIMES DAILY
Qty: 180 TABLET | Refills: 3 | Status: SHIPPED | OUTPATIENT
Start: 2019-05-14 | End: 2019-11-15

## 2019-05-14 RX ORDER — FUROSEMIDE 20 MG/1
20 TABLET ORAL DAILY
Qty: 90 TABLET | Refills: 3 | Status: SHIPPED | OUTPATIENT
Start: 2019-05-14 | End: 2019-11-15 | Stop reason: SDUPTHER

## 2019-05-14 RX ORDER — AMLODIPINE BESYLATE 5 MG/1
5 TABLET ORAL DAILY
Qty: 90 TABLET | Refills: 3 | Status: SHIPPED | OUTPATIENT
Start: 2019-05-14 | End: 2019-10-16

## 2019-05-14 NOTE — PROGRESS NOTES
Subjective:    Patient ID:  Rudy Saldaña is a 78 y.o. male who presents for evaluation of Follow-up (follow up)      HPI78 yo with HTN, diastolic dysfunction, and CKD with lower extremity edema and poorly controlled HTN.Last visit stop amlodipine because of edema and increased benazapril but edema unchanged and blood pressure up.    Review of Systems   Constitution: Negative for decreased appetite, fever, malaise/fatigue, weight gain and weight loss.   HENT: Negative for hearing loss and nosebleeds.    Eyes: Negative for visual disturbance.   Cardiovascular: Positive for dyspnea on exertion and leg swelling. Negative for chest pain, claudication, cyanosis, irregular heartbeat, near-syncope, orthopnea, palpitations, paroxysmal nocturnal dyspnea and syncope.   Respiratory: Negative for cough, hemoptysis, shortness of breath, sleep disturbances due to breathing, snoring and wheezing.    Endocrine: Negative for cold intolerance, heat intolerance, polydipsia and polyuria.   Hematologic/Lymphatic: Negative for adenopathy and bleeding problem. Bruises/bleeds easily.   Skin: Negative for color change, itching, poor wound healing, rash and suspicious lesions.   Musculoskeletal: Negative for arthritis, back pain, falls, joint pain, joint swelling, muscle cramps, muscle weakness and myalgias.   Gastrointestinal: Negative for bloating, abdominal pain, change in bowel habit, constipation, flatus, heartburn, hematemesis, hematochezia, hemorrhoids, jaundice, melena, nausea and vomiting.   Genitourinary: Negative for bladder incontinence, decreased libido, frequency, hematuria, hesitancy and urgency.   Neurological: Negative for brief paralysis, difficulty with concentration, excessive daytime sleepiness, dizziness, focal weakness, headaches, light-headedness, loss of balance, numbness, vertigo and weakness.   Psychiatric/Behavioral: Negative for altered mental status, depression and memory loss. The patient does not have  insomnia and is not nervous/anxious.    Allergic/Immunologic: Negative for environmental allergies, hives and persistent infections.        Objective:    Physical Exam   Constitutional: He is oriented to person, place, and time. He appears well-developed and well-nourished. No distress.   BP (!) 179/76   Pulse 63   Ht 6' (1.829 m)   Wt 98 kg (216 lb)   BMI 29.29 kg/m²      HENT:   Head: Normocephalic and atraumatic.   Eyes: Pupils are equal, round, and reactive to light. Conjunctivae and lids are normal. Right eye exhibits no discharge. No scleral icterus.   Neck: Normal range of motion. Neck supple. No JVD present. No tracheal deviation present. No thyromegaly present.   Cardiovascular: Normal rate, regular rhythm, S1 normal, S2 normal, normal heart sounds and intact distal pulses. Exam reveals no gallop and no friction rub.   No murmur heard.  Pulses:       Carotid pulses are 2+ on the right side, and 2+ on the left side.       Radial pulses are 2+ on the right side, and 2+ on the left side.        Femoral pulses are 2+ on the right side, and 2+ on the left side.       Popliteal pulses are 2+ on the right side, and 2+ on the left side.        Dorsalis pedis pulses are 2+ on the right side, and 2+ on the left side.        Posterior tibial pulses are 2+ on the right side, and 2+ on the left side.   Pulmonary/Chest: Effort normal and breath sounds normal. No respiratory distress. He has no wheezes. He has no rales. He exhibits no tenderness.   Abdominal: Soft. Bowel sounds are normal. He exhibits no distension and no mass. There is no hepatosplenomegaly or hepatomegaly. There is no tenderness. There is no rebound and no guarding.   Musculoskeletal: Normal range of motion. He exhibits edema. He exhibits no tenderness.   Lymphadenopathy:     He has no cervical adenopathy.   Neurological: He is alert and oriented to person, place, and time. He has normal reflexes. No cranial nerve deficit. Coordination normal.    Skin: Skin is warm and dry. No rash noted. He is not diaphoretic. No erythema. No pallor.   Psychiatric: He has a normal mood and affect. His speech is normal and behavior is normal. Judgment and thought content normal. Cognition and memory are normal.         Assessment:       1. Hypertension associated with diabetes    2. CKD stage 3 secondary to diabetes    3. DEVIKA on CPAP         Plan:     Restart amlodipine at 5mg daily    Change lasix to 20mg daily    Monitor BP    No orders of the defined types were placed in this encounter.    Follow up in about 6 weeks (around 6/25/2019).

## 2019-06-04 ENCOUNTER — PATIENT MESSAGE (OUTPATIENT)
Dept: RHEUMATOLOGY | Facility: CLINIC | Age: 79
End: 2019-06-04

## 2019-06-04 ENCOUNTER — LAB VISIT (OUTPATIENT)
Dept: LAB | Facility: HOSPITAL | Age: 79
End: 2019-06-04
Attending: INTERNAL MEDICINE
Payer: MEDICARE

## 2019-06-04 DIAGNOSIS — M1A.09X1 IDIOPATHIC CHRONIC GOUT OF MULTIPLE SITES WITH TOPHUS: ICD-10-CM

## 2019-06-04 DIAGNOSIS — I15.2 HYPERTENSION ASSOCIATED WITH DIABETES: ICD-10-CM

## 2019-06-04 DIAGNOSIS — N18.30 CKD STAGE 3 SECONDARY TO DIABETES: ICD-10-CM

## 2019-06-04 DIAGNOSIS — E11.22 CKD STAGE 3 SECONDARY TO DIABETES: ICD-10-CM

## 2019-06-04 DIAGNOSIS — E11.59 HYPERTENSION ASSOCIATED WITH DIABETES: ICD-10-CM

## 2019-06-04 LAB
ALBUMIN SERPL BCP-MCNC: 3.8 G/DL (ref 3.5–5.2)
ALP SERPL-CCNC: 71 U/L (ref 55–135)
ALT SERPL W/O P-5'-P-CCNC: 17 U/L (ref 10–44)
ANION GAP SERPL CALC-SCNC: 7 MMOL/L (ref 8–16)
AST SERPL-CCNC: 18 U/L (ref 10–40)
BILIRUB SERPL-MCNC: 1.1 MG/DL (ref 0.1–1)
BNP SERPL-MCNC: 77 PG/ML (ref 0–99)
BUN SERPL-MCNC: 18 MG/DL (ref 8–23)
CALCIUM SERPL-MCNC: 9.7 MG/DL (ref 8.7–10.5)
CHLORIDE SERPL-SCNC: 109 MMOL/L (ref 95–110)
CO2 SERPL-SCNC: 28 MMOL/L (ref 23–29)
CREAT SERPL-MCNC: 1.3 MG/DL (ref 0.5–1.4)
EST. GFR  (AFRICAN AMERICAN): 59.6 ML/MIN/1.73 M^2
EST. GFR  (NON AFRICAN AMERICAN): 51.5 ML/MIN/1.73 M^2
GLUCOSE SERPL-MCNC: 106 MG/DL (ref 70–110)
POTASSIUM SERPL-SCNC: 4.5 MMOL/L (ref 3.5–5.1)
PROT SERPL-MCNC: 6.7 G/DL (ref 6–8.4)
SODIUM SERPL-SCNC: 144 MMOL/L (ref 136–145)
URATE SERPL-MCNC: 6.6 MG/DL (ref 3.4–7)

## 2019-06-04 PROCEDURE — 36415 COLL VENOUS BLD VENIPUNCTURE: CPT | Mod: PO

## 2019-06-04 PROCEDURE — 80053 COMPREHEN METABOLIC PANEL: CPT

## 2019-06-04 PROCEDURE — 84550 ASSAY OF BLOOD/URIC ACID: CPT

## 2019-06-04 PROCEDURE — 83880 ASSAY OF NATRIURETIC PEPTIDE: CPT

## 2019-06-05 ENCOUNTER — PATIENT MESSAGE (OUTPATIENT)
Dept: PODIATRY | Facility: CLINIC | Age: 79
End: 2019-06-05

## 2019-06-06 ENCOUNTER — PATIENT MESSAGE (OUTPATIENT)
Dept: RHEUMATOLOGY | Facility: CLINIC | Age: 79
End: 2019-06-06

## 2019-06-10 ENCOUNTER — OFFICE VISIT (OUTPATIENT)
Dept: RHEUMATOLOGY | Facility: CLINIC | Age: 79
End: 2019-06-10
Payer: MEDICARE

## 2019-06-10 VITALS
HEIGHT: 72 IN | HEART RATE: 54 BPM | WEIGHT: 216.5 LBS | BODY MASS INDEX: 29.32 KG/M2 | SYSTOLIC BLOOD PRESSURE: 158 MMHG | DIASTOLIC BLOOD PRESSURE: 66 MMHG

## 2019-06-10 DIAGNOSIS — E11.22 CKD STAGE 3 SECONDARY TO DIABETES: ICD-10-CM

## 2019-06-10 DIAGNOSIS — M1A.09X1 IDIOPATHIC CHRONIC GOUT OF MULTIPLE SITES WITH TOPHUS: Primary | ICD-10-CM

## 2019-06-10 DIAGNOSIS — N18.30 CKD STAGE 3 SECONDARY TO DIABETES: ICD-10-CM

## 2019-06-10 PROCEDURE — 3078F DIAST BP <80 MM HG: CPT | Mod: CPTII,S$GLB,, | Performed by: INTERNAL MEDICINE

## 2019-06-10 PROCEDURE — 3077F PR MOST RECENT SYSTOLIC BLOOD PRESSURE >= 140 MM HG: ICD-10-PCS | Mod: CPTII,S$GLB,, | Performed by: INTERNAL MEDICINE

## 2019-06-10 PROCEDURE — 3077F SYST BP >= 140 MM HG: CPT | Mod: CPTII,S$GLB,, | Performed by: INTERNAL MEDICINE

## 2019-06-10 PROCEDURE — 99999 PR PBB SHADOW E&M-EST. PATIENT-LVL III: CPT | Mod: PBBFAC,,, | Performed by: INTERNAL MEDICINE

## 2019-06-10 PROCEDURE — 99214 PR OFFICE/OUTPT VISIT, EST, LEVL IV, 30-39 MIN: ICD-10-PCS | Mod: S$GLB,,, | Performed by: INTERNAL MEDICINE

## 2019-06-10 PROCEDURE — 99999 PR PBB SHADOW E&M-EST. PATIENT-LVL III: ICD-10-PCS | Mod: PBBFAC,,, | Performed by: INTERNAL MEDICINE

## 2019-06-10 PROCEDURE — 3078F PR MOST RECENT DIASTOLIC BLOOD PRESSURE < 80 MM HG: ICD-10-PCS | Mod: CPTII,S$GLB,, | Performed by: INTERNAL MEDICINE

## 2019-06-10 PROCEDURE — 1101F PR PT FALLS ASSESS DOC 0-1 FALLS W/OUT INJ PAST YR: ICD-10-PCS | Mod: CPTII,S$GLB,, | Performed by: INTERNAL MEDICINE

## 2019-06-10 PROCEDURE — 1101F PT FALLS ASSESS-DOCD LE1/YR: CPT | Mod: CPTII,S$GLB,, | Performed by: INTERNAL MEDICINE

## 2019-06-10 PROCEDURE — 99214 OFFICE O/P EST MOD 30 MIN: CPT | Mod: S$GLB,,, | Performed by: INTERNAL MEDICINE

## 2019-06-10 RX ORDER — ALLOPURINOL 100 MG/1
200 TABLET ORAL DAILY
Qty: 180 TABLET | Refills: 1 | Status: SHIPPED | OUTPATIENT
Start: 2019-06-10 | End: 2020-01-02

## 2019-06-10 RX ORDER — COLCHICINE 0.6 MG/1
1 CAPSULE ORAL DAILY
Qty: 90 CAPSULE | Refills: 3 | Status: SHIPPED | OUTPATIENT
Start: 2019-06-10 | End: 2019-06-24 | Stop reason: SDUPTHER

## 2019-06-10 NOTE — TELEPHONE ENCOUNTER
----- Message from Allen Roberts sent at 6/16/2018  8:07 AM CDT -----  Contact: self [phone]  Pt is returning a call from yesterday regarding his rx dosage. He takes Vit D2 1.25mg 50,000 units/ thanks.  
You requested his dosage  
regular

## 2019-06-10 NOTE — PROGRESS NOTES
RHEUMATOLOGY CLINIC FOLLOW UP VISIT  Chief complaints:-  To follow up for arthritis.    HPI:-  Rudy Rolle a 78 y.o. pleasant male comes in for a follow up visit.  Pt reports that he is doing well and has not had any flare ups since 01/2019. Seen by Dr Rodriguez in 01/2019. Since 05/2019, sen by Dr VELASQUEZ and he was started on allopurinol 100mg daily.  He is currently on allopurinol 100mg daily and ran out off colchicine 0.6mg qd about 4 days ago.     He got dual CT done at [facility] and has not yet obtained the CD. He was informed that he has a lot of gout deposits on exam. He avoids seafood and beer.  His gout flare-up usually involves toes, ankles, wrists. He is trying to incorporate exercise in his daily activity.     Pt denies fever, chills, CP, SOB, abd pain, changes in bowel/bladder habits, other joint pains/swelling/erythema.     Review of Systems   Constitutional: Negative for chills and fever.   HENT: Negative for congestion and sore throat.    Eyes: Negative for blurred vision and redness.   Respiratory: Negative for cough and shortness of breath.    Cardiovascular: Negative for chest pain and leg swelling.   Gastrointestinal: Negative for abdominal pain.   Genitourinary: Negative for dysuria.   Musculoskeletal: Negative for back pain, falls, joint pain, myalgias and neck pain.   Skin: Negative for rash.   Neurological: Negative for headaches.   Endo/Heme/Allergies: Does not bruise/bleed easily.   Psychiatric/Behavioral: Negative for memory loss. The patient does not have insomnia.        Past Medical History:   Diagnosis Date    Anemia     BPH (benign prostatic hypertrophy)     Carotid bruit     CKD (chronic kidney disease) stage 2/3     Dr. Nails    CVA (cerebral vascular accident)     Diabetes mellitus, type 2     Hyperlipidemia     Hypertension     Hypothyroidism 2/29/2016    Type 2 diabetes mellitus, uncontrolled,  with renal complications     The patient presents with diabetes.  The patient denies polyuria, polydipsia, polyphagia, hypoglycemia and paresthesias.  The patient's glucose control has been good.  Home glucose averages are routinely checked.  The patient is without retinopathy currently.  The patient has no history of neuropathy.  The patient currently complains of no podiatric problems.  The patient has excellent compliance.    Type II or unspecified type diabetes mellitus without mention of complication, not stated as uncontrolled     Vitamin D deficiency 2/29/2016       Past Surgical History:   Procedure Laterality Date    COLONOSCOPY  2008    COLONOSCOPY  5/31/2005    repeat in 2015    PILONIDAL CYST DRAINAGE      TRANSURETHRAL RESECTION OF PROSTATE          Social History     Tobacco Use    Smoking status: Never Smoker    Smokeless tobacco: Never Used   Substance Use Topics    Alcohol use: No     Alcohol/week: 0.0 oz    Drug use: No       Family History   Problem Relation Age of Onset    Hypertension Mother     Hypertension Father     Heart attack Maternal Uncle     Brain cancer Sister     Diabetes Neg Hx     Stroke Neg Hx        Review of patient's allergies indicates:   Allergen Reactions    No known drug allergies        Vitals:    06/10/19 1025   BP: (!) 158/66   Pulse: (!) 54   Weight: 98.2 kg (216 lb 7.9 oz)   Height: 6' (1.829 m)   PainSc: 0-No pain       Physical Exam   Constitutional: He is oriented to person, place, and time and well-developed, well-nourished, and in no distress. No distress.   HENT:   Head: Normocephalic.   Mouth/Throat: Oropharynx is clear and moist.   Eyes: Pupils are equal, round, and reactive to light. Conjunctivae are normal.   Neck: Normal range of motion. Neck supple.   Cardiovascular: Normal rate and intact distal pulses.   Pulmonary/Chest: Effort normal. No respiratory distress.   Abdominal: Soft. There is no tenderness.   Musculoskeletal: Normal range of  motion. He exhibits no edema, tenderness or deformity.   No synovitis in small joints of hands or feet.  Good range of motion in large joints.    Tophi R elbow (skin thickening olecranon)    Neurological: He is alert and oriented to person, place, and time. Gait normal.   Skin: Skin is warm. No rash noted. No erythema.   Psychiatric: Mood and affect normal.   Nursing note and vitals reviewed.      Medication List with Changes/Refills   Current Medications    ACARBOSE (PRECOSE) 100 MG TAB    TAKE 1 TABLET(100 MG) BY MOUTH THREE TIMES DAILY WITH MEALS    ALLOPURINOL (ZYLOPRIM) 100 MG TABLET    Take 1 tablet (100 mg total) by mouth once daily.    AMLODIPINE (NORVASC) 5 MG TABLET    Take 1 tablet (5 mg total) by mouth once daily.    ASPIRIN (ECOTRIN) 81 MG EC TABLET    Take 81 mg by mouth once daily.    ATORVASTATIN (LIPITOR) 20 MG TABLET    Take 1 tablet (20 mg total) by mouth once daily.    BENAZEPRIL (LOTENSIN) 40 MG TABLET    Take 1 tablet (40 mg total) by mouth 2 (two) times daily.    BLOOD SUGAR DIAGNOSTIC STRP    1 each by Misc.(Non-Drug; Combo Route) route 3 (three) times daily as needed. One touch ultra 2 test strips    CLOPIDOGREL (PLAVIX) 75 MG TABLET    Take 1 tablet (75 mg total) by mouth once daily.    ERGOCALCIFEROL (ERGOCALCIFEROL) 50,000 UNIT CAP    TAKE 1 CAPSULE BY MOUTH 2 TIMES A WEEK    FERROUS SULFATE 325 MG (65 MG IRON) TAB TABLET    Take 1 tablet (325 mg total) by mouth 3 (three) times daily.    FUROSEMIDE (LASIX) 20 MG TABLET    Take 1 tablet (20 mg total) by mouth once daily.    GALANTAMINE (RAZADYNE) 8 MG TAB    TAKE ONE TABLET BY MOUTH TWICE DAILY WITH MEALS    GLIPIZIDE (GLUCOTROL) 10 MG TABLET    TAKE 1 TABLET(10 MG) BY MOUTH TWICE DAILY BEFORE MEALS    JANUVIA 100 MG TAB    TAKE 1 TABLET(100 MG) BY MOUTH EVERY DAY    KETOCONAZOLE (NIZORAL) 2 % SHAMPOO        LANCETS (TRUEPLUS LANCETS) 33 GAUGE MISC    1 lancet by Misc.(Non-Drug; Combo Route) route once daily.    LEVOTHYROXINE (SYNTHROID)  25 MCG TABLET    take 1 tablet by mouth every morning ON AN EMPTY STOMACH    MITIGARE 0.6 MG CAP    Take 1 capsule (0.6 mg total) by mouth Daily.    NIACIN (NIASPAN) 1000 MG CR TABLET    Take 1 tablet (1,000 mg total) by mouth every evening.    ONETOUCH VERIO IQ METER KIT    Use as instructed    PIOGLITAZONE (ACTOS) 30 MG TABLET    Take 1 tablet (30 mg total) by mouth once daily.    POTASSIUM CHLORIDE (MICRO-K) 10 MEQ CPSR    Take 1 capsule (10 mEq total) by mouth once daily.     Results for LUCIANA KOROMA (MRN [id number]) as of 6/10/2019 10:33   Ref. Range 11/12/2018 14:30   WBC Latest Ref Range: 3.90 - 12.70 K/uL 9.95   RBC Latest Ref Range: 4.60 - 6.20 M/uL 4.98   Hemoglobin Latest Ref Range: 14.0 - 18.0 g/dL 14.7   Hematocrit Latest Ref Range: 40.0 - 54.0 % 45.3   MCV Latest Ref Range: 82 - 98 fL 91   MCH Latest Ref Range: 27.0 - 31.0 pg 29.5   MCHC Latest Ref Range: 32.0 - 36.0 g/dL 32.5   RDW Latest Ref Range: 11.5 - 14.5 % 14.6 (H)   Platelets Latest Ref Range: 150 - 350 K/uL 306   MPV Latest Ref Range: 9.2 - 12.9 fL 10.8   Gran% Latest Ref Range: 38.0 - 73.0 % 73.1 (H)   Gran # (ANC) Latest Ref Range: 1.8 - 7.7 K/uL 7.3   Lymph% Latest Ref Range: 18.0 - 48.0 % 15.6 (L)   Lymph # Latest Ref Range: 1.0 - 4.8 K/uL 1.6   Mono% Latest Ref Range: 4.0 - 15.0 % 10.2   Mono # Latest Ref Range: 0.3 - 1.0 K/uL 1.0   Eosinophil% Latest Ref Range: 0.0 - 8.0 % 0.1   Eos # Latest Ref Range: 0.0 - 0.5 K/uL 0.0   Basophil% Latest Ref Range: 0.0 - 1.9 % 0.3   Baso # Latest Ref Range: 0.00 - 0.20 K/uL 0.03   nRBC Latest Ref Range: 0 /100 WBC 0   Differential Method Unknown Automated   Immature Grans (Abs) Latest Ref Range: 0.00 - 0.04 K/uL 0.07 (H)   Immature Granulocytes Latest Ref Range: 0.0 - 0.5 % 0.7 (H)     Results for LUCIANA KOROMA CAMILLE (MRN [id number]) as of 6/10/2019 10:33   Ref. Range 6/4/2019 10:55   Sodium Latest Ref Range: 136 - 145 mmol/L 144   Potassium Latest Ref Range: 3.5 - 5.1 mmol/L 4.5   Chloride Latest Ref Range:  95 - 110 mmol/L 109   CO2 Latest Ref Range: 23 - 29 mmol/L 28   Anion Gap Latest Ref Range: 8 - 16 mmol/L 7 (L)   BUN, Bld Latest Ref Range: 8 - 23 mg/dL 18   Creatinine Latest Ref Range: 0.5 - 1.4 mg/dL 1.3   eGFR if non African American Latest Ref Range: >60 mL/min/1.73 m^2 51.5 (A)   eGFR if African American Latest Ref Range: >60 mL/min/1.73 m^2 59.6 (A)   Glucose Latest Ref Range: 70 - 110 mg/dL 106   Calcium Latest Ref Range: 8.7 - 10.5 mg/dL 9.7   Alkaline Phosphatase Latest Ref Range: 55 - 135 U/L 71   PROTEIN TOTAL Latest Ref Range: 6.0 - 8.4 g/dL 6.7   Albumin Latest Ref Range: 3.5 - 5.2 g/dL 3.8   Uric Acid Latest Ref Range: 3.4 - 7.0 mg/dL 6.6   BILIRUBIN TOTAL Latest Ref Range: 0.1 - 1.0 mg/dL 1.1 (H)   AST Latest Ref Range: 10 - 40 U/L 18   ALT Latest Ref Range: 10 - 44 U/L 17     Results for LUCIANA KOROMA (MRN [id number]) as of 6/10/2019 10:33   Ref. Range 11/12/2018 14:30 1/7/2019 10:39 5/6/2019 10:50 6/4/2019 10:55   Uric Acid Latest Ref Range: 3.4 - 7.0 mg/dL 8.3 (H) 6.9 7.4 (H) 6.6     Assessment/Plans:-  First visit with me.  All problems new to me.  No diagnosis found.  Problem List Items Addressed This Visit        Renal/    CKD stage 3 secondary to diabetes       Orthopedic    Idiopathic chronic gout of multiple sites with tophus - Primary        Luciana was seen today for disease management.    Diagnoses and all orders for this visit:    Idiopathic chronic gout of multiple sites with tophus  SUA 6.6 . Goal <5. Due to tophaceous gout.   Continue colchicine 0.6mg qd  Increase allopurinol 200mg daily  Medrol dose adelina prn  Advised on importance of gout diet which he voiced understanding.  Labs in 1 month  Followup on CD from DECT scan  -     MITIGARE 0.6 mg Cap; Take 1 capsule (0.6 mg total) by mouth Daily.  -     allopurinol (ZYLOPRIM) 100 MG tablet; Take 2 tablets (200 mg total) by mouth once daily.    CKD stage 3 secondary to diabetes

## 2019-06-14 ENCOUNTER — TELEPHONE (OUTPATIENT)
Dept: PODIATRY | Facility: CLINIC | Age: 79
End: 2019-06-14

## 2019-06-18 ENCOUNTER — PATIENT MESSAGE (OUTPATIENT)
Dept: PODIATRY | Facility: CLINIC | Age: 79
End: 2019-06-18

## 2019-06-24 DIAGNOSIS — M1A.09X1 IDIOPATHIC CHRONIC GOUT OF MULTIPLE SITES WITH TOPHUS: ICD-10-CM

## 2019-06-24 RX ORDER — COLCHICINE 0.6 MG/1
1 CAPSULE ORAL DAILY
Qty: 90 CAPSULE | Refills: 3 | Status: SHIPPED | OUTPATIENT
Start: 2019-06-24 | End: 2019-12-11 | Stop reason: SDUPTHER

## 2019-06-25 ENCOUNTER — OFFICE VISIT (OUTPATIENT)
Dept: CARDIOLOGY | Facility: CLINIC | Age: 79
End: 2019-06-25
Payer: MEDICARE

## 2019-06-25 VITALS
SYSTOLIC BLOOD PRESSURE: 152 MMHG | WEIGHT: 217 LBS | DIASTOLIC BLOOD PRESSURE: 68 MMHG | HEART RATE: 54 BPM | HEIGHT: 72 IN | BODY MASS INDEX: 29.39 KG/M2

## 2019-06-25 DIAGNOSIS — Z86.73 HISTORY OF CVA (CEREBROVASCULAR ACCIDENT): ICD-10-CM

## 2019-06-25 DIAGNOSIS — E11.69 COMBINED HYPERLIPIDEMIA ASSOCIATED WITH TYPE 2 DIABETES MELLITUS: ICD-10-CM

## 2019-06-25 DIAGNOSIS — E11.59 HYPERTENSION ASSOCIATED WITH DIABETES: Primary | ICD-10-CM

## 2019-06-25 DIAGNOSIS — I15.2 HYPERTENSION ASSOCIATED WITH DIABETES: Primary | ICD-10-CM

## 2019-06-25 DIAGNOSIS — E78.2 COMBINED HYPERLIPIDEMIA ASSOCIATED WITH TYPE 2 DIABETES MELLITUS: ICD-10-CM

## 2019-06-25 PROCEDURE — 99214 OFFICE O/P EST MOD 30 MIN: CPT | Mod: S$GLB,,, | Performed by: INTERNAL MEDICINE

## 2019-06-25 PROCEDURE — 99999 PR PBB SHADOW E&M-EST. PATIENT-LVL III: ICD-10-PCS | Mod: PBBFAC,,, | Performed by: INTERNAL MEDICINE

## 2019-06-25 PROCEDURE — 3078F DIAST BP <80 MM HG: CPT | Mod: CPTII,S$GLB,, | Performed by: INTERNAL MEDICINE

## 2019-06-25 PROCEDURE — 1101F PT FALLS ASSESS-DOCD LE1/YR: CPT | Mod: CPTII,S$GLB,, | Performed by: INTERNAL MEDICINE

## 2019-06-25 PROCEDURE — 99999 PR PBB SHADOW E&M-EST. PATIENT-LVL III: CPT | Mod: PBBFAC,,, | Performed by: INTERNAL MEDICINE

## 2019-06-25 PROCEDURE — 3077F SYST BP >= 140 MM HG: CPT | Mod: CPTII,S$GLB,, | Performed by: INTERNAL MEDICINE

## 2019-06-25 PROCEDURE — 3078F PR MOST RECENT DIASTOLIC BLOOD PRESSURE < 80 MM HG: ICD-10-PCS | Mod: CPTII,S$GLB,, | Performed by: INTERNAL MEDICINE

## 2019-06-25 PROCEDURE — 3077F PR MOST RECENT SYSTOLIC BLOOD PRESSURE >= 140 MM HG: ICD-10-PCS | Mod: CPTII,S$GLB,, | Performed by: INTERNAL MEDICINE

## 2019-06-25 PROCEDURE — 99214 PR OFFICE/OUTPT VISIT, EST, LEVL IV, 30-39 MIN: ICD-10-PCS | Mod: S$GLB,,, | Performed by: INTERNAL MEDICINE

## 2019-06-25 PROCEDURE — 1101F PR PT FALLS ASSESS DOC 0-1 FALLS W/OUT INJ PAST YR: ICD-10-PCS | Mod: CPTII,S$GLB,, | Performed by: INTERNAL MEDICINE

## 2019-06-25 RX ORDER — CLOPIDOGREL BISULFATE 75 MG/1
75 TABLET ORAL DAILY
Qty: 90 TABLET | Refills: 3 | Status: SHIPPED | OUTPATIENT
Start: 2019-06-25 | End: 2019-10-16

## 2019-06-25 RX ORDER — ATORVASTATIN CALCIUM 20 MG/1
20 TABLET, FILM COATED ORAL DAILY
Qty: 90 TABLET | Refills: 3 | Status: SHIPPED | OUTPATIENT
Start: 2019-06-25 | End: 2019-08-07 | Stop reason: SDUPTHER

## 2019-06-25 NOTE — PROGRESS NOTES
Subjective:    Patient ID:  Rudy Saldaña is a 78 y.o. male who presents for evaluation of Hypertension (follow up )      HPI 79 yo with HTN, diastolic dysfunction, and CKD with lower extremity edema and poorly controlled HTN.Last visit because of BP being up restarted amlodipine and added lasix daily. Off norvasc made no difference in edema.Patient states edema better and feels good. Goes to gym daily and exercises.    Review of Systems   Constitution: Negative for decreased appetite, fever, malaise/fatigue, weight gain and weight loss.   HENT: Negative for hearing loss and nosebleeds.    Eyes: Negative for visual disturbance.   Cardiovascular: Negative for chest pain, claudication, cyanosis, dyspnea on exertion, irregular heartbeat, leg swelling, near-syncope, orthopnea, palpitations, paroxysmal nocturnal dyspnea and syncope.   Respiratory: Negative for cough, hemoptysis, shortness of breath, sleep disturbances due to breathing, snoring and wheezing.    Endocrine: Negative for cold intolerance, heat intolerance, polydipsia and polyuria.   Hematologic/Lymphatic: Negative for adenopathy and bleeding problem. Does not bruise/bleed easily.   Skin: Negative for color change, itching, poor wound healing, rash and suspicious lesions.   Musculoskeletal: Negative for arthritis, back pain, falls, joint pain, joint swelling, muscle cramps, muscle weakness and myalgias.   Gastrointestinal: Negative for bloating, abdominal pain, change in bowel habit, constipation, flatus, heartburn, hematemesis, hematochezia, hemorrhoids, jaundice, melena, nausea and vomiting.   Genitourinary: Negative for bladder incontinence, decreased libido, frequency, hematuria, hesitancy and urgency.   Neurological: Negative for brief paralysis, difficulty with concentration, excessive daytime sleepiness, dizziness, focal weakness, headaches, light-headedness, loss of balance, numbness, vertigo and weakness.   Psychiatric/Behavioral: Negative for  altered mental status, depression and memory loss. The patient does not have insomnia and is not nervous/anxious.    Allergic/Immunologic: Negative for environmental allergies, hives and persistent infections.        Objective:    Physical Exam   Constitutional: He is oriented to person, place, and time. He appears well-developed and well-nourished. No distress.   BP (!) 152/68   Pulse (!) 54   Ht 6' (1.829 m)   Wt 98.4 kg (217 lb)   BMI 29.43 kg/m²      HENT:   Head: Normocephalic and atraumatic.   Eyes: Pupils are equal, round, and reactive to light. Conjunctivae and lids are normal. Right eye exhibits no discharge. No scleral icterus.   Neck: Normal range of motion. Neck supple. No JVD present. No tracheal deviation present. No thyromegaly present.   Cardiovascular: Normal rate, regular rhythm, S1 normal, S2 normal, normal heart sounds and intact distal pulses. Exam reveals no gallop and no friction rub.   No murmur heard.  Pulses:       Carotid pulses are 2+ on the right side, and 2+ on the left side.       Radial pulses are 2+ on the right side, and 2+ on the left side.        Femoral pulses are 2+ on the right side, and 2+ on the left side.       Popliteal pulses are 2+ on the right side, and 2+ on the left side.        Dorsalis pedis pulses are 2+ on the right side, and 2+ on the left side.        Posterior tibial pulses are 2+ on the right side, and 2+ on the left side.   Pulmonary/Chest: Effort normal and breath sounds normal. No respiratory distress. He has no wheezes. He has no rales. He exhibits no tenderness.   Abdominal: Soft. Bowel sounds are normal. He exhibits no distension and no mass. There is no hepatosplenomegaly or hepatomegaly. There is no tenderness. There is no rebound and no guarding.   Musculoskeletal: Normal range of motion. He exhibits no edema or tenderness.   Lymphadenopathy:     He has no cervical adenopathy.   Neurological: He is alert and oriented to person, place, and time. He  has normal reflexes. No cranial nerve deficit. Coordination normal.   Skin: Skin is warm and dry. No rash noted. He is not diaphoretic. No erythema. No pallor.   Psychiatric: He has a normal mood and affect. His speech is normal and behavior is normal. Judgment and thought content normal. Cognition and memory are normal.         Assessment:       1. Hypertension associated with diabetes    2. Combined hyperlipidemia associated with type 2 diabetes mellitus    3. History of CVA (cerebrovascular accident)         Plan:     The current medical regimen is effective;  continue present plan and medications.     BP consistently below 160 and will accept that reading     Low salt diet     Patient advised to modify risk factors such as weight, exercise, diet,  tobacco and alcohol exposure      No orders of the defined types were placed in this encounter.    Follow up in about 6 months (around 12/25/2019).

## 2019-07-10 ENCOUNTER — LAB VISIT (OUTPATIENT)
Dept: LAB | Facility: HOSPITAL | Age: 79
End: 2019-07-10
Attending: INTERNAL MEDICINE
Payer: MEDICARE

## 2019-07-10 DIAGNOSIS — M1A.09X1 IDIOPATHIC CHRONIC GOUT OF MULTIPLE SITES WITH TOPHUS: ICD-10-CM

## 2019-07-10 LAB
ALBUMIN SERPL BCP-MCNC: 3.9 G/DL (ref 3.5–5.2)
ALP SERPL-CCNC: 76 U/L (ref 55–135)
ALT SERPL W/O P-5'-P-CCNC: 12 U/L (ref 10–44)
ANION GAP SERPL CALC-SCNC: 7 MMOL/L (ref 8–16)
AST SERPL-CCNC: 16 U/L (ref 10–40)
BILIRUB SERPL-MCNC: 1.3 MG/DL (ref 0.1–1)
BUN SERPL-MCNC: 29 MG/DL (ref 8–23)
CALCIUM SERPL-MCNC: 9.6 MG/DL (ref 8.7–10.5)
CHLORIDE SERPL-SCNC: 107 MMOL/L (ref 95–110)
CO2 SERPL-SCNC: 27 MMOL/L (ref 23–29)
CREAT SERPL-MCNC: 1.4 MG/DL (ref 0.5–1.4)
EST. GFR  (AFRICAN AMERICAN): 54.5 ML/MIN/1.73 M^2
EST. GFR  (NON AFRICAN AMERICAN): 47.1 ML/MIN/1.73 M^2
GLUCOSE SERPL-MCNC: 116 MG/DL (ref 70–110)
POTASSIUM SERPL-SCNC: 4.8 MMOL/L (ref 3.5–5.1)
PROT SERPL-MCNC: 7.1 G/DL (ref 6–8.4)
SODIUM SERPL-SCNC: 141 MMOL/L (ref 136–145)
URATE SERPL-MCNC: 6.4 MG/DL (ref 3.4–7)

## 2019-07-10 PROCEDURE — 84550 ASSAY OF BLOOD/URIC ACID: CPT

## 2019-07-10 PROCEDURE — 80053 COMPREHEN METABOLIC PANEL: CPT

## 2019-07-10 PROCEDURE — 36415 COLL VENOUS BLD VENIPUNCTURE: CPT | Mod: PO

## 2019-08-02 RX ORDER — GALANTAMINE 8 MG/1
TABLET, FILM COATED ORAL
Qty: 180 TABLET | Refills: 0 | Status: SHIPPED | OUTPATIENT
Start: 2019-08-02 | End: 2019-10-27 | Stop reason: SDUPTHER

## 2019-08-07 DIAGNOSIS — E78.2 COMBINED HYPERLIPIDEMIA ASSOCIATED WITH TYPE 2 DIABETES MELLITUS: ICD-10-CM

## 2019-08-07 DIAGNOSIS — E11.69 COMBINED HYPERLIPIDEMIA ASSOCIATED WITH TYPE 2 DIABETES MELLITUS: ICD-10-CM

## 2019-08-07 RX ORDER — ATORVASTATIN CALCIUM 20 MG/1
TABLET, FILM COATED ORAL
Qty: 90 TABLET | Refills: 11 | Status: SHIPPED | OUTPATIENT
Start: 2019-08-07 | End: 2020-12-01 | Stop reason: SDUPTHER

## 2019-08-14 ENCOUNTER — LAB VISIT (OUTPATIENT)
Dept: LAB | Facility: HOSPITAL | Age: 79
End: 2019-08-14
Attending: FAMILY MEDICINE
Payer: MEDICARE

## 2019-08-14 DIAGNOSIS — M1A.09X1 IDIOPATHIC CHRONIC GOUT OF MULTIPLE SITES WITH TOPHUS: ICD-10-CM

## 2019-08-14 LAB
ALBUMIN SERPL BCP-MCNC: 3.8 G/DL (ref 3.5–5.2)
ALP SERPL-CCNC: 78 U/L (ref 55–135)
ALT SERPL W/O P-5'-P-CCNC: 16 U/L (ref 10–44)
ANION GAP SERPL CALC-SCNC: 8 MMOL/L (ref 8–16)
AST SERPL-CCNC: 18 U/L (ref 10–40)
BILIRUB SERPL-MCNC: 1.2 MG/DL (ref 0.1–1)
BUN SERPL-MCNC: 22 MG/DL (ref 8–23)
CALCIUM SERPL-MCNC: 9.1 MG/DL (ref 8.7–10.5)
CHLORIDE SERPL-SCNC: 107 MMOL/L (ref 95–110)
CO2 SERPL-SCNC: 27 MMOL/L (ref 23–29)
CREAT SERPL-MCNC: 1.3 MG/DL (ref 0.5–1.4)
EST. GFR  (AFRICAN AMERICAN): 59.6 ML/MIN/1.73 M^2
EST. GFR  (NON AFRICAN AMERICAN): 51.5 ML/MIN/1.73 M^2
GLUCOSE SERPL-MCNC: 150 MG/DL (ref 70–110)
POTASSIUM SERPL-SCNC: 4.5 MMOL/L (ref 3.5–5.1)
PROT SERPL-MCNC: 6.8 G/DL (ref 6–8.4)
SODIUM SERPL-SCNC: 142 MMOL/L (ref 136–145)
URATE SERPL-MCNC: 6.1 MG/DL (ref 3.4–7)

## 2019-08-14 PROCEDURE — 36415 COLL VENOUS BLD VENIPUNCTURE: CPT | Mod: PO

## 2019-08-14 PROCEDURE — 84550 ASSAY OF BLOOD/URIC ACID: CPT

## 2019-08-14 PROCEDURE — 80053 COMPREHEN METABOLIC PANEL: CPT

## 2019-08-16 ENCOUNTER — TELEPHONE (OUTPATIENT)
Dept: INTERNAL MEDICINE | Facility: CLINIC | Age: 79
End: 2019-08-16

## 2019-08-19 DIAGNOSIS — N18.30 CONTROLLED TYPE 2 DIABETES MELLITUS WITH STAGE 3 CHRONIC KIDNEY DISEASE, WITHOUT LONG-TERM CURRENT USE OF INSULIN: ICD-10-CM

## 2019-08-19 DIAGNOSIS — E11.22 CONTROLLED TYPE 2 DIABETES MELLITUS WITH STAGE 3 CHRONIC KIDNEY DISEASE, WITHOUT LONG-TERM CURRENT USE OF INSULIN: ICD-10-CM

## 2019-08-19 RX ORDER — ACARBOSE 100 MG/1
TABLET ORAL
Qty: 270 TABLET | Refills: 0 | Status: SHIPPED | OUTPATIENT
Start: 2019-08-19 | End: 2019-11-11 | Stop reason: SDUPTHER

## 2019-09-09 ENCOUNTER — PATIENT MESSAGE (OUTPATIENT)
Dept: FAMILY MEDICINE | Facility: CLINIC | Age: 79
End: 2019-09-09

## 2019-09-12 DIAGNOSIS — E78.2 COMBINED HYPERLIPIDEMIA ASSOCIATED WITH TYPE 2 DIABETES MELLITUS: ICD-10-CM

## 2019-09-12 DIAGNOSIS — E11.69 COMBINED HYPERLIPIDEMIA ASSOCIATED WITH TYPE 2 DIABETES MELLITUS: ICD-10-CM

## 2019-09-12 RX ORDER — NIACIN 1000 MG/1
TABLET, EXTENDED RELEASE ORAL
Qty: 90 TABLET | Refills: 0 | Status: SHIPPED | OUTPATIENT
Start: 2019-09-12 | End: 2019-12-16 | Stop reason: SDUPTHER

## 2019-09-25 DIAGNOSIS — E03.9 HYPOTHYROIDISM, UNSPECIFIED TYPE: ICD-10-CM

## 2019-09-25 RX ORDER — LEVOTHYROXINE SODIUM 25 UG/1
TABLET ORAL
Qty: 90 TABLET | Refills: 0 | Status: SHIPPED | OUTPATIENT
Start: 2019-09-25 | End: 2019-10-09

## 2019-10-01 RX ORDER — POTASSIUM CHLORIDE 750 MG/1
TABLET, EXTENDED RELEASE ORAL
Qty: 90 TABLET | Refills: 0 | Status: SHIPPED | OUTPATIENT
Start: 2019-10-01 | End: 2019-12-31

## 2019-10-02 ENCOUNTER — PATIENT MESSAGE (OUTPATIENT)
Dept: NEPHROLOGY | Facility: CLINIC | Age: 79
End: 2019-10-02

## 2019-10-03 ENCOUNTER — LAB VISIT (OUTPATIENT)
Dept: LAB | Facility: HOSPITAL | Age: 79
End: 2019-10-03
Attending: INTERNAL MEDICINE
Payer: MEDICARE

## 2019-10-03 DIAGNOSIS — M11.9 CRYSTAL ARTHRITIS: ICD-10-CM

## 2019-10-03 DIAGNOSIS — M1A.0710 CHRONIC GOUT OF RIGHT FOOT, UNSPECIFIED CAUSE: ICD-10-CM

## 2019-10-03 DIAGNOSIS — N18.30 CKD (CHRONIC KIDNEY DISEASE) STAGE 3, GFR 30-59 ML/MIN: ICD-10-CM

## 2019-10-03 DIAGNOSIS — E55.9 VITAMIN D DEFICIENCY: ICD-10-CM

## 2019-10-03 DIAGNOSIS — M1A.09X1 IDIOPATHIC CHRONIC GOUT OF MULTIPLE SITES WITH TOPHUS: ICD-10-CM

## 2019-10-03 DIAGNOSIS — E11.22 CONTROLLED TYPE 2 DIABETES MELLITUS WITH STAGE 3 CHRONIC KIDNEY DISEASE, WITHOUT LONG-TERM CURRENT USE OF INSULIN: ICD-10-CM

## 2019-10-03 DIAGNOSIS — D50.8 IRON DEFICIENCY ANEMIA SECONDARY TO INADEQUATE DIETARY IRON INTAKE: ICD-10-CM

## 2019-10-03 DIAGNOSIS — E03.9 HYPOTHYROIDISM, UNSPECIFIED TYPE: ICD-10-CM

## 2019-10-03 DIAGNOSIS — Z12.5 ENCOUNTER FOR PROSTATE CANCER SCREENING: ICD-10-CM

## 2019-10-03 DIAGNOSIS — N18.30 CONTROLLED TYPE 2 DIABETES MELLITUS WITH STAGE 3 CHRONIC KIDNEY DISEASE, WITHOUT LONG-TERM CURRENT USE OF INSULIN: ICD-10-CM

## 2019-10-03 LAB
25(OH)D3+25(OH)D2 SERPL-MCNC: 31 NG/ML (ref 30–96)
BASOPHILS # BLD AUTO: 0.02 K/UL (ref 0–0.2)
BASOPHILS NFR BLD: 0.4 % (ref 0–1.9)
COMPLEXED PSA SERPL-MCNC: 1.2 NG/ML (ref 0–4)
DIFFERENTIAL METHOD: ABNORMAL
EOSINOPHIL # BLD AUTO: 0.1 K/UL (ref 0–0.5)
EOSINOPHIL NFR BLD: 2.6 % (ref 0–8)
ERYTHROCYTE [DISTWIDTH] IN BLOOD BY AUTOMATED COUNT: 14 % (ref 11.5–14.5)
ERYTHROCYTE [SEDIMENTATION RATE] IN BLOOD BY WESTERGREN METHOD: 5 MM/HR (ref 0–10)
ESTIMATED AVG GLUCOSE: 128 MG/DL (ref 68–131)
HBA1C MFR BLD HPLC: 6.1 % (ref 4–5.6)
HCT VFR BLD AUTO: 42.2 % (ref 40–54)
HGB BLD-MCNC: 13.7 G/DL (ref 14–18)
IMM GRANULOCYTES # BLD AUTO: 0.02 K/UL (ref 0–0.04)
IMM GRANULOCYTES NFR BLD AUTO: 0.4 % (ref 0–0.5)
LYMPHOCYTES # BLD AUTO: 1.4 K/UL (ref 1–4.8)
LYMPHOCYTES NFR BLD: 31.3 % (ref 18–48)
MCH RBC QN AUTO: 30.5 PG (ref 27–31)
MCHC RBC AUTO-ENTMCNC: 32.5 G/DL (ref 32–36)
MCV RBC AUTO: 94 FL (ref 82–98)
MONOCYTES # BLD AUTO: 0.4 K/UL (ref 0.3–1)
MONOCYTES NFR BLD: 9.4 % (ref 4–15)
NEUTROPHILS # BLD AUTO: 2.6 K/UL (ref 1.8–7.7)
NEUTROPHILS NFR BLD: 55.9 % (ref 38–73)
NRBC BLD-RTO: 0 /100 WBC
PLATELET # BLD AUTO: 158 K/UL (ref 150–350)
PMV BLD AUTO: 11.7 FL (ref 9.2–12.9)
RBC # BLD AUTO: 4.49 M/UL (ref 4.6–6.2)
WBC # BLD AUTO: 4.57 K/UL (ref 3.9–12.7)

## 2019-10-03 PROCEDURE — 82306 VITAMIN D 25 HYDROXY: CPT

## 2019-10-03 PROCEDURE — 83735 ASSAY OF MAGNESIUM: CPT

## 2019-10-03 PROCEDURE — 83540 ASSAY OF IRON: CPT

## 2019-10-03 PROCEDURE — 84443 ASSAY THYROID STIM HORMONE: CPT

## 2019-10-03 PROCEDURE — 80053 COMPREHEN METABOLIC PANEL: CPT

## 2019-10-03 PROCEDURE — 80069 RENAL FUNCTION PANEL: CPT

## 2019-10-03 PROCEDURE — 85025 COMPLETE CBC W/AUTO DIFF WBC: CPT

## 2019-10-03 PROCEDURE — 83036 HEMOGLOBIN GLYCOSYLATED A1C: CPT

## 2019-10-03 PROCEDURE — 84439 ASSAY OF FREE THYROXINE: CPT

## 2019-10-03 PROCEDURE — 85651 RBC SED RATE NONAUTOMATED: CPT | Mod: PO

## 2019-10-03 PROCEDURE — 84153 ASSAY OF PSA TOTAL: CPT

## 2019-10-03 PROCEDURE — 84550 ASSAY OF BLOOD/URIC ACID: CPT

## 2019-10-03 PROCEDURE — 86140 C-REACTIVE PROTEIN: CPT

## 2019-10-03 PROCEDURE — 36415 COLL VENOUS BLD VENIPUNCTURE: CPT | Mod: PO

## 2019-10-04 LAB
ALBUMIN SERPL BCP-MCNC: 4 G/DL (ref 3.5–5.2)
ALBUMIN SERPL BCP-MCNC: 4 G/DL (ref 3.5–5.2)
ALP SERPL-CCNC: 72 U/L (ref 55–135)
ALT SERPL W/O P-5'-P-CCNC: 20 U/L (ref 10–44)
ANION GAP SERPL CALC-SCNC: 9 MMOL/L (ref 8–16)
ANION GAP SERPL CALC-SCNC: 9 MMOL/L (ref 8–16)
AST SERPL-CCNC: 25 U/L (ref 10–40)
BILIRUB SERPL-MCNC: 1.2 MG/DL (ref 0.1–1)
BUN SERPL-MCNC: 22 MG/DL (ref 8–23)
BUN SERPL-MCNC: 22 MG/DL (ref 8–23)
CALCIUM SERPL-MCNC: 9.5 MG/DL (ref 8.7–10.5)
CALCIUM SERPL-MCNC: 9.5 MG/DL (ref 8.7–10.5)
CHLORIDE SERPL-SCNC: 108 MMOL/L (ref 95–110)
CHLORIDE SERPL-SCNC: 108 MMOL/L (ref 95–110)
CO2 SERPL-SCNC: 26 MMOL/L (ref 23–29)
CO2 SERPL-SCNC: 26 MMOL/L (ref 23–29)
CREAT SERPL-MCNC: 1.2 MG/DL (ref 0.5–1.4)
CRP SERPL-MCNC: 0.4 MG/L (ref 0–8.2)
EST. GFR  (AFRICAN AMERICAN): >60 ML/MIN/1.73 M^2
EST. GFR  (NON AFRICAN AMERICAN): 56.4 ML/MIN/1.73 M^2
GLUCOSE SERPL-MCNC: 120 MG/DL (ref 70–110)
GLUCOSE SERPL-MCNC: 120 MG/DL (ref 70–110)
IRON SERPL-MCNC: 72 UG/DL (ref 45–160)
MAGNESIUM SERPL-MCNC: 1.8 MG/DL (ref 1.6–2.6)
PHOSPHATE SERPL-MCNC: 3 MG/DL (ref 2.7–4.5)
POTASSIUM SERPL-SCNC: 4.5 MMOL/L (ref 3.5–5.1)
POTASSIUM SERPL-SCNC: 4.5 MMOL/L (ref 3.5–5.1)
PROT SERPL-MCNC: 6.8 G/DL (ref 6–8.4)
SATURATED IRON: 25 % (ref 20–50)
SODIUM SERPL-SCNC: 143 MMOL/L (ref 136–145)
SODIUM SERPL-SCNC: 143 MMOL/L (ref 136–145)
T4 FREE SERPL-MCNC: 1.06 NG/DL (ref 0.71–1.51)
TOTAL IRON BINDING CAPACITY: 293 UG/DL (ref 250–450)
TRANSFERRIN SERPL-MCNC: 198 MG/DL (ref 200–375)
TSH SERPL DL<=0.005 MIU/L-ACNC: 4.13 UIU/ML (ref 0.4–4)
URATE SERPL-MCNC: 5.7 MG/DL (ref 3.4–7)
URATE SERPL-MCNC: 5.7 MG/DL (ref 3.4–7)

## 2019-10-09 DIAGNOSIS — E03.9 HYPOTHYROIDISM, UNSPECIFIED TYPE: Primary | ICD-10-CM

## 2019-10-09 RX ORDER — LEVOTHYROXINE SODIUM 50 UG/1
50 TABLET ORAL DAILY
COMMUNITY
End: 2019-10-09

## 2019-10-09 RX ORDER — LEVOTHYROXINE SODIUM 50 UG/1
50 TABLET ORAL DAILY
Qty: 30 TABLET | Refills: 2 | Status: SHIPPED | OUTPATIENT
Start: 2019-10-09 | End: 2019-10-16

## 2019-10-09 NOTE — TELEPHONE ENCOUNTER
I have signed for the following orders AND/OR meds.  Please call the patient and ask the patient to schedule the testing AND/OR inform about any medications that were sent.      Orders Placed This Encounter   Procedures    TSH     Standing Status:   Future     Standing Expiration Date:   12/7/2020       Medications Ordered This Encounter   Medications    levothyroxine (SYNTHROID) 50 MCG tablet     Sig: Take 1 tablet (50 mcg total) by mouth once daily.     Dispense:  30 tablet     Refill:  2

## 2019-10-09 NOTE — TELEPHONE ENCOUNTER
----- Message from Mohinder Morfin MD sent at 10/8/2019 11:55 PM CDT -----  The patient's TSH is abnormal.  Repeat TSH in 2 months and send the patient the appointment for this.  Send a prescription for the patient's thyroid medication (synthroid or levothyroxine) at a dose of 50 mcg once a day to the pharmacy for 2 months of meds.

## 2019-10-10 ENCOUNTER — PATIENT OUTREACH (OUTPATIENT)
Dept: ADMINISTRATIVE | Facility: HOSPITAL | Age: 79
End: 2019-10-10

## 2019-10-10 NOTE — PROGRESS NOTES
Spoke with pt concerning annual check up via HTN report.  Annual scheduled with Dr. Morfin 11/15/19 at 9:00 am.  Lipid panel ordered and linked to labs.

## 2019-10-15 ENCOUNTER — LAB VISIT (OUTPATIENT)
Dept: LAB | Facility: HOSPITAL | Age: 79
End: 2019-10-15
Attending: INTERNAL MEDICINE
Payer: MEDICARE

## 2019-10-15 ENCOUNTER — OFFICE VISIT (OUTPATIENT)
Dept: PODIATRY | Facility: CLINIC | Age: 79
End: 2019-10-15
Payer: MEDICARE

## 2019-10-15 VITALS
SYSTOLIC BLOOD PRESSURE: 177 MMHG | WEIGHT: 221.75 LBS | HEART RATE: 48 BPM | DIASTOLIC BLOOD PRESSURE: 73 MMHG | BODY MASS INDEX: 30.03 KG/M2 | HEIGHT: 72 IN

## 2019-10-15 DIAGNOSIS — M1A.09X1 IDIOPATHIC CHRONIC GOUT OF MULTIPLE SITES WITH TOPHUS: ICD-10-CM

## 2019-10-15 DIAGNOSIS — E11.49 TYPE II DIABETES MELLITUS WITH NEUROLOGICAL MANIFESTATIONS: Primary | ICD-10-CM

## 2019-10-15 DIAGNOSIS — B35.1 DERMATOPHYTOSIS OF NAIL: ICD-10-CM

## 2019-10-15 LAB
ALBUMIN SERPL BCP-MCNC: 3.9 G/DL (ref 3.5–5.2)
ALP SERPL-CCNC: 76 U/L (ref 55–135)
ALT SERPL W/O P-5'-P-CCNC: 19 U/L (ref 10–44)
ANION GAP SERPL CALC-SCNC: 10 MMOL/L (ref 8–16)
AST SERPL-CCNC: 19 U/L (ref 10–40)
BILIRUB SERPL-MCNC: 1.1 MG/DL (ref 0.1–1)
BUN SERPL-MCNC: 21 MG/DL (ref 8–23)
CALCIUM SERPL-MCNC: 9.1 MG/DL (ref 8.7–10.5)
CHLORIDE SERPL-SCNC: 106 MMOL/L (ref 95–110)
CO2 SERPL-SCNC: 27 MMOL/L (ref 23–29)
CREAT SERPL-MCNC: 1.3 MG/DL (ref 0.5–1.4)
EST. GFR  (AFRICAN AMERICAN): 59.2 ML/MIN/1.73 M^2
EST. GFR  (NON AFRICAN AMERICAN): 51.2 ML/MIN/1.73 M^2
GLUCOSE SERPL-MCNC: 175 MG/DL (ref 70–110)
POTASSIUM SERPL-SCNC: 4 MMOL/L (ref 3.5–5.1)
PROT SERPL-MCNC: 6.9 G/DL (ref 6–8.4)
SODIUM SERPL-SCNC: 143 MMOL/L (ref 136–145)
URATE SERPL-MCNC: 5 MG/DL (ref 3.4–7)

## 2019-10-15 PROCEDURE — 99499 UNLISTED E&M SERVICE: CPT | Mod: S$GLB,,, | Performed by: PODIATRIST

## 2019-10-15 PROCEDURE — 84550 ASSAY OF BLOOD/URIC ACID: CPT

## 2019-10-15 PROCEDURE — 80053 COMPREHEN METABOLIC PANEL: CPT

## 2019-10-15 PROCEDURE — 36415 COLL VENOUS BLD VENIPUNCTURE: CPT | Mod: PO

## 2019-10-15 PROCEDURE — 99499 NO LOS: ICD-10-PCS | Mod: S$GLB,,, | Performed by: PODIATRIST

## 2019-10-15 PROCEDURE — 11721 DEBRIDE NAIL 6 OR MORE: CPT | Mod: Q9,S$GLB,, | Performed by: PODIATRIST

## 2019-10-15 PROCEDURE — 99999 PR PBB SHADOW E&M-EST. PATIENT-LVL III: CPT | Mod: PBBFAC,,, | Performed by: PODIATRIST

## 2019-10-15 PROCEDURE — 99999 PR PBB SHADOW E&M-EST. PATIENT-LVL III: ICD-10-PCS | Mod: PBBFAC,,, | Performed by: PODIATRIST

## 2019-10-15 PROCEDURE — 11721 PR DEBRIDEMENT OF NAILS, 6 OR MORE: ICD-10-PCS | Mod: Q9,S$GLB,, | Performed by: PODIATRIST

## 2019-10-15 NOTE — PROGRESS NOTES
Subjective:     Patient ID: Rudy Saldaña is a 79 y.o. male.    Chief Complaint: Nail Care (RNC. c/o no pain. Diabetic Pt. Wears tennis shoes. PCP DR Morfin, last visit 4/15/19)    Rudy is a 79 y.o. male who presents to the clinic for evaluation and treatment of high risk feet. Rudy has a past medical history of Anemia, BPH (benign prostatic hypertrophy), Carotid bruit, CKD (chronic kidney disease) stage 2/3, CVA (cerebral vascular accident), Diabetes mellitus, type 2, Hyperlipidemia, Hypertension, Hypothyroidism (2/29/2016), Type 2 diabetes mellitus, uncontrolled, with renal complications, Type II or unspecified type diabetes mellitus without mention of complication, not stated as uncontrolled, and Vitamin D deficiency (2/29/2016). The patient's chief complaint is long, thick toenails. This patient has documented high risk feet requiring routine maintenance secondary to peripheral neuropathy. Patient denies any pain in his feet.      PCP: Mohinder Morfin MD    Date Last Seen by PCP: 04/15/19    Current shoe gear:  Affected Foot: Tennis shoes     Unaffected Foot: tennis  shoes    Hemoglobin A1C   Date Value Ref Range Status   10/03/2019 6.1 (H) 4.0 - 5.6 % Final     Comment:     ADA Screening Guidelines:  5.7-6.4%  Consistent with prediabetes  >or=6.5%  Consistent with diabetes  High levels of fetal hemoglobin interfere with the HbA1C  assay. Heterozygous hemoglobin variants (HbS, HgC, etc)do  not significantly interfere with this assay.   However, presence of multiple variants may affect accuracy.     03/19/2019 6.6 (H) 4.0 - 5.6 % Final     Comment:     ADA Screening Guidelines:  5.7-6.4%  Consistent with prediabetes  >or=6.5%  Consistent with diabetes  High levels of fetal hemoglobin interfere with the HbA1C  assay. Heterozygous hemoglobin variants (HbS, HgC, etc)do  not significantly interfere with this assay.   However, presence of multiple variants may affect accuracy.     09/14/2018 6.9 (H) 4.0 -  5.6 % Final     Comment:     ADA Screening Guidelines:  5.7-6.4%  Consistent with prediabetes  >or=6.5%  Consistent with diabetes  High levels of fetal hemoglobin interfere with the HbA1C  assay. Heterozygous hemoglobin variants (HbS, HgC, etc)do  not significantly interfere with this assay.   However, presence of multiple variants may affect accuracy.             Patient Active Problem List   Diagnosis    Combined hyperlipidemia associated with type 2 diabetes mellitus    Hypertension associated with diabetes    Type 2 diabetes mellitus, uncontrolled, with renal complications    History of stroke    BPH (benign prostatic hypertrophy)    History of colon polyps    Iron deficiency anemia    Polyneuropathy due to secondary diabetes    DEVIKA on CPAP    Family history of colonic polyps    Vitamin D deficiency    Hypothyroidism    Anemia    Gross hematuria    CKD stage 3 secondary to diabetes    Facet arthropathy, lumbar    Right hip pain    Primary osteoarthritis of right hip    Osteoarthritis of spine with radiculopathy, lumbar region    Acute idiopathic gout of left hand    Chronic fatigue    Crystal arthritis    Idiopathic chronic gout of multiple sites with tophus       Medication List with Changes/Refills   Current Medications    ACARBOSE (PRECOSE) 100 MG TAB    TAKE 1 TABLET(100 MG) BY MOUTH THREE TIMES DAILY WITH MEALS    ALLOPURINOL (ZYLOPRIM) 100 MG TABLET    Take 2 tablets (200 mg total) by mouth once daily.    AMLODIPINE (NORVASC) 5 MG TABLET    Take 1 tablet (5 mg total) by mouth once daily.    ASPIRIN (ECOTRIN) 81 MG EC TABLET    Take 81 mg by mouth once daily.    ATORVASTATIN (LIPITOR) 20 MG TABLET    TAKE 1 TABLET BY MOUTH DAILY    BENAZEPRIL (LOTENSIN) 40 MG TABLET    Take 1 tablet (40 mg total) by mouth 2 (two) times daily.    BLOOD SUGAR DIAGNOSTIC STRP    1 each by Misc.(Non-Drug; Combo Route) route 3 (three) times daily as needed. One touch ultra 2 test strips    CLOPIDOGREL  (PLAVIX) 75 MG TABLET    Take 1 tablet (75 mg total) by mouth once daily.    ERGOCALCIFEROL (ERGOCALCIFEROL) 50,000 UNIT CAP    TAKE 1 CAPSULE BY MOUTH 2 TIMES A WEEK    FERROUS SULFATE 325 MG (65 MG IRON) TAB TABLET    Take 1 tablet (325 mg total) by mouth 3 (three) times daily.    FUROSEMIDE (LASIX) 20 MG TABLET    Take 1 tablet (20 mg total) by mouth once daily.    GALANTAMINE (RAZADYNE) 8 MG TAB    TAKE 1 TABLET BY MOUTH TWICE DAILY WITH MEALS    GLIPIZIDE (GLUCOTROL) 10 MG TABLET    TAKE 1 TABLET(10 MG) BY MOUTH TWICE DAILY BEFORE MEALS    JANUVIA 100 MG TAB    TAKE 1 TABLET(100 MG) BY MOUTH EVERY DAY    KETOCONAZOLE (NIZORAL) 2 % SHAMPOO        LANCETS (TRUEPLUS LANCETS) 33 GAUGE MISC    1 lancet by Misc.(Non-Drug; Combo Route) route once daily.    LEVOTHYROXINE (SYNTHROID) 50 MCG TABLET    Take 1 tablet (50 mcg total) by mouth once daily.    MITIGARE 0.6 MG CAP    Take 1 capsule (0.6 mg total) by mouth Daily.    NIACIN (NIASPAN) 1000 MG CR TABLET    TAKE 1 TABLET(1000 MG) BY MOUTH EVERY EVENING    ONETOUCH VERIO IQ METER KIT    Use as instructed    PIOGLITAZONE (ACTOS) 30 MG TABLET    Take 1 tablet (30 mg total) by mouth once daily.    POTASSIUM CHLORIDE (KLOR-CON) 10 MEQ TBSR    TAKE 1 TABLET(10 MEQ) BY MOUTH EVERY DAY       Review of patient's allergies indicates:   Allergen Reactions    No known drug allergies        Past Surgical History:   Procedure Laterality Date    COLONOSCOPY  2008    COLONOSCOPY  5/31/2005    repeat in 2015    PILONIDAL CYST DRAINAGE      TRANSURETHRAL RESECTION OF PROSTATE         Family History   Problem Relation Age of Onset    Hypertension Mother     Hypertension Father     Heart attack Maternal Uncle     Brain cancer Sister     Diabetes Neg Hx     Stroke Neg Hx        Social History     Socioeconomic History    Marital status:      Spouse name: Not on file    Number of children: Not on file    Years of education: Not on file    Highest education level: Not  on file   Occupational History    Not on file   Social Needs    Financial resource strain: Not on file    Food insecurity:     Worry: Not on file     Inability: Not on file    Transportation needs:     Medical: Not on file     Non-medical: Not on file   Tobacco Use    Smoking status: Never Smoker    Smokeless tobacco: Never Used   Substance and Sexual Activity    Alcohol use: No     Alcohol/week: 0.0 standard drinks    Drug use: No    Sexual activity: Not on file   Lifestyle    Physical activity:     Days per week: Not on file     Minutes per session: Not on file    Stress: Not on file   Relationships    Social connections:     Talks on phone: Not on file     Gets together: Not on file     Attends Zoroastrian service: Not on file     Active member of club or organization: Not on file     Attends meetings of clubs or organizations: Not on file     Relationship status: Not on file   Other Topics Concern    Not on file   Social History Narrative    Not on file       Vitals:    10/15/19 1049   Weight: 100.6 kg (221 lb 12.5 oz)   Height: 6' (1.829 m)   PainSc: 0-No pain       Hemoglobin A1C   Date Value Ref Range Status   10/03/2019 6.1 (H) 4.0 - 5.6 % Final     Comment:     ADA Screening Guidelines:  5.7-6.4%  Consistent with prediabetes  >or=6.5%  Consistent with diabetes  High levels of fetal hemoglobin interfere with the HbA1C  assay. Heterozygous hemoglobin variants (HbS, HgC, etc)do  not significantly interfere with this assay.   However, presence of multiple variants may affect accuracy.     03/19/2019 6.6 (H) 4.0 - 5.6 % Final     Comment:     ADA Screening Guidelines:  5.7-6.4%  Consistent with prediabetes  >or=6.5%  Consistent with diabetes  High levels of fetal hemoglobin interfere with the HbA1C  assay. Heterozygous hemoglobin variants (HbS, HgC, etc)do  not significantly interfere with this assay.   However, presence of multiple variants may affect accuracy.     09/14/2018 6.9 (H) 4.0 - 5.6 %  Final     Comment:     ADA Screening Guidelines:  5.7-6.4%  Consistent with prediabetes  >or=6.5%  Consistent with diabetes  High levels of fetal hemoglobin interfere with the HbA1C  assay. Heterozygous hemoglobin variants (HbS, HgC, etc)do  not significantly interfere with this assay.   However, presence of multiple variants may affect accuracy.         Review of Systems   Constitutional: Negative for chills and fever.   Respiratory: Negative for shortness of breath.    Cardiovascular: Negative for chest pain, palpitations, orthopnea, claudication and leg swelling.   Gastrointestinal: Negative for diarrhea, nausea and vomiting.   Musculoskeletal: Negative for joint pain.   Skin: Negative for rash.   Neurological: Positive for tingling and sensory change. Negative for dizziness, focal weakness and weakness.   Psychiatric/Behavioral: Negative.              Objective:   PHYSICAL EXAM: Apperance: Alert and orient in no distress,well developed, and with good attention to grooming and body habits  Patient presents ambulating in tennis shoes.   LOWER EXTREMITY EXAM:  VASCULAR: Dorsalis pedis pulses 2/4 bilateral and Posterior Tibial pulses 2/4 bilateral. Capillary fill time <4 seconds bilateral. No edema observed bilateral. Varicosities present bilateral. Skin temperature of the lower extremities is warm to warm, proximal to distal. Hair growth WNL bilateral.  DERMATOLOGICAL: No skin rashes, subcutaneous nodules, lesions, or ulcers observed bilateral. Nails 1,2,3,4,5 right and 2,3,4,5 left thickened. Left hallux nail absent. Webspaces 1-4 clean, dry and without evidence of break in skin integrity bilateral.   NEUROLOGICAL: Light touch, sharp-dull, proprioception all present and equal bilaterally.  Vibratory sensation absent at bilateral hallux and diminished at bilateral navicular . Protective sensation absent at 4/10 sites as tested with a Fabens-Chelsy 5.07 monofilament.   MUSCULOSKELETAL: Muscle strength is 5/5 for  foot inverters, everters, plantarflexors, and dorsiflexors. Muscle tone is normal. No pain on palpation left lateral midfoot. Minimal bony prominence noted to bilateral lateral midfoot at cuboid-metatarsal joint.       Assessment:   Type II diabetes mellitus with neurological manifestations    Dermatophytosis of nail          Plan:   Type II diabetes mellitus with neurological manifestations    Dermatophytosis of nail      I counseled the patient on his conditions, regarding findings of my examination, my impressions, and usual treatment plan.   Greater than 50% of this visit spent on counseling and coordination of care.  Greater than 15 minutes of a 20 minute appointment spent on education about the diabetic foot, neuropathy, and prevention of limb loss.  Shoe inspection. Diabetic Foot Education. Patient reminded of the importance of good nutrition and blood sugar control to help prevent podiatric complications of diabetes. Patient instructed on proper foot hygeine. We discussed wearing proper shoe gear, daily foot inspections, never walking without protective shoe gear, never putting sharp instruments to feet.    With patient's permission, nails 1-5 bilateral were debrided/trimmed in length and thickness aggressively to their soft tissue attachment mechanically and with electric , removing all offending nail and debris. Patient relates relief following the procedure.  Patient  will continue to monitor the areas daily, inspect feet, wear protective shoe gear when ambulatory, moisturizer to maintain skin integrity. Patient reminded of the importance of good nutrition and blood sugar control to help prevent podiatric complications of diabetes.  Patient to return 4 months or sooner if needed.                 Padmaja Dave DPM  [facility]

## 2019-10-16 ENCOUNTER — OFFICE VISIT (OUTPATIENT)
Dept: FAMILY MEDICINE | Facility: CLINIC | Age: 79
End: 2019-10-16
Payer: MEDICARE

## 2019-10-16 VITALS
TEMPERATURE: 98 F | HEART RATE: 64 BPM | SYSTOLIC BLOOD PRESSURE: 142 MMHG | BODY MASS INDEX: 29.77 KG/M2 | HEIGHT: 72 IN | DIASTOLIC BLOOD PRESSURE: 75 MMHG | WEIGHT: 219.81 LBS

## 2019-10-16 DIAGNOSIS — E03.9 HYPOTHYROIDISM, UNSPECIFIED TYPE: ICD-10-CM

## 2019-10-16 DIAGNOSIS — N18.30 CKD STAGE 3 SECONDARY TO DIABETES: ICD-10-CM

## 2019-10-16 DIAGNOSIS — I15.2 HYPERTENSION ASSOCIATED WITH DIABETES: Primary | ICD-10-CM

## 2019-10-16 DIAGNOSIS — E11.59 HYPERTENSION ASSOCIATED WITH DIABETES: Primary | ICD-10-CM

## 2019-10-16 DIAGNOSIS — Z23 INFLUENZA VACCINE NEEDED: ICD-10-CM

## 2019-10-16 DIAGNOSIS — E11.22 CKD STAGE 3 SECONDARY TO DIABETES: ICD-10-CM

## 2019-10-16 PROCEDURE — 99214 PR OFFICE/OUTPT VISIT, EST, LEVL IV, 30-39 MIN: ICD-10-PCS | Mod: 25,S$GLB,, | Performed by: INTERNAL MEDICINE

## 2019-10-16 PROCEDURE — 1101F PR PT FALLS ASSESS DOC 0-1 FALLS W/OUT INJ PAST YR: ICD-10-PCS | Mod: CPTII,S$GLB,, | Performed by: INTERNAL MEDICINE

## 2019-10-16 PROCEDURE — 1101F PT FALLS ASSESS-DOCD LE1/YR: CPT | Mod: CPTII,S$GLB,, | Performed by: INTERNAL MEDICINE

## 2019-10-16 PROCEDURE — 3077F PR MOST RECENT SYSTOLIC BLOOD PRESSURE >= 140 MM HG: ICD-10-PCS | Mod: CPTII,S$GLB,, | Performed by: INTERNAL MEDICINE

## 2019-10-16 PROCEDURE — 99214 OFFICE O/P EST MOD 30 MIN: CPT | Mod: 25,S$GLB,, | Performed by: INTERNAL MEDICINE

## 2019-10-16 PROCEDURE — 99999 PR PBB SHADOW E&M-EST. PATIENT-LVL V: ICD-10-PCS | Mod: PBBFAC,,, | Performed by: INTERNAL MEDICINE

## 2019-10-16 PROCEDURE — 90662 FLU VACCINE - HIGH DOSE (65+) PRESERVATIVE FREE IM: ICD-10-PCS | Mod: S$GLB,,, | Performed by: INTERNAL MEDICINE

## 2019-10-16 PROCEDURE — 3077F SYST BP >= 140 MM HG: CPT | Mod: CPTII,S$GLB,, | Performed by: INTERNAL MEDICINE

## 2019-10-16 PROCEDURE — G0008 ADMIN INFLUENZA VIRUS VAC: HCPCS | Mod: S$GLB,,, | Performed by: INTERNAL MEDICINE

## 2019-10-16 PROCEDURE — 3078F DIAST BP <80 MM HG: CPT | Mod: CPTII,S$GLB,, | Performed by: INTERNAL MEDICINE

## 2019-10-16 PROCEDURE — 99999 PR PBB SHADOW E&M-EST. PATIENT-LVL V: CPT | Mod: PBBFAC,,, | Performed by: INTERNAL MEDICINE

## 2019-10-16 PROCEDURE — G0008 FLU VACCINE - HIGH DOSE (65+) PRESERVATIVE FREE IM: ICD-10-PCS | Mod: S$GLB,,, | Performed by: INTERNAL MEDICINE

## 2019-10-16 PROCEDURE — 90662 IIV NO PRSV INCREASED AG IM: CPT | Mod: S$GLB,,, | Performed by: INTERNAL MEDICINE

## 2019-10-16 PROCEDURE — 3078F PR MOST RECENT DIASTOLIC BLOOD PRESSURE < 80 MM HG: ICD-10-PCS | Mod: CPTII,S$GLB,, | Performed by: INTERNAL MEDICINE

## 2019-10-16 RX ORDER — AMLODIPINE BESYLATE 10 MG/1
10 TABLET ORAL DAILY
Qty: 90 TABLET | Refills: 1 | Status: SHIPPED | OUTPATIENT
Start: 2019-10-16 | End: 2019-11-15 | Stop reason: SDUPTHER

## 2019-10-16 RX ORDER — LEVOTHYROXINE SODIUM 50 UG/1
50 TABLET ORAL DAILY
Qty: 90 TABLET | Refills: 0 | Status: SHIPPED | OUTPATIENT
Start: 2019-10-16 | End: 2020-01-16

## 2019-10-16 NOTE — PATIENT INSTRUCTIONS
Increasing Amlodipine to 10mg daily. You can continue taking two of your 5mg tablets until you run out. Then start one pill daily of the 10mg tablet when you  your new prescription. Follow up with nephrology as already scheduled.      Amlodipine tablets  What is this medicine?  AMLODIPINE (am JEREMIAH galdamez) is a calcium-channel blocker. It affects the amount of calcium found in your heart and muscle cells. This relaxes your blood vessels, which can reduce the amount of work the heart has to do. This medicine is used to lower high blood pressure. It is also used to prevent chest pain.  How should I use this medicine?  Take this medicine by mouth with a glass of water. Follow the directions on the prescription label. Take your medicine at regular intervals. Do not take more medicine than directed.  Talk to your pediatrician regarding the use of this medicine in children. Special care may be needed. This medicine has been used in children as young as 6.  Persons over 65 years old may have a stronger reaction to this medicine and need smaller doses.  What side effects may I notice from receiving this medicine?  Side effects that you should report to your doctor or health care professional as soon as possible:  · allergic reactions like skin rash, itching or hives, swelling of the face, lips, or tongue  · breathing problems  · changes in vision or hearing  · chest pain  · fast, irregular heartbeat  · swelling of legs or ankles  Side effects that usually do not require medical attention (report to your doctor or health care professional if they continue or are bothersome):  · dry mouth  · facial flushing  · nausea, vomiting  · stomach gas, pain  · tired, weak  · trouble sleeping  What may interact with this medicine?  · herbal or dietary supplements  · local or general anesthetics  · medicines for high blood pressure  · medicines for prostate problems  · rifampin  What if I miss a dose?  If you miss a dose, take it  as soon as you can. If it is almost time for your next dose, take only that dose. Do not take double or extra doses.  Where should I keep my medicine?  Keep out of the reach of children.  Store at room temperature between 59 and 86 degrees F (15 and 30 degrees C). Protect from light. Keep container tightly closed. Throw away any unused medicine after the expiration date.  What should I tell my health care provider before I take this medicine?  They need to know if you have any of these conditions:  · heart problems like heart failure or aortic stenosis  · liver disease  · an unusual or allergic reaction to amlodipine, other medicines, foods, dyes, or preservatives  · pregnant or trying to get pregnant  · breast-feeding  What should I watch for while using this medicine?  Visit your doctor or health care professional for regular check ups. Check your blood pressure and pulse rate regularly. Ask your health care professional what your blood pressure and pulse rate should be, and when you should contact him or her.  This medicine may make you feel confused, dizzy or lightheaded. Do not drive, use machinery, or do anything that needs mental alertness until you know how this medicine affects you. To reduce the risk of dizzy or fainting spells, do not sit or stand up quickly, especially if you are an older patient. Avoid alcoholic drinks; they can make you more dizzy.  Do not suddenly stop taking amlodipine. Ask your doctor or health care professional how you can gradually reduce the dose.  NOTE:This sheet is a summary. It may not cover all possible information. If you have questions about this medicine, talk to your doctor, pharmacist, or health care provider. Copyright© 2017 [employer]

## 2019-10-16 NOTE — ASSESSMENT & PLAN NOTE
Elevated yesterday to SBP of 170 at podiatry clinic but unsure of accuracy of read. Asymptomatic. However, patient does reports that SBP has consistently been >150 at home over the past month. Per cardiology, patient's goal SBP is <150. No new changes in meds or diet recently. Continue lasix and lotensin but will increase amlodipine to 10mg daily. Patient has a follow up visit with nephrology in a few weeks so will have BP check at that time.

## 2019-10-16 NOTE — ASSESSMENT & PLAN NOTE
Levothyroxine recently increased to 50mcg due to elevated TSH. Patient requesting 90 day supply. Will provide today.

## 2019-10-23 ENCOUNTER — PATIENT MESSAGE (OUTPATIENT)
Dept: NEPHROLOGY | Facility: CLINIC | Age: 79
End: 2019-10-23

## 2019-10-27 RX ORDER — GALANTAMINE 8 MG/1
TABLET, FILM COATED ORAL
Qty: 180 TABLET | Refills: 0 | Status: SHIPPED | OUTPATIENT
Start: 2019-10-27 | End: 2020-01-27

## 2019-10-27 NOTE — TELEPHONE ENCOUNTER
The patient requested medications be refilled and I have refilled them for 3 months.  There are no preventive care reminders to display for this patient.

## 2019-11-04 DIAGNOSIS — D50.8 IRON DEFICIENCY ANEMIA SECONDARY TO INADEQUATE DIETARY IRON INTAKE: ICD-10-CM

## 2019-11-04 RX ORDER — FERROUS SULFATE 325(65) MG
TABLET ORAL
Qty: 270 TABLET | Refills: 0 | Status: SHIPPED | OUTPATIENT
Start: 2019-11-04 | End: 2020-02-10

## 2019-11-11 DIAGNOSIS — N18.30 CONTROLLED TYPE 2 DIABETES MELLITUS WITH STAGE 3 CHRONIC KIDNEY DISEASE, WITHOUT LONG-TERM CURRENT USE OF INSULIN: ICD-10-CM

## 2019-11-11 DIAGNOSIS — E11.22 CONTROLLED TYPE 2 DIABETES MELLITUS WITH STAGE 3 CHRONIC KIDNEY DISEASE, WITHOUT LONG-TERM CURRENT USE OF INSULIN: ICD-10-CM

## 2019-11-11 RX ORDER — ACARBOSE 100 MG/1
TABLET ORAL
Qty: 270 TABLET | Refills: 0 | Status: SHIPPED | OUTPATIENT
Start: 2019-11-11 | End: 2020-02-10

## 2019-11-11 NOTE — TELEPHONE ENCOUNTER
The patient requested medicine refills and I did refill it once.    There are no preventive care reminders to display for this patient.  BP Readings from Last 3 Encounters:   10/16/19 (!) 142/75   10/15/19 (!) 177/73   06/25/19 (!) 152/68

## 2019-11-13 ENCOUNTER — LAB VISIT (OUTPATIENT)
Dept: LAB | Facility: HOSPITAL | Age: 79
End: 2019-11-13
Attending: INTERNAL MEDICINE
Payer: MEDICARE

## 2019-11-13 DIAGNOSIS — M1A.09X1 IDIOPATHIC CHRONIC GOUT OF MULTIPLE SITES WITH TOPHUS: ICD-10-CM

## 2019-11-13 LAB
ALBUMIN SERPL BCP-MCNC: 3.7 G/DL (ref 3.5–5.2)
ALP SERPL-CCNC: 72 U/L (ref 55–135)
ALT SERPL W/O P-5'-P-CCNC: 17 U/L (ref 10–44)
ANION GAP SERPL CALC-SCNC: 6 MMOL/L (ref 8–16)
AST SERPL-CCNC: 18 U/L (ref 10–40)
BILIRUB SERPL-MCNC: 1.3 MG/DL (ref 0.1–1)
BUN SERPL-MCNC: 19 MG/DL (ref 8–23)
CALCIUM SERPL-MCNC: 9.4 MG/DL (ref 8.7–10.5)
CHLORIDE SERPL-SCNC: 106 MMOL/L (ref 95–110)
CHOLEST SERPL-MCNC: 116 MG/DL (ref 120–199)
CHOLEST/HDLC SERPL: 2.6 {RATIO} (ref 2–5)
CO2 SERPL-SCNC: 30 MMOL/L (ref 23–29)
CREAT SERPL-MCNC: 1.3 MG/DL (ref 0.5–1.4)
EST. GFR  (AFRICAN AMERICAN): 59.2 ML/MIN/1.73 M^2
EST. GFR  (NON AFRICAN AMERICAN): 51.2 ML/MIN/1.73 M^2
GLUCOSE SERPL-MCNC: 150 MG/DL (ref 70–110)
HDLC SERPL-MCNC: 45 MG/DL (ref 40–75)
HDLC SERPL: 38.8 % (ref 20–50)
LDLC SERPL CALC-MCNC: 60.8 MG/DL (ref 63–159)
NONHDLC SERPL-MCNC: 71 MG/DL
POTASSIUM SERPL-SCNC: 3.9 MMOL/L (ref 3.5–5.1)
PROT SERPL-MCNC: 6.7 G/DL (ref 6–8.4)
SODIUM SERPL-SCNC: 142 MMOL/L (ref 136–145)
TRIGL SERPL-MCNC: 51 MG/DL (ref 30–150)
URATE SERPL-MCNC: 5.4 MG/DL (ref 3.4–7)

## 2019-11-13 PROCEDURE — 84550 ASSAY OF BLOOD/URIC ACID: CPT

## 2019-11-13 PROCEDURE — 80053 COMPREHEN METABOLIC PANEL: CPT

## 2019-11-13 PROCEDURE — 80061 LIPID PANEL: CPT

## 2019-11-13 PROCEDURE — 36415 COLL VENOUS BLD VENIPUNCTURE: CPT | Mod: PO

## 2019-11-15 ENCOUNTER — HOSPITAL ENCOUNTER (OUTPATIENT)
Dept: RADIOLOGY | Facility: HOSPITAL | Age: 79
Discharge: HOME | End: 2019-11-15
Attending: FAMILY MEDICINE
Payer: MEDICARE

## 2019-11-15 ENCOUNTER — OFFICE VISIT (OUTPATIENT)
Dept: FAMILY MEDICINE | Facility: CLINIC | Age: 79
End: 2019-11-15
Payer: MEDICARE

## 2019-11-15 VITALS
SYSTOLIC BLOOD PRESSURE: 154 MMHG | WEIGHT: 224 LBS | HEIGHT: 72 IN | TEMPERATURE: 98 F | DIASTOLIC BLOOD PRESSURE: 78 MMHG | BODY MASS INDEX: 30.34 KG/M2 | HEART RATE: 54 BPM

## 2019-11-15 DIAGNOSIS — G89.29 CHRONIC PAIN OF BOTH KNEES: ICD-10-CM

## 2019-11-15 DIAGNOSIS — M25.522 PAIN OF BOTH ELBOWS: ICD-10-CM

## 2019-11-15 DIAGNOSIS — M25.521 PAIN OF BOTH ELBOWS: ICD-10-CM

## 2019-11-15 DIAGNOSIS — M25.562 CHRONIC PAIN OF BOTH KNEES: ICD-10-CM

## 2019-11-15 DIAGNOSIS — R07.81 RIB PAIN ON RIGHT SIDE: ICD-10-CM

## 2019-11-15 DIAGNOSIS — M25.561 CHRONIC PAIN OF BOTH KNEES: ICD-10-CM

## 2019-11-15 DIAGNOSIS — E11.59 HYPERTENSION ASSOCIATED WITH DIABETES: ICD-10-CM

## 2019-11-15 DIAGNOSIS — M46.96 UNSPECIFIED INFLAMMATORY SPONDYLOPATHY, LUMBAR REGION: ICD-10-CM

## 2019-11-15 DIAGNOSIS — I15.2 HYPERTENSION ASSOCIATED WITH DIABETES: ICD-10-CM

## 2019-11-15 PROCEDURE — 3077F PR MOST RECENT SYSTOLIC BLOOD PRESSURE >= 140 MM HG: ICD-10-PCS | Mod: CPTII,S$GLB,, | Performed by: FAMILY MEDICINE

## 2019-11-15 PROCEDURE — 3078F DIAST BP <80 MM HG: CPT | Mod: CPTII,S$GLB,, | Performed by: FAMILY MEDICINE

## 2019-11-15 PROCEDURE — 99214 OFFICE O/P EST MOD 30 MIN: CPT | Mod: S$GLB,,, | Performed by: FAMILY MEDICINE

## 2019-11-15 PROCEDURE — 99214 PR OFFICE/OUTPT VISIT, EST, LEVL IV, 30-39 MIN: ICD-10-PCS | Mod: S$GLB,,, | Performed by: FAMILY MEDICINE

## 2019-11-15 PROCEDURE — 73080 X-RAY EXAM OF ELBOW: CPT | Mod: 26,50,, | Performed by: RADIOLOGY

## 2019-11-15 PROCEDURE — 99999 PR PBB SHADOW E&M-EST. PATIENT-LVL V: CPT | Mod: PBBFAC,,, | Performed by: FAMILY MEDICINE

## 2019-11-15 PROCEDURE — 99499 RISK ADDL DX/OHS AUDIT: ICD-10-PCS | Mod: S$GLB,,, | Performed by: FAMILY MEDICINE

## 2019-11-15 PROCEDURE — 3078F PR MOST RECENT DIASTOLIC BLOOD PRESSURE < 80 MM HG: ICD-10-PCS | Mod: CPTII,S$GLB,, | Performed by: FAMILY MEDICINE

## 2019-11-15 PROCEDURE — 1101F PR PT FALLS ASSESS DOC 0-1 FALLS W/OUT INJ PAST YR: ICD-10-PCS | Mod: CPTII,S$GLB,, | Performed by: FAMILY MEDICINE

## 2019-11-15 PROCEDURE — 1159F PR MEDICATION LIST DOCUMENTED IN MEDICAL RECORD: ICD-10-PCS | Mod: S$GLB,,, | Performed by: FAMILY MEDICINE

## 2019-11-15 PROCEDURE — 1126F AMNT PAIN NOTED NONE PRSNT: CPT | Mod: S$GLB,,, | Performed by: FAMILY MEDICINE

## 2019-11-15 PROCEDURE — 99999 PR PBB SHADOW E&M-EST. PATIENT-LVL V: ICD-10-PCS | Mod: PBBFAC,,, | Performed by: FAMILY MEDICINE

## 2019-11-15 PROCEDURE — 73562 XR KNEE ORTHO BILAT: ICD-10-PCS | Mod: 26,50,, | Performed by: RADIOLOGY

## 2019-11-15 PROCEDURE — 1101F PT FALLS ASSESS-DOCD LE1/YR: CPT | Mod: CPTII,S$GLB,, | Performed by: FAMILY MEDICINE

## 2019-11-15 PROCEDURE — 1159F MED LIST DOCD IN RCRD: CPT | Mod: S$GLB,,, | Performed by: FAMILY MEDICINE

## 2019-11-15 PROCEDURE — 73080 X-RAY EXAM OF ELBOW: CPT | Mod: 50,TC,PO

## 2019-11-15 PROCEDURE — 1126F PR PAIN SEVERITY QUANTIFIED, NO PAIN PRESENT: ICD-10-PCS | Mod: S$GLB,,, | Performed by: FAMILY MEDICINE

## 2019-11-15 PROCEDURE — 3077F SYST BP >= 140 MM HG: CPT | Mod: CPTII,S$GLB,, | Performed by: FAMILY MEDICINE

## 2019-11-15 PROCEDURE — 99499 UNLISTED E&M SERVICE: CPT | Mod: S$GLB,,, | Performed by: FAMILY MEDICINE

## 2019-11-15 PROCEDURE — 71100 X-RAY EXAM RIBS UNI 2 VIEWS: CPT | Mod: 26,RT,, | Performed by: RADIOLOGY

## 2019-11-15 PROCEDURE — 73562 X-RAY EXAM OF KNEE 3: CPT | Mod: TC,50,PO

## 2019-11-15 PROCEDURE — 73562 X-RAY EXAM OF KNEE 3: CPT | Mod: 26,50,, | Performed by: RADIOLOGY

## 2019-11-15 PROCEDURE — 71100 X-RAY EXAM RIBS UNI 2 VIEWS: CPT | Mod: TC,PO,RT

## 2019-11-15 PROCEDURE — 73080 XR ELBOW COMPLETE 3 VIEW BILATERAL: ICD-10-PCS | Mod: 26,50,, | Performed by: RADIOLOGY

## 2019-11-15 PROCEDURE — 71100 XR RIBS 2 VIEW RIGHT: ICD-10-PCS | Mod: 26,RT,, | Performed by: RADIOLOGY

## 2019-11-15 RX ORDER — HYDRALAZINE HYDROCHLORIDE 25 MG/1
25 TABLET, FILM COATED ORAL EVERY 12 HOURS
Qty: 60 TABLET | Refills: 11 | Status: SHIPPED | OUTPATIENT
Start: 2019-11-15 | End: 2019-12-02 | Stop reason: SDUPTHER

## 2019-11-15 RX ORDER — FUROSEMIDE 20 MG/1
20 TABLET ORAL DAILY
Qty: 90 TABLET | Refills: 3 | Status: SHIPPED | OUTPATIENT
Start: 2019-11-15 | End: 2019-12-16 | Stop reason: SDUPTHER

## 2019-11-15 RX ORDER — BENAZEPRIL HYDROCHLORIDE 40 MG/1
40 TABLET ORAL DAILY
Qty: 90 TABLET | Refills: 3 | Status: SHIPPED | OUTPATIENT
Start: 2019-11-15 | End: 2019-12-16 | Stop reason: SDUPTHER

## 2019-11-15 RX ORDER — AMLODIPINE BESYLATE 10 MG/1
10 TABLET ORAL DAILY
Qty: 90 TABLET | Refills: 1 | Status: SHIPPED | OUTPATIENT
Start: 2019-11-15 | End: 2019-12-16 | Stop reason: SDUPTHER

## 2019-11-15 NOTE — PROGRESS NOTES
Subjective:      Patient ID: Rudy Saldaña is a 79 y.o. male.    Chief Complaint: Hypertension    Problem List Items Addressed This Visit     Chronic pain of both knees    Overview     He has pain in his knees bilaterally.  His right knee will hurt going up stairs or steps. He is stiff and he states that it does not hurt to walk.  It hurts to go up the stair.  The right is worse than the left. He feels like his gait is not normal and is wabbly.  He walks 2 miles in the gym.  He does not have to stop.         Hypertension associated with diabetes    Pain of both elbows    Overview     He has been having pain in the elbows for the last few months.  He has good strength but it hurts to extend or bend the elbows. Once they are bent or extended, he feels fine.  They don't swell.          Rib pain on right side    Overview     1 week ago, he leaned over his  and his rib hit into the arm rest and he felt something pop and he had a sharp pain.  He has had continued pain.  No SOB noted.  No cough noted.         Unspecified inflammatory spondylopathy, lumbar region        The patient presents with essential hypertension.  The patient is tolerating the medication well and is in excellent compliance.  The patient is experiencing no side effects.  Counseling was offered regarding low salt diets.  The patient has a reduced salt intake.  The patient denies chest pain, palpitations, shortness of breath, dyspnea on exertion, left or murmur neck pain, nausea, vomiting, diaphoresis, paroxysmal nocturnal dyspnea, and orthopnea.   Hypertension Medications             amLODIPine (NORVASC) 10 MG tablet Take 1 tablet (10 mg total) by mouth once daily.    benazepril (LOTENSIN) 40 MG tablet Take 1 tablet (40 mg total) by mouth 2 (two) times daily.    furosemide (LASIX) 20 MG tablet Take 1 tablet (20 mg total) by mouth once daily.        Past Medical History:  Past Medical History:   Diagnosis Date    Anemia     BPH (benign  prostatic hypertrophy)     Carotid bruit     CKD (chronic kidney disease) stage 2/3     Dr. Nails    CVA (cerebral vascular accident)     Diabetes mellitus, type 2     Hyperlipidemia     Hypertension     Hypothyroidism 2/29/2016    Type 2 diabetes mellitus, uncontrolled, with renal complications     The patient presents with diabetes.  The patient denies polyuria, polydipsia, polyphagia, hypoglycemia and paresthesias.  The patient's glucose control has been good.  Home glucose averages are routinely checked.  The patient is without retinopathy currently.  The patient has no history of neuropathy.  The patient currently complains of no podiatric problems.  The patient has excellent compliance.    Type II or unspecified type diabetes mellitus without mention of complication, not stated as uncontrolled     Vitamin D deficiency 2/29/2016     Past Surgical History:   Procedure Laterality Date    COLONOSCOPY  2008    COLONOSCOPY  5/31/2005    repeat in 2015    PILONIDAL CYST DRAINAGE      TRANSURETHRAL RESECTION OF PROSTATE       Review of patient's allergies indicates:   Allergen Reactions    No known drug allergies      Current Outpatient Medications on File Prior to Visit   Medication Sig Dispense Refill    acarbose (PRECOSE) 100 MG Tab TAKE 1 TABLET(100 MG) BY MOUTH THREE TIMES DAILY WITH MEALS 270 tablet 0    allopurinol (ZYLOPRIM) 100 MG tablet Take 2 tablets (200 mg total) by mouth once daily. 180 tablet 1    amLODIPine (NORVASC) 10 MG tablet Take 1 tablet (10 mg total) by mouth once daily. 90 tablet 1    aspirin (ECOTRIN) 81 MG EC tablet Take 81 mg by mouth once daily.      atorvastatin (LIPITOR) 20 MG tablet TAKE 1 TABLET BY MOUTH DAILY 90 tablet 11    benazepril (LOTENSIN) 40 MG tablet Take 1 tablet (40 mg total) by mouth 2 (two) times daily. 180 tablet 3    blood sugar diagnostic Strp 1 each by Misc.(Non-Drug; Combo Route) route 3 (three) times daily as needed. One touch ultra 2 test  strips 100 each 5    ergocalciferol (ERGOCALCIFEROL) 50,000 unit Cap TAKE 1 CAPSULE BY MOUTH 2 TIMES A WEEK 26 capsule 3    ferrous sulfate (FEOSOL) 325 mg (65 mg iron) Tab tablet TAKE 1 TABLET BY MOUTH THREE TIMES DAILY 270 tablet 0    furosemide (LASIX) 20 MG tablet Take 1 tablet (20 mg total) by mouth once daily. 90 tablet 3    galantamine (RAZADYNE) 8 MG Tab TAKE 1 TABLET(8 MG) BY MOUTH TWICE DAILY WITH MEALS 180 tablet 0    glipiZIDE (GLUCOTROL) 10 MG tablet TAKE 1 TABLET(10 MG) BY MOUTH TWICE DAILY BEFORE MEALS 180 tablet 0    JANUVIA 100 mg Tab TAKE 1 TABLET(100 MG) BY MOUTH EVERY DAY 90 tablet 0    ketoconazole (NIZORAL) 2 % shampoo       lancets (TRUEPLUS LANCETS) 33 gauge Misc 1 lancet by Misc.(Non-Drug; Combo Route) route once daily. 100 each 5    levothyroxine (SYNTHROID) 50 MCG tablet Take 1 tablet (50 mcg total) by mouth once daily. 90 tablet 0    MITIGARE 0.6 mg Cap Take 1 capsule (0.6 mg total) by mouth Daily. 90 capsule 3    niacin (NIASPAN) 1000 MG CR tablet TAKE 1 TABLET(1000 MG) BY MOUTH EVERY EVENING 90 tablet 0    pioglitazone (ACTOS) 30 MG tablet Take 1 tablet (30 mg total) by mouth once daily. 90 tablet 2    potassium chloride (KLOR-CON) 10 MEQ TbSR TAKE 1 TABLET(10 MEQ) BY MOUTH EVERY DAY 90 tablet 0    ONETOUCH VERIO IQ METER kit Use as instructed 1 each 0     No current facility-administered medications on file prior to visit.      Social History     Socioeconomic History    Marital status:      Spouse name: Not on file    Number of children: Not on file    Years of education: Not on file    Highest education level: Not on file   Occupational History    Not on file   Social Needs    Financial resource strain: Not on file    Food insecurity:     Worry: Not on file     Inability: Not on file    Transportation needs:     Medical: Not on file     Non-medical: Not on file   Tobacco Use    Smoking status: Never Smoker    Smokeless tobacco: Never Used   Substance  and Sexual Activity    Alcohol use: No     Alcohol/week: 0.0 standard drinks    Drug use: No    Sexual activity: Not on file   Lifestyle    Physical activity:     Days per week: Not on file     Minutes per session: Not on file    Stress: Not on file   Relationships    Social connections:     Talks on phone: Not on file     Gets together: Not on file     Attends [employer] service: Not on file     Active member of club or organization: Not on file     Attends meetings of clubs or organizations: Not on file     Relationship status: Not on file   Other Topics Concern    Not on file   Social History Narrative    Not on file     Family History   Problem Relation Age of Onset    Hypertension Mother     Hypertension Father     Heart attack Maternal Uncle     Brain cancer Sister     Diabetes Neg Hx     Stroke Neg Hx        Review of Systems    Objective:     BP (!) 154/78   Pulse (!) 54   Temp 97.8 °F (36.6 °C) (Oral)   Ht 6' (1.829 m)   Wt 101.6 kg (224 lb)   BMI 30.38 kg/m²     Physical Exam  Assessment:     1. Hypertension associated with diabetes    2. Chronic pain of both knees    3. Pain of both elbows    4. Unspecified inflammatory spondylopathy, lumbar region    5. Rib pain on right side        Plan:     Problem List Items Addressed This Visit     Chronic pain of both knees    Hypertension associated with diabetes    Pain of both elbows    Rib pain on right side    Unspecified inflammatory spondylopathy, lumbar region        Follow up in about 2 weeks (around 11/29/2019) for to follow up on med changes, blood pressure check with a Nurse Practitioner..      I am having Rudy Saldaña maintain his ONETOUCH VERIO IQ METER, aspirin, ketoconazole, blood sugar diagnostic, lancets, pioglitazone, ergocalciferol, glipiZIDE, JANUVIA, furosemide, benazepril, allopurinol, MITIGARE, atorvastatin, niacin, potassium chloride, amLODIPine, levothyroxine, galantamine, ferrous sulfate, and acarbose.    Rudy was  seen today for hypertension.    Diagnoses and all orders for this visit:    Hypertension associated with diabetes  -     benazepril (LOTENSIN) 40 MG tablet; Take 1 tablet (40 mg total) by mouth once daily.  -     amLODIPine (NORVASC) 10 MG tablet; Take 1 tablet (10 mg total) by mouth once daily.  -     furosemide (LASIX) 20 MG tablet; Take 1 tablet (20 mg total) by mouth once daily.  -     hydrALAZINE (APRESOLINE) 25 MG tablet; Take 1 tablet (25 mg total) by mouth every 12 (twelve) hours.    Chronic pain of both knees  -     X-Ray Knee 3 View Left; Future  -     X-Ray Knee 3 View Right; Future    Pain of both elbows  -     X-Ray Elbow 2 Views Left; Future  -     X-Ray Elbow 2 Views Right; Future    Unspecified inflammatory spondylopathy, lumbar region    Rib pain on right side  -     X-Ray Ribs 2 View Right; Future         The patient was instructed to stop the following meds:  Medications Discontinued During This Encounter   Medication Reason    benazepril (LOTENSIN) 40 MG tablet 1 po bid (change to qd) Overdose on this.               Orders Placed This Encounter   Procedures    X-Ray Elbow 2 Views Left     Standing Status:   Future     Standing Expiration Date:   11/14/2020     Order Specific Question:   Reason for Exam:     Answer:   pain    X-Ray Elbow 2 Views Right     Standing Status:   Future     Standing Expiration Date:   11/14/2020     Order Specific Question:   Reason for Exam:     Answer:   pain    X-Ray Knee 3 View Left     Standing Status:   Future     Standing Expiration Date:   11/14/2020     Order Specific Question:   Reason for Exam:     Answer:   pain    X-Ray Knee 3 View Right     Standing Status:   Future     Standing Expiration Date:   11/14/2020     Order Specific Question:   Reason for Exam:     Answer:   pain    X-Ray Ribs 2 View Right     Standing Status:   Future     Standing Expiration Date:   11/14/2020     Order Specific Question:   Reason for Exam:     Answer:   pain

## 2019-11-18 RX ORDER — MELOXICAM 7.5 MG/1
7.5 TABLET ORAL DAILY
Qty: 14 TABLET | Refills: 0 | Status: SHIPPED | OUTPATIENT
Start: 2019-11-18 | End: 2019-11-18 | Stop reason: SDUPTHER

## 2019-11-18 NOTE — PROGRESS NOTES
The elbow shows what appears to be a bursitis and a bone spur.  Based on this, I recommend that we start mobic for 14 days and if not better, refer to an orthopedist.  I will send in the prescription.

## 2019-11-18 NOTE — PROGRESS NOTES
Arthritis is noted.  I will use the medicine we discussed for the elbows also.  If not better in 2 weeks, we will consider injection with orthopedics.

## 2019-11-19 RX ORDER — MELOXICAM 7.5 MG/1
TABLET ORAL
Qty: 90 TABLET | Refills: 0 | Status: SHIPPED | OUTPATIENT
Start: 2019-11-19 | End: 2019-12-02

## 2019-12-02 ENCOUNTER — PATIENT MESSAGE (OUTPATIENT)
Dept: FAMILY MEDICINE | Facility: CLINIC | Age: 79
End: 2019-12-02

## 2019-12-02 ENCOUNTER — OFFICE VISIT (OUTPATIENT)
Dept: FAMILY MEDICINE | Facility: CLINIC | Age: 79
End: 2019-12-02
Payer: MEDICARE

## 2019-12-02 ENCOUNTER — PATIENT MESSAGE (OUTPATIENT)
Dept: NEPHROLOGY | Facility: CLINIC | Age: 79
End: 2019-12-02

## 2019-12-02 VITALS
HEIGHT: 72 IN | DIASTOLIC BLOOD PRESSURE: 74 MMHG | BODY MASS INDEX: 30.64 KG/M2 | SYSTOLIC BLOOD PRESSURE: 154 MMHG | WEIGHT: 226.19 LBS | TEMPERATURE: 98 F | HEART RATE: 58 BPM

## 2019-12-02 DIAGNOSIS — I15.2 HYPERTENSION ASSOCIATED WITH DIABETES: Primary | ICD-10-CM

## 2019-12-02 DIAGNOSIS — E11.59 HYPERTENSION ASSOCIATED WITH DIABETES: Primary | ICD-10-CM

## 2019-12-02 PROCEDURE — 1101F PT FALLS ASSESS-DOCD LE1/YR: CPT | Mod: CPTII,S$GLB,, | Performed by: NURSE PRACTITIONER

## 2019-12-02 PROCEDURE — 3077F PR MOST RECENT SYSTOLIC BLOOD PRESSURE >= 140 MM HG: ICD-10-PCS | Mod: CPTII,S$GLB,, | Performed by: NURSE PRACTITIONER

## 2019-12-02 PROCEDURE — 1126F AMNT PAIN NOTED NONE PRSNT: CPT | Mod: S$GLB,,, | Performed by: NURSE PRACTITIONER

## 2019-12-02 PROCEDURE — 1159F MED LIST DOCD IN RCRD: CPT | Mod: S$GLB,,, | Performed by: NURSE PRACTITIONER

## 2019-12-02 PROCEDURE — 3078F PR MOST RECENT DIASTOLIC BLOOD PRESSURE < 80 MM HG: ICD-10-PCS | Mod: CPTII,S$GLB,, | Performed by: NURSE PRACTITIONER

## 2019-12-02 PROCEDURE — 3077F SYST BP >= 140 MM HG: CPT | Mod: CPTII,S$GLB,, | Performed by: NURSE PRACTITIONER

## 2019-12-02 PROCEDURE — 3078F DIAST BP <80 MM HG: CPT | Mod: CPTII,S$GLB,, | Performed by: NURSE PRACTITIONER

## 2019-12-02 PROCEDURE — 1159F PR MEDICATION LIST DOCUMENTED IN MEDICAL RECORD: ICD-10-PCS | Mod: S$GLB,,, | Performed by: NURSE PRACTITIONER

## 2019-12-02 PROCEDURE — 99999 PR PBB SHADOW E&M-EST. PATIENT-LVL III: CPT | Mod: PBBFAC,,, | Performed by: NURSE PRACTITIONER

## 2019-12-02 PROCEDURE — 99999 PR PBB SHADOW E&M-EST. PATIENT-LVL III: ICD-10-PCS | Mod: PBBFAC,,, | Performed by: NURSE PRACTITIONER

## 2019-12-02 PROCEDURE — 99213 PR OFFICE/OUTPT VISIT, EST, LEVL III, 20-29 MIN: ICD-10-PCS | Mod: S$GLB,,, | Performed by: NURSE PRACTITIONER

## 2019-12-02 PROCEDURE — 1101F PR PT FALLS ASSESS DOC 0-1 FALLS W/OUT INJ PAST YR: ICD-10-PCS | Mod: CPTII,S$GLB,, | Performed by: NURSE PRACTITIONER

## 2019-12-02 PROCEDURE — 99213 OFFICE O/P EST LOW 20 MIN: CPT | Mod: S$GLB,,, | Performed by: NURSE PRACTITIONER

## 2019-12-02 PROCEDURE — 1126F PR PAIN SEVERITY QUANTIFIED, NO PAIN PRESENT: ICD-10-PCS | Mod: S$GLB,,, | Performed by: NURSE PRACTITIONER

## 2019-12-02 RX ORDER — HYDRALAZINE HYDROCHLORIDE 25 MG/1
25 TABLET, FILM COATED ORAL EVERY 8 HOURS
Qty: 90 TABLET | Refills: 11 | Status: SHIPPED | OUTPATIENT
Start: 2019-12-02 | End: 2019-12-16 | Stop reason: SDUPTHER

## 2019-12-02 NOTE — PROGRESS NOTES
Subjective:       Patient ID: Rudy Saldaña is a 79 y.o. male.    Chief Complaint: Follow-up (bp)    Hypertension   This is a chronic problem. The current episode started more than 1 year ago. The problem is unchanged. The problem is uncontrolled. Pertinent negatives include no chest pain, headaches, palpitations or shortness of breath. Past treatments include ACE inhibitors and calcium channel blockers. There are no compliance problems.          Review of Systems   Constitutional: Negative for fatigue, fever and unexpected weight change.   HENT: Negative for ear pain and sore throat.    Eyes: Negative.  Negative for pain and visual disturbance.   Respiratory: Negative for cough and shortness of breath.    Cardiovascular: Negative for chest pain and palpitations.   Gastrointestinal: Negative for abdominal pain, diarrhea, nausea and vomiting.   Genitourinary: Negative for dysuria and frequency.   Musculoskeletal: Negative for arthralgias and myalgias.   Skin: Negative for color change and rash.   Neurological: Negative for dizziness and headaches.   Psychiatric/Behavioral: Negative for sleep disturbance. The patient is not nervous/anxious.        Vitals:    12/02/19 0928   BP: (!) 154/74   Pulse: (!) 58   Temp: 97.7 °F (36.5 °C)       Objective:     Current Outpatient Medications   Medication Sig Dispense Refill    acarbose (PRECOSE) 100 MG Tab TAKE 1 TABLET(100 MG) BY MOUTH THREE TIMES DAILY WITH MEALS 270 tablet 0    allopurinol (ZYLOPRIM) 100 MG tablet Take 2 tablets (200 mg total) by mouth once daily. 180 tablet 1    amLODIPine (NORVASC) 10 MG tablet Take 1 tablet (10 mg total) by mouth once daily. 90 tablet 1    aspirin (ECOTRIN) 81 MG EC tablet Take 81 mg by mouth once daily.      atorvastatin (LIPITOR) 20 MG tablet TAKE 1 TABLET BY MOUTH DAILY 90 tablet 11    benazepril (LOTENSIN) 40 MG tablet Take 1 tablet (40 mg total) by mouth once daily. 90 tablet 3    blood sugar diagnostic Strp 1 each by  Misc.(Non-Drug; Combo Route) route 3 (three) times daily as needed. One touch ultra 2 test strips 100 each 5    ergocalciferol (ERGOCALCIFEROL) 50,000 unit Cap TAKE 1 CAPSULE BY MOUTH 2 TIMES A WEEK 26 capsule 3    ferrous sulfate (FEOSOL) 325 mg (65 mg iron) Tab tablet TAKE 1 TABLET BY MOUTH THREE TIMES DAILY 270 tablet 0    furosemide (LASIX) 20 MG tablet Take 1 tablet (20 mg total) by mouth once daily. 90 tablet 3    galantamine (RAZADYNE) 8 MG Tab TAKE 1 TABLET(8 MG) BY MOUTH TWICE DAILY WITH MEALS 180 tablet 0    glipiZIDE (GLUCOTROL) 10 MG tablet TAKE 1 TABLET(10 MG) BY MOUTH TWICE DAILY BEFORE MEALS 180 tablet 0    hydrALAZINE (APRESOLINE) 25 MG tablet Take 1 tablet (25 mg total) by mouth every 8 (eight) hours. 90 tablet 11    JANUVIA 100 mg Tab TAKE 1 TABLET(100 MG) BY MOUTH EVERY DAY 90 tablet 0    lancets (TRUEPLUS LANCETS) 33 gauge Misc 1 lancet by Misc.(Non-Drug; Combo Route) route once daily. 100 each 5    levothyroxine (SYNTHROID) 50 MCG tablet Take 1 tablet (50 mcg total) by mouth once daily. 90 tablet 0    MITIGARE 0.6 mg Cap Take 1 capsule (0.6 mg total) by mouth Daily. 90 capsule 3    niacin (NIASPAN) 1000 MG CR tablet TAKE 1 TABLET(1000 MG) BY MOUTH EVERY EVENING 90 tablet 0    pioglitazone (ACTOS) 30 MG tablet Take 1 tablet (30 mg total) by mouth once daily. 90 tablet 2    potassium chloride (KLOR-CON) 10 MEQ TbSR TAKE 1 TABLET(10 MEQ) BY MOUTH EVERY DAY 90 tablet 0    ketoconazole (NIZORAL) 2 % shampoo       meloxicam (MOBIC) 7.5 MG tablet TAKE 1 TABLET(7.5 MG) BY MOUTH EVERY DAY FOR 14 DAYS (Patient not taking: Reported on 12/2/2019) 90 tablet 0    ONETOUCH VERIO IQ METER kit Use as instructed 1 each 0     No current facility-administered medications for this visit.        Physical Exam   Constitutional: He is oriented to person, place, and time. He appears well-developed. No distress.   HENT:   Head: Normocephalic and atraumatic.   Eyes: Pupils are equal, round, and reactive to  light. EOM are normal.   Neck: Normal range of motion. Neck supple.   Cardiovascular: Normal rate and regular rhythm.   Pulmonary/Chest: Effort normal and breath sounds normal.   Musculoskeletal: Normal range of motion.   Neurological: He is alert and oriented to person, place, and time.   Skin: Skin is warm and dry. No rash noted.   Psychiatric: He has a normal mood and affect. Thought content normal.   Nursing note and vitals reviewed.      Assessment:       1. Hypertension associated with diabetes        Plan:   Hypertension associated with diabetes  -     hydrALAZINE (APRESOLINE) 25 MG tablet; Take 1 tablet (25 mg total) by mouth every 8 (eight) hours.  Dispense: 90 tablet; Refill: 11        No follow-ups on file.    There are no Patient Instructions on file for this visit.

## 2019-12-10 ENCOUNTER — OFFICE VISIT (OUTPATIENT)
Dept: CARDIOLOGY | Facility: CLINIC | Age: 79
End: 2019-12-10
Payer: MEDICARE

## 2019-12-10 VITALS
HEART RATE: 67 BPM | HEIGHT: 72 IN | DIASTOLIC BLOOD PRESSURE: 75 MMHG | BODY MASS INDEX: 30.48 KG/M2 | SYSTOLIC BLOOD PRESSURE: 164 MMHG | WEIGHT: 225 LBS

## 2019-12-10 DIAGNOSIS — E11.59 HYPERTENSION ASSOCIATED WITH DIABETES: ICD-10-CM

## 2019-12-10 DIAGNOSIS — E11.69 COMBINED HYPERLIPIDEMIA ASSOCIATED WITH TYPE 2 DIABETES MELLITUS: ICD-10-CM

## 2019-12-10 DIAGNOSIS — N18.30 CKD STAGE 3 SECONDARY TO DIABETES: ICD-10-CM

## 2019-12-10 DIAGNOSIS — E78.2 COMBINED HYPERLIPIDEMIA ASSOCIATED WITH TYPE 2 DIABETES MELLITUS: ICD-10-CM

## 2019-12-10 DIAGNOSIS — I15.2 HYPERTENSION ASSOCIATED WITH DIABETES: ICD-10-CM

## 2019-12-10 DIAGNOSIS — E11.22 CKD STAGE 3 SECONDARY TO DIABETES: ICD-10-CM

## 2019-12-10 DIAGNOSIS — Z86.73 HISTORY OF STROKE: Primary | ICD-10-CM

## 2019-12-10 DIAGNOSIS — G47.33 OSA ON CPAP: Chronic | ICD-10-CM

## 2019-12-10 PROCEDURE — 3077F SYST BP >= 140 MM HG: CPT | Mod: CPTII,S$GLB,, | Performed by: INTERNAL MEDICINE

## 2019-12-10 PROCEDURE — 1159F MED LIST DOCD IN RCRD: CPT | Mod: S$GLB,,, | Performed by: INTERNAL MEDICINE

## 2019-12-10 PROCEDURE — 3078F DIAST BP <80 MM HG: CPT | Mod: CPTII,S$GLB,, | Performed by: INTERNAL MEDICINE

## 2019-12-10 PROCEDURE — 1159F PR MEDICATION LIST DOCUMENTED IN MEDICAL RECORD: ICD-10-PCS | Mod: S$GLB,,, | Performed by: INTERNAL MEDICINE

## 2019-12-10 PROCEDURE — 99999 PR PBB SHADOW E&M-EST. PATIENT-LVL III: CPT | Mod: PBBFAC,,, | Performed by: INTERNAL MEDICINE

## 2019-12-10 PROCEDURE — 3078F PR MOST RECENT DIASTOLIC BLOOD PRESSURE < 80 MM HG: ICD-10-PCS | Mod: CPTII,S$GLB,, | Performed by: INTERNAL MEDICINE

## 2019-12-10 PROCEDURE — 3077F PR MOST RECENT SYSTOLIC BLOOD PRESSURE >= 140 MM HG: ICD-10-PCS | Mod: CPTII,S$GLB,, | Performed by: INTERNAL MEDICINE

## 2019-12-10 PROCEDURE — 99214 OFFICE O/P EST MOD 30 MIN: CPT | Mod: S$GLB,,, | Performed by: INTERNAL MEDICINE

## 2019-12-10 PROCEDURE — 1101F PT FALLS ASSESS-DOCD LE1/YR: CPT | Mod: CPTII,S$GLB,, | Performed by: INTERNAL MEDICINE

## 2019-12-10 PROCEDURE — 1126F AMNT PAIN NOTED NONE PRSNT: CPT | Mod: S$GLB,,, | Performed by: INTERNAL MEDICINE

## 2019-12-10 PROCEDURE — 1126F PR PAIN SEVERITY QUANTIFIED, NO PAIN PRESENT: ICD-10-PCS | Mod: S$GLB,,, | Performed by: INTERNAL MEDICINE

## 2019-12-10 PROCEDURE — 99214 PR OFFICE/OUTPT VISIT, EST, LEVL IV, 30-39 MIN: ICD-10-PCS | Mod: S$GLB,,, | Performed by: INTERNAL MEDICINE

## 2019-12-10 PROCEDURE — 99999 PR PBB SHADOW E&M-EST. PATIENT-LVL III: ICD-10-PCS | Mod: PBBFAC,,, | Performed by: INTERNAL MEDICINE

## 2019-12-10 PROCEDURE — 1101F PR PT FALLS ASSESS DOC 0-1 FALLS W/OUT INJ PAST YR: ICD-10-PCS | Mod: CPTII,S$GLB,, | Performed by: INTERNAL MEDICINE

## 2019-12-10 NOTE — PROGRESS NOTES
Subjective:    Patient ID:  Rudy Saldaña is a 79 y.o. male who presents for evaluation of Follow-up (follow up )      HPI 78 yo with HTN, diastolic dysfunction, and CKD with lower extremity edema and poorly controlled HTN.Last visit because of BP being up restarted amlodipine and added lasix daily. He feels OK. BP up. He has gained 8 lbs, states he quit exercising and eats salted peanuts as snacks all during the day.    Review of Systems   Constitution: Negative for decreased appetite, fever, malaise/fatigue, weight gain and weight loss.   HENT: Negative for hearing loss and nosebleeds.    Eyes: Negative for visual disturbance.   Cardiovascular: Positive for leg swelling. Negative for chest pain, claudication, cyanosis, dyspnea on exertion, irregular heartbeat, near-syncope, orthopnea, palpitations, paroxysmal nocturnal dyspnea and syncope.   Respiratory: Negative for cough, hemoptysis, shortness of breath, sleep disturbances due to breathing, snoring and wheezing.    Endocrine: Negative for cold intolerance, heat intolerance, polydipsia and polyuria.   Hematologic/Lymphatic: Negative for adenopathy and bleeding problem. Does not bruise/bleed easily.   Skin: Positive for rash. Negative for color change, itching, poor wound healing and suspicious lesions.   Musculoskeletal: Negative for arthritis, back pain, falls, joint pain, joint swelling, muscle cramps, muscle weakness and myalgias.   Gastrointestinal: Negative for bloating, abdominal pain, change in bowel habit, constipation, flatus, heartburn, hematemesis, hematochezia, hemorrhoids, jaundice, melena, nausea and vomiting.   Genitourinary: Negative for bladder incontinence, decreased libido, frequency, hematuria, hesitancy and urgency.   Neurological: Negative for brief paralysis, difficulty with concentration, excessive daytime sleepiness, dizziness, focal weakness, headaches, light-headedness, loss of balance, numbness, vertigo and weakness.    Psychiatric/Behavioral: Negative for altered mental status, depression and memory loss. The patient does not have insomnia and is not nervous/anxious.    Allergic/Immunologic: Negative for environmental allergies, hives and persistent infections.        Objective:    Physical Exam   Constitutional: He is oriented to person, place, and time. He appears well-developed and well-nourished. No distress.   BP (!) 164/75   Pulse 67   Ht 6' (1.829 m)   Wt 102.1 kg (225 lb)   BMI 30.52 kg/m²      HENT:   Head: Normocephalic and atraumatic.   Eyes: Pupils are equal, round, and reactive to light. Conjunctivae and lids are normal. Right eye exhibits no discharge. No scleral icterus.   Neck: Normal range of motion. Neck supple. No JVD present. No tracheal deviation present. No thyromegaly present.   Cardiovascular: Normal rate, regular rhythm, S1 normal, S2 normal, normal heart sounds and intact distal pulses. Exam reveals no gallop and no friction rub.   No murmur heard.  Pulses:       Carotid pulses are 2+ on the right side, and 2+ on the left side.       Radial pulses are 2+ on the right side, and 2+ on the left side.        Femoral pulses are 2+ on the right side, and 2+ on the left side.       Popliteal pulses are 2+ on the right side, and 2+ on the left side.        Dorsalis pedis pulses are 2+ on the right side, and 2+ on the left side.        Posterior tibial pulses are 2+ on the right side, and 2+ on the left side.   Pulmonary/Chest: Effort normal and breath sounds normal. No respiratory distress. He has no wheezes. He has no rales. He exhibits no tenderness.   Abdominal: Soft. Bowel sounds are normal. He exhibits no distension and no mass. There is no hepatosplenomegaly or hepatomegaly. There is no tenderness. There is no rebound and no guarding.   Musculoskeletal: Normal range of motion. He exhibits edema. He exhibits no tenderness.   Trace   Lymphadenopathy:     He has no cervical adenopathy.   Neurological: He  is alert and oriented to person, place, and time. He has normal reflexes. No cranial nerve deficit. Coordination normal.   Skin: Skin is warm and dry. No rash noted. He is not diaphoretic. No erythema. No pallor.   Psychiatric: He has a normal mood and affect. His speech is normal and behavior is normal. Judgment and thought content normal. Cognition and memory are normal.         Assessment:       1. History of stroke    2. Combined hyperlipidemia associated with type 2 diabetes mellitus    3. Hypertension associated with diabetes    4. CKD stage 3 secondary to diabetes    5. DEVIKA on CPAP         Plan:     Needs to adjust lifestyle prior to changing meds    Low salt diet    Patient advised to limit exposure to caffeine, stimulants, and alcohol    Patient advised to modify risk factors such as weight, exercise, diet,  tobacco and alcohol exposure    No orders of the defined types were placed in this encounter.    Follow up in about 6 months (around 6/10/2020).

## 2019-12-11 ENCOUNTER — LAB VISIT (OUTPATIENT)
Dept: LAB | Facility: HOSPITAL | Age: 79
End: 2019-12-11
Attending: INTERNAL MEDICINE
Payer: MEDICARE

## 2019-12-11 ENCOUNTER — OFFICE VISIT (OUTPATIENT)
Dept: RHEUMATOLOGY | Facility: CLINIC | Age: 79
End: 2019-12-11
Payer: MEDICARE

## 2019-12-11 VITALS
WEIGHT: 224 LBS | DIASTOLIC BLOOD PRESSURE: 62 MMHG | BODY MASS INDEX: 30.34 KG/M2 | HEIGHT: 72 IN | HEART RATE: 63 BPM | SYSTOLIC BLOOD PRESSURE: 161 MMHG

## 2019-12-11 DIAGNOSIS — E66.9 OBESITY (BMI 30-39.9): ICD-10-CM

## 2019-12-11 DIAGNOSIS — M1A.09X1 IDIOPATHIC CHRONIC GOUT OF MULTIPLE SITES WITH TOPHUS: ICD-10-CM

## 2019-12-11 DIAGNOSIS — M1A.09X1 IDIOPATHIC CHRONIC GOUT OF MULTIPLE SITES WITH TOPHUS: Primary | ICD-10-CM

## 2019-12-11 LAB
ALBUMIN SERPL BCP-MCNC: 4.1 G/DL (ref 3.5–5.2)
ALP SERPL-CCNC: 75 U/L (ref 55–135)
ALT SERPL W/O P-5'-P-CCNC: 21 U/L (ref 10–44)
ANION GAP SERPL CALC-SCNC: 9 MMOL/L (ref 8–16)
AST SERPL-CCNC: 19 U/L (ref 10–40)
BILIRUB SERPL-MCNC: 1.3 MG/DL (ref 0.1–1)
BUN SERPL-MCNC: 20 MG/DL (ref 8–23)
CALCIUM SERPL-MCNC: 9.8 MG/DL (ref 8.7–10.5)
CHLORIDE SERPL-SCNC: 106 MMOL/L (ref 95–110)
CO2 SERPL-SCNC: 28 MMOL/L (ref 23–29)
CREAT SERPL-MCNC: 1.3 MG/DL (ref 0.5–1.4)
EST. GFR  (AFRICAN AMERICAN): 59.2 ML/MIN/1.73 M^2
EST. GFR  (NON AFRICAN AMERICAN): 51.2 ML/MIN/1.73 M^2
GLUCOSE SERPL-MCNC: 199 MG/DL (ref 70–110)
POTASSIUM SERPL-SCNC: 4.3 MMOL/L (ref 3.5–5.1)
PROT SERPL-MCNC: 7.1 G/DL (ref 6–8.4)
SODIUM SERPL-SCNC: 143 MMOL/L (ref 136–145)
URATE SERPL-MCNC: 4.7 MG/DL (ref 3.4–7)

## 2019-12-11 PROCEDURE — 1159F MED LIST DOCD IN RCRD: CPT | Mod: S$GLB,,, | Performed by: INTERNAL MEDICINE

## 2019-12-11 PROCEDURE — 84550 ASSAY OF BLOOD/URIC ACID: CPT

## 2019-12-11 PROCEDURE — 36415 COLL VENOUS BLD VENIPUNCTURE: CPT

## 2019-12-11 PROCEDURE — 3077F SYST BP >= 140 MM HG: CPT | Mod: CPTII,S$GLB,, | Performed by: INTERNAL MEDICINE

## 2019-12-11 PROCEDURE — 1126F AMNT PAIN NOTED NONE PRSNT: CPT | Mod: S$GLB,,, | Performed by: INTERNAL MEDICINE

## 2019-12-11 PROCEDURE — 99999 PR PBB SHADOW E&M-EST. PATIENT-LVL III: CPT | Mod: PBBFAC,,, | Performed by: INTERNAL MEDICINE

## 2019-12-11 PROCEDURE — 1101F PT FALLS ASSESS-DOCD LE1/YR: CPT | Mod: CPTII,S$GLB,, | Performed by: INTERNAL MEDICINE

## 2019-12-11 PROCEDURE — 99999 PR PBB SHADOW E&M-EST. PATIENT-LVL III: ICD-10-PCS | Mod: PBBFAC,,, | Performed by: INTERNAL MEDICINE

## 2019-12-11 PROCEDURE — 80053 COMPREHEN METABOLIC PANEL: CPT

## 2019-12-11 PROCEDURE — 99214 OFFICE O/P EST MOD 30 MIN: CPT | Mod: S$GLB,,, | Performed by: INTERNAL MEDICINE

## 2019-12-11 PROCEDURE — 1159F PR MEDICATION LIST DOCUMENTED IN MEDICAL RECORD: ICD-10-PCS | Mod: S$GLB,,, | Performed by: INTERNAL MEDICINE

## 2019-12-11 PROCEDURE — 1101F PR PT FALLS ASSESS DOC 0-1 FALLS W/OUT INJ PAST YR: ICD-10-PCS | Mod: CPTII,S$GLB,, | Performed by: INTERNAL MEDICINE

## 2019-12-11 PROCEDURE — 3078F PR MOST RECENT DIASTOLIC BLOOD PRESSURE < 80 MM HG: ICD-10-PCS | Mod: CPTII,S$GLB,, | Performed by: INTERNAL MEDICINE

## 2019-12-11 PROCEDURE — 3077F PR MOST RECENT SYSTOLIC BLOOD PRESSURE >= 140 MM HG: ICD-10-PCS | Mod: CPTII,S$GLB,, | Performed by: INTERNAL MEDICINE

## 2019-12-11 PROCEDURE — 99214 PR OFFICE/OUTPT VISIT, EST, LEVL IV, 30-39 MIN: ICD-10-PCS | Mod: S$GLB,,, | Performed by: INTERNAL MEDICINE

## 2019-12-11 PROCEDURE — 1126F PR PAIN SEVERITY QUANTIFIED, NO PAIN PRESENT: ICD-10-PCS | Mod: S$GLB,,, | Performed by: INTERNAL MEDICINE

## 2019-12-11 PROCEDURE — 3078F DIAST BP <80 MM HG: CPT | Mod: CPTII,S$GLB,, | Performed by: INTERNAL MEDICINE

## 2019-12-11 RX ORDER — COLCHICINE 0.6 MG/1
1 CAPSULE ORAL DAILY
Qty: 90 CAPSULE | Refills: 3 | Status: SHIPPED | OUTPATIENT
Start: 2019-12-11 | End: 2020-03-06

## 2019-12-11 NOTE — PROGRESS NOTES
RHEUMATOLOGY CLINIC FOLLOW UP VISIT  Chief complaints:-  To follow up for gout.    HPI:-  Rudy Rolle a 79 y.o. pleasant male comes in for a follow up visit.  He denies any flares since last visit.  Mild weakness around bilateral thighs.  No knee pain.  Have gained weight since last visit since he has not been exercising regularly and intake of high carbohydrate diet.    Review of Systems   Constitutional: Negative for chills and fever.   HENT: Negative for congestion and sore throat.    Eyes: Negative for blurred vision and redness.   Respiratory: Negative for cough and shortness of breath.    Cardiovascular: Negative for chest pain and leg swelling.   Gastrointestinal: Negative for abdominal pain.   Genitourinary: Negative for dysuria.   Musculoskeletal: Negative for back pain, falls, joint pain, myalgias and neck pain.   Skin: Negative for rash.   Neurological: Positive for weakness. Negative for headaches.   Endo/Heme/Allergies: Does not bruise/bleed easily.   Psychiatric/Behavioral: Negative for memory loss. The patient does not have insomnia.        Past Medical History:   Diagnosis Date    Anemia     BPH (benign prostatic hypertrophy)     Carotid bruit     CKD (chronic kidney disease) stage 2/3     Dr. Nails    CVA (cerebral vascular accident)     Diabetes mellitus, type 2     Hyperlipidemia     Hypertension     Hypothyroidism 2/29/2016    Type 2 diabetes mellitus, uncontrolled, with renal complications     The patient presents with diabetes.  The patient denies polyuria, polydipsia, polyphagia, hypoglycemia and paresthesias.  The patient's glucose control has been good.  Home glucose averages are routinely checked.  The patient is without retinopathy currently.  The patient has no history of neuropathy.  The patient currently complains of no podiatric problems.  The patient has excellent compliance.    Type II or unspecified type  diabetes mellitus without mention of complication, not stated as uncontrolled     Vitamin D deficiency 2/29/2016       Past Surgical History:   Procedure Laterality Date    COLONOSCOPY  2008    COLONOSCOPY  5/31/2005    repeat in 2015    PILONIDAL CYST DRAINAGE      TRANSURETHRAL RESECTION OF PROSTATE          Social History     Tobacco Use    Smoking status: Never Smoker    Smokeless tobacco: Never Used   Substance Use Topics    Alcohol use: No     Alcohol/week: 0.0 standard drinks    Drug use: No       Family History   Problem Relation Age of Onset    Hypertension Mother     Hypertension Father     Heart attack Maternal Uncle     Brain cancer Sister     Diabetes Neg Hx     Stroke Neg Hx        Review of patient's allergies indicates:   Allergen Reactions    No known drug allergies        Vitals:    12/11/19 1022   BP: (!) 161/62   Pulse: 63   Weight: 101.6 kg (223 lb 15.8 oz)   Height: 6' (1.829 m)   PainSc: 0-No pain       Physical Exam   Constitutional: He is oriented to person, place, and time and well-developed, well-nourished, and in no distress. No distress.   HENT:   Head: Normocephalic.   Mouth/Throat: Oropharynx is clear and moist.   Eyes: Pupils are equal, round, and reactive to light. Conjunctivae are normal.   Neck: Normal range of motion. Neck supple.   Cardiovascular: Normal rate and intact distal pulses.   Pulmonary/Chest: Effort normal. No respiratory distress.   Abdominal: Soft. There is no tenderness.   Musculoskeletal:   No synovitis in small joints of hands or feet.  Good range of motion in large joints.     Neurological: He is alert and oriented to person, place, and time. Gait normal.   Skin: Skin is warm. No rash noted. No erythema.   Psychiatric: Mood and affect normal.       Medication List with Changes/Refills   Current Medications    ACARBOSE (PRECOSE) 100 MG TAB    TAKE 1 TABLET(100 MG) BY MOUTH THREE TIMES DAILY WITH MEALS    ALLOPURINOL (ZYLOPRIM) 100 MG TABLET    Take  2 tablets (200 mg total) by mouth once daily.    AMLODIPINE (NORVASC) 10 MG TABLET    Take 1 tablet (10 mg total) by mouth once daily.    ASPIRIN (ECOTRIN) 81 MG EC TABLET    Take 81 mg by mouth once daily.    ATORVASTATIN (LIPITOR) 20 MG TABLET    TAKE 1 TABLET BY MOUTH DAILY    BENAZEPRIL (LOTENSIN) 40 MG TABLET    Take 1 tablet (40 mg total) by mouth once daily.    BLOOD SUGAR DIAGNOSTIC STRP    1 each by Misc.(Non-Drug; Combo Route) route 3 (three) times daily as needed. One touch ultra 2 test strips    ERGOCALCIFEROL (ERGOCALCIFEROL) 50,000 UNIT CAP    TAKE 1 CAPSULE BY MOUTH 2 TIMES A WEEK    FERROUS SULFATE (FEOSOL) 325 MG (65 MG IRON) TAB TABLET    TAKE 1 TABLET BY MOUTH THREE TIMES DAILY    FUROSEMIDE (LASIX) 20 MG TABLET    Take 1 tablet (20 mg total) by mouth once daily.    GALANTAMINE (RAZADYNE) 8 MG TAB    TAKE 1 TABLET(8 MG) BY MOUTH TWICE DAILY WITH MEALS    GLIPIZIDE (GLUCOTROL) 10 MG TABLET    TAKE 1 TABLET(10 MG) BY MOUTH TWICE DAILY BEFORE MEALS    HYDRALAZINE (APRESOLINE) 25 MG TABLET    Take 1 tablet (25 mg total) by mouth every 8 (eight) hours.    JANUVIA 100 MG TAB    TAKE 1 TABLET(100 MG) BY MOUTH EVERY DAY    KETOCONAZOLE (NIZORAL) 2 % SHAMPOO        LANCETS (TRUEPLUS LANCETS) 33 GAUGE MISC    1 lancet by Misc.(Non-Drug; Combo Route) route once daily.    LEVOTHYROXINE (SYNTHROID) 50 MCG TABLET    Take 1 tablet (50 mcg total) by mouth once daily.    NIACIN (NIASPAN) 1000 MG CR TABLET    TAKE 1 TABLET(1000 MG) BY MOUTH EVERY EVENING    ONETOUCH VERIO IQ METER KIT    Use as instructed    PIOGLITAZONE (ACTOS) 30 MG TABLET    Take 1 tablet (30 mg total) by mouth once daily.    POTASSIUM CHLORIDE (KLOR-CON) 10 MEQ TBSR    TAKE 1 TABLET(10 MEQ) BY MOUTH EVERY DAY   Changed and/or Refilled Medications    Modified Medication Previous Medication    MITIGARE 0.6 MG CAP MITIGARE 0.6 mg Cap       Take 1 capsule (0.6 mg total) by mouth Daily.    Take 1 capsule (0.6 mg total) by mouth Daily.        Assessment/Plans:-  1. Idiopathic chronic gout of multiple sites with tophus    2. Obesity (BMI 30-39.9)      Problem List Items Addressed This Visit        Endocrine    Obesity (BMI 30-39.9)    Current Assessment & Plan     Advised low-carbohydrate diet.            Orthopedic    Idiopathic chronic gout of multiple sites with tophus - Primary    Current Assessment & Plan     Uric acid not at goal.  Repeat uric acid from today in process.  No gout flares since last visit.  If uric acid above 5,  increase allopurinol dose to 300 mg daily.  Reduce colchicine dose to every other day to prevent flares.         Relevant Medications    MITIGARE 0.6 mg Cap        # Follow up in about 6 months (around 6/11/2020).      Disclaimer: This note was prepared using voice recognition system and is likely to have sound alike errors and is not proof read.  Please call me with any questions.

## 2019-12-16 ENCOUNTER — OFFICE VISIT (OUTPATIENT)
Dept: FAMILY MEDICINE | Facility: CLINIC | Age: 79
End: 2019-12-16
Payer: MEDICARE

## 2019-12-16 VITALS
WEIGHT: 225.38 LBS | DIASTOLIC BLOOD PRESSURE: 63 MMHG | BODY MASS INDEX: 30.53 KG/M2 | HEIGHT: 72 IN | TEMPERATURE: 98 F | HEART RATE: 59 BPM | SYSTOLIC BLOOD PRESSURE: 134 MMHG

## 2019-12-16 DIAGNOSIS — E11.69 COMBINED HYPERLIPIDEMIA ASSOCIATED WITH TYPE 2 DIABETES MELLITUS: ICD-10-CM

## 2019-12-16 DIAGNOSIS — I15.2 HYPERTENSION ASSOCIATED WITH DIABETES: Primary | ICD-10-CM

## 2019-12-16 DIAGNOSIS — E11.59 HYPERTENSION ASSOCIATED WITH DIABETES: Primary | ICD-10-CM

## 2019-12-16 DIAGNOSIS — E78.2 COMBINED HYPERLIPIDEMIA ASSOCIATED WITH TYPE 2 DIABETES MELLITUS: ICD-10-CM

## 2019-12-16 PROCEDURE — 3078F DIAST BP <80 MM HG: CPT | Mod: CPTII,S$GLB,, | Performed by: FAMILY MEDICINE

## 2019-12-16 PROCEDURE — 3078F PR MOST RECENT DIASTOLIC BLOOD PRESSURE < 80 MM HG: ICD-10-PCS | Mod: CPTII,S$GLB,, | Performed by: FAMILY MEDICINE

## 2019-12-16 PROCEDURE — 99999 PR PBB SHADOW E&M-EST. PATIENT-LVL III: CPT | Mod: PBBFAC,,, | Performed by: FAMILY MEDICINE

## 2019-12-16 PROCEDURE — 3075F PR MOST RECENT SYSTOLIC BLOOD PRESS GE 130-139MM HG: ICD-10-PCS | Mod: CPTII,S$GLB,, | Performed by: FAMILY MEDICINE

## 2019-12-16 PROCEDURE — 1159F PR MEDICATION LIST DOCUMENTED IN MEDICAL RECORD: ICD-10-PCS | Mod: S$GLB,,, | Performed by: FAMILY MEDICINE

## 2019-12-16 PROCEDURE — 1126F AMNT PAIN NOTED NONE PRSNT: CPT | Mod: S$GLB,,, | Performed by: FAMILY MEDICINE

## 2019-12-16 PROCEDURE — 1126F PR PAIN SEVERITY QUANTIFIED, NO PAIN PRESENT: ICD-10-PCS | Mod: S$GLB,,, | Performed by: FAMILY MEDICINE

## 2019-12-16 PROCEDURE — 1101F PT FALLS ASSESS-DOCD LE1/YR: CPT | Mod: CPTII,S$GLB,, | Performed by: FAMILY MEDICINE

## 2019-12-16 PROCEDURE — 1159F MED LIST DOCD IN RCRD: CPT | Mod: S$GLB,,, | Performed by: FAMILY MEDICINE

## 2019-12-16 PROCEDURE — 1101F PR PT FALLS ASSESS DOC 0-1 FALLS W/OUT INJ PAST YR: ICD-10-PCS | Mod: CPTII,S$GLB,, | Performed by: FAMILY MEDICINE

## 2019-12-16 PROCEDURE — 99999 PR PBB SHADOW E&M-EST. PATIENT-LVL III: ICD-10-PCS | Mod: PBBFAC,,, | Performed by: FAMILY MEDICINE

## 2019-12-16 PROCEDURE — 3075F SYST BP GE 130 - 139MM HG: CPT | Mod: CPTII,S$GLB,, | Performed by: FAMILY MEDICINE

## 2019-12-16 PROCEDURE — 99213 PR OFFICE/OUTPT VISIT, EST, LEVL III, 20-29 MIN: ICD-10-PCS | Mod: S$GLB,,, | Performed by: FAMILY MEDICINE

## 2019-12-16 PROCEDURE — 99213 OFFICE O/P EST LOW 20 MIN: CPT | Mod: S$GLB,,, | Performed by: FAMILY MEDICINE

## 2019-12-16 RX ORDER — AMLODIPINE BESYLATE 10 MG/1
10 TABLET ORAL DAILY
Qty: 90 TABLET | Refills: 3 | Status: SHIPPED | OUTPATIENT
Start: 2019-12-16 | End: 2021-04-30 | Stop reason: SDUPTHER

## 2019-12-16 RX ORDER — HYDRALAZINE HYDROCHLORIDE 25 MG/1
25 TABLET, FILM COATED ORAL EVERY 8 HOURS
Qty: 270 TABLET | Refills: 3 | Status: SHIPPED | OUTPATIENT
Start: 2019-12-16 | End: 2020-05-19

## 2019-12-16 RX ORDER — NIACIN 1000 MG/1
TABLET, EXTENDED RELEASE ORAL
Qty: 90 TABLET | Refills: 0 | Status: SHIPPED | OUTPATIENT
Start: 2019-12-16 | End: 2020-03-11

## 2019-12-16 RX ORDER — BENAZEPRIL HYDROCHLORIDE 40 MG/1
40 TABLET ORAL DAILY
Qty: 90 TABLET | Refills: 3 | Status: SHIPPED | OUTPATIENT
Start: 2019-12-16 | End: 2020-12-01 | Stop reason: SDUPTHER

## 2019-12-16 RX ORDER — FUROSEMIDE 20 MG/1
20 TABLET ORAL DAILY
Qty: 90 TABLET | Refills: 3 | Status: SHIPPED | OUTPATIENT
Start: 2019-12-16 | End: 2020-08-20

## 2019-12-16 NOTE — PROGRESS NOTES
Subjective:   Rudy Saldaña is a 79 y.o. male with hypertension.  He is tolerating the medication well and is in excellent compliance.  The patient is experiencing no side effects.  Counseling was offered regarding low salt diets.  He has a reduced salt intake.  He denies chest pain, palpitations, shortness of breath, dyspnea on exertion, left or murmur neck pain, nausea, vomiting, diaphoresis, paroxysmal nocturnal dyspnea, and orthopnea. The bp had been in creased recently.  It is normal today.   The patient's Health Maintenance was reviewed and the following appears to be due at this time:  There are no preventive care reminders to display for this patient.    Outpatient Medications Prior to Visit   Medication Sig Dispense Refill    acarbose (PRECOSE) 100 MG Tab TAKE 1 TABLET(100 MG) BY MOUTH THREE TIMES DAILY WITH MEALS 270 tablet 0    allopurinol (ZYLOPRIM) 100 MG tablet Take 2 tablets (200 mg total) by mouth once daily. 180 tablet 1    amLODIPine (NORVASC) 10 MG tablet Take 1 tablet (10 mg total) by mouth once daily. 90 tablet 1    aspirin (ECOTRIN) 81 MG EC tablet Take 81 mg by mouth once daily.      atorvastatin (LIPITOR) 20 MG tablet TAKE 1 TABLET BY MOUTH DAILY 90 tablet 11    benazepril (LOTENSIN) 40 MG tablet Take 1 tablet (40 mg total) by mouth once daily. 90 tablet 3    blood sugar diagnostic Strp 1 each by Misc.(Non-Drug; Combo Route) route 3 (three) times daily as needed. One touch ultra 2 test strips 100 each 5    ergocalciferol (ERGOCALCIFEROL) 50,000 unit Cap TAKE 1 CAPSULE BY MOUTH 2 TIMES A WEEK 26 capsule 3    ferrous sulfate (FEOSOL) 325 mg (65 mg iron) Tab tablet TAKE 1 TABLET BY MOUTH THREE TIMES DAILY 270 tablet 0    furosemide (LASIX) 20 MG tablet Take 1 tablet (20 mg total) by mouth once daily. 90 tablet 3    galantamine (RAZADYNE) 8 MG Tab TAKE 1 TABLET(8 MG) BY MOUTH TWICE DAILY WITH MEALS 180 tablet 0    glipiZIDE (GLUCOTROL) 10 MG tablet TAKE 1 TABLET(10 MG) BY MOUTH  TWICE DAILY BEFORE MEALS 180 tablet 0    hydrALAZINE (APRESOLINE) 25 MG tablet Take 1 tablet (25 mg total) by mouth every 8 (eight) hours. 90 tablet 11    JANUVIA 100 mg Tab TAKE 1 TABLET(100 MG) BY MOUTH EVERY DAY 90 tablet 0    ketoconazole (NIZORAL) 2 % shampoo       lancets (TRUEPLUS LANCETS) 33 gauge Misc 1 lancet by Misc.(Non-Drug; Combo Route) route once daily. 100 each 5    levothyroxine (SYNTHROID) 50 MCG tablet Take 1 tablet (50 mcg total) by mouth once daily. 90 tablet 0    MITIGARE 0.6 mg Cap Take 1 capsule (0.6 mg total) by mouth Daily. 90 capsule 3    niacin (NIASPAN) 1000 MG CR tablet TAKE 1 TABLET(1000 MG) BY MOUTH EVERY EVENING 90 tablet 0    pioglitazone (ACTOS) 30 MG tablet Take 1 tablet (30 mg total) by mouth once daily. 90 tablet 2    potassium chloride (KLOR-CON) 10 MEQ TbSR TAKE 1 TABLET(10 MEQ) BY MOUTH EVERY DAY 90 tablet 0    ONETOUCH VERIO IQ METER kit Use as instructed 1 each 0     No facility-administered medications prior to visit.       .  Hypertension Medications             amLODIPine (NORVASC) 10 MG tablet Take 1 tablet (10 mg total) by mouth once daily.    benazepril (LOTENSIN) 40 MG tablet Take 1 tablet (40 mg total) by mouth once daily.    furosemide (LASIX) 20 MG tablet Take 1 tablet (20 mg total) by mouth once daily.    hydrALAZINE (APRESOLINE) 25 MG tablet Take 1 tablet (25 mg total) by mouth every 8 (eight) hours.          Objective:   /63   Pulse (!) 59   Temp 97.8 °F (36.6 °C) (Oral)   Ht 6' (1.829 m)   Wt 102.2 kg (225 lb 6.4 oz)   BMI 30.57 kg/m²    Body mass index is 30.57 kg/m².  Genl:Alert and oriented in NAD.   Cardiovascular: Normal rate, regular rhythm, S1 normal, S2 normal and normal heart sounds.  Exam reveals no gallop, no S3, no S4 and no friction rub.    No murmur heard.  Pulmonary/Chest: Effort normal and breath sounds normal. No stridor. Not tachypneic. No respiratory distress. The patient has no wheezes. The patient has no rhonchi. The  patient has no rales.   Skin: The patient is not diaphoretic.     Encounter Diagnosis   Name Primary?    Hypertension associated with diabetes Yes       PLAN:    I am having Rudy Saldaña maintain his OneTouch Verio IQ Meter, aspirin, ketoconazole, blood sugar diagnostic, lancets, pioglitazone, ergocalciferol, glipiZIDE, Januvia, allopurinol, atorvastatin, niacin, potassium chloride, levothyroxine, galantamine, ferrous sulfate, acarbose, benazepril, amLODIPine, furosemide, hydrALAZINE, and Mitigare.    Rudy was seen today for follow-up.    Diagnoses and all orders for this visit:    Hypertension associated with diabetes         No orders of the defined types were placed in this encounter.    Use a low salt diet.   Exercise and diet enough to keep the BMI less than 30.

## 2019-12-31 RX ORDER — POTASSIUM CHLORIDE 750 MG/1
TABLET, EXTENDED RELEASE ORAL
Qty: 90 TABLET | Refills: 0 | Status: SHIPPED | OUTPATIENT
Start: 2019-12-31 | End: 2020-03-25 | Stop reason: SDUPTHER

## 2020-01-02 ENCOUNTER — OFFICE VISIT (OUTPATIENT)
Dept: RHEUMATOLOGY | Facility: CLINIC | Age: 80
End: 2020-01-02
Payer: MEDICARE

## 2020-01-02 VITALS
DIASTOLIC BLOOD PRESSURE: 60 MMHG | HEART RATE: 61 BPM | BODY MASS INDEX: 29.56 KG/M2 | SYSTOLIC BLOOD PRESSURE: 140 MMHG | WEIGHT: 218.25 LBS | HEIGHT: 72 IN

## 2020-01-02 DIAGNOSIS — M11.9 CRYSTAL ARTHROPATHY: Primary | ICD-10-CM

## 2020-01-02 DIAGNOSIS — Z71.89 COUNSELING ON HEALTH PROMOTION AND DISEASE PREVENTION: ICD-10-CM

## 2020-01-02 PROCEDURE — 99214 PR OFFICE/OUTPT VISIT, EST, LEVL IV, 30-39 MIN: ICD-10-PCS | Mod: S$GLB,,, | Performed by: INTERNAL MEDICINE

## 2020-01-02 PROCEDURE — 1159F PR MEDICATION LIST DOCUMENTED IN MEDICAL RECORD: ICD-10-PCS | Mod: S$GLB,,, | Performed by: INTERNAL MEDICINE

## 2020-01-02 PROCEDURE — 1125F PR PAIN SEVERITY QUANTIFIED, PAIN PRESENT: ICD-10-PCS | Mod: S$GLB,,, | Performed by: INTERNAL MEDICINE

## 2020-01-02 PROCEDURE — 3078F PR MOST RECENT DIASTOLIC BLOOD PRESSURE < 80 MM HG: ICD-10-PCS | Mod: CPTII,S$GLB,, | Performed by: INTERNAL MEDICINE

## 2020-01-02 PROCEDURE — 1125F AMNT PAIN NOTED PAIN PRSNT: CPT | Mod: S$GLB,,, | Performed by: INTERNAL MEDICINE

## 2020-01-02 PROCEDURE — 1159F MED LIST DOCD IN RCRD: CPT | Mod: S$GLB,,, | Performed by: INTERNAL MEDICINE

## 2020-01-02 PROCEDURE — 99999 PR PBB SHADOW E&M-EST. PATIENT-LVL III: ICD-10-PCS | Mod: PBBFAC,,, | Performed by: INTERNAL MEDICINE

## 2020-01-02 PROCEDURE — 3077F SYST BP >= 140 MM HG: CPT | Mod: CPTII,S$GLB,, | Performed by: INTERNAL MEDICINE

## 2020-01-02 PROCEDURE — 1101F PR PT FALLS ASSESS DOC 0-1 FALLS W/OUT INJ PAST YR: ICD-10-PCS | Mod: CPTII,S$GLB,, | Performed by: INTERNAL MEDICINE

## 2020-01-02 PROCEDURE — 1101F PT FALLS ASSESS-DOCD LE1/YR: CPT | Mod: CPTII,S$GLB,, | Performed by: INTERNAL MEDICINE

## 2020-01-02 PROCEDURE — 99999 PR PBB SHADOW E&M-EST. PATIENT-LVL III: CPT | Mod: PBBFAC,,, | Performed by: INTERNAL MEDICINE

## 2020-01-02 PROCEDURE — 3078F DIAST BP <80 MM HG: CPT | Mod: CPTII,S$GLB,, | Performed by: INTERNAL MEDICINE

## 2020-01-02 PROCEDURE — 3077F PR MOST RECENT SYSTOLIC BLOOD PRESSURE >= 140 MM HG: ICD-10-PCS | Mod: CPTII,S$GLB,, | Performed by: INTERNAL MEDICINE

## 2020-01-02 PROCEDURE — 99214 OFFICE O/P EST MOD 30 MIN: CPT | Mod: S$GLB,,, | Performed by: INTERNAL MEDICINE

## 2020-01-02 RX ORDER — PREDNISONE 10 MG/1
TABLET ORAL
Qty: 43 TABLET | Refills: 0 | Status: SHIPPED | OUTPATIENT
Start: 2020-01-02 | End: 2020-01-26

## 2020-01-02 RX ORDER — ALLOPURINOL 300 MG/1
300 TABLET ORAL DAILY
Qty: 90 TABLET | Refills: 1 | Status: SHIPPED | OUTPATIENT
Start: 2020-01-02 | End: 2020-04-01

## 2020-01-02 RX ORDER — PREDNISONE 50 MG/1
50 TABLET ORAL DAILY
Qty: 7 TABLET | Refills: 0 | Status: SHIPPED | OUTPATIENT
Start: 2020-01-02 | End: 2020-01-09

## 2020-01-02 RX ORDER — DICLOFENAC SODIUM 10 MG/G
2 GEL TOPICAL 4 TIMES DAILY PRN
Qty: 1 TUBE | Refills: 2 | Status: SHIPPED | OUTPATIENT
Start: 2020-01-02 | End: 2020-01-13

## 2020-01-02 NOTE — PATIENT INSTRUCTIONS
Rheumatology medications:    · Colchicine: (within 24h of acute flare) 1 capsule and repeat in 1 hour. Continue with 1 daily the next day for 1 week.  Otherwise, 1 every other day    · Allopurinol; 300mg capsule once daily (increased from 200mg). Start new dose in 2 weeks    · Prednisone: 50mg daily for 7 days.  Then may continue with taper dose to discontinue (paper script)      Treating Gout Attacks     Raising the joint above the level of your heart can help reduce gout symptoms.     Gout is a disease that affects the joints. It is caused by excess uric acid in your blood stream that may lead to crystals forming in your joints. Left untreated, it can lead to painful foot and joint deformities and even kidney problems. But, by treating gout early, you can relieve pain and help prevent future problems. Gout can usually be treated with medication and proper diet. In severe cases, surgery may be needed.  Gout attacks are painful and often happen more than once. Taking medications may reduce pain and prevent attacks in the future. There are also some things you can do at home to relieve symptoms.  Medications for gout  Your healthcare provider may prescribe a daily medication to reduce levels of uric acid. Reducing your uric acid levels may help prevent gout attacks. Allopurinol is one commonly used medication taken daily to reduce uric acid levels. Other medications can help relieve pain and swelling during an acute attack. Medicines such as NSAIDs (nonsteroidal anti-inflammatory medicines), steroids, and colchicine may be prescribed for intermittent use to relieve an acute gout attack. Be sure to take your medication as directed.  What you can do  Below are some things you can do at home to relieve gout symptoms. Your healthcare provider may have other tips.  · Rest the painful joint as much as you can.  · Raise the painful joint so it is at a level higher than your heart.  · Use ice for 10 minutes every 1-2 hours  as possible.  How can I prevent gout?  With a little effort, you may be able to prevent gout attacks in the future. Here are some things you can do:  · Avoid foods high in purines  ¨ Certain meats (red meat, processed meat, turkey)  ¨ Organ meats (kidney, liver, sweetbread)  ¨ Shellfish (lobster, crab, shrimp, scallop, mussel)  ¨ Certain fish (anchovy, sardine, herring, mackerel)  · Take any medications prescribed by your healthcare provider.  · Lose weight if you need to.  · Reduce high fructose corn syrup in meals and drinks.  · Reduce or eliminate consumption of alcohol, particularly beer, but also red wine and spirits.  · Control blood pressure, diabetes, and cholesterol.  · Drink plenty of water to help flush uric acid from your body.  Date Last Reviewed: 2/1/2016  © 2087-4651 [employer] [address] [place] [address]. All rights reserved. This information is not intended as a substitute for professional medical care. Always follow your healthcare professional's instructions.        Eating to Prevent Gout  Gout is a painful form of arthritis caused by an excess of uric acid. This is a waste product made by the body. It builds up in the body and forms crystals that collect in the joints, bringing on a gout attack. Alcohol and certain foods can trigger a gout attack. Below are some guidelines for changing your diet to help you manage gout. Your healthcare provider can work with you to determine the best eating plan for you. Know that diet is only one part of managing gout. Take your medicines as prescribed and follow the other guidelines your healthcare provider has given you.  Foods to limit  Eating too many foods containing purines may increase the levels of uric acid in your body and increase your risk for a gout attack. It may be best to limit these high-purine foods:  · Alcohol (beer, red wine). You may be told to avoid alcohol completely.  · Certain fish (anchovies, sardines, fish  roes, herring, tuna, mussels, codfish, scallops, trout, and valentino)  · Certain meats (red meat, processed meat, peter, turkey, wild game, and goose)  · Sauces and gravies made with meat  · Organ meats (such as liver, kidneys, sweetbreads, and tripe)  · Legumes (such as dried beans, peas)  · Mushrooms, spinach, asparagus, and cauliflower  · Yeast and yeast extract supplements  Foods to try  Some foods may be helpful for people with gout. You may want to try adding some of the following foods to your diet:  · Dark berries: These include blueberries, blackberries, and cherries. These berries contain chemicals that may lower uric acid.  · Tofu: Tofu, which is made from soy, is a good source of protein. Studies have shown that it may be a better choice than meat for people with gout.  · Omega fatty acids: These acids are found in fatty fish (such as salmon), certain oils (such as flax, olive, or nut oils), or nuts. They may help prevent inflammation due to gout.  The following guidelines are recommended by the American Medical Association for people with gout. Your diet should be:  · High in fiber, whole grains, fruits, and vegetables.  · Low in protein (15% of calories should come from protein. Choose lean sources such as soy, lean meats, and poultry).  · Low in fat (no more than 30% of calories should come from fat, with only 10% coming from animal fat).   Date Last Reviewed: 6/17/2015 © 2000-2017 [employer]. [address] [place] [address], [place] [address]. All rights reserved. This information is not intended as a substitute for professional medical care. Always follow your healthcare professional's instructions.

## 2020-01-02 NOTE — PROGRESS NOTES
RHEUMATOLOGY OUTPATIENT CLINIC NOTE    1/2/2020    Attending Rheumatologist: Figueroa Arias  Primary Care Provider: Mohinder Morfin MD   Physician Requesting Consultation: No referring provider defined for this encounter.  Chief Complaint/Reason For Consultation:  Gout    Subjective:       HPI  Rudy Saldaña is a 79 y.o. White male with chronic gout comes for acute flare    Today:  Last seen by Rheumatology on early December.  Was considered to increase urate lowering therapy depending uric acid levels.  Main concern today is acute right hand extreme pain and swelling.  Started on December 30th without any particular precipitating events.  Refers excellent adherence with allopurinol, however no following any dietary restrictions.  Denies any other significant joint pain.  Does not have fever,  or GI complaints.    Review of Systems   Constitutional: Negative for chills, fever and malaise/fatigue.   Eyes: Negative for pain and redness.   Respiratory: Negative for cough, hemoptysis and shortness of breath.    Cardiovascular: Negative for chest pain and leg swelling.   Gastrointestinal: Negative for abdominal pain, blood in stool and melena.   Genitourinary: Negative for dysuria and hematuria.   Musculoskeletal: Positive for joint pain (Right hand, inflammatory pattern.). Negative for falls.   Skin: Negative for rash.   Neurological: Negative for tingling and focal weakness.   Psychiatric/Behavioral: Negative for memory loss. The patient does not have insomnia.        Chronic comorbid conditions affecting medical decision making today:  Past Medical History:   Diagnosis Date    Anemia     BPH (benign prostatic hypertrophy)     Carotid bruit     CKD (chronic kidney disease) stage 2/3     Dr. Nails    CVA (cerebral vascular accident)     Diabetes mellitus, type 2     Hyperlipidemia     Hypertension     Hypothyroidism 2/29/2016    Type 2 diabetes mellitus, uncontrolled, with renal complications      The patient presents with diabetes.  The patient denies polyuria, polydipsia, polyphagia, hypoglycemia and paresthesias.  The patient's glucose control has been good.  Home glucose averages are routinely checked.  The patient is without retinopathy currently.  The patient has no history of neuropathy.  The patient currently complains of no podiatric problems.  The patient has excellent compliance.    Type II or unspecified type diabetes mellitus without mention of complication, not stated as uncontrolled     Vitamin D deficiency 2/29/2016     Past Surgical History:   Procedure Laterality Date    COLONOSCOPY  2008    COLONOSCOPY  5/31/2005    repeat in 2015    PILONIDAL CYST DRAINAGE      TRANSURETHRAL RESECTION OF PROSTATE       Family History   Problem Relation Age of Onset    Hypertension Mother     Hypertension Father     Heart attack Maternal Uncle     Brain cancer Sister     Diabetes Neg Hx     Stroke Neg Hx      Social History     Substance and Sexual Activity   Alcohol Use No    Alcohol/week: 0.0 standard drinks     Social History     Tobacco Use   Smoking Status Never Smoker   Smokeless Tobacco Never Used     Social History     Substance and Sexual Activity   Drug Use No       Current Outpatient Medications:     acarbose (PRECOSE) 100 MG Tab, TAKE 1 TABLET(100 MG) BY MOUTH THREE TIMES DAILY WITH MEALS, Disp: 270 tablet, Rfl: 0    allopurinol (ZYLOPRIM) 300 MG tablet, Take 1 tablet (300 mg total) by mouth once daily., Disp: 90 tablet, Rfl: 1    amLODIPine (NORVASC) 10 MG tablet, Take 1 tablet (10 mg total) by mouth once daily., Disp: 90 tablet, Rfl: 3    aspirin (ECOTRIN) 81 MG EC tablet, Take 81 mg by mouth once daily., Disp: , Rfl:     atorvastatin (LIPITOR) 20 MG tablet, TAKE 1 TABLET BY MOUTH DAILY, Disp: 90 tablet, Rfl: 11    benazepril (LOTENSIN) 40 MG tablet, Take 1 tablet (40 mg total) by mouth once daily., Disp: 90 tablet, Rfl: 3    blood sugar diagnostic Strp, 1 each by  Misc.(Non-Drug; Combo Route) route 3 (three) times daily as needed. One touch ultra 2 test strips, Disp: 100 each, Rfl: 5    ergocalciferol (ERGOCALCIFEROL) 50,000 unit Cap, TAKE 1 CAPSULE BY MOUTH 2 TIMES A WEEK, Disp: 26 capsule, Rfl: 3    ferrous sulfate (FEOSOL) 325 mg (65 mg iron) Tab tablet, TAKE 1 TABLET BY MOUTH THREE TIMES DAILY, Disp: 270 tablet, Rfl: 0    furosemide (LASIX) 20 MG tablet, Take 1 tablet (20 mg total) by mouth once daily., Disp: 90 tablet, Rfl: 3    galantamine (RAZADYNE) 8 MG Tab, TAKE 1 TABLET(8 MG) BY MOUTH TWICE DAILY WITH MEALS, Disp: 180 tablet, Rfl: 0    glipiZIDE (GLUCOTROL) 10 MG tablet, TAKE 1 TABLET(10 MG) BY MOUTH TWICE DAILY BEFORE MEALS, Disp: 180 tablet, Rfl: 0    hydrALAZINE (APRESOLINE) 25 MG tablet, Take 1 tablet (25 mg total) by mouth every 8 (eight) hours., Disp: 270 tablet, Rfl: 3    JANUVIA 100 mg Tab, TAKE 1 TABLET(100 MG) BY MOUTH EVERY DAY, Disp: 90 tablet, Rfl: 0    Lactobacillus rhamnosus GG (CULTURELLE) 10 billion cell capsule, Take 1 capsule by mouth once daily., Disp: , Rfl:     lancets (TRUEPLUS LANCETS) 33 gauge Misc, 1 lancet by Misc.(Non-Drug; Combo Route) route once daily., Disp: 100 each, Rfl: 5    levothyroxine (SYNTHROID) 50 MCG tablet, Take 1 tablet (50 mcg total) by mouth once daily., Disp: 90 tablet, Rfl: 0    MITIGARE 0.6 mg Cap, Take 1 capsule (0.6 mg total) by mouth Daily., Disp: 90 capsule, Rfl: 3    niacin (NIASPAN) 1000 MG CR tablet, TAKE 1 TABLET BY MOUTH EVERY EVENING, Disp: 90 tablet, Rfl: 0    pioglitazone (ACTOS) 30 MG tablet, Take 1 tablet (30 mg total) by mouth once daily., Disp: 90 tablet, Rfl: 2    potassium chloride (KLOR-CON) 10 MEQ TbSR, TAKE 1 TABLET(10 MEQ) BY MOUTH EVERY DAY, Disp: 90 tablet, Rfl: 0    diclofenac sodium (VOLTAREN) 1 % Gel, Apply 2 g topically 4 (four) times daily as needed., Disp: 1 Tube, Rfl: 2    ketoconazole (NIZORAL) 2 % shampoo, , Disp: , Rfl:     ONETOUCH VERIO IQ METER kit, Use as  instructed, Disp: 1 each, Rfl: 0    predniSONE (DELTASONE) 10 MG tablet, Take 4 tablets (40 mg total) by mouth once daily for 4 days, THEN 3 tablets (30 mg total) once daily for 4 days, THEN 2 tablets (20 mg total) once daily for 4 days, THEN 1 tablet (10 mg total) once daily for 4 days, THEN 0.5 tablets (5 mg total) once daily for 4 days, THEN 0.5 tablets (5 mg total) every other day for 4 days., Disp: 43 tablet, Rfl: 0    predniSONE (DELTASONE) 50 MG Tab, Take 1 tablet (50 mg total) by mouth once daily. for 7 days, Disp: 7 tablet, Rfl: 0     Objective:         Vitals:    01/02/20 1124   BP: (!) 140/60   Pulse: 61     Physical Exam   Constitutional: No distress.   Estimated body mass index is 29.6 kg/m² as calculated from the following:    Height as of this encounter: 6' (1.829 m).    Weight as of this encounter: 99 kg (218 lb 4.1 oz).    Wt Readings from Last 1 Encounters:  01/02/20 1124 : 99 kg (218 lb 4.1 oz)     HENT:   Head: Normocephalic and atraumatic.   Eyes: Conjunctivae are normal. Pupils are equal, round, and reactive to light.   Neck: Normal range of motion.   Cardiovascular: Normal rate and intact distal pulses.    Pulmonary/Chest: Effort normal. No respiratory distress.   Abdominal: Soft. He exhibits no distension.   Neurological: He is alert. Gait normal.   Skin: No rash noted. No erythema.     Musculoskeletal: Normal range of motion. He exhibits tenderness (Extreme tenderness right hand from wrist down).   :  Weak due to severe pain right hand  Rt hand: Swelling +++, warmth ++. No erythema    AROM:  Limited right hand due to pain, otherwise intact  PROM:  As above    Devices used by patient: none       Reviewed old and all outside pertinent medical records available.    All lab results personally reviewed and interpreted by me.  Lab Results   Component Value Date    WBC 4.57 10/03/2019    HGB 13.7 (L) 10/03/2019    HCT 42.2 10/03/2019    MCV 94 10/03/2019    MCH 30.5 10/03/2019    MCHC 32.5  10/03/2019    RDW 14.0 10/03/2019     10/03/2019    MPV 11.7 10/03/2019       Lab Results   Component Value Date     12/11/2019    K 4.3 12/11/2019     12/11/2019    CO2 28 12/11/2019     (H) 12/11/2019    BUN 20 12/11/2019    CALCIUM 9.8 12/11/2019    PROT 7.1 12/11/2019    ALBUMIN 4.1 12/11/2019    BILITOT 1.3 (H) 12/11/2019    AST 19 12/11/2019    ALKPHOS 75 12/11/2019    ALT 21 12/11/2019       Lab Results   Component Value Date    COLORU Yellow 10/03/2019    APPEARANCEUA Clear 10/03/2019    SPECGRAV 1.025 10/03/2019    PHUR 6.0 10/03/2019    PROTEINUA Negative 10/03/2019    KETONESU Negative 10/03/2019    LEUKOCYTESUR Negative 10/03/2019    NITRITE Negative 10/03/2019    UROBILINOGEN neg 12/16/2015       Lab Results   Component Value Date    CRP 0.4 10/03/2019       Lab Results   Component Value Date    SEDRATE 5 10/03/2019       Lab Results   Component Value Date    FRANDY Negative 06/11/2004    RF <10.0 11/12/2018    SEDRATE 5 10/03/2019       No components found for: 25OHVITDTOT, 19YOCKYQ9, 38HXXPRD2, METHODNOTE    Lab Results   Component Value Date    URICACID 4.7 12/11/2019       No components found for: TSPOTTB    Uric acid: 11.3 (2015)-> 4.3 (12/2019)    Rheum Labs:   FRANDY negative   Anti Maru 1 negative   Rheumatoid factor, CCP negative     Imaging:  All imaging reviewed and independently  interpreted by me.    X-ray foot March 2017  There is no radiographic evidence of acute osseous, articular, or soft tissue abnormality. Mild multi-articular degenerative findings noted throughout the bilateral midfeet and forefeet.  Prominent dorsal and plantar surface calcaneal enthesophytes are present bilaterally.  No erosive osseous changes demonstrated.    X-ray knee November 2018  Juxta-articular osteopenia and bilateral tricompartment degenerative changes similar to prior exam.  There is a increased narrowing of the medial compartments, greater on the left.    X-ray hands January  2019  Osteoarthritic changes of the interphalangeal, CMC and triscaphe joints noted.  No concerning erosions.    X-ray ankle January 2019  No acute osseous or soft tissue abnormality.  Calcaneal enthesophyte formation present.    X-ray elbow November 2019  No acute fracture or dislocation.  Similar degenerative findings present including prominent olecranon enthesophyte.  There is prominence of the soft tissue overlying the olecranon suggestive of olecranon bursitis although improved from prior study.     ASSESSMENT / PLAN:     Rudy Saldaña is a 79 y.o. White male with:    1. Idiopathic chronic gout   - clinical diagnosis, presenting with acute flare.  - reports adherence with allopurinol, following dietary restrictions  - provided rescue therapy with systemic corticosteroids and taper to discontinue  - instructed to increase allopurinol to 300 mg in 2 weeks. Last uric acid at goal.  - prior imaging with no erosions.  Rheumatic autoimmune workup unrevealing  - Dietary and lifestyle modifications brain forced  - acute flare: colchicine: 1.2 mg followed by 0.6 mg 1 h later, then c/w EOD  - clinical significance side effects of therapy were discussed.  Hyperglycemia precautions discussed.  - predniSONE (DELTASONE) 10 MG tablet; Take 4 tablets (40 mg total) by mouth once daily for 4 days, THEN 3 tablets (30 mg total) once daily for 4 days, THEN 2 tablets (20 mg total) once daily for 4 days, THEN 1 tablet (10 mg total) once daily for 4 days, THEN 0.5 tablets (5 mg total) once daily for 4 days, THEN 0.5 tablets (5 mg total) every other day for 4 days.  Dispense: 43 tablet; Refill: 0  - diclofenac sodium (VOLTAREN) 1 % Gel; Apply 2 g topically 4 (four) times daily as needed.  Dispense: 1 Tube; Refill: 2  - allopurinol (ZYLOPRIM) 300 MG tablet; Take 1 tablet (300 mg total) by mouth once daily.  Dispense: 90 tablet; Refill: 1  - predniSONE (DELTASONE) 50 MG Tab; Take 1 tablet (50 mg total) by mouth once daily. for 7 days   Dispense: 7 tablet; Refill: 0    2. Other specified counseling  - over 10 minutes spent regarding below topics:  - Nutrition and exercise counseling.  - Limitation of alcohol consumption.  - Regular exercise:  Aerobic and resistance.  - Medication counseling provided.    Follow up in about 3 weeks (around 1/23/2020).    Method of contact with patient concerns: [facility]    Disclaimer:  This note is prepared using voice recognition software and as such is likely to have errors and has not been proof read. Please contact me for questions.     Time spent: 25 minutes in face to face discussion concerning diagnosis, prognosis, review of lab and test results, benefits of treatment as well as management of disease, counseling of patient and coordination of care between various health care providers.  Greater than half the time spent was used for coordination of care and counseling of patient.    Figueroa Arias M.D.  Rheumatology Department   [facility] [place]

## 2020-01-04 ENCOUNTER — PATIENT MESSAGE (OUTPATIENT)
Dept: FAMILY MEDICINE | Facility: CLINIC | Age: 80
End: 2020-01-04

## 2020-01-07 ENCOUNTER — PATIENT MESSAGE (OUTPATIENT)
Dept: FAMILY MEDICINE | Facility: CLINIC | Age: 80
End: 2020-01-07

## 2020-01-07 RX ORDER — INSULIN GLARGINE 100 [IU]/ML
10 INJECTION, SOLUTION SUBCUTANEOUS DAILY
Qty: 15 ML | Refills: 0 | Status: SHIPPED | OUTPATIENT
Start: 2020-01-07 | End: 2020-06-10

## 2020-01-07 RX ORDER — PEN NEEDLE, DIABETIC 29 G X1/2"
1 NEEDLE, DISPOSABLE MISCELLANEOUS DAILY
Qty: 100 EACH | Refills: 3 | Status: SHIPPED | OUTPATIENT
Start: 2020-01-07 | End: 2020-06-10

## 2020-01-07 NOTE — TELEPHONE ENCOUNTER
"Book the patient with jani today to get taught how to use the lantus that I have sent in.      Rebook with her in 10 days also for her to follow up on the glucose log on the 10 u of the lantus daily .    I have signed for the following orders AND/OR meds.  Please call the patient and ask the patient to schedule the testing AND/OR inform about any medications that were sent.      No orders of the defined types were placed in this encounter.      Medications Ordered This Encounter   Medications    insulin (LANTUS SOLOSTAR U-100 INSULIN) glargine 100 units/mL (3mL) SubQ pen     Sig: Inject 10 Units into the skin once daily.     Dispense:  15 mL     Refill:  0    pen needle, diabetic (PEN NEEDLE) 31 gauge x 1/4" Ndle     Si Units by Misc.(Non-Drug; Combo Route) route once daily.     Dispense:  100 each     Refill:  3     "

## 2020-01-08 ENCOUNTER — OFFICE VISIT (OUTPATIENT)
Dept: FAMILY MEDICINE | Facility: CLINIC | Age: 80
End: 2020-01-08
Payer: MEDICARE

## 2020-01-08 VITALS
HEART RATE: 72 BPM | SYSTOLIC BLOOD PRESSURE: 135 MMHG | BODY MASS INDEX: 30.2 KG/M2 | WEIGHT: 223 LBS | HEIGHT: 72 IN | TEMPERATURE: 98 F | DIASTOLIC BLOOD PRESSURE: 66 MMHG

## 2020-01-08 DIAGNOSIS — N18.30 CKD STAGE 3 SECONDARY TO DIABETES: ICD-10-CM

## 2020-01-08 DIAGNOSIS — M46.96 UNSPECIFIED INFLAMMATORY SPONDYLOPATHY, LUMBAR REGION: ICD-10-CM

## 2020-01-08 DIAGNOSIS — E13.42 POLYNEUROPATHY DUE TO SECONDARY DIABETES: ICD-10-CM

## 2020-01-08 DIAGNOSIS — E11.22 CKD STAGE 3 SECONDARY TO DIABETES: ICD-10-CM

## 2020-01-08 PROCEDURE — 99214 PR OFFICE/OUTPT VISIT, EST, LEVL IV, 30-39 MIN: ICD-10-PCS | Mod: S$GLB,,, | Performed by: FAMILY MEDICINE

## 2020-01-08 PROCEDURE — 3075F PR MOST RECENT SYSTOLIC BLOOD PRESS GE 130-139MM HG: ICD-10-PCS | Mod: CPTII,S$GLB,, | Performed by: FAMILY MEDICINE

## 2020-01-08 PROCEDURE — 99214 OFFICE O/P EST MOD 30 MIN: CPT | Mod: S$GLB,,, | Performed by: FAMILY MEDICINE

## 2020-01-08 PROCEDURE — 99999 PR PBB SHADOW E&M-EST. PATIENT-LVL III: ICD-10-PCS | Mod: PBBFAC,,, | Performed by: FAMILY MEDICINE

## 2020-01-08 PROCEDURE — 1101F PR PT FALLS ASSESS DOC 0-1 FALLS W/OUT INJ PAST YR: ICD-10-PCS | Mod: CPTII,S$GLB,, | Performed by: FAMILY MEDICINE

## 2020-01-08 PROCEDURE — 99499 UNLISTED E&M SERVICE: CPT | Mod: S$GLB,,, | Performed by: FAMILY MEDICINE

## 2020-01-08 PROCEDURE — 1101F PT FALLS ASSESS-DOCD LE1/YR: CPT | Mod: CPTII,S$GLB,, | Performed by: FAMILY MEDICINE

## 2020-01-08 PROCEDURE — 1159F PR MEDICATION LIST DOCUMENTED IN MEDICAL RECORD: ICD-10-PCS | Mod: S$GLB,,, | Performed by: FAMILY MEDICINE

## 2020-01-08 PROCEDURE — 3078F DIAST BP <80 MM HG: CPT | Mod: CPTII,S$GLB,, | Performed by: FAMILY MEDICINE

## 2020-01-08 PROCEDURE — 99999 PR PBB SHADOW E&M-EST. PATIENT-LVL III: CPT | Mod: PBBFAC,,, | Performed by: FAMILY MEDICINE

## 2020-01-08 PROCEDURE — 1126F AMNT PAIN NOTED NONE PRSNT: CPT | Mod: S$GLB,,, | Performed by: FAMILY MEDICINE

## 2020-01-08 PROCEDURE — 1126F PR PAIN SEVERITY QUANTIFIED, NO PAIN PRESENT: ICD-10-PCS | Mod: S$GLB,,, | Performed by: FAMILY MEDICINE

## 2020-01-08 PROCEDURE — 99499 RISK ADDL DX/OHS AUDIT: ICD-10-PCS | Mod: S$GLB,,, | Performed by: FAMILY MEDICINE

## 2020-01-08 PROCEDURE — 3075F SYST BP GE 130 - 139MM HG: CPT | Mod: CPTII,S$GLB,, | Performed by: FAMILY MEDICINE

## 2020-01-08 PROCEDURE — 1159F MED LIST DOCD IN RCRD: CPT | Mod: S$GLB,,, | Performed by: FAMILY MEDICINE

## 2020-01-08 PROCEDURE — 3078F PR MOST RECENT DIASTOLIC BLOOD PRESSURE < 80 MM HG: ICD-10-PCS | Mod: CPTII,S$GLB,, | Performed by: FAMILY MEDICINE

## 2020-01-09 ENCOUNTER — PATIENT MESSAGE (OUTPATIENT)
Dept: FAMILY MEDICINE | Facility: CLINIC | Age: 80
End: 2020-01-09

## 2020-01-13 ENCOUNTER — OFFICE VISIT (OUTPATIENT)
Dept: NEPHROLOGY | Facility: CLINIC | Age: 80
End: 2020-01-13
Payer: MEDICARE

## 2020-01-13 VITALS
WEIGHT: 226 LBS | OXYGEN SATURATION: 99 % | DIASTOLIC BLOOD PRESSURE: 56 MMHG | HEART RATE: 68 BPM | SYSTOLIC BLOOD PRESSURE: 114 MMHG | BODY MASS INDEX: 30.61 KG/M2 | HEIGHT: 72 IN

## 2020-01-13 DIAGNOSIS — E03.9 HYPOTHYROIDISM, UNSPECIFIED TYPE: ICD-10-CM

## 2020-01-13 DIAGNOSIS — N18.30 CONTROLLED TYPE 2 DIABETES MELLITUS WITH STAGE 3 CHRONIC KIDNEY DISEASE, WITHOUT LONG-TERM CURRENT USE OF INSULIN: ICD-10-CM

## 2020-01-13 DIAGNOSIS — N40.0 BENIGN PROSTATIC HYPERPLASIA WITHOUT LOWER URINARY TRACT SYMPTOMS: ICD-10-CM

## 2020-01-13 DIAGNOSIS — G47.33 OSA ON CPAP: Chronic | ICD-10-CM

## 2020-01-13 DIAGNOSIS — E11.69 COMBINED HYPERLIPIDEMIA ASSOCIATED WITH TYPE 2 DIABETES MELLITUS: ICD-10-CM

## 2020-01-13 DIAGNOSIS — E11.59 HYPERTENSION ASSOCIATED WITH DIABETES: ICD-10-CM

## 2020-01-13 DIAGNOSIS — E11.22 CKD STAGE 3 SECONDARY TO DIABETES: Primary | ICD-10-CM

## 2020-01-13 DIAGNOSIS — I15.2 HYPERTENSION ASSOCIATED WITH DIABETES: ICD-10-CM

## 2020-01-13 DIAGNOSIS — D50.9 IRON DEFICIENCY ANEMIA, UNSPECIFIED IRON DEFICIENCY ANEMIA TYPE: ICD-10-CM

## 2020-01-13 DIAGNOSIS — E11.22 CONTROLLED TYPE 2 DIABETES MELLITUS WITH STAGE 3 CHRONIC KIDNEY DISEASE, WITHOUT LONG-TERM CURRENT USE OF INSULIN: ICD-10-CM

## 2020-01-13 DIAGNOSIS — Z86.73 HISTORY OF STROKE: ICD-10-CM

## 2020-01-13 DIAGNOSIS — E78.2 COMBINED HYPERLIPIDEMIA ASSOCIATED WITH TYPE 2 DIABETES MELLITUS: ICD-10-CM

## 2020-01-13 DIAGNOSIS — N18.30 CKD (CHRONIC KIDNEY DISEASE) STAGE 3, GFR 30-59 ML/MIN: ICD-10-CM

## 2020-01-13 DIAGNOSIS — N18.30 CKD STAGE 3 SECONDARY TO DIABETES: Primary | ICD-10-CM

## 2020-01-13 DIAGNOSIS — E66.9 OBESITY (BMI 30-39.9): ICD-10-CM

## 2020-01-13 DIAGNOSIS — E55.9 VITAMIN D DEFICIENCY: ICD-10-CM

## 2020-01-13 PROCEDURE — 99499 UNLISTED E&M SERVICE: CPT | Mod: S$GLB,,, | Performed by: INTERNAL MEDICINE

## 2020-01-13 PROCEDURE — 1159F PR MEDICATION LIST DOCUMENTED IN MEDICAL RECORD: ICD-10-PCS | Mod: S$GLB,,, | Performed by: INTERNAL MEDICINE

## 2020-01-13 PROCEDURE — 1101F PT FALLS ASSESS-DOCD LE1/YR: CPT | Mod: CPTII,S$GLB,, | Performed by: INTERNAL MEDICINE

## 2020-01-13 PROCEDURE — 3078F PR MOST RECENT DIASTOLIC BLOOD PRESSURE < 80 MM HG: ICD-10-PCS | Mod: CPTII,S$GLB,, | Performed by: INTERNAL MEDICINE

## 2020-01-13 PROCEDURE — 3074F SYST BP LT 130 MM HG: CPT | Mod: CPTII,S$GLB,, | Performed by: INTERNAL MEDICINE

## 2020-01-13 PROCEDURE — 99999 PR PBB SHADOW E&M-EST. PATIENT-LVL III: ICD-10-PCS | Mod: PBBFAC,,, | Performed by: INTERNAL MEDICINE

## 2020-01-13 PROCEDURE — 1159F MED LIST DOCD IN RCRD: CPT | Mod: S$GLB,,, | Performed by: INTERNAL MEDICINE

## 2020-01-13 PROCEDURE — 99499 RISK ADDL DX/OHS AUDIT: ICD-10-PCS | Mod: S$GLB,,, | Performed by: INTERNAL MEDICINE

## 2020-01-13 PROCEDURE — 3078F DIAST BP <80 MM HG: CPT | Mod: CPTII,S$GLB,, | Performed by: INTERNAL MEDICINE

## 2020-01-13 PROCEDURE — 1101F PR PT FALLS ASSESS DOC 0-1 FALLS W/OUT INJ PAST YR: ICD-10-PCS | Mod: CPTII,S$GLB,, | Performed by: INTERNAL MEDICINE

## 2020-01-13 PROCEDURE — 99214 OFFICE O/P EST MOD 30 MIN: CPT | Mod: S$GLB,,, | Performed by: INTERNAL MEDICINE

## 2020-01-13 PROCEDURE — 99999 PR PBB SHADOW E&M-EST. PATIENT-LVL III: CPT | Mod: PBBFAC,,, | Performed by: INTERNAL MEDICINE

## 2020-01-13 PROCEDURE — 3074F PR MOST RECENT SYSTOLIC BLOOD PRESSURE < 130 MM HG: ICD-10-PCS | Mod: CPTII,S$GLB,, | Performed by: INTERNAL MEDICINE

## 2020-01-13 PROCEDURE — 99214 PR OFFICE/OUTPT VISIT, EST, LEVL IV, 30-39 MIN: ICD-10-PCS | Mod: S$GLB,,, | Performed by: INTERNAL MEDICINE

## 2020-01-13 RX ORDER — PIOGLITAZONEHYDROCHLORIDE 30 MG/1
TABLET ORAL
Qty: 90 TABLET | Refills: 2 | Status: SHIPPED | OUTPATIENT
Start: 2020-01-13 | End: 2020-09-14 | Stop reason: SDUPTHER

## 2020-01-13 NOTE — PROGRESS NOTES
Subjective:       Patient ID: Rudy Saldaña is a 79 y.o. White male who presents for new evaluation of Chronic Kidney Disease    HPI     Initial consult--7/2018: He is referred by his PCP for CKD  He has a significant history of HTN, DM and gout, last Hba1c was 6.9, highest 7.6 in 2015.   He denies foamy urine, no hematuria and no flank pain. He admits to a high sodium diet but feels he stays hydrated. No LE edema and no SOB. No NSAID use and no herbal medications. He's quite sedentary, no routine exercise. No known family history of kidney disease    Interval history: He complains of various joint pains and saw Rheumatology. He is on prednisone and notes his BS are running high. He was scripted Insulin Lantus plus ISS but he has never given himself an injection and is wary of using it. No NSAID use for his arthralgias, Tylenol use but he is even concerned with using that. No LE edema and no SOB. Nocturia os 0-2X. He denies calcium supplements, Tums, Rolaids, large amounts of calcium based foods. His wife has a BP wrist type at home that is several years old.   Sister accompanies him today. He received the flu vaccine    Interval history April 2019: he developed 'gout' in various joints which basically crippled him and now he lower body is deconditioned--he is having trouble standing without using his arms for support. He is attending a gym and using equipment to strengthen LE. Otherwise he feels OK.     Interval history Jan 2020: gout peaked and is now followed by Rheumatology. No GI. No LE edema, on daily Lasix.     Review of Systems   Constitutional: Positive for fatigue. Negative for activity change, appetite change and unexpected weight change.   HENT: Negative for facial swelling.    Respiratory: Negative for cough and shortness of breath.    Cardiovascular: Negative for chest pain and leg swelling.   Gastrointestinal: Positive for constipation (uses stool softeners).   Genitourinary: Positive for frequency.  Negative for difficulty urinating, dysuria, hematuria and urgency.   Musculoskeletal: Positive for arthralgias.   Neurological: Negative for weakness and headaches.   Hematological: Does not bruise/bleed easily.   Psychiatric/Behavioral: Negative for confusion.       Objective:      Physical Exam   Constitutional: He is oriented to person, place, and time. He appears well-developed and well-nourished. No distress.   Neck: No JVD present.   Cardiovascular: S1 normal and S2 normal. Exam reveals no friction rub.   Pulmonary/Chest: Breath sounds normal. He has no wheezes. He has no rales.   Abdominal: Soft.   Musculoskeletal: He exhibits no edema.   Neurological: He is alert and oriented to person, place, and time.   Skin: Skin is warm and dry.   Psychiatric: He has a normal mood and affect.       Assessment:       1. CKD stage 3 secondary to diabetes    2. CKD (chronic kidney disease) stage 3, GFR 30-59 ml/min    3. Benign prostatic hyperplasia without lower urinary tract symptoms    4. Hypertension associated with diabetes    5. Combined hyperlipidemia associated with type 2 diabetes mellitus    6. History of stroke    7. Iron deficiency anemia, unspecified iron deficiency anemia type    8. Type 2 diabetes mellitus, uncontrolled, with renal complications    9. Vitamin D deficiency    10. Hypothyroidism, unspecified type    11. Obesity (BMI 30-39.9)    12. DEVIKA on CPAP        Plan:              CKD appearing to be at Stage 3 with multiple risk factors. He displays typical progression over the past few years. Continue RAAS blockade for renal preservation    HTN--controlled. Discussed ace an lung cancer association. He is comfortable maintaining benazepril    DM--controlled    Hypercalcemia/Mineral and Bone Disease--hypercalcemia resolved with decreasing D2 to q week from twice a week.        Patient was advised to avoid NSAIDs, continue a low sodium diet, and ensure hydration      RTC 6 mo

## 2020-01-16 ENCOUNTER — PATIENT MESSAGE (OUTPATIENT)
Dept: FAMILY MEDICINE | Facility: CLINIC | Age: 80
End: 2020-01-16

## 2020-01-16 DIAGNOSIS — E03.9 HYPOTHYROIDISM, UNSPECIFIED TYPE: ICD-10-CM

## 2020-01-16 RX ORDER — LEVOTHYROXINE SODIUM 50 UG/1
TABLET ORAL
Qty: 30 TABLET | Refills: 0 | Status: SHIPPED | OUTPATIENT
Start: 2020-01-16 | End: 2020-01-23 | Stop reason: SDUPTHER

## 2020-01-18 ENCOUNTER — PATIENT MESSAGE (OUTPATIENT)
Dept: FAMILY MEDICINE | Facility: CLINIC | Age: 80
End: 2020-01-18

## 2020-01-18 NOTE — PROGRESS NOTES
Subjective:      Patient ID: Rudy Saldaña is a 79 y.o. male.    Chief Complaint:   diabetes     The patient presents today for me to show him how to use an injection needle.  The patient has given himself a shot already this and did not feel that I needed to show him this but wanted to discuss his sugars.  Lab Results   Component Value Date    HGBA1C 6.1 (H) 10/03/2019     Of concern is that his sugars have been high recently because he has been placed on steroids and they have been running fairly high.      Units 1/18/2020 1/17/2020 1/17/2020 1/17/2020 1/17/2020 1/16/2020 1/16/2020   Time -  8:41 AM 11:24 PM  5:43 PM 12:01 PM  9:17 AM  6:00 PM 12:00 PM   Glucose mg/dL 114 297 419 326 [phone]        The log above shows what he has been seeing.  I felt it was best if we would place him on some insulin.  Past Medical History:  Past Medical History:   Diagnosis Date    Anemia     BPH (benign prostatic hypertrophy)     Carotid bruit     CKD (chronic kidney disease) stage 2/3     Dr. Nails    CVA (cerebral vascular accident)     Diabetes mellitus, type 2     Hyperlipidemia     Hypertension     Hypothyroidism 2/29/2016    Type 2 diabetes mellitus, uncontrolled, with renal complications     The patient presents with diabetes.  The patient denies polyuria, polydipsia, polyphagia, hypoglycemia and paresthesias.  The patient's glucose control has been good.  Home glucose averages are routinely checked.  The patient is without retinopathy currently.  The patient has no history of neuropathy.  The patient currently complains of no podiatric problems.  The patient has excellent compliance.    Type II or unspecified type diabetes mellitus without mention of complication, not stated as uncontrolled     Vitamin D deficiency 2/29/2016     Past Surgical History:   Procedure Laterality Date    COLONOSCOPY  2008    COLONOSCOPY  5/31/2005    repeat in 2015    PILONIDAL CYST DRAINAGE      TRANSURETHRAL RESECTION  OF PROSTATE       Review of patient's allergies indicates:   Allergen Reactions    No known drug allergies      Current Outpatient Medications on File Prior to Visit   Medication Sig Dispense Refill    acarbose (PRECOSE) 100 MG Tab TAKE 1 TABLET(100 MG) BY MOUTH THREE TIMES DAILY WITH MEALS 270 tablet 0    allopurinol (ZYLOPRIM) 300 MG tablet Take 1 tablet (300 mg total) by mouth once daily. 90 tablet 1    amLODIPine (NORVASC) 10 MG tablet Take 1 tablet (10 mg total) by mouth once daily. 90 tablet 3    aspirin (ECOTRIN) 81 MG EC tablet Take 81 mg by mouth once daily.      atorvastatin (LIPITOR) 20 MG tablet TAKE 1 TABLET BY MOUTH DAILY 90 tablet 11    benazepril (LOTENSIN) 40 MG tablet Take 1 tablet (40 mg total) by mouth once daily. 90 tablet 3    ergocalciferol (ERGOCALCIFEROL) 50,000 unit Cap TAKE 1 CAPSULE BY MOUTH 2 TIMES A WEEK 26 capsule 3    ferrous sulfate (FEOSOL) 325 mg (65 mg iron) Tab tablet TAKE 1 TABLET BY MOUTH THREE TIMES DAILY 270 tablet 0    furosemide (LASIX) 20 MG tablet Take 1 tablet (20 mg total) by mouth once daily. 90 tablet 3    galantamine (RAZADYNE) 8 MG Tab TAKE 1 TABLET(8 MG) BY MOUTH TWICE DAILY WITH MEALS 180 tablet 0    glipiZIDE (GLUCOTROL) 10 MG tablet TAKE 1 TABLET(10 MG) BY MOUTH TWICE DAILY BEFORE MEALS 180 tablet 0    hydrALAZINE (APRESOLINE) 25 MG tablet Take 1 tablet (25 mg total) by mouth every 8 (eight) hours. 270 tablet 3    insulin (LANTUS SOLOSTAR U-100 INSULIN) glargine 100 units/mL (3mL) SubQ pen Inject 10 Units into the skin once daily. 15 mL 0    JANUVIA 100 mg Tab TAKE 1 TABLET(100 MG) BY MOUTH EVERY DAY 90 tablet 0    Lactobacillus rhamnosus GG (CULTURELLE) 10 billion cell capsule Take 1 capsule by mouth once daily.      lancets (TRUEPLUS LANCETS) 33 gauge Misc 1 lancet by Misc.(Non-Drug; Combo Route) route once daily. 100 each 5    MITIGARE 0.6 mg Cap Take 1 capsule (0.6 mg total) by mouth Daily. 90 capsule 3    niacin (NIASPAN) 1000 MG CR  "tablet TAKE 1 TABLET BY MOUTH EVERY EVENING 90 tablet 0    pen needle, diabetic (PEN NEEDLE) 31 gauge x 1/4" Ndle 1 Units by Misc.(Non-Drug; Combo Route) route once daily. 100 each 3    potassium chloride (KLOR-CON) 10 MEQ TbSR TAKE 1 TABLET(10 MEQ) BY MOUTH EVERY DAY 90 tablet 0    blood sugar diagnostic Strp 1 each by Misc.(Non-Drug; Combo Route) route 3 (three) times daily as needed. One touch ultra 2 test strips 100 each 5    ONETOUCH VERIO IQ METER kit Use as instructed 1 each 0    predniSONE (DELTASONE) 10 MG tablet Take 4 tablets (40 mg total) by mouth once daily for 4 days, THEN 3 tablets (30 mg total) once daily for 4 days, THEN 2 tablets (20 mg total) once daily for 4 days, THEN 1 tablet (10 mg total) once daily for 4 days, THEN 0.5 tablets (5 mg total) once daily for 4 days, THEN 0.5 tablets (5 mg total) every other day for 4 days. 43 tablet 0     No current facility-administered medications on file prior to visit.      Social History     Socioeconomic History    Marital status:      Spouse name: Not on file    Number of children: Not on file    Years of education: Not on file    Highest education level: Not on file   Occupational History    Not on file   Social Needs    Financial resource strain: Not on file    Food insecurity:     Worry: Not on file     Inability: Not on file    Transportation needs:     Medical: Not on file     Non-medical: Not on file   Tobacco Use    Smoking status: Never Smoker    Smokeless tobacco: Never Used   Substance and Sexual Activity    Alcohol use: No     Alcohol/week: 0.0 standard drinks    Drug use: No    Sexual activity: Not on file   Lifestyle    Physical activity:     Days per week: Not on file     Minutes per session: Not on file    Stress: Not on file   Relationships    Social connections:     Talks on phone: Not on file     Gets together: Not on file     Attends Voodoo service: Not on file     Active member of club or organization: Not " on file     Attends meetings of clubs or organizations: Not on file     Relationship status: Not on file   Other Topics Concern    Not on file   Social History Narrative    Not on file     Family History   Problem Relation Age of Onset    Hypertension Mother     Hypertension Father     Heart attack Maternal Uncle     Brain cancer Sister     Diabetes Neg Hx     Stroke Neg Hx        Review of Systems   Constitutional: Positive for fatigue. Negative for fever.   Respiratory: Negative for cough and shortness of breath.    Genitourinary: Negative for dysuria and hematuria.       Objective:     /66   Pulse 72   Temp 98 °F (36.7 °C) (Oral)   Ht 6' (1.829 m)   Wt 101.2 kg (223 lb)   BMI 30.24 kg/m²     Physical Exam   Constitutional: He appears well-developed and well-nourished.   Cardiovascular: Normal rate, regular rhythm and normal heart sounds. Exam reveals no gallop and no friction rub.   No murmur heard.  Pulmonary/Chest: Effort normal and breath sounds normal. No respiratory distress. He has no wheezes. He has no rales.   Musculoskeletal: He exhibits no edema.     Assessment:     1. Type 2 diabetes mellitus, uncontrolled, with renal complications    2. 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 CKD stage 3 secondary to diabetes    4. 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      I am having Rudy Saldaña maintain his OneTouch Verio IQ Meter, aspirin, blood sugar diagnostic, lancets, ergocalciferol, glipiZIDE, Januvia, atorvastatin, galantamine, ferrous sulfate, acarbose, Mitigare, amLODIPine, furosemide, hydrALAZINE, benazepril, niacin, potassium chloride, Lactobacillus rhamnosus GG,  predniSONE, allopurinol, insulin, and (pen needle, diabetic).    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  Orders Placed This Encounter   Procedures    MyChart Patient Entered Blood Pressure     Please track your blood pressure twice a day so that I can review the daily control that you have.  This will be uploaded to your EPIC chart at our office so that we can care for you better.  Dr. Mohinder Morfin     Order Specific Question:   After how many days would you like to receive a notification of this patient's flowsheet entries?     Answer:   30     Order Specific Question:   Send a notification if this patient's systolic value is above:     Answer:   160     Order Specific Question:   Send a notification if this patient's systolic value is below:     Answer:   100     Order Specific Question:   Send a notification if this patient's diastolic value is above:     Answer:   100     Order Specific Question:   Send a notification if this patient's diastolic value is below:     Answer:   50    MyChart Patient Entered Pulse     Order Specific Question:   After how many days would you like to receive a notification of this patient's flowsheet entries?     Answer:   30

## 2020-01-20 ENCOUNTER — LAB VISIT (OUTPATIENT)
Dept: LAB | Facility: HOSPITAL | Age: 80
End: 2020-01-20
Attending: FAMILY MEDICINE
Payer: MEDICARE

## 2020-01-20 DIAGNOSIS — E03.9 HYPOTHYROIDISM, UNSPECIFIED TYPE: ICD-10-CM

## 2020-01-20 PROCEDURE — 84443 ASSAY THYROID STIM HORMONE: CPT

## 2020-01-20 PROCEDURE — 36415 COLL VENOUS BLD VENIPUNCTURE: CPT | Mod: PO

## 2020-01-21 LAB — TSH SERPL DL<=0.005 MIU/L-ACNC: 2.41 UIU/ML (ref 0.4–4)

## 2020-01-23 ENCOUNTER — PATIENT MESSAGE (OUTPATIENT)
Dept: RHEUMATOLOGY | Facility: CLINIC | Age: 80
End: 2020-01-23

## 2020-01-23 DIAGNOSIS — E03.9 HYPOTHYROIDISM, UNSPECIFIED TYPE: ICD-10-CM

## 2020-01-23 RX ORDER — LEVOTHYROXINE SODIUM 50 UG/1
TABLET ORAL
Qty: 30 TABLET | Refills: 11 | Status: SHIPPED | OUTPATIENT
Start: 2020-01-23 | End: 2021-04-30 | Stop reason: SDUPTHER

## 2020-01-27 ENCOUNTER — PATIENT MESSAGE (OUTPATIENT)
Dept: FAMILY MEDICINE | Facility: CLINIC | Age: 80
End: 2020-01-27

## 2020-01-27 RX ORDER — DONEPEZIL HYDROCHLORIDE 5 MG/1
TABLET, FILM COATED ORAL
Qty: 60 TABLET | Refills: 11 | Status: SHIPPED | OUTPATIENT
Start: 2020-01-27 | End: 2021-04-30

## 2020-01-27 NOTE — TELEPHONE ENCOUNTER
The patient is galantamine is too expensive and he is requesting a change to Aricept.    Stop the galantamine.    I have signed for the following orders AND/OR meds.  Please call the patient and ask the patient to schedule the testing AND/OR inform about any medications that were sent.      No orders of the defined types were placed in this encounter.      Medications Ordered This Encounter   Medications    donepezil (ARICEPT) 5 MG tablet     Sig: Take 1 tablet daily for 1 month and then take 2 tablets every morning     Dispense:  60 tablet     Refill:  11

## 2020-02-03 DIAGNOSIS — N18.30 CONTROLLED TYPE 2 DIABETES MELLITUS WITH STAGE 3 CHRONIC KIDNEY DISEASE, WITHOUT LONG-TERM CURRENT USE OF INSULIN: ICD-10-CM

## 2020-02-03 DIAGNOSIS — E11.22 CONTROLLED TYPE 2 DIABETES MELLITUS WITH STAGE 3 CHRONIC KIDNEY DISEASE, WITHOUT LONG-TERM CURRENT USE OF INSULIN: ICD-10-CM

## 2020-02-03 NOTE — TELEPHONE ENCOUNTER
----- Message from Belem Paul sent at 2/3/2020 12:45 PM CST -----  Contact: Joy  Type:  RX Refill Request    Who Called: Dayna  Refill or New Rx: refill   RX Name and Strength: Januvia 100 mg tab  How is the patient currently taking it? (ex. 1XDay): n/a  Is this a 30 day or 90 day RX: 30  Preferred Pharmacy with phone number: [facility]  Ph: [phone]  Local or Mail Order: Local   Ordering Provider: Dr. Morfin  Would the patient rather a call back or a response via [facility]? Call back   Best Call Back Number: Please call her at [phone]  Additional Information: n/a    Type:  RX Refill Request    Who Called: Dayna  Refill or New Rx: Refill   RX Name and Strength: Ferrous Sulfate  How is the patient currently taking it? (ex. 1XDay): n/a  Is this a 30 day or 90 day RX: 30  Preferred Pharmacy with phone number: [facility]   Ph: [phone]  Local or Mail Order: Local   Ordering Provider: Dr. Morfin   Would the patient rather a call back or a response via [facility]? Call back   Best Call Back Number: Please call her at [phone]  Additional Information: n/a

## 2020-02-08 DIAGNOSIS — D50.8 IRON DEFICIENCY ANEMIA SECONDARY TO INADEQUATE DIETARY IRON INTAKE: ICD-10-CM

## 2020-02-09 DIAGNOSIS — E11.22 CONTROLLED TYPE 2 DIABETES MELLITUS WITH STAGE 3 CHRONIC KIDNEY DISEASE, WITHOUT LONG-TERM CURRENT USE OF INSULIN: ICD-10-CM

## 2020-02-09 DIAGNOSIS — N18.30 CONTROLLED TYPE 2 DIABETES MELLITUS WITH STAGE 3 CHRONIC KIDNEY DISEASE, WITHOUT LONG-TERM CURRENT USE OF INSULIN: ICD-10-CM

## 2020-02-10 RX ORDER — ACARBOSE 100 MG/1
TABLET ORAL
Qty: 270 TABLET | Refills: 0 | Status: SHIPPED | OUTPATIENT
Start: 2020-02-10 | End: 2020-03-16

## 2020-02-10 RX ORDER — FERROUS SULFATE 325(65) MG
TABLET ORAL
Qty: 270 TABLET | Refills: 0 | Status: SHIPPED | OUTPATIENT
Start: 2020-02-10 | End: 2020-03-16

## 2020-03-06 ENCOUNTER — PATIENT MESSAGE (OUTPATIENT)
Dept: RHEUMATOLOGY | Facility: CLINIC | Age: 80
End: 2020-03-06

## 2020-03-06 RX ORDER — COLCHICINE 0.6 MG/1
0.6 TABLET ORAL DAILY
Qty: 30 TABLET | Refills: 11 | Status: SHIPPED | OUTPATIENT
Start: 2020-03-06 | End: 2021-02-28

## 2020-03-11 DIAGNOSIS — E11.69 COMBINED HYPERLIPIDEMIA ASSOCIATED WITH TYPE 2 DIABETES MELLITUS: ICD-10-CM

## 2020-03-11 DIAGNOSIS — E78.2 COMBINED HYPERLIPIDEMIA ASSOCIATED WITH TYPE 2 DIABETES MELLITUS: ICD-10-CM

## 2020-03-11 RX ORDER — NIACIN 1000 MG/1
TABLET, EXTENDED RELEASE ORAL
Qty: 90 TABLET | Refills: 0 | Status: SHIPPED | OUTPATIENT
Start: 2020-03-11 | End: 2020-06-10

## 2020-03-13 ENCOUNTER — PATIENT MESSAGE (OUTPATIENT)
Dept: FAMILY MEDICINE | Facility: CLINIC | Age: 80
End: 2020-03-13

## 2020-03-16 DIAGNOSIS — N18.30 CONTROLLED TYPE 2 DIABETES MELLITUS WITH STAGE 3 CHRONIC KIDNEY DISEASE, WITHOUT LONG-TERM CURRENT USE OF INSULIN: ICD-10-CM

## 2020-03-16 DIAGNOSIS — E11.22 CONTROLLED TYPE 2 DIABETES MELLITUS WITH STAGE 3 CHRONIC KIDNEY DISEASE, WITHOUT LONG-TERM CURRENT USE OF INSULIN: ICD-10-CM

## 2020-03-16 DIAGNOSIS — D50.8 IRON DEFICIENCY ANEMIA SECONDARY TO INADEQUATE DIETARY IRON INTAKE: ICD-10-CM

## 2020-03-16 DIAGNOSIS — E55.9 VITAMIN D DEFICIENCY: ICD-10-CM

## 2020-03-16 RX ORDER — GLIPIZIDE 10 MG/1
TABLET ORAL
Qty: 180 TABLET | Refills: 0 | Status: SHIPPED | OUTPATIENT
Start: 2020-03-16 | End: 2020-06-10

## 2020-03-16 RX ORDER — FERROUS SULFATE 325(65) MG
TABLET ORAL
Qty: 270 TABLET | Refills: 0 | Status: SHIPPED | OUTPATIENT
Start: 2020-03-16 | End: 2020-08-04 | Stop reason: SDUPTHER

## 2020-03-16 RX ORDER — ERGOCALCIFEROL 1.25 MG/1
CAPSULE ORAL
Qty: 24 CAPSULE | Refills: 3 | Status: SHIPPED | OUTPATIENT
Start: 2020-03-16 | End: 2020-06-10 | Stop reason: SDUPTHER

## 2020-03-16 RX ORDER — ACARBOSE 100 MG/1
TABLET ORAL
Qty: 270 TABLET | Refills: 0 | Status: SHIPPED | OUTPATIENT
Start: 2020-03-16 | End: 2020-06-10

## 2020-03-25 ENCOUNTER — PATIENT MESSAGE (OUTPATIENT)
Dept: FAMILY MEDICINE | Facility: CLINIC | Age: 80
End: 2020-03-25

## 2020-03-25 RX ORDER — POTASSIUM CHLORIDE 750 MG/1
TABLET, EXTENDED RELEASE ORAL
Qty: 90 TABLET | Refills: 3 | Status: SHIPPED | OUTPATIENT
Start: 2020-03-25 | End: 2020-12-01 | Stop reason: SDUPTHER

## 2020-03-25 NOTE — TELEPHONE ENCOUNTER
BMP  Lab Results   Component Value Date     12/11/2019    K 4.3 12/11/2019     12/11/2019    CO2 28 12/11/2019    BUN 20 12/11/2019    CREATININE 1.3 12/11/2019    CALCIUM 9.8 12/11/2019    ANIONGAP 9 12/11/2019    ESTGFRAFRICA 59.2 (A) 12/11/2019    EGFRNONAA 51.2 (A) 12/11/2019

## 2020-05-08 ENCOUNTER — TELEPHONE (OUTPATIENT)
Dept: RHEUMATOLOGY | Facility: CLINIC | Age: 80
End: 2020-05-08

## 2020-05-08 DIAGNOSIS — M11.9 CRYSTAL ARTHROPATHY: Primary | ICD-10-CM

## 2020-05-08 NOTE — TELEPHONE ENCOUNTER
Patient states he is having pain in his left foot for the last 2-3 days.  He rates his pain on a scale of 4 with throbbing, swelling, warm to touch.  He states he had previously taken steroid but at the moment he is not on steroid.  But he thanks his gout is flared.  Please advise

## 2020-05-08 NOTE — TELEPHONE ENCOUNTER
----- Message from Gali Laguna sent at 5/8/2020  3:34 PM CDT -----  Pt is requesting a call back to speak with Nurse about his health      Please call and advise          Thank you

## 2020-05-09 RX ORDER — METHYLPREDNISOLONE 4 MG/1
TABLET ORAL
Qty: 1 PACKAGE | Refills: 0 | Status: SHIPPED | OUTPATIENT
Start: 2020-05-09 | End: 2020-05-09

## 2020-05-09 RX ORDER — METHYLPREDNISOLONE 4 MG/1
TABLET ORAL
Qty: 1 PACKAGE | Refills: 0 | Status: SHIPPED | OUTPATIENT
Start: 2020-05-09 | End: 2020-05-23 | Stop reason: SDUPTHER

## 2020-05-11 NOTE — TELEPHONE ENCOUNTER
Advised pt of Dr. VELASQUEZ's advice below, stated he started the medrol dose pack on 5-9-20, feeling much better, Verbalized understanding.

## 2020-05-18 ENCOUNTER — PATIENT MESSAGE (OUTPATIENT)
Dept: FAMILY MEDICINE | Facility: CLINIC | Age: 80
End: 2020-05-18

## 2020-05-19 RX ORDER — HYDRALAZINE HYDROCHLORIDE 50 MG/1
50 TABLET, FILM COATED ORAL EVERY 12 HOURS
Qty: 60 TABLET | Refills: 11 | Status: SHIPPED | OUTPATIENT
Start: 2020-05-19 | End: 2020-07-20

## 2020-05-19 NOTE — TELEPHONE ENCOUNTER
I changed his hydralazine to 50 mg po bid and based on this, I need for him to check his bp daily and enter in the [facility] anjum and then schedule with me for a VV in 2 weeks w me.

## 2020-05-21 ENCOUNTER — PATIENT MESSAGE (OUTPATIENT)
Dept: RHEUMATOLOGY | Facility: CLINIC | Age: 80
End: 2020-05-21

## 2020-05-22 ENCOUNTER — PATIENT MESSAGE (OUTPATIENT)
Dept: RHEUMATOLOGY | Facility: CLINIC | Age: 80
End: 2020-05-22

## 2020-05-23 DIAGNOSIS — M11.9 CRYSTAL ARTHROPATHY: ICD-10-CM

## 2020-05-23 RX ORDER — METHYLPREDNISOLONE 4 MG/1
TABLET ORAL
Qty: 1 PACKAGE | Refills: 0 | Status: SHIPPED | OUTPATIENT
Start: 2020-05-23 | End: 2020-06-10

## 2020-05-26 ENCOUNTER — OFFICE VISIT (OUTPATIENT)
Dept: RHEUMATOLOGY | Facility: CLINIC | Age: 80
End: 2020-05-26
Payer: MEDICARE

## 2020-05-26 ENCOUNTER — PATIENT MESSAGE (OUTPATIENT)
Dept: RHEUMATOLOGY | Facility: CLINIC | Age: 80
End: 2020-05-26

## 2020-05-26 VITALS
DIASTOLIC BLOOD PRESSURE: 66 MMHG | HEIGHT: 72 IN | SYSTOLIC BLOOD PRESSURE: 134 MMHG | WEIGHT: 216.25 LBS | HEART RATE: 63 BPM | BODY MASS INDEX: 29.29 KG/M2

## 2020-05-26 DIAGNOSIS — M25.562 CHRONIC PAIN OF LEFT KNEE: ICD-10-CM

## 2020-05-26 DIAGNOSIS — M1A.09X1 IDIOPATHIC CHRONIC GOUT OF MULTIPLE SITES WITH TOPHUS: ICD-10-CM

## 2020-05-26 DIAGNOSIS — G89.29 CHRONIC PAIN OF LEFT KNEE: ICD-10-CM

## 2020-05-26 DIAGNOSIS — M17.12 PRIMARY OSTEOARTHRITIS OF LEFT KNEE: Primary | ICD-10-CM

## 2020-05-26 DIAGNOSIS — M11.9 CRYSTAL ARTHROPATHY: ICD-10-CM

## 2020-05-26 PROCEDURE — 99999 PR PBB SHADOW E&M-EST. PATIENT-LVL III: ICD-10-PCS | Mod: PBBFAC,,, | Performed by: INTERNAL MEDICINE

## 2020-05-26 PROCEDURE — 1125F PR PAIN SEVERITY QUANTIFIED, PAIN PRESENT: ICD-10-PCS | Mod: S$GLB,,, | Performed by: INTERNAL MEDICINE

## 2020-05-26 PROCEDURE — 3075F PR MOST RECENT SYSTOLIC BLOOD PRESS GE 130-139MM HG: ICD-10-PCS | Mod: CPTII,S$GLB,, | Performed by: INTERNAL MEDICINE

## 2020-05-26 PROCEDURE — 99214 PR OFFICE/OUTPT VISIT, EST, LEVL IV, 30-39 MIN: ICD-10-PCS | Mod: 25,S$GLB,, | Performed by: INTERNAL MEDICINE

## 2020-05-26 PROCEDURE — 1159F PR MEDICATION LIST DOCUMENTED IN MEDICAL RECORD: ICD-10-PCS | Mod: S$GLB,,, | Performed by: INTERNAL MEDICINE

## 2020-05-26 PROCEDURE — 3078F DIAST BP <80 MM HG: CPT | Mod: CPTII,S$GLB,, | Performed by: INTERNAL MEDICINE

## 2020-05-26 PROCEDURE — 1125F AMNT PAIN NOTED PAIN PRSNT: CPT | Mod: S$GLB,,, | Performed by: INTERNAL MEDICINE

## 2020-05-26 PROCEDURE — 1101F PT FALLS ASSESS-DOCD LE1/YR: CPT | Mod: CPTII,S$GLB,, | Performed by: INTERNAL MEDICINE

## 2020-05-26 PROCEDURE — 99999 PR PBB SHADOW E&M-EST. PATIENT-LVL III: CPT | Mod: PBBFAC,,, | Performed by: INTERNAL MEDICINE

## 2020-05-26 PROCEDURE — 20610 LARGE JOINT ASPIRATION/INJECTION: L KNEE: ICD-10-PCS | Mod: LT,S$GLB,, | Performed by: INTERNAL MEDICINE

## 2020-05-26 PROCEDURE — 3075F SYST BP GE 130 - 139MM HG: CPT | Mod: CPTII,S$GLB,, | Performed by: INTERNAL MEDICINE

## 2020-05-26 PROCEDURE — 20610 DRAIN/INJ JOINT/BURSA W/O US: CPT | Mod: LT,S$GLB,, | Performed by: INTERNAL MEDICINE

## 2020-05-26 PROCEDURE — 1159F MED LIST DOCD IN RCRD: CPT | Mod: S$GLB,,, | Performed by: INTERNAL MEDICINE

## 2020-05-26 PROCEDURE — 1101F PR PT FALLS ASSESS DOC 0-1 FALLS W/OUT INJ PAST YR: ICD-10-PCS | Mod: CPTII,S$GLB,, | Performed by: INTERNAL MEDICINE

## 2020-05-26 PROCEDURE — 99214 OFFICE O/P EST MOD 30 MIN: CPT | Mod: 25,S$GLB,, | Performed by: INTERNAL MEDICINE

## 2020-05-26 PROCEDURE — 3078F PR MOST RECENT DIASTOLIC BLOOD PRESSURE < 80 MM HG: ICD-10-PCS | Mod: CPTII,S$GLB,, | Performed by: INTERNAL MEDICINE

## 2020-05-26 RX ORDER — ALLOPURINOL 300 MG/1
300 TABLET ORAL DAILY
COMMUNITY
End: 2020-06-16

## 2020-05-26 RX ADMIN — TRIAMCINOLONE ACETONIDE 40 MG: 40 INJECTION, SUSPENSION INTRA-ARTICULAR; INTRAMUSCULAR at 12:05

## 2020-05-26 NOTE — TELEPHONE ENCOUNTER
Spoke with pt and scheduled same day visit with Dr. VELASQUEZ for 5.26.20 at 12.30 pm. Pt verbalized understanding

## 2020-05-27 ENCOUNTER — PATIENT OUTREACH (OUTPATIENT)
Dept: ADMINISTRATIVE | Facility: HOSPITAL | Age: 80
End: 2020-05-27

## 2020-05-27 NOTE — PROGRESS NOTES
Health Maintenance Updated.   Immunizations: Abstracted.   Care Everywhere: Abstracted: No HM results found.     Health Maintenance Due   Topic    Hemoglobin A1c        HA1c: Scheduled

## 2020-06-01 PROBLEM — M25.562 CHRONIC PAIN OF LEFT KNEE: Status: ACTIVE | Noted: 2020-06-01

## 2020-06-01 PROBLEM — M11.9 CRYSTAL ARTHROPATHY: Status: ACTIVE | Noted: 2020-06-01

## 2020-06-01 PROBLEM — G89.29 CHRONIC PAIN OF LEFT KNEE: Status: ACTIVE | Noted: 2020-06-01

## 2020-06-01 RX ORDER — TRIAMCINOLONE ACETONIDE 40 MG/ML
40 INJECTION, SUSPENSION INTRA-ARTICULAR; INTRAMUSCULAR
Status: DISCONTINUED | OUTPATIENT
Start: 2020-05-26 | End: 2020-06-01 | Stop reason: HOSPADM

## 2020-06-01 NOTE — PROGRESS NOTES
RHEUMATOLOGY CLINIC FOLLOW UP VISIT  Chief complaints:-  To follow up for knee pain.     HPI:-  Rudy Rolle a 79 y.o. pleasant male comes in for a follow up visit. Worsening left knee pain - chronic, worsening , associated with swelling and stiffness.  Pain was in the and 2 DN does not relieved with resting.    Review of Systems   Constitutional: Negative for chills and fever.   HENT: Negative for congestion and sore throat.    Eyes: Negative for blurred vision and redness.   Respiratory: Negative for cough and shortness of breath.    Cardiovascular: Negative for chest pain and leg swelling.   Gastrointestinal: Negative for abdominal pain.   Genitourinary: Negative for dysuria.   Musculoskeletal: Positive for back pain, joint pain, myalgias and neck pain. Negative for falls.   Skin: Negative for rash.   Neurological: Negative for headaches.   Endo/Heme/Allergies: Does not bruise/bleed easily.   Psychiatric/Behavioral: Negative for memory loss. The patient does not have insomnia.        Past Medical History:   Diagnosis Date    Anemia     BPH (benign prostatic hypertrophy)     Carotid bruit     CKD (chronic kidney disease) stage 2/3     Dr. Nails    CVA (cerebral vascular accident)     Diabetes mellitus, type 2     Hyperlipidemia     Hypertension     Hypothyroidism 2/29/2016    Type 2 diabetes mellitus, uncontrolled, with renal complications     The patient presents with diabetes.  The patient denies polyuria, polydipsia, polyphagia, hypoglycemia and paresthesias.  The patient's glucose control has been good.  Home glucose averages are routinely checked.  The patient is without retinopathy currently.  The patient has no history of neuropathy.  The patient currently complains of no podiatric problems.  The patient has excellent compliance.    Type II or unspecified type diabetes mellitus without mention of complication, not stated as uncontrolled      Vitamin D deficiency 2/29/2016       Past Surgical History:   Procedure Laterality Date    COLONOSCOPY  2008    COLONOSCOPY  5/31/2005    repeat in 2015    PILONIDAL CYST DRAINAGE      TRANSURETHRAL RESECTION OF PROSTATE          Social History     Tobacco Use    Smoking status: Never Smoker    Smokeless tobacco: Never Used   Substance Use Topics    Alcohol use: No     Alcohol/week: 0.0 standard drinks    Drug use: No       Family History   Problem Relation Age of Onset    Hypertension Mother     Hypertension Father     Heart attack Maternal Uncle     Brain cancer Sister     Diabetes Neg Hx     Stroke Neg Hx        Review of patient's allergies indicates:   Allergen Reactions    No known drug allergies        Vitals:    05/26/20 1233   BP: 134/66   Pulse: 63   Weight: 98.1 kg (216 lb 4.3 oz)   Height: 6' (1.829 m)   PainSc:   3       Physical Exam   Constitutional: He is oriented to person, place, and time and well-developed, well-nourished, and in no distress. No distress.   HENT:   Head: Normocephalic.   Mouth/Throat: Oropharynx is clear and moist.   Eyes: Pupils are equal, round, and reactive to light. Conjunctivae are normal.   Neck: Normal range of motion. Neck supple.   Cardiovascular: Normal rate and intact distal pulses.   Pulmonary/Chest: Effort normal. No respiratory distress.   Abdominal: Soft. There is no tenderness.   Musculoskeletal:   No synovitis in small joints of hands or feet.  Good range of motion in large joints.  Left knee tender but no effusion.   Neurological: He is alert and oriented to person, place, and time. Gait normal.   Skin: Skin is warm. No rash noted. No erythema.   Psychiatric: Mood and affect normal.       Medication List with Changes/Refills   Current Medications    ACARBOSE (PRECOSE) 100 MG TAB    TAKE 1 TABLET(100 MG) BY MOUTH THREE TIMES DAILY WITH MEALS    ALLOPURINOL (ZYLOPRIM) 300 MG TABLET    Take 300 mg by mouth once daily.    AMLODIPINE (NORVASC) 10 MG  "TABLET    Take 1 tablet (10 mg total) by mouth once daily.    ASPIRIN (ECOTRIN) 81 MG EC TABLET    Take 81 mg by mouth once daily.    ATORVASTATIN (LIPITOR) 20 MG TABLET    TAKE 1 TABLET BY MOUTH DAILY    BENAZEPRIL (LOTENSIN) 40 MG TABLET    Take 1 tablet (40 mg total) by mouth once daily.    BLOOD SUGAR DIAGNOSTIC STRP    1 each by Misc.(Non-Drug; Combo Route) route 3 (three) times daily as needed. One touch ultra 2 test strips    COLCHICINE (COLCRYS) 0.6 MG TABLET    Take 1 tablet (0.6 mg total) by mouth once daily.    DONEPEZIL (ARICEPT) 5 MG TABLET    Take 1 tablet daily for 1 month and then take 2 tablets every morning    ERGOCALCIFEROL (ERGOCALCIFEROL) 50,000 UNIT CAP    TAKE 1 CAPSULE BY MOUTH 2 TIMES A WEEK    FERROUS SULFATE (FEOSOL) 325 MG (65 MG IRON) TAB TABLET    TAKE 1 TABLET BY MOUTH THREE TIMES DAILY    FUROSEMIDE (LASIX) 20 MG TABLET    Take 1 tablet (20 mg total) by mouth once daily.    GLIPIZIDE (GLUCOTROL) 10 MG TABLET    TAKE 1 TABLET(10 MG) BY MOUTH TWICE DAILY BEFORE MEALS    HYDRALAZINE (APRESOLINE) 50 MG TABLET    Take 1 tablet (50 mg total) by mouth every 12 (twelve) hours.    INSULIN (LANTUS SOLOSTAR U-100 INSULIN) GLARGINE 100 UNITS/ML (3ML) SUBQ PEN    Inject 10 Units into the skin once daily.    LACTOBACILLUS RHAMNOSUS GG (CULTURELLE) 10 BILLION CELL CAPSULE    Take 1 capsule by mouth once daily.    LANCETS (TRUEPLUS LANCETS) 33 GAUGE MISC    1 lancet by Misc.(Non-Drug; Combo Route) route once daily.    LEVOTHYROXINE (SYNTHROID) 50 MCG TABLET    TAKE 1 TABLET(50 MCG) BY MOUTH EVERY DAY    METHYLPREDNISOLONE (MEDROL DOSEPACK) 4 MG TABLET    use as directed    NIACIN (NIASPAN) 1000 MG CR TABLET    TAKE 1 TABLET BY MOUTH EVERY EVENING    ONETOUCH VERIO IQ METER KIT    Use as instructed    PEN NEEDLE, DIABETIC (PEN NEEDLE) 31 GAUGE X 1/4" NDLE    1 Units by Misc.(Non-Drug; Combo Route) route once daily.    PIOGLITAZONE (ACTOS) 30 MG TABLET    TAKE 1 TABLET(30 MG) BY MOUTH EVERY DAY    " POTASSIUM CHLORIDE (KLOR-CON) 10 MEQ TBSR    TAKE 1 TABLET(10 MEQ) BY MOUTH EVERY DAY    SITAGLIPTIN (JANUVIA) 100 MG TAB    TAKE 1 TABLET(100 MG) BY MOUTH EVERY DAY       Assessment/Plans:-  1. Primary osteoarthritis of left knee    2. Chronic pain of left knee    3. Crystal arthropathy    4. Idiopathic chronic gout of multiple sites with tophus      Problem List Items Addressed This Visit        Orthopedic    Idiopathic chronic gout of multiple sites with tophus    Primary osteoarthritis of left knee - Primary    Current Assessment & Plan     Inject Synvisc-one. Repeat in 6 months.          Relevant Orders    Prior authorization Order    Chronic pain of left knee    Crystal arthropathy      Large Joint Aspiration/Injection: L knee  Date/Time: 5/26/2020 12:30 PM  Performed by: Adrian Ramirez MD  Authorized by: Adrian Ramirez MD     Consent Done?:  Yes (Verbal)  Indications:  Joint swelling and pain  Site marked: the procedure site was marked    Timeout: prior to procedure the correct patient, procedure, and site was verified      Local anesthesia used?: Yes    Anesthesia:  Local infiltration  Local anesthetic:  Lidocaine 2% with epinephrine  Anesthetic total (ml):  2      Details:  Needle Size:  25 G  Ultrasonic Guidance for needle placement?: No    Approach:  Anterior  Location:  Knee  Site:  L knee  Medications:  40 mg triamcinolone acetonide 40 mg/mL  Patient tolerance:  Patient tolerated the procedure well with no immediate complications        # No follow-ups on file.      Disclaimer: This note was prepared using voice recognition system and is likely to have sound alike errors and is not proof read.  Please call me with any questions.

## 2020-06-08 ENCOUNTER — LAB VISIT (OUTPATIENT)
Dept: LAB | Facility: HOSPITAL | Age: 80
End: 2020-06-08
Attending: FAMILY MEDICINE
Payer: MEDICARE

## 2020-06-08 DIAGNOSIS — M1A.09X1 IDIOPATHIC CHRONIC GOUT OF MULTIPLE SITES WITH TOPHUS: ICD-10-CM

## 2020-06-08 DIAGNOSIS — N18.30 CKD (CHRONIC KIDNEY DISEASE) STAGE 3, GFR 30-59 ML/MIN: ICD-10-CM

## 2020-06-08 DIAGNOSIS — E11.22 CONTROLLED TYPE 2 DIABETES MELLITUS WITH STAGE 3 CHRONIC KIDNEY DISEASE, WITHOUT LONG-TERM CURRENT USE OF INSULIN: ICD-10-CM

## 2020-06-08 DIAGNOSIS — N18.30 CONTROLLED TYPE 2 DIABETES MELLITUS WITH STAGE 3 CHRONIC KIDNEY DISEASE, WITHOUT LONG-TERM CURRENT USE OF INSULIN: ICD-10-CM

## 2020-06-08 LAB
ALBUMIN SERPL BCP-MCNC: 3.7 G/DL (ref 3.5–5.2)
ANION GAP SERPL CALC-SCNC: 8 MMOL/L (ref 8–16)
BUN SERPL-MCNC: 21 MG/DL (ref 8–23)
CALCIUM SERPL-MCNC: 9.3 MG/DL (ref 8.7–10.5)
CHLORIDE SERPL-SCNC: 108 MMOL/L (ref 95–110)
CO2 SERPL-SCNC: 26 MMOL/L (ref 23–29)
CREAT SERPL-MCNC: 1.1 MG/DL (ref 0.5–1.4)
EST. GFR  (AFRICAN AMERICAN): >60 ML/MIN/1.73 M^2
EST. GFR  (NON AFRICAN AMERICAN): >60 ML/MIN/1.73 M^2
ESTIMATED AVG GLUCOSE: 169 MG/DL (ref 68–131)
GLUCOSE SERPL-MCNC: 134 MG/DL (ref 70–110)
HBA1C MFR BLD HPLC: 7.5 % (ref 4–5.6)
PHOSPHATE SERPL-MCNC: 3.9 MG/DL (ref 2.7–4.5)
POTASSIUM SERPL-SCNC: 4.1 MMOL/L (ref 3.5–5.1)
PTH-INTACT SERPL-MCNC: 84 PG/ML (ref 9–77)
SODIUM SERPL-SCNC: 142 MMOL/L (ref 136–145)
URATE SERPL-MCNC: 4 MG/DL (ref 3.4–7)

## 2020-06-08 PROCEDURE — 80069 RENAL FUNCTION PANEL: CPT

## 2020-06-08 PROCEDURE — 83036 HEMOGLOBIN GLYCOSYLATED A1C: CPT

## 2020-06-08 PROCEDURE — 84550 ASSAY OF BLOOD/URIC ACID: CPT

## 2020-06-08 PROCEDURE — 83970 ASSAY OF PARATHORMONE: CPT

## 2020-06-08 PROCEDURE — 36415 COLL VENOUS BLD VENIPUNCTURE: CPT | Mod: PO

## 2020-06-10 ENCOUNTER — OFFICE VISIT (OUTPATIENT)
Dept: FAMILY MEDICINE | Facility: CLINIC | Age: 80
End: 2020-06-10
Payer: MEDICARE

## 2020-06-10 DIAGNOSIS — E55.9 VITAMIN D DEFICIENCY: ICD-10-CM

## 2020-06-10 DIAGNOSIS — E11.59 HYPERTENSION ASSOCIATED WITH DIABETES: Primary | ICD-10-CM

## 2020-06-10 DIAGNOSIS — I15.2 HYPERTENSION ASSOCIATED WITH DIABETES: Primary | ICD-10-CM

## 2020-06-10 PROCEDURE — 1159F PR MEDICATION LIST DOCUMENTED IN MEDICAL RECORD: ICD-10-PCS | Mod: 95,,, | Performed by: FAMILY MEDICINE

## 2020-06-10 PROCEDURE — 1159F MED LIST DOCD IN RCRD: CPT | Mod: 95,,, | Performed by: FAMILY MEDICINE

## 2020-06-10 PROCEDURE — 3051F PR MOST RECENT HEMOGLOBIN A1C LEVEL 7.0 - < 8.0%: ICD-10-PCS | Mod: CPTII,95,, | Performed by: FAMILY MEDICINE

## 2020-06-10 PROCEDURE — 3051F HG A1C>EQUAL 7.0%<8.0%: CPT | Mod: CPTII,95,, | Performed by: FAMILY MEDICINE

## 2020-06-10 PROCEDURE — 99213 PR OFFICE/OUTPT VISIT, EST, LEVL III, 20-29 MIN: ICD-10-PCS | Mod: 95,,, | Performed by: FAMILY MEDICINE

## 2020-06-10 PROCEDURE — 99213 OFFICE O/P EST LOW 20 MIN: CPT | Mod: 95,,, | Performed by: FAMILY MEDICINE

## 2020-06-10 PROCEDURE — 1101F PR PT FALLS ASSESS DOC 0-1 FALLS W/OUT INJ PAST YR: ICD-10-PCS | Mod: CPTII,95,, | Performed by: FAMILY MEDICINE

## 2020-06-10 PROCEDURE — 1101F PT FALLS ASSESS-DOCD LE1/YR: CPT | Mod: CPTII,95,, | Performed by: FAMILY MEDICINE

## 2020-06-10 NOTE — PROGRESS NOTES
Primary Care Telemedicine Note    The patient location is:  Patient Home   The chief complaint leading to consultation is: Hypertension  Total time spent with patient: 15 min      Visit type: Virtual visit with synchronous audio only and video  Each patient to whom he or she provides medical services by telemedicine is:  (1) informed of the relationship between the physician and patient and the respective role of any other health care provider with respect to management of the patient; and (2) notified that he or she may decline to receive medical services by telemedicine and may withdraw from such care at any time.    Subjective:      Patient ID: Rudy Saldaña is a 79 y.o. male.    Chief Complaint: Hypertension  HPIThe patient presents with essential hypertension.  The patient is tolerating the medication well and is in excellent compliance.  The patient is experiencing no side effects.  Counseling was offered regarding low salt diets.  The patient has a reduced salt intake.  The patient denies chest pain, palpitations, shortness of breath, dyspnea on exertion, left or murmur neck pain, nausea, vomiting, diaphoresis, paroxysmal nocturnal dyspnea, and orthopnea.   Hypertension Medications             amLODIPine (NORVASC) 10 MG tablet Take 1 tablet (10 mg total) by mouth once daily.    benazepril (LOTENSIN) 40 MG tablet Take 1 tablet (40 mg total) by mouth once daily.    furosemide (LASIX) 20 MG tablet Take 1 tablet (20 mg total) by mouth once daily.    hydrALAZINE (APRESOLINE) 50 MG tablet Take 1 tablet (50 mg total) by mouth every 12 (twelve) hours.        He is also vitamin deficient in vitamin-D.  He needed a refill of this medication which I did for him today.  The current medication list that we have since it was last reconciled is as follows:  Current Outpatient Medications on File Prior to Visit   Medication Sig Dispense Refill    acarbose (PRECOSE) 100 MG Tab TAKE 1 TABLET(100 MG) BY MOUTH THREE TIMES  DAILY WITH MEALS 270 tablet 0    allopurinoL (ZYLOPRIM) 300 MG tablet Take 300 mg by mouth once daily.      amLODIPine (NORVASC) 10 MG tablet Take 1 tablet (10 mg total) by mouth once daily. 90 tablet 3    aspirin (ECOTRIN) 81 MG EC tablet Take 81 mg by mouth once daily.      atorvastatin (LIPITOR) 20 MG tablet TAKE 1 TABLET BY MOUTH DAILY 90 tablet 11    benazepril (LOTENSIN) 40 MG tablet Take 1 tablet (40 mg total) by mouth once daily. 90 tablet 3    blood sugar diagnostic Strp 1 each by Misc.(Non-Drug; Combo Route) route 3 (three) times daily as needed. One touch ultra 2 test strips 100 each 5    colchicine (COLCRYS) 0.6 mg tablet Take 1 tablet (0.6 mg total) by mouth once daily. 30 tablet 11    donepezil (ARICEPT) 5 MG tablet Take 1 tablet daily for 1 month and then take 2 tablets every morning 60 tablet 11    ergocalciferol (ERGOCALCIFEROL) 50,000 unit Cap TAKE 1 CAPSULE BY MOUTH 2 TIMES A WEEK 24 capsule 3    ferrous sulfate (FEOSOL) 325 mg (65 mg iron) Tab tablet TAKE 1 TABLET BY MOUTH THREE TIMES DAILY 270 tablet 0    furosemide (LASIX) 20 MG tablet Take 1 tablet (20 mg total) by mouth once daily. 90 tablet 3    glipiZIDE (GLUCOTROL) 10 MG tablet TAKE 1 TABLET(10 MG) BY MOUTH TWICE DAILY BEFORE MEALS 180 tablet 0    hydrALAZINE (APRESOLINE) 50 MG tablet Take 1 tablet (50 mg total) by mouth every 12 (twelve) hours. 60 tablet 11    insulin (LANTUS SOLOSTAR U-100 INSULIN) glargine 100 units/mL (3mL) SubQ pen Inject 10 Units into the skin once daily. 15 mL 0    Lactobacillus rhamnosus GG (CULTURELLE) 10 billion cell capsule Take 1 capsule by mouth once daily.      lancets (TRUEPLUS LANCETS) 33 gauge Misc 1 lancet by Misc.(Non-Drug; Combo Route) route once daily. 100 each 5    levothyroxine (SYNTHROID) 50 MCG tablet TAKE 1 TABLET(50 MCG) BY MOUTH EVERY DAY 30 tablet 11    methylPREDNISolone (MEDROL DOSEPACK) 4 mg tablet use as directed 1 Package 0    niacin (NIASPAN) 1000 MG CR tablet TAKE 1  "TABLET BY MOUTH EVERY EVENING 90 tablet 0    ONETOUCH VERIO IQ METER kit Use as instructed 1 each 0    pen needle, diabetic (PEN NEEDLE) 31 gauge x 1/4" Ndle 1 Units by Misc.(Non-Drug; Combo Route) route once daily. 100 each 3    pioglitazone (ACTOS) 30 MG tablet TAKE 1 TABLET(30 MG) BY MOUTH EVERY DAY 90 tablet 2    potassium chloride (KLOR-CON) 10 MEQ TbSR TAKE 1 TABLET(10 MEQ) BY MOUTH EVERY DAY 90 tablet 3    SITagliptin (JANUVIA) 100 MG Tab TAKE 1 TABLET(100 MG) BY MOUTH EVERY DAY 90 tablet 0     No current facility-administered medications on file prior to visit.      There are no preventive care reminders to display for this patient.    Review of Systems   Constitutional: Negative for activity change and unexpected weight change.   HENT: Negative for hearing loss, rhinorrhea and trouble swallowing.    Eyes: Negative for discharge and visual disturbance.   Respiratory: Negative for chest tightness and wheezing.    Cardiovascular: Negative for chest pain and palpitations.   Gastrointestinal: Negative for blood in stool, constipation, diarrhea and vomiting.   Endocrine: Negative for polydipsia and polyuria.   Genitourinary: Negative for difficulty urinating, hematuria and urgency.   Musculoskeletal: Positive for arthralgias and joint swelling. Negative for neck pain.   Neurological: Negative for weakness and headaches.   Psychiatric/Behavioral: Negative for confusion and dysphoric mood.     Constitutional: Negative for chills and fever.   Respiratory: Negative for cough and wheezing.    Cardiovascular: Negative for chest pain and palpitations.       Objective:      The patient has been recording blood pressures and pulses at home and the following is the data that was reviewed to make an evaluation and management decision today.    Units 6/10/2020 6/8/2020 6/5/2020 6/3/2020 6/1/2020 5/29/2020 5/27/2020   Time -  6:41 AM  6:57 AM  7:49 AM  8:51 AM  9:41 AM  7:53 AM  7:25 AM   Pulse bpm 54 55 55 64 67 60 56 "          6/10/2020 6/8/2020 6/5/2020 6/3/2020 6/1/2020 5/29/2020 5/27/2020   Time  6:42 AM  6:58 AM  7:50 AM  8:52 AM  9:42 AM  7:54 AM  7:27 AM   Systolic Blood Pressure 143 155 152 126 140 145 146   Diastolic Blood Pressure 80 85 82 71 75 73 78       Units 6/10/2020 6/10/2020 6/9/2020 6/9/2020 6/8/2020 6/8/2020 6/7/2020   Time -  6:39 AM 12:29 AM  8:56 AM 12:11 AM  6:56 AM 12:06 AM  7:42 AM   Glucose mg/dL 132 212 121 167 132 297 139          Physical Exam  Constitutional: The patient is oriented to person, place, and time. He appears well-developed and well-nourished.   Pulmonary/Chest: Effort normal. No respiratory distress.   Neurological: He is alert and oriented to person, place, and time.   Psychiatric: He has a normal mood and affect. His behavior is normal. Judgment and thought content normal.       Assessment:       1. Hypertension associated with diabetes    2. Vitamin D deficiency        Plan:       Diagnoses and all orders for this visit:    Hypertension associated with diabetes    Vitamin D deficiency  -     ergocalciferol (ERGOCALCIFEROL) 50,000 unit Cap; Take 1 capsule (50,000 Units total) by mouth every 7 days.           Continue current medications and monitor is blood pressure and notify me of changes.

## 2020-06-15 RX ORDER — ERGOCALCIFEROL 1.25 MG/1
50000 CAPSULE ORAL
Qty: 12 CAPSULE | Refills: 3 | Status: SHIPPED | OUTPATIENT
Start: 2020-06-15 | End: 2021-02-28

## 2020-07-19 ENCOUNTER — PATIENT MESSAGE (OUTPATIENT)
Dept: FAMILY MEDICINE | Facility: CLINIC | Age: 80
End: 2020-07-19

## 2020-07-20 RX ORDER — HYDRALAZINE HYDROCHLORIDE 100 MG/1
100 TABLET, FILM COATED ORAL EVERY 12 HOURS
Qty: 60 TABLET | Refills: 11 | Status: SHIPPED | OUTPATIENT
Start: 2020-07-20 | End: 2020-11-23

## 2020-08-04 DIAGNOSIS — D50.8 IRON DEFICIENCY ANEMIA SECONDARY TO INADEQUATE DIETARY IRON INTAKE: ICD-10-CM

## 2020-08-04 RX ORDER — FERROUS SULFATE 325(65) MG
TABLET ORAL
Qty: 270 TABLET | Refills: 0 | Status: SHIPPED | OUTPATIENT
Start: 2020-08-04 | End: 2020-10-29 | Stop reason: SDUPTHER

## 2020-08-18 ENCOUNTER — OFFICE VISIT (OUTPATIENT)
Dept: PODIATRY | Facility: CLINIC | Age: 80
End: 2020-08-18
Payer: MEDICARE

## 2020-08-18 ENCOUNTER — PATIENT MESSAGE (OUTPATIENT)
Dept: FAMILY MEDICINE | Facility: CLINIC | Age: 80
End: 2020-08-18

## 2020-08-18 VITALS
DIASTOLIC BLOOD PRESSURE: 76 MMHG | BODY MASS INDEX: 29.29 KG/M2 | HEIGHT: 72 IN | SYSTOLIC BLOOD PRESSURE: 184 MMHG | HEART RATE: 75 BPM | WEIGHT: 216.25 LBS

## 2020-08-18 DIAGNOSIS — B35.1 DERMATOPHYTOSIS OF NAIL: ICD-10-CM

## 2020-08-18 DIAGNOSIS — E11.49 TYPE II DIABETES MELLITUS WITH NEUROLOGICAL MANIFESTATIONS: Primary | ICD-10-CM

## 2020-08-18 DIAGNOSIS — M20.42 HAMMER TOES OF BOTH FEET: ICD-10-CM

## 2020-08-18 DIAGNOSIS — L84 CORN OR CALLUS: ICD-10-CM

## 2020-08-18 DIAGNOSIS — M20.41 HAMMER TOES OF BOTH FEET: ICD-10-CM

## 2020-08-18 PROCEDURE — 3078F PR MOST RECENT DIASTOLIC BLOOD PRESSURE < 80 MM HG: ICD-10-PCS | Mod: CPTII,S$GLB,, | Performed by: PODIATRIST

## 2020-08-18 PROCEDURE — 3051F PR MOST RECENT HEMOGLOBIN A1C LEVEL 7.0 - < 8.0%: ICD-10-PCS | Mod: CPTII,S$GLB,, | Performed by: PODIATRIST

## 2020-08-18 PROCEDURE — 1159F MED LIST DOCD IN RCRD: CPT | Mod: S$GLB,,, | Performed by: PODIATRIST

## 2020-08-18 PROCEDURE — 99499 RISK ADDL DX/OHS AUDIT: ICD-10-PCS | Mod: S$GLB,,, | Performed by: PODIATRIST

## 2020-08-18 PROCEDURE — 1159F PR MEDICATION LIST DOCUMENTED IN MEDICAL RECORD: ICD-10-PCS | Mod: S$GLB,,, | Performed by: PODIATRIST

## 2020-08-18 PROCEDURE — 11721 PR DEBRIDEMENT OF NAILS, 6 OR MORE: ICD-10-PCS | Mod: Q9,S$GLB,, | Performed by: PODIATRIST

## 2020-08-18 PROCEDURE — 1126F PR PAIN SEVERITY QUANTIFIED, NO PAIN PRESENT: ICD-10-PCS | Mod: S$GLB,,, | Performed by: PODIATRIST

## 2020-08-18 PROCEDURE — 3077F PR MOST RECENT SYSTOLIC BLOOD PRESSURE >= 140 MM HG: ICD-10-PCS | Mod: CPTII,S$GLB,, | Performed by: PODIATRIST

## 2020-08-18 PROCEDURE — 99999 PR PBB SHADOW E&M-EST. PATIENT-LVL V: ICD-10-PCS | Mod: PBBFAC,,, | Performed by: PODIATRIST

## 2020-08-18 PROCEDURE — 1101F PR PT FALLS ASSESS DOC 0-1 FALLS W/OUT INJ PAST YR: ICD-10-PCS | Mod: CPTII,S$GLB,, | Performed by: PODIATRIST

## 2020-08-18 PROCEDURE — 3078F DIAST BP <80 MM HG: CPT | Mod: CPTII,S$GLB,, | Performed by: PODIATRIST

## 2020-08-18 PROCEDURE — 99213 OFFICE O/P EST LOW 20 MIN: CPT | Mod: 25,S$GLB,, | Performed by: PODIATRIST

## 2020-08-18 PROCEDURE — 99213 PR OFFICE/OUTPT VISIT, EST, LEVL III, 20-29 MIN: ICD-10-PCS | Mod: 25,S$GLB,, | Performed by: PODIATRIST

## 2020-08-18 PROCEDURE — 1101F PT FALLS ASSESS-DOCD LE1/YR: CPT | Mod: CPTII,S$GLB,, | Performed by: PODIATRIST

## 2020-08-18 PROCEDURE — 3051F HG A1C>EQUAL 7.0%<8.0%: CPT | Mod: CPTII,S$GLB,, | Performed by: PODIATRIST

## 2020-08-18 PROCEDURE — 99999 PR PBB SHADOW E&M-EST. PATIENT-LVL V: CPT | Mod: PBBFAC,,, | Performed by: PODIATRIST

## 2020-08-18 PROCEDURE — 11721 DEBRIDE NAIL 6 OR MORE: CPT | Mod: Q9,S$GLB,, | Performed by: PODIATRIST

## 2020-08-18 PROCEDURE — 99499 UNLISTED E&M SERVICE: CPT | Mod: S$GLB,,, | Performed by: PODIATRIST

## 2020-08-18 PROCEDURE — 1126F AMNT PAIN NOTED NONE PRSNT: CPT | Mod: S$GLB,,, | Performed by: PODIATRIST

## 2020-08-18 PROCEDURE — 3077F SYST BP >= 140 MM HG: CPT | Mod: CPTII,S$GLB,, | Performed by: PODIATRIST

## 2020-08-18 NOTE — PROGRESS NOTES
Subjective:     Patient ID: Rudy Saldaña is a 79 y.o. male.    Chief Complaint: Nail Care ([facility]. Diabetic Pt. Wears tennis shoes. c/o no pain. PCP DR Morfin, last visit 6/10/20)    Rudy is a 79 y.o. male who presents to the clinic for evaluation and treatment of high risk feet. Rudy has a past medical history of Anemia, BPH (benign prostatic hypertrophy), Carotid bruit, CKD (chronic kidney disease) stage 2/3, CVA (cerebral vascular accident), Diabetes mellitus, type 2, Hyperlipidemia, Hypertension, Hypothyroidism (2/29/2016), Type 2 diabetes mellitus, uncontrolled, with renal complications, Type II or unspecified type diabetes mellitus without mention of complication, not stated as uncontrolled, and Vitamin D deficiency (2/29/2016). The patient's chief complaint is long, thick toenails. This patient has documented high risk feet requiring routine maintenance secondary to peripheral neuropathy. Patient denies any pain in his feet.      PCP: Mohinder Morfin MD    Date Last Seen by PCP: 06/10/2020    Current shoe gear:  Affected Foot: Tennis shoes     Unaffected Foot: tennis  shoes    Hemoglobin A1C   Date Value Ref Range Status   06/08/2020 7.5 (H) 4.0 - 5.6 % Final     Comment:     ADA Screening Guidelines:  5.7-6.4%  Consistent with prediabetes  >or=6.5%  Consistent with diabetes  High levels of fetal hemoglobin interfere with the HbA1C  assay. Heterozygous hemoglobin variants (HbS, HgC, etc)do  not significantly interfere with this assay.   However, presence of multiple variants may affect accuracy.     10/03/2019 6.1 (H) 4.0 - 5.6 % Final     Comment:     ADA Screening Guidelines:  5.7-6.4%  Consistent with prediabetes  >or=6.5%  Consistent with diabetes  High levels of fetal hemoglobin interfere with the HbA1C  assay. Heterozygous hemoglobin variants (HbS, HgC, etc)do  not significantly interfere with this assay.   However, presence of multiple variants may affect accuracy.     03/19/2019 6.6 (H) 4.0 -  5.6 % Final     Comment:     ADA Screening Guidelines:  5.7-6.4%  Consistent with prediabetes  >or=6.5%  Consistent with diabetes  High levels of fetal hemoglobin interfere with the HbA1C  assay. Heterozygous hemoglobin variants (HbS, HgC, etc)do  not significantly interfere with this assay.   However, presence of multiple variants may affect accuracy.             Patient Active Problem List   Diagnosis    Combined hyperlipidemia associated with type 2 diabetes mellitus    Hypertension associated with diabetes    Type 2 diabetes mellitus, uncontrolled, with renal complications    History of stroke    BPH (benign prostatic hypertrophy)    History of colon polyps    Iron deficiency anemia    Polyneuropathy due to secondary diabetes    DEVIKA on CPAP    Family history of colonic polyps    Vitamin D deficiency    Hypothyroidism    Anemia    Gross hematuria    CKD stage 3 secondary to diabetes    Facet arthropathy, lumbar    Right hip pain    Primary osteoarthritis of right hip    Osteoarthritis of spine with radiculopathy, lumbar region    Acute idiopathic gout of left hand    Chronic fatigue    Crystal arthritis    Idiopathic chronic gout of multiple sites with tophus    Chronic pain of both knees    Pain of both elbows    Unspecified inflammatory spondylopathy, lumbar region    Rib pain on right side    Obesity (BMI 30-39.9)    Primary osteoarthritis of left knee    Chronic pain of left knee    Crystal arthropathy       Medication List with Changes/Refills   Current Medications    ACARBOSE (PRECOSE) 100 MG TAB    TAKE 1 TABLET(100 MG) BY MOUTH THREE TIMES DAILY WITH MEALS    ALLOPURINOL (ZYLOPRIM) 300 MG TABLET    TAKE 1 TABLET BY MOUTH ONCE DAILY    AMLODIPINE (NORVASC) 10 MG TABLET    Take 1 tablet (10 mg total) by mouth once daily.    ASPIRIN (ECOTRIN) 81 MG EC TABLET    Take 81 mg by mouth once daily.    ATORVASTATIN (LIPITOR) 20 MG TABLET    TAKE 1 TABLET BY MOUTH DAILY    BENAZEPRIL  (LOTENSIN) 40 MG TABLET    Take 1 tablet (40 mg total) by mouth once daily.    BLOOD SUGAR DIAGNOSTIC STRP    1 each by Misc.(Non-Drug; Combo Route) route 3 (three) times daily as needed. One touch ultra 2 test strips    COLCHICINE (COLCRYS) 0.6 MG TABLET    Take 1 tablet (0.6 mg total) by mouth once daily.    DONEPEZIL (ARICEPT) 5 MG TABLET    Take 1 tablet daily for 1 month and then take 2 tablets every morning    ERGOCALCIFEROL (ERGOCALCIFEROL) 50,000 UNIT CAP    Take 1 capsule (50,000 Units total) by mouth every 7 days.    FERROUS SULFATE (FEOSOL) 325 MG (65 MG IRON) TAB TABLET    TAKE 1 TABLET BY MOUTH THREE TIMES DAILY    FUROSEMIDE (LASIX) 20 MG TABLET    Take 1 tablet (20 mg total) by mouth once daily.    GLIPIZIDE (GLUCOTROL) 10 MG TABLET    TAKE 1 TABLET(10 MG) BY MOUTH TWICE DAILY BEFORE MEALS    HYDRALAZINE (APRESOLINE) 100 MG TABLET    Take 1 tablet (100 mg total) by mouth every 12 (twelve) hours.    LACTOBACILLUS RHAMNOSUS GG (CULTURELLE) 10 BILLION CELL CAPSULE    Take 1 capsule by mouth once daily.    LANCETS (TRUEPLUS LANCETS) 33 GAUGE MISC    1 lancet by Misc.(Non-Drug; Combo Route) route once daily.    LEVOTHYROXINE (SYNTHROID) 50 MCG TABLET    TAKE 1 TABLET(50 MCG) BY MOUTH EVERY DAY    NIACIN (NIASPAN) 1000 MG CR TABLET    TAKE 1 TABLET BY MOUTH EVERY EVENING    ONETOTradeBriefsIO IQ METER KIT    Use as instructed    PIOGLITAZONE (ACTOS) 30 MG TABLET    TAKE 1 TABLET(30 MG) BY MOUTH EVERY DAY    POTASSIUM CHLORIDE (KLOR-CON) 10 MEQ TBSR    TAKE 1 TABLET(10 MEQ) BY MOUTH EVERY DAY    SITAGLIPTIN (JANUVIA) 100 MG TAB    TAKE 1 TABLET(100 MG) BY MOUTH EVERY DAY       Review of patient's allergies indicates:   Allergen Reactions    No known drug allergies        Past Surgical History:   Procedure Laterality Date    COLONOSCOPY  2008    COLONOSCOPY  5/31/2005    repeat in 2015    PILONIDAL CYST DRAINAGE      TRANSURETHRAL RESECTION OF PROSTATE         Family History   Problem Relation Age of Onset     Hypertension Mother     Hypertension Father     Heart attack Maternal Uncle     Brain cancer Sister     Diabetes Neg Hx     Stroke Neg Hx        Social History     Socioeconomic History    Marital status:      Spouse name: Not on file    Number of children: Not on file    Years of education: Not on file    Highest education level: Not on file   Occupational History    Not on file   Social Needs    Financial resource strain: Not on file    Food insecurity     Worry: Not on file     Inability: Not on file    Transportation needs     Medical: Not on file     Non-medical: Not on file   Tobacco Use    Smoking status: Never Smoker    Smokeless tobacco: Never Used   Substance and Sexual Activity    Alcohol use: No     Alcohol/week: 0.0 standard drinks    Drug use: No    Sexual activity: Not on file   Lifestyle    Physical activity     Days per week: Not on file     Minutes per session: Not on file    Stress: Not on file   Relationships    Social connections     Talks on phone: Not on file     Gets together: Not on file     Attends [employer] service: Not on file     Active member of club or organization: Not on file     Attends meetings of clubs or organizations: Not on file     Relationship status: Not on file   Other Topics Concern    Not on file   Social History Narrative    Not on file       Vitals:    08/18/20 0904   BP: (!) 184/76   Pulse: 75   Weight: 98.1 kg (216 lb 4.3 oz)   Height: 6' (1.829 m)   PainSc: 0-No pain       Hemoglobin A1C   Date Value Ref Range Status   06/08/2020 7.5 (H) 4.0 - 5.6 % Final     Comment:     ADA Screening Guidelines:  5.7-6.4%  Consistent with prediabetes  >or=6.5%  Consistent with diabetes  High levels of fetal hemoglobin interfere with the HbA1C  assay. Heterozygous hemoglobin variants (HbS, HgC, etc)do  not significantly interfere with this assay.   However, presence of multiple variants may affect accuracy.     10/03/2019 6.1 (H) 4.0 - 5.6 % Final      Comment:     ADA Screening Guidelines:  5.7-6.4%  Consistent with prediabetes  >or=6.5%  Consistent with diabetes  High levels of fetal hemoglobin interfere with the HbA1C  assay. Heterozygous hemoglobin variants (HbS, HgC, etc)do  not significantly interfere with this assay.   However, presence of multiple variants may affect accuracy.     03/19/2019 6.6 (H) 4.0 - 5.6 % Final     Comment:     ADA Screening Guidelines:  5.7-6.4%  Consistent with prediabetes  >or=6.5%  Consistent with diabetes  High levels of fetal hemoglobin interfere with the HbA1C  assay. Heterozygous hemoglobin variants (HbS, HgC, etc)do  not significantly interfere with this assay.   However, presence of multiple variants may affect accuracy.         Review of Systems   Constitutional: Negative for chills and fever.   Respiratory: Negative for shortness of breath.    Cardiovascular: Negative for chest pain, palpitations, orthopnea, claudication and leg swelling.   Gastrointestinal: Negative for diarrhea, nausea and vomiting.   Musculoskeletal: Negative for joint pain.   Skin: Negative for rash.   Neurological: Positive for tingling and sensory change. Negative for dizziness, focal weakness and weakness.   Psychiatric/Behavioral: Negative.              Objective:   PHYSICAL EXAM: Apperance: Alert and orient in no distress,well developed, and with good attention to grooming and body habits  Patient presents ambulating in tennis shoes.   LOWER EXTREMITY EXAM:  VASCULAR: Dorsalis pedis pulses 1/4 bilateral and Posterior Tibial pulses 2/4 bilateral. Capillary fill time <4 seconds bilateral. No edema observed bilateral. Varicosities present bilateral. Skin temperature of the lower extremities is warm to warm, proximal to distal. Hair growth WNL bilateral.  DERMATOLOGICAL: No skin rashes, subcutaneous nodules, lesions, or ulcers observed bilateral. Nails 1,2,3,4,5 right and 2,3,4,5 left thickened. Left hallux nail absent. Webspaces 1-4 clean, dry  and without evidence of break in skin integrity bilateral. Minimal hyperkeratotic tissue noted to lateral 5th toe.   NEUROLOGICAL: Light touch, sharp-dull, proprioception all present and equal bilaterally.  Vibratory sensation absent at bilateral hallux and diminished at bilateral navicular . Protective sensation absent at 4/10 sites as tested with a [place]-Chelsy 5.07 monofilament.   MUSCULOSKELETAL: Muscle strength is 5/5 for foot inverters, everters, plantarflexors, and dorsiflexors. Muscle tone is normal. No pain on palpation left lateral midfoot. Minimal bony prominence noted to bilateral lateral midfoot at cuboid-metatarsal joint. Flexible adductovarus hammertoes noted bilateral 5th.       Assessment:   Type II diabetes mellitus with neurological manifestations  -     DIABETIC SHOES FOR HOME USE    Dermatophytosis of nail    Hammer toes of both feet  -     DIABETIC SHOES FOR HOME USE    Corn or callus  -     DIABETIC SHOES FOR HOME USE          Plan:   Type II diabetes mellitus with neurological manifestations  -     DIABETIC SHOES FOR HOME USE    Dermatophytosis of nail    Hammer toes of both feet  -     DIABETIC SHOES FOR HOME USE    Corn or callus  -     DIABETIC SHOES FOR HOME USE      I counseled the patient on his conditions, regarding findings of my examination, my impressions, and usual treatment plan.   Greater than 50% of this visit spent on counseling and coordination of care.  Greater than 15 minutes of a 20 minute appointment spent on education about the diabetic foot, neuropathy, and prevention of limb loss.  Shoe inspection. Diabetic Foot Education. Patient reminded of the importance of good nutrition and blood sugar control to help prevent podiatric complications of diabetes. Patient instructed on proper foot hygeine. We discussed wearing proper shoe gear, daily foot inspections, never walking without protective shoe gear, never putting sharp instruments to feet.    With patient's  permission, nails 1-5 bilateral were debrided/trimmed in length and thickness aggressively to their soft tissue attachment mechanically and with electric , removing all offending nail and debris. Patient relates relief following the procedure.  Prescription written for Diabetic shoes and inserts.   Discussed surgical and conservative management of hammertoes deformity. Conservatively we did discuss padding, and shoe modifications such as softer shoes with wide toe boxes. Surgically we briefly discussed pre and post operative expectations. The patient elects for conservative management at this time   Patient  will continue to monitor the areas daily, inspect feet, wear protective shoe gear when ambulatory, moisturizer to maintain skin integrity. Patient reminded of the importance of good nutrition and blood sugar control to help prevent podiatric complications of diabetes.  Patient to return 4 months or sooner if needed.                 Padmaja Dave DPM  [facility]

## 2020-08-19 NOTE — TELEPHONE ENCOUNTER
Hypertension Medications             amLODIPine (NORVASC) 10 MG tablet Take 1 tablet (10 mg total) by mouth once daily.    benazepril (LOTENSIN) 40 MG tablet Take 1 tablet (40 mg total) by mouth once daily.    furosemide (LASIX) 20 MG tablet Take 1 tablet (20 mg total) by mouth once daily.    hydrALAZINE (APRESOLINE) 100 MG tablet Take 1 tablet (100 mg total) by mouth every 12 (twelve) hours.        The above with the current meds that are being taken by the patient for blood pressure.    It has been running high.    8/19/2020 8/17/2020 8/14/2020 8/12/2020 8/10/2020 8/7/2020 8/5/2020   Time  8:10 AM  8:48 AM  8:18 AM  7:48 AM  8:54 AM  7:48 AM  2:08 PM   Systolic Blood Pressure 142 151 146 138 137 147 131   Diastolic Blood Pressure 74 74 81 79 70 73 67      I will ask him to increase his Lasix to 40 mg a day.

## 2020-08-20 ENCOUNTER — PATIENT MESSAGE (OUTPATIENT)
Dept: FAMILY MEDICINE | Facility: CLINIC | Age: 80
End: 2020-08-20

## 2020-08-20 ENCOUNTER — PATIENT MESSAGE (OUTPATIENT)
Dept: RHEUMATOLOGY | Facility: CLINIC | Age: 80
End: 2020-08-20

## 2020-08-20 RX ORDER — FUROSEMIDE 40 MG/1
40 TABLET ORAL DAILY
Qty: 90 TABLET | Refills: 3 | Status: SHIPPED | OUTPATIENT
Start: 2020-08-20 | End: 2021-08-18

## 2020-08-20 NOTE — TELEPHONE ENCOUNTER
I have signed for the following orders AND/OR meds.  Please call the patient and ask the patient to schedule the testing AND/OR inform about any medications that were sent.      No orders of the defined types were placed in this encounter.      Medications Ordered This Encounter   Medications    furosemide (LASIX) 40 MG tablet     Sig: Take 1 tablet (40 mg total) by mouth once daily.     Dispense:  90 tablet     Refill:  3

## 2020-08-21 ENCOUNTER — PATIENT MESSAGE (OUTPATIENT)
Dept: RHEUMATOLOGY | Facility: CLINIC | Age: 80
End: 2020-08-21

## 2020-09-09 ENCOUNTER — PATIENT OUTREACH (OUTPATIENT)
Dept: ADMINISTRATIVE | Facility: OTHER | Age: 80
End: 2020-09-09

## 2020-09-09 NOTE — PROGRESS NOTES
Health Maintenance Due   Topic Date Due    Shingles Vaccine (2 of 3) 08/05/2014    Influenza Vaccine (1) 08/01/2020     Updates were requested from care everywhere.  Chart was reviewed for overdue Proactive [facility] ([facility]) topics (CRS, Breast Cancer Screening, Eye exam)  Health Maintenance has been updated.  [facility] immunization registry triggered.  Immunizations were reconciled.

## 2020-09-10 ENCOUNTER — OFFICE VISIT (OUTPATIENT)
Dept: PULMONOLOGY | Facility: CLINIC | Age: 80
End: 2020-09-10
Payer: MEDICARE

## 2020-09-10 VITALS
BODY MASS INDEX: 29.74 KG/M2 | WEIGHT: 219.56 LBS | DIASTOLIC BLOOD PRESSURE: 74 MMHG | HEART RATE: 72 BPM | OXYGEN SATURATION: 98 % | RESPIRATION RATE: 20 BRPM | HEIGHT: 72 IN | SYSTOLIC BLOOD PRESSURE: 124 MMHG

## 2020-09-10 DIAGNOSIS — G47.33 OSA ON CPAP: Primary | Chronic | ICD-10-CM

## 2020-09-10 PROCEDURE — 99999 PR PBB SHADOW E&M-EST. PATIENT-LVL V: CPT | Mod: PBBFAC,,, | Performed by: INTERNAL MEDICINE

## 2020-09-10 PROCEDURE — 1159F PR MEDICATION LIST DOCUMENTED IN MEDICAL RECORD: ICD-10-PCS | Mod: S$GLB,,, | Performed by: INTERNAL MEDICINE

## 2020-09-10 PROCEDURE — 99999 PR PBB SHADOW E&M-EST. PATIENT-LVL V: ICD-10-PCS | Mod: PBBFAC,,, | Performed by: INTERNAL MEDICINE

## 2020-09-10 PROCEDURE — 3078F DIAST BP <80 MM HG: CPT | Mod: CPTII,S$GLB,, | Performed by: INTERNAL MEDICINE

## 2020-09-10 PROCEDURE — 3078F PR MOST RECENT DIASTOLIC BLOOD PRESSURE < 80 MM HG: ICD-10-PCS | Mod: CPTII,S$GLB,, | Performed by: INTERNAL MEDICINE

## 2020-09-10 PROCEDURE — 3074F SYST BP LT 130 MM HG: CPT | Mod: CPTII,S$GLB,, | Performed by: INTERNAL MEDICINE

## 2020-09-10 PROCEDURE — 99213 PR OFFICE/OUTPT VISIT, EST, LEVL III, 20-29 MIN: ICD-10-PCS | Mod: S$GLB,,, | Performed by: INTERNAL MEDICINE

## 2020-09-10 PROCEDURE — 1159F MED LIST DOCD IN RCRD: CPT | Mod: S$GLB,,, | Performed by: INTERNAL MEDICINE

## 2020-09-10 PROCEDURE — 99213 OFFICE O/P EST LOW 20 MIN: CPT | Mod: S$GLB,,, | Performed by: INTERNAL MEDICINE

## 2020-09-10 PROCEDURE — 1101F PT FALLS ASSESS-DOCD LE1/YR: CPT | Mod: CPTII,S$GLB,, | Performed by: INTERNAL MEDICINE

## 2020-09-10 PROCEDURE — 1101F PR PT FALLS ASSESS DOC 0-1 FALLS W/OUT INJ PAST YR: ICD-10-PCS | Mod: CPTII,S$GLB,, | Performed by: INTERNAL MEDICINE

## 2020-09-10 PROCEDURE — 3074F PR MOST RECENT SYSTOLIC BLOOD PRESSURE < 130 MM HG: ICD-10-PCS | Mod: CPTII,S$GLB,, | Performed by: INTERNAL MEDICINE

## 2020-09-10 NOTE — PROGRESS NOTES
Subjective:      Patient ID: Rudy Saldaña is a 80 y.o. male.    Patient Active Problem List   Diagnosis    Combined hyperlipidemia associated with type 2 diabetes mellitus    Hypertension associated with diabetes    Type 2 diabetes mellitus, uncontrolled, with renal complications    History of stroke    BPH (benign prostatic hypertrophy)    History of colon polyps    Iron deficiency anemia    Polyneuropathy due to secondary diabetes    DEVIKA on CPAP    Family history of colonic polyps    Vitamin D deficiency    Hypothyroidism    Anemia    Gross hematuria    CKD stage 3 secondary to diabetes    Facet arthropathy, lumbar    Right hip pain    Primary osteoarthritis of right hip    Osteoarthritis of spine with radiculopathy, lumbar region    Acute idiopathic gout of left hand    Chronic fatigue    Crystal arthritis    Idiopathic chronic gout of multiple sites with tophus    Chronic pain of both knees    Pain of both elbows    Unspecified inflammatory spondylopathy, lumbar region    Rib pain on right side    Obesity (BMI 30-39.9)    Primary osteoarthritis of left knee    Chronic pain of left knee    Crystal arthropathy     Problem list has been reviewed.    Chief Complaint: Sleep Apnea    HPI: He is  here for follow up for DEVIKA and CPAP complaince assessment.     He reports that his current CPAP machine has  malfunctioned.   He is requesting a new CPAP machine. DME is DURAMED.  Patient denies snoring, headaches.    He is compliant with his CPAP. He definitely thinks PAP is beneficial to his health and he wants to continue with PAP therapy.     Compliance Summary  6/12/2020 - 9/9/2020 (90 days)  Days with Device Usage 87 days  Days without Device Usage 3 days  Percent Days with Device Usage 96.7%  Cumulative Usage 15 days 11 hrs. 40 mins. 52 secs.  Maximum Usage (1 Day) 6 hrs. 35 mins. 54 secs.  Average Usage (All Days) 4 hrs. 7 mins. 47 secs.  Average Usage (Days Used) 4 hrs. 16 mins. 19  secs.  Minimum Usage (1 Day) 2 hrs. 20 mins. 9 secs.  Percent of Days with Usage >= 4 Hours 65.6%  Percent of Days with Usage < 4 Hours 34.4%  Total Blower Time 15 days 11 hrs. 57 mins. 52 secs.  CPAP Summary ([facility])  Average Time in Large Leak Per Day 1 secs.  Average AHI 4.2  CPAP 8.0 cmH2O    [place] Sleepiness Scale   EPWORTH SLEEPINESS SCALE 9/10/2020 11/27/2018 3/15/2018 12/7/2015 10/5/2015 8/10/2015   Sitting and reading 1 0 1 2 2 3   Watching TV 3 2 1 2 2 3   Sitting, inactive in a public place (e.g. a theatre or a meeting) 1 0 0 2 1 3   As a passenger in a car for an hour without a break 3 1 1 2 1 3   Lying down to rest in the afternoon when circumstances permit 3 2 1 1 2 3   Sitting and talking to someone 0 0 0 0 0 1   Sitting quietly after a lunch without alcohol 2 0 0 2 1 3   In a car, while stopped for a few minutes in traffic 0 0 0 0 1 0   Total score 13 5 4 11 10 19     Previous Report Reviewed: office notes     The following portions of the patient's history were reviewed and updated as appropriate: He  has a past medical history of Anemia, BPH (benign prostatic hypertrophy), Carotid bruit, CKD (chronic kidney disease) stage 2/3, CVA (cerebral vascular accident), Diabetes mellitus, type 2, Hyperlipidemia, Hypertension, Hypothyroidism (2/29/2016), Type 2 diabetes mellitus, uncontrolled, with renal complications, Type II or unspecified type diabetes mellitus without mention of complication, not stated as uncontrolled, and Vitamin D deficiency (2/29/2016).  He  has a past surgical history that includes Colonoscopy (2008); Transurethral resection of prostate; Pilonidal cyst drainage; and Colonoscopy (5/31/2005).  His family history includes Brain cancer in his sister; Heart attack in his maternal uncle; Hypertension in his father and mother.  He  reports that he has never smoked. He has never used smokeless tobacco. He reports that he does not drink alcohol or use drugs.  He has a current  medication list which includes the following prescription(s): acarbose, allopurinol, amlodipine, aspirin, atorvastatin, benazepril, blood sugar diagnostic, colchicine, donepezil, ergocalciferol, ferrous sulfate, furosemide, glipizide, hydralazine, lactobacillus rhamnosus gg, lancets, levothyroxine, niacin, pioglitazone, potassium chloride, sitagliptin, and onetouch verio iq meter.  He is allergic to no known drug allergies..    Review of Systems   Constitutional: Negative for fever, weight gain and weakness.   HENT: Negative for nosebleeds, postnasal drip and hearing loss.    Eyes: Negative for itching.   Respiratory: Negative for hemoptysis, asthma nighttime symptoms, dyspnea on extertion and somnolence.    Cardiovascular: Negative for chest pain and leg swelling.   Endocrine: Negative for polydipsia and heat intolerance.    Musculoskeletal: Negative for back pain and gait problem.   Skin: Negative for rash.   Gastrointestinal: Negative for nausea, vomiting and acid reflux.   Neurological: Negative for dizziness, light-headedness and headaches.   Hematological: Does not bruise/bleed easily.   Psychiatric/Behavioral: The patient is not nervous/anxious.    All other systems reviewed and are negative.     Objective:     Vitals:    09/10/20 1117   BP: 124/74   Pulse: 72   Resp: 20   SpO2: 98%   Weight: 99.6 kg (219 lb 9.3 oz)   Height: 6' (1.829 m)     Body mass index is 29.78 kg/m².    Physical Exam  Constitutional:       Appearance: He is well-developed. He is not diaphoretic.   HENT:      Head: Normocephalic and atraumatic.      Right Ear: External ear normal.      Left Ear: External ear normal.      Nose: Nose normal.      Mouth/Throat:      Pharynx: No oropharyngeal exudate.   Eyes:      General: No scleral icterus.        Right eye: No discharge.         Left eye: No discharge.      Conjunctiva/sclera: Conjunctivae normal.      Pupils: Pupils are equal, round, and reactive to light.   Neck:      Musculoskeletal:  Normal range of motion and neck supple.      Thyroid: No thyromegaly.      Vascular: No JVD.   Cardiovascular:      Rate and Rhythm: Normal rate and regular rhythm.      Heart sounds: Normal heart sounds. No murmur. No friction rub. No gallop.    Pulmonary:      Effort: Pulmonary effort is normal. No respiratory distress.      Breath sounds: Normal breath sounds. No wheezing or rales.   Chest:      Chest wall: No tenderness.   Abdominal:      General: Bowel sounds are normal. There is no distension.      Palpations: Abdomen is soft. There is no mass.      Tenderness: There is no abdominal tenderness. There is no guarding or rebound.   Musculoskeletal: Normal range of motion.         General: No tenderness.   Lymphadenopathy:      Cervical: No cervical adenopathy.   Skin:     General: Skin is warm and dry.   Neurological:      Mental Status: He is alert and oriented to person, place, and time.      Cranial Nerves: No cranial nerve deficit.      Coordination: Coordination normal.         Personal Diagnostic Review  CPAP complaince download.     Assessment / plan :       Discussed diagnosis, its evaluation, treatment and usual course. All questions answered.      Problem List Items Addressed This Visit        Other    DEVIKA on CPAP - Primary (Chronic)    Current Assessment & Plan       Continue CPAP of 8. (DME - DUR[facility]ED)     NEW CPAP ORDERED TO REPLACE HIS CURRENT CPAP WHICH HAS MALFUNCTIONED.     Discussed therapeutic goals for positive airway pressure therapy(CPAP or BiPAP): Ideal is usage 100% of nights for 6 - 8 hours per night. Minimum usage is 70% of night for at least 4 hours per night used. Pateint expressed understanding. All Questions answered.      Return in 1 year.// CPAP compliance in 1 year.          Relevant Orders    CPAP FOR HOME USE          TIME SPENT WITH PATIENT: Time spent: 25 minutes in face to face  discussion concerning diagnosis, prognosis, review of lab and test results, benefits of treatment  as well as management of disease, counseling of patient and coordination of care between various health  care providers . Greater than half the time spent was used for coordination of care and counseling of patient.       Follow up in about 1 year (around 9/10/2021) for DEVIKA.

## 2020-09-10 NOTE — PATIENT INSTRUCTIONS
Obstructive Sleep Apnea  Obstructive sleep apnea is a condition that causes your air passages to become narrowed or blocked during sleep. As a result, breathing stops for short periods. Your body wakes up enough for breathing to begin again, though you don't remember it. The cycle of stopped breathing and brief awakenings can repeat dozens of times a night. This prevents the body from getting to the deeper stages of sleep that are needed for good rest and may cause your body's oxygen level to fall.  Signs of sleep apnea include loud snoring, noisy breathing, and gasping sounds during sleep. Daytime symptoms include waking up tired after a full night's sleep, waking up with headaches, feeling very sleepy or falling asleep during the day, and having problems with memory or concentration.  Risk factors for sleep apnea include:  · Being overweight  · Being a man, or a woman in menopause  · Smoking  · Using alcohol or sedating medicines  · Having enlarged structures in the nose or throat  Home care  Lifestyle changes that can help treat snoring and sleep apnea include the following:  · If you are overweight, lose weight. Talk to your healthcare provider about a weight-loss plan for you.  · Avoid alcohol for 3 to 4 hours before bedtime. Avoid sedating medications. Ask your healthcare provider about the medicines you take.  · If you smoke, talk to your healthcare provider about ways to quit.  · Sleep on your side. This can help prevent gravity from pulling relaxed throat tissues into your breathing passages.  · If you have allergies or sinus problems that block your nose, ask your healthcare provider for help.  Follow-up care  Follow up with your healthcare provider, or as advised. A diagnosis of sleep apnea is made with a sleep study. Your healthcare provider can tell you more about this test.  When to seek medical advice  Sleep apnea can make you more likely to have certain health problems. These include high blood  pressure, heart attack, stroke, and sexual dysfunction. If you have sleep apnea, talk to your healthcare provider about the best treatments for you.  Date Last Reviewed: 4/1/2017  © 2262-5533 [employer]. [address] [place] [address], [place] [address]. All rights reserved. This information is not intended as a substitute for professional medical care. Always follow your healthcare professional's instructions.

## 2020-09-10 NOTE — ASSESSMENT & PLAN NOTE
Continue CPAP of 8. (DME - DURAMED)     NEW CPAP ORDERED TO REPLACE HIS CURRENT CPAP WHICH HAS MALFUNCTIONED.     Discussed therapeutic goals for positive airway pressure therapy(CPAP or BiPAP): Ideal is usage 100% of nights for 6 - 8 hours per night. Minimum usage is 70% of night for at least 4 hours per night used. Pateint expressed understanding. All Questions answered.      Return in 1 year.// CPAP compliance in 1 year.

## 2020-09-11 ENCOUNTER — LAB VISIT (OUTPATIENT)
Dept: LAB | Facility: HOSPITAL | Age: 80
End: 2020-09-11
Attending: FAMILY MEDICINE
Payer: MEDICARE

## 2020-09-11 DIAGNOSIS — E11.22 CONTROLLED TYPE 2 DIABETES MELLITUS WITH STAGE 3 CHRONIC KIDNEY DISEASE, WITHOUT LONG-TERM CURRENT USE OF INSULIN: ICD-10-CM

## 2020-09-11 DIAGNOSIS — N18.30 CONTROLLED TYPE 2 DIABETES MELLITUS WITH STAGE 3 CHRONIC KIDNEY DISEASE, WITHOUT LONG-TERM CURRENT USE OF INSULIN: ICD-10-CM

## 2020-09-11 PROCEDURE — 83036 HEMOGLOBIN GLYCOSYLATED A1C: CPT

## 2020-09-11 PROCEDURE — 36415 COLL VENOUS BLD VENIPUNCTURE: CPT | Mod: PO

## 2020-09-12 LAB
ESTIMATED AVG GLUCOSE: 134 MG/DL (ref 68–131)
HBA1C MFR BLD HPLC: 6.3 % (ref 4–5.6)

## 2020-09-14 DIAGNOSIS — N18.30 CONTROLLED TYPE 2 DIABETES MELLITUS WITH STAGE 3 CHRONIC KIDNEY DISEASE, WITHOUT LONG-TERM CURRENT USE OF INSULIN: ICD-10-CM

## 2020-09-14 DIAGNOSIS — E11.22 CONTROLLED TYPE 2 DIABETES MELLITUS WITH STAGE 3 CHRONIC KIDNEY DISEASE, WITHOUT LONG-TERM CURRENT USE OF INSULIN: ICD-10-CM

## 2020-09-14 RX ORDER — GLIPIZIDE 10 MG/1
TABLET ORAL
Qty: 180 TABLET | Refills: 1 | Status: SHIPPED | OUTPATIENT
Start: 2020-09-14 | End: 2020-09-18 | Stop reason: SDUPTHER

## 2020-09-14 RX ORDER — ACARBOSE 100 MG/1
TABLET ORAL
Qty: 270 TABLET | Refills: 1 | Status: SHIPPED | OUTPATIENT
Start: 2020-09-14 | End: 2021-02-28

## 2020-09-14 RX ORDER — PIOGLITAZONEHYDROCHLORIDE 30 MG/1
30 TABLET ORAL DAILY
Qty: 90 TABLET | Refills: 2 | Status: SHIPPED | OUTPATIENT
Start: 2020-09-14 | End: 2020-09-18 | Stop reason: SDUPTHER

## 2020-09-14 NOTE — TELEPHONE ENCOUNTER
----- Message from Mohinder Morfin MD sent at 9/12/2020  9:17 PM CDT -----  Book a1c in 6 months.  Notify of the appointment via MyChart or letter.   Refill Diabetes Meds x 6 months.    The patient's Health Maintenance that is due is below:  Shingles Vaccine(2 of 3) due on 08/05/2014  Influenza Vaccine(1) due on 08/01/2020

## 2020-09-16 ENCOUNTER — PATIENT MESSAGE (OUTPATIENT)
Dept: FAMILY MEDICINE | Facility: CLINIC | Age: 80
End: 2020-09-16

## 2020-09-18 ENCOUNTER — PATIENT MESSAGE (OUTPATIENT)
Dept: FAMILY MEDICINE | Facility: CLINIC | Age: 80
End: 2020-09-18

## 2020-09-18 DIAGNOSIS — E11.69 COMBINED HYPERLIPIDEMIA ASSOCIATED WITH TYPE 2 DIABETES MELLITUS: ICD-10-CM

## 2020-09-18 DIAGNOSIS — E78.2 COMBINED HYPERLIPIDEMIA ASSOCIATED WITH TYPE 2 DIABETES MELLITUS: ICD-10-CM

## 2020-09-18 DIAGNOSIS — N18.30 CONTROLLED TYPE 2 DIABETES MELLITUS WITH STAGE 3 CHRONIC KIDNEY DISEASE, WITHOUT LONG-TERM CURRENT USE OF INSULIN: ICD-10-CM

## 2020-09-18 DIAGNOSIS — E11.22 CONTROLLED TYPE 2 DIABETES MELLITUS WITH STAGE 3 CHRONIC KIDNEY DISEASE, WITHOUT LONG-TERM CURRENT USE OF INSULIN: ICD-10-CM

## 2020-09-18 RX ORDER — GLIPIZIDE 10 MG/1
TABLET ORAL
Qty: 180 TABLET | Refills: 1 | Status: SHIPPED | OUTPATIENT
Start: 2020-09-18 | End: 2021-11-08 | Stop reason: SDUPTHER

## 2020-09-18 RX ORDER — NIACIN 1000 MG/1
1000 TABLET, EXTENDED RELEASE ORAL NIGHTLY
Qty: 90 TABLET | Refills: 0 | Status: SHIPPED | OUTPATIENT
Start: 2020-09-18 | End: 2020-12-01 | Stop reason: SDUPTHER

## 2020-09-18 RX ORDER — PIOGLITAZONEHYDROCHLORIDE 30 MG/1
30 TABLET ORAL DAILY
Qty: 90 TABLET | Refills: 0 | Status: SHIPPED | OUTPATIENT
Start: 2020-09-18 | End: 2021-10-25 | Stop reason: SDUPTHER

## 2020-09-30 ENCOUNTER — PATIENT MESSAGE (OUTPATIENT)
Dept: PODIATRY | Facility: CLINIC | Age: 80
End: 2020-09-30

## 2020-10-06 ENCOUNTER — PATIENT MESSAGE (OUTPATIENT)
Dept: FAMILY MEDICINE | Facility: CLINIC | Age: 80
End: 2020-10-06

## 2020-10-17 ENCOUNTER — CLINICAL SUPPORT (OUTPATIENT)
Dept: FAMILY MEDICINE | Facility: CLINIC | Age: 80
End: 2020-10-17
Payer: MEDICARE

## 2020-10-17 DIAGNOSIS — Z23 IMMUNIZATION DUE: Primary | ICD-10-CM

## 2020-10-17 PROCEDURE — 90694 FLU VACCINE - QUADRIVALENT - ADJUVANTED: ICD-10-PCS | Mod: S$GLB,,, | Performed by: FAMILY MEDICINE

## 2020-10-17 PROCEDURE — 90694 VACC AIIV4 NO PRSRV 0.5ML IM: CPT | Mod: S$GLB,,, | Performed by: FAMILY MEDICINE

## 2020-10-17 PROCEDURE — G0008 ADMIN INFLUENZA VIRUS VAC: HCPCS | Mod: S$GLB,,, | Performed by: FAMILY MEDICINE

## 2020-10-17 PROCEDURE — G0008 FLU VACCINE - QUADRIVALENT - ADJUVANTED: ICD-10-PCS | Mod: S$GLB,,, | Performed by: FAMILY MEDICINE

## 2020-10-22 ENCOUNTER — PATIENT MESSAGE (OUTPATIENT)
Dept: PODIATRY | Facility: CLINIC | Age: 80
End: 2020-10-22

## 2020-10-29 DIAGNOSIS — D50.8 IRON DEFICIENCY ANEMIA SECONDARY TO INADEQUATE DIETARY IRON INTAKE: ICD-10-CM

## 2020-10-29 RX ORDER — FERROUS SULFATE 325(65) MG
TABLET ORAL
Qty: 270 TABLET | Refills: 0 | Status: SHIPPED | OUTPATIENT
Start: 2020-10-29 | End: 2020-11-04 | Stop reason: SDUPTHER

## 2020-11-04 DIAGNOSIS — D50.8 IRON DEFICIENCY ANEMIA SECONDARY TO INADEQUATE DIETARY IRON INTAKE: ICD-10-CM

## 2020-11-04 RX ORDER — FERROUS SULFATE 325(65) MG
TABLET ORAL
Qty: 270 TABLET | Refills: 0 | Status: SHIPPED | OUTPATIENT
Start: 2020-11-04 | End: 2021-04-30 | Stop reason: SDUPTHER

## 2020-11-20 ENCOUNTER — TELEPHONE (OUTPATIENT)
Dept: RHEUMATOLOGY | Facility: CLINIC | Age: 80
End: 2020-11-20

## 2020-11-23 ENCOUNTER — TELEPHONE (OUTPATIENT)
Dept: FAMILY MEDICINE | Facility: CLINIC | Age: 80
End: 2020-11-23

## 2020-11-23 ENCOUNTER — TELEPHONE (OUTPATIENT)
Dept: RHEUMATOLOGY | Facility: CLINIC | Age: 80
End: 2020-11-23

## 2020-11-23 ENCOUNTER — PATIENT MESSAGE (OUTPATIENT)
Dept: RHEUMATOLOGY | Facility: CLINIC | Age: 80
End: 2020-11-23

## 2020-11-23 RX ORDER — HYDRALAZINE HYDROCHLORIDE 100 MG/1
100 TABLET, FILM COATED ORAL EVERY 8 HOURS
Qty: 270 TABLET | Refills: 3 | Status: SHIPPED | OUTPATIENT
Start: 2020-11-23 | End: 2021-08-23 | Stop reason: SDUPTHER

## 2020-11-23 NOTE — TELEPHONE ENCOUNTER
I have signed for the following orders AND/OR meds.  Please call the patient and ask the patient to schedule the testing AND/OR inform about any medications that were sent.      No orders of the defined types were placed in this encounter.      Medications Ordered This Encounter   Medications    hydrALAZINE (APRESOLINE) 100 MG tablet     Sig: Take 1 tablet (100 mg total) by mouth every 8 (eight) hours.     Dispense:  270 tablet     Refill:  3     .

## 2020-11-23 NOTE — TELEPHONE ENCOUNTER
The patient's blood pressure continues to run high.  Will you ask the patient if he will consider increasing his hydralazine to adding an extra 100 mg tablet to his lunch that way he would be taking one with breakfast, one with lunch, and one with supper.  Let me know and I will change his prescription if he agrees.  In addition, I will need for him to have a blood pressure check in the office in two weeks.  This can be done in ancillary.

## 2020-11-23 NOTE — TELEPHONE ENCOUNTER
Called to see if pt would like to r/s cxld appt today with Sita Stewart, pt declined, Stated his condition improved, Will call us back if he need an appt, Verbalized understanding.

## 2020-11-23 NOTE — TELEPHONE ENCOUNTER
Spoke with patient and he is agreeable to the increase in medication. He asks that the new rx be sent to [facility] in [place]. Ancillary appointment scheduled.

## 2020-12-01 DIAGNOSIS — E11.69 COMBINED HYPERLIPIDEMIA ASSOCIATED WITH TYPE 2 DIABETES MELLITUS: ICD-10-CM

## 2020-12-01 DIAGNOSIS — I15.2 HYPERTENSION ASSOCIATED WITH DIABETES: ICD-10-CM

## 2020-12-01 DIAGNOSIS — E11.59 HYPERTENSION ASSOCIATED WITH DIABETES: ICD-10-CM

## 2020-12-01 DIAGNOSIS — E78.2 COMBINED HYPERLIPIDEMIA ASSOCIATED WITH TYPE 2 DIABETES MELLITUS: ICD-10-CM

## 2020-12-01 RX ORDER — ATORVASTATIN CALCIUM 20 MG/1
20 TABLET, FILM COATED ORAL DAILY
Qty: 90 TABLET | Refills: 11 | Status: SHIPPED | OUTPATIENT
Start: 2020-12-01 | End: 2020-12-01 | Stop reason: SDUPTHER

## 2020-12-01 RX ORDER — NIACIN 1000 MG/1
1000 TABLET, EXTENDED RELEASE ORAL NIGHTLY
Qty: 90 TABLET | Refills: 0 | Status: SHIPPED | OUTPATIENT
Start: 2020-12-01 | End: 2020-12-01 | Stop reason: SDUPTHER

## 2020-12-01 RX ORDER — BENAZEPRIL HYDROCHLORIDE 40 MG/1
40 TABLET ORAL DAILY
Qty: 90 TABLET | Refills: 3 | Status: SHIPPED | OUTPATIENT
Start: 2020-12-01 | End: 2020-12-01 | Stop reason: SDUPTHER

## 2020-12-01 RX ORDER — POTASSIUM CHLORIDE 750 MG/1
TABLET, EXTENDED RELEASE ORAL
Qty: 90 TABLET | Refills: 3 | Status: SHIPPED | OUTPATIENT
Start: 2020-12-01 | End: 2020-12-01 | Stop reason: SDUPTHER

## 2021-01-05 ENCOUNTER — PATIENT MESSAGE (OUTPATIENT)
Dept: FAMILY MEDICINE | Facility: CLINIC | Age: 81
End: 2021-01-05

## 2021-01-22 ENCOUNTER — PATIENT MESSAGE (OUTPATIENT)
Dept: ADMINISTRATIVE | Facility: OTHER | Age: 81
End: 2021-01-22

## 2021-02-28 DIAGNOSIS — N18.30 CONTROLLED TYPE 2 DIABETES MELLITUS WITH STAGE 3 CHRONIC KIDNEY DISEASE, WITHOUT LONG-TERM CURRENT USE OF INSULIN: ICD-10-CM

## 2021-02-28 DIAGNOSIS — E55.9 VITAMIN D DEFICIENCY: ICD-10-CM

## 2021-02-28 DIAGNOSIS — E11.22 CONTROLLED TYPE 2 DIABETES MELLITUS WITH STAGE 3 CHRONIC KIDNEY DISEASE, WITHOUT LONG-TERM CURRENT USE OF INSULIN: ICD-10-CM

## 2021-02-28 RX ORDER — ERGOCALCIFEROL 1.25 MG/1
CAPSULE ORAL
Qty: 24 CAPSULE | Refills: 3 | Status: SHIPPED | OUTPATIENT
Start: 2021-02-28 | End: 2021-11-08 | Stop reason: SDUPTHER

## 2021-02-28 RX ORDER — ACARBOSE 100 MG/1
TABLET ORAL
Qty: 270 TABLET | Refills: 1 | Status: SHIPPED | OUTPATIENT
Start: 2021-02-28 | End: 2021-08-18

## 2021-03-02 ENCOUNTER — LAB VISIT (OUTPATIENT)
Dept: LAB | Facility: HOSPITAL | Age: 81
End: 2021-03-02
Attending: FAMILY MEDICINE
Payer: MEDICARE

## 2021-03-02 DIAGNOSIS — N18.30 CONTROLLED TYPE 2 DIABETES MELLITUS WITH STAGE 3 CHRONIC KIDNEY DISEASE, WITHOUT LONG-TERM CURRENT USE OF INSULIN: ICD-10-CM

## 2021-03-02 DIAGNOSIS — E11.22 CONTROLLED TYPE 2 DIABETES MELLITUS WITH STAGE 3 CHRONIC KIDNEY DISEASE, WITHOUT LONG-TERM CURRENT USE OF INSULIN: ICD-10-CM

## 2021-03-02 PROCEDURE — 83036 HEMOGLOBIN GLYCOSYLATED A1C: CPT

## 2021-03-02 PROCEDURE — 36415 COLL VENOUS BLD VENIPUNCTURE: CPT | Mod: PO

## 2021-03-03 LAB
ESTIMATED AVG GLUCOSE: 128 MG/DL (ref 68–131)
HBA1C MFR BLD: 6.1 % (ref 4–5.6)

## 2021-03-08 ENCOUNTER — PATIENT MESSAGE (OUTPATIENT)
Dept: FAMILY MEDICINE | Facility: CLINIC | Age: 81
End: 2021-03-08

## 2021-03-11 RX ORDER — METOPROLOL SUCCINATE 25 MG/1
25 TABLET, EXTENDED RELEASE ORAL DAILY
Qty: 30 TABLET | Refills: 11 | Status: SHIPPED | OUTPATIENT
Start: 2021-03-11 | End: 2021-11-08 | Stop reason: SDUPTHER

## 2021-03-25 ENCOUNTER — CLINICAL SUPPORT (OUTPATIENT)
Dept: FAMILY MEDICINE | Facility: CLINIC | Age: 81
End: 2021-03-25
Payer: MEDICARE

## 2021-03-25 VITALS — HEART RATE: 56 BPM | SYSTOLIC BLOOD PRESSURE: 131 MMHG | DIASTOLIC BLOOD PRESSURE: 61 MMHG

## 2021-03-25 DIAGNOSIS — Z01.30 BP CHECK: Primary | ICD-10-CM

## 2021-03-25 PROCEDURE — 99999 PR PBB SHADOW E&M-EST. PATIENT-LVL III: ICD-10-PCS | Mod: PBBFAC,,,

## 2021-03-25 PROCEDURE — 99999 PR PBB SHADOW E&M-EST. PATIENT-LVL III: CPT | Mod: PBBFAC,,,

## 2021-04-29 ENCOUNTER — PATIENT MESSAGE (OUTPATIENT)
Dept: FAMILY MEDICINE | Facility: CLINIC | Age: 81
End: 2021-04-29

## 2021-04-29 DIAGNOSIS — E11.59 HYPERTENSION ASSOCIATED WITH DIABETES: ICD-10-CM

## 2021-04-29 DIAGNOSIS — I15.2 HYPERTENSION ASSOCIATED WITH DIABETES: ICD-10-CM

## 2021-04-29 DIAGNOSIS — D50.8 IRON DEFICIENCY ANEMIA SECONDARY TO INADEQUATE DIETARY IRON INTAKE: ICD-10-CM

## 2021-04-29 DIAGNOSIS — E03.9 HYPOTHYROIDISM, UNSPECIFIED TYPE: ICD-10-CM

## 2021-04-30 RX ORDER — LEVOTHYROXINE SODIUM 50 UG/1
TABLET ORAL
Qty: 30 TABLET | Refills: 11 | Status: SHIPPED | OUTPATIENT
Start: 2021-04-30 | End: 2021-11-08 | Stop reason: SDUPTHER

## 2021-04-30 RX ORDER — FERROUS SULFATE 325(65) MG
TABLET ORAL
Qty: 270 TABLET | Refills: 0 | Status: SHIPPED | OUTPATIENT
Start: 2021-04-30 | End: 2021-07-22

## 2021-04-30 RX ORDER — AMLODIPINE BESYLATE 10 MG/1
10 TABLET ORAL DAILY
Qty: 90 TABLET | Refills: 3 | Status: SHIPPED | OUTPATIENT
Start: 2021-04-30 | End: 2021-11-08 | Stop reason: SDUPTHER

## 2021-05-04 ENCOUNTER — OFFICE VISIT (OUTPATIENT)
Dept: PODIATRY | Facility: CLINIC | Age: 81
End: 2021-05-04
Payer: MEDICARE

## 2021-05-04 VITALS
DIASTOLIC BLOOD PRESSURE: 64 MMHG | HEIGHT: 72 IN | WEIGHT: 219 LBS | BODY MASS INDEX: 29.66 KG/M2 | SYSTOLIC BLOOD PRESSURE: 130 MMHG | HEART RATE: 55 BPM

## 2021-05-04 DIAGNOSIS — S90.221A SUBUNGUAL HEMATOMA OF TOE OF RIGHT FOOT, INITIAL ENCOUNTER: ICD-10-CM

## 2021-05-04 DIAGNOSIS — E11.49 TYPE II DIABETES MELLITUS WITH NEUROLOGICAL MANIFESTATIONS: Primary | ICD-10-CM

## 2021-05-04 DIAGNOSIS — B35.1 DERMATOPHYTOSIS OF NAIL: ICD-10-CM

## 2021-05-04 PROBLEM — L84 CORN OR CALLUS: Status: ACTIVE | Noted: 2021-05-04

## 2021-05-04 PROBLEM — L84 CORN OR CALLUS: Status: RESOLVED | Noted: 2021-05-04 | Resolved: 2021-05-04

## 2021-05-04 PROCEDURE — 99212 PR OFFICE/OUTPT VISIT, EST, LEVL II, 10-19 MIN: ICD-10-PCS | Mod: 25,S$GLB,, | Performed by: PODIATRIST

## 2021-05-04 PROCEDURE — 11721 PR DEBRIDEMENT OF NAILS, 6 OR MORE: ICD-10-PCS | Mod: Q9,S$GLB,, | Performed by: PODIATRIST

## 2021-05-04 PROCEDURE — 99499 RISK ADDL DX/OHS AUDIT: ICD-10-PCS | Mod: S$GLB,,, | Performed by: PODIATRIST

## 2021-05-04 PROCEDURE — 1159F MED LIST DOCD IN RCRD: CPT | Mod: S$GLB,,, | Performed by: PODIATRIST

## 2021-05-04 PROCEDURE — 99212 OFFICE O/P EST SF 10 MIN: CPT | Mod: 25,S$GLB,, | Performed by: PODIATRIST

## 2021-05-04 PROCEDURE — 99499 UNLISTED E&M SERVICE: CPT | Mod: S$GLB,,, | Performed by: PODIATRIST

## 2021-05-04 PROCEDURE — 99999 PR PBB SHADOW E&M-EST. PATIENT-LVL III: CPT | Mod: PBBFAC,,, | Performed by: PODIATRIST

## 2021-05-04 PROCEDURE — 99999 PR PBB SHADOW E&M-EST. PATIENT-LVL III: ICD-10-PCS | Mod: PBBFAC,,, | Performed by: PODIATRIST

## 2021-05-04 PROCEDURE — 1101F PR PT FALLS ASSESS DOC 0-1 FALLS W/OUT INJ PAST YR: ICD-10-PCS | Mod: CPTII,S$GLB,, | Performed by: PODIATRIST

## 2021-05-04 PROCEDURE — 1126F PR PAIN SEVERITY QUANTIFIED, NO PAIN PRESENT: ICD-10-PCS | Mod: S$GLB,,, | Performed by: PODIATRIST

## 2021-05-04 PROCEDURE — 1159F PR MEDICATION LIST DOCUMENTED IN MEDICAL RECORD: ICD-10-PCS | Mod: S$GLB,,, | Performed by: PODIATRIST

## 2021-05-04 PROCEDURE — 11721 DEBRIDE NAIL 6 OR MORE: CPT | Mod: Q9,S$GLB,, | Performed by: PODIATRIST

## 2021-05-04 PROCEDURE — 3288F FALL RISK ASSESSMENT DOCD: CPT | Mod: CPTII,S$GLB,, | Performed by: PODIATRIST

## 2021-05-04 PROCEDURE — 1101F PT FALLS ASSESS-DOCD LE1/YR: CPT | Mod: CPTII,S$GLB,, | Performed by: PODIATRIST

## 2021-05-04 PROCEDURE — 3288F PR FALLS RISK ASSESSMENT DOCUMENTED: ICD-10-PCS | Mod: CPTII,S$GLB,, | Performed by: PODIATRIST

## 2021-05-04 PROCEDURE — 1126F AMNT PAIN NOTED NONE PRSNT: CPT | Mod: S$GLB,,, | Performed by: PODIATRIST

## 2021-05-17 ENCOUNTER — PATIENT MESSAGE (OUTPATIENT)
Dept: RHEUMATOLOGY | Facility: CLINIC | Age: 81
End: 2021-05-17

## 2021-05-26 RX ORDER — ALLOPURINOL 300 MG/1
300 TABLET ORAL DAILY
Qty: 90 TABLET | Refills: 1 | Status: SHIPPED | OUTPATIENT
Start: 2021-05-26 | End: 2021-10-13

## 2021-08-23 ENCOUNTER — TELEPHONE (OUTPATIENT)
Dept: FAMILY MEDICINE | Facility: CLINIC | Age: 81
End: 2021-08-23

## 2021-09-07 ENCOUNTER — PATIENT OUTREACH (OUTPATIENT)
Dept: ADMINISTRATIVE | Facility: HOSPITAL | Age: 81
End: 2021-09-07

## 2021-10-25 DIAGNOSIS — E11.22 CONTROLLED TYPE 2 DIABETES MELLITUS WITH STAGE 3 CHRONIC KIDNEY DISEASE, WITHOUT LONG-TERM CURRENT USE OF INSULIN: ICD-10-CM

## 2021-10-25 DIAGNOSIS — N18.30 CONTROLLED TYPE 2 DIABETES MELLITUS WITH STAGE 3 CHRONIC KIDNEY DISEASE, WITHOUT LONG-TERM CURRENT USE OF INSULIN: ICD-10-CM

## 2021-10-25 RX ORDER — PIOGLITAZONEHYDROCHLORIDE 30 MG/1
30 TABLET ORAL DAILY
Qty: 90 TABLET | Refills: 0 | Status: SHIPPED | OUTPATIENT
Start: 2021-10-25 | End: 2021-11-08 | Stop reason: SDUPTHER

## 2021-11-08 ENCOUNTER — OFFICE VISIT (OUTPATIENT)
Dept: FAMILY MEDICINE | Facility: CLINIC | Age: 81
End: 2021-11-08
Payer: MEDICARE

## 2021-11-08 VITALS
BODY MASS INDEX: 28.85 KG/M2 | WEIGHT: 213 LBS | SYSTOLIC BLOOD PRESSURE: 138 MMHG | HEART RATE: 57 BPM | RESPIRATION RATE: 18 BRPM | TEMPERATURE: 98 F | DIASTOLIC BLOOD PRESSURE: 69 MMHG | HEIGHT: 72 IN

## 2021-11-08 DIAGNOSIS — E11.22 CONTROLLED TYPE 2 DIABETES MELLITUS WITH STAGE 3 CHRONIC KIDNEY DISEASE, WITHOUT LONG-TERM CURRENT USE OF INSULIN: ICD-10-CM

## 2021-11-08 DIAGNOSIS — M10.042 ACUTE IDIOPATHIC GOUT OF LEFT HAND: ICD-10-CM

## 2021-11-08 DIAGNOSIS — E55.9 VITAMIN D DEFICIENCY: ICD-10-CM

## 2021-11-08 DIAGNOSIS — I15.2 HYPERTENSION ASSOCIATED WITH DIABETES: ICD-10-CM

## 2021-11-08 DIAGNOSIS — E78.2 COMBINED HYPERLIPIDEMIA ASSOCIATED WITH TYPE 2 DIABETES MELLITUS: Primary | ICD-10-CM

## 2021-11-08 DIAGNOSIS — E11.59 HYPERTENSION ASSOCIATED WITH DIABETES: ICD-10-CM

## 2021-11-08 DIAGNOSIS — N18.30 CONTROLLED TYPE 2 DIABETES MELLITUS WITH STAGE 3 CHRONIC KIDNEY DISEASE, WITHOUT LONG-TERM CURRENT USE OF INSULIN: ICD-10-CM

## 2021-11-08 DIAGNOSIS — D50.8 IRON DEFICIENCY ANEMIA SECONDARY TO INADEQUATE DIETARY IRON INTAKE: ICD-10-CM

## 2021-11-08 DIAGNOSIS — Z23 IMMUNIZATION DUE: ICD-10-CM

## 2021-11-08 DIAGNOSIS — E03.9 HYPOTHYROIDISM, UNSPECIFIED TYPE: ICD-10-CM

## 2021-11-08 DIAGNOSIS — E11.69 COMBINED HYPERLIPIDEMIA ASSOCIATED WITH TYPE 2 DIABETES MELLITUS: Primary | ICD-10-CM

## 2021-11-08 PROCEDURE — 1126F PR PAIN SEVERITY QUANTIFIED, NO PAIN PRESENT: ICD-10-PCS | Mod: CPTII,S$GLB,, | Performed by: FAMILY MEDICINE

## 2021-11-08 PROCEDURE — 99999 PR PBB SHADOW E&M-EST. PATIENT-LVL III: ICD-10-PCS | Mod: PBBFAC,,, | Performed by: FAMILY MEDICINE

## 2021-11-08 PROCEDURE — 1101F PR PT FALLS ASSESS DOC 0-1 FALLS W/OUT INJ PAST YR: ICD-10-PCS | Mod: CPTII,S$GLB,, | Performed by: FAMILY MEDICINE

## 2021-11-08 PROCEDURE — 1159F PR MEDICATION LIST DOCUMENTED IN MEDICAL RECORD: ICD-10-PCS | Mod: CPTII,S$GLB,, | Performed by: FAMILY MEDICINE

## 2021-11-08 PROCEDURE — 99214 PR OFFICE/OUTPT VISIT, EST, LEVL IV, 30-39 MIN: ICD-10-PCS | Mod: 25,S$GLB,, | Performed by: FAMILY MEDICINE

## 2021-11-08 PROCEDURE — 90694 VACC AIIV4 NO PRSRV 0.5ML IM: CPT | Mod: S$GLB,,, | Performed by: FAMILY MEDICINE

## 2021-11-08 PROCEDURE — 3075F PR MOST RECENT SYSTOLIC BLOOD PRESS GE 130-139MM HG: ICD-10-PCS | Mod: CPTII,S$GLB,, | Performed by: FAMILY MEDICINE

## 2021-11-08 PROCEDURE — 99499 RISK ADDL DX/OHS AUDIT: ICD-10-PCS | Mod: S$GLB,,, | Performed by: FAMILY MEDICINE

## 2021-11-08 PROCEDURE — 3078F DIAST BP <80 MM HG: CPT | Mod: CPTII,S$GLB,, | Performed by: FAMILY MEDICINE

## 2021-11-08 PROCEDURE — G0008 FLU VACCINE - QUADRIVALENT - ADJUVANTED: ICD-10-PCS | Mod: S$GLB,,, | Performed by: FAMILY MEDICINE

## 2021-11-08 PROCEDURE — 1159F MED LIST DOCD IN RCRD: CPT | Mod: CPTII,S$GLB,, | Performed by: FAMILY MEDICINE

## 2021-11-08 PROCEDURE — 3288F FALL RISK ASSESSMENT DOCD: CPT | Mod: CPTII,S$GLB,, | Performed by: FAMILY MEDICINE

## 2021-11-08 PROCEDURE — 3078F PR MOST RECENT DIASTOLIC BLOOD PRESSURE < 80 MM HG: ICD-10-PCS | Mod: CPTII,S$GLB,, | Performed by: FAMILY MEDICINE

## 2021-11-08 PROCEDURE — 1126F AMNT PAIN NOTED NONE PRSNT: CPT | Mod: CPTII,S$GLB,, | Performed by: FAMILY MEDICINE

## 2021-11-08 PROCEDURE — 99999 PR PBB SHADOW E&M-EST. PATIENT-LVL III: CPT | Mod: PBBFAC,,, | Performed by: FAMILY MEDICINE

## 2021-11-08 PROCEDURE — 3288F PR FALLS RISK ASSESSMENT DOCUMENTED: ICD-10-PCS | Mod: CPTII,S$GLB,, | Performed by: FAMILY MEDICINE

## 2021-11-08 PROCEDURE — G0008 ADMIN INFLUENZA VIRUS VAC: HCPCS | Mod: S$GLB,,, | Performed by: FAMILY MEDICINE

## 2021-11-08 PROCEDURE — 99214 OFFICE O/P EST MOD 30 MIN: CPT | Mod: 25,S$GLB,, | Performed by: FAMILY MEDICINE

## 2021-11-08 PROCEDURE — 1101F PT FALLS ASSESS-DOCD LE1/YR: CPT | Mod: CPTII,S$GLB,, | Performed by: FAMILY MEDICINE

## 2021-11-08 PROCEDURE — 90694 FLU VACCINE - QUADRIVALENT - ADJUVANTED: ICD-10-PCS | Mod: S$GLB,,, | Performed by: FAMILY MEDICINE

## 2021-11-08 PROCEDURE — 3075F SYST BP GE 130 - 139MM HG: CPT | Mod: CPTII,S$GLB,, | Performed by: FAMILY MEDICINE

## 2021-11-08 PROCEDURE — 99499 UNLISTED E&M SERVICE: CPT | Mod: S$GLB,,, | Performed by: FAMILY MEDICINE

## 2021-11-08 RX ORDER — FERROUS SULFATE 325(65) MG
TABLET ORAL
Qty: 270 TABLET | Refills: 0 | Status: SHIPPED | OUTPATIENT
Start: 2021-11-08 | End: 2022-01-19

## 2021-11-08 RX ORDER — PIOGLITAZONEHYDROCHLORIDE 30 MG/1
30 TABLET ORAL DAILY
Qty: 90 TABLET | Refills: 0 | Status: SHIPPED | OUTPATIENT
Start: 2021-11-08 | End: 2022-04-22

## 2021-11-08 RX ORDER — POTASSIUM CHLORIDE 750 MG/1
10 TABLET, EXTENDED RELEASE ORAL DAILY
Qty: 90 TABLET | Refills: 3 | Status: SHIPPED | OUTPATIENT
Start: 2021-11-08 | End: 2022-11-30 | Stop reason: SDUPTHER

## 2021-11-08 RX ORDER — BENAZEPRIL HYDROCHLORIDE 40 MG/1
40 TABLET ORAL DAILY
Qty: 90 TABLET | Refills: 3 | Status: SHIPPED | OUTPATIENT
Start: 2021-11-08 | End: 2022-11-30 | Stop reason: SDUPTHER

## 2021-11-08 RX ORDER — LEVOTHYROXINE SODIUM 50 UG/1
TABLET ORAL
Qty: 30 TABLET | Refills: 11 | Status: SHIPPED | OUTPATIENT
Start: 2021-11-08 | End: 2022-01-19

## 2021-11-08 RX ORDER — NIACIN 1000 MG/1
TABLET, EXTENDED RELEASE ORAL
Qty: 90 TABLET | Refills: 3 | Status: SHIPPED | OUTPATIENT
Start: 2021-11-08 | End: 2022-07-19

## 2021-11-08 RX ORDER — GLIPIZIDE 10 MG/1
TABLET ORAL
Qty: 180 TABLET | Refills: 1 | Status: SHIPPED | OUTPATIENT
Start: 2021-11-08 | End: 2022-04-19

## 2021-11-08 RX ORDER — DONEPEZIL HYDROCHLORIDE 10 MG/1
10 TABLET, FILM COATED ORAL DAILY
Qty: 90 TABLET | Refills: 3 | Status: SHIPPED | OUTPATIENT
Start: 2021-11-08 | End: 2022-10-16 | Stop reason: SDUPTHER

## 2021-11-08 RX ORDER — ERGOCALCIFEROL 1.25 MG/1
CAPSULE ORAL
Qty: 12 CAPSULE | Refills: 3 | Status: SHIPPED | OUTPATIENT
Start: 2021-11-08 | End: 2022-10-16 | Stop reason: SDUPTHER

## 2021-11-08 RX ORDER — ATORVASTATIN CALCIUM 20 MG/1
20 TABLET, FILM COATED ORAL DAILY
Qty: 90 TABLET | Refills: 11 | Status: SHIPPED | OUTPATIENT
Start: 2021-11-08 | End: 2022-11-30 | Stop reason: SDUPTHER

## 2021-11-08 RX ORDER — ALLOPURINOL 300 MG/1
300 TABLET ORAL DAILY
Qty: 90 TABLET | Refills: 3 | Status: SHIPPED | OUTPATIENT
Start: 2021-11-08 | End: 2022-11-30 | Stop reason: SDUPTHER

## 2021-11-08 RX ORDER — COLCHICINE 0.6 MG/1
TABLET ORAL
Qty: 90 TABLET | Refills: 3 | Status: SHIPPED | OUTPATIENT
Start: 2021-11-08 | End: 2022-10-16

## 2021-11-08 RX ORDER — HYDRALAZINE HYDROCHLORIDE 100 MG/1
TABLET, FILM COATED ORAL
Qty: 270 TABLET | Refills: 3 | Status: SHIPPED | OUTPATIENT
Start: 2021-11-08 | End: 2022-10-16 | Stop reason: SDUPTHER

## 2021-11-08 RX ORDER — FUROSEMIDE 40 MG/1
40 TABLET ORAL DAILY
Qty: 90 TABLET | Refills: 3 | Status: SHIPPED | OUTPATIENT
Start: 2021-11-08 | End: 2022-10-16

## 2021-11-08 RX ORDER — ACARBOSE 100 MG/1
TABLET ORAL
Qty: 270 TABLET | Refills: 1 | Status: SHIPPED | OUTPATIENT
Start: 2021-11-08 | End: 2022-04-19

## 2021-11-08 RX ORDER — AMLODIPINE BESYLATE 10 MG/1
10 TABLET ORAL DAILY
Qty: 90 TABLET | Refills: 3 | Status: SHIPPED | OUTPATIENT
Start: 2021-11-08 | End: 2022-11-30

## 2021-11-08 RX ORDER — METOPROLOL SUCCINATE 25 MG/1
25 TABLET, EXTENDED RELEASE ORAL DAILY
Qty: 30 TABLET | Refills: 11 | Status: SHIPPED | OUTPATIENT
Start: 2021-11-08 | End: 2022-10-16 | Stop reason: SDUPTHER

## 2021-11-09 ENCOUNTER — LAB VISIT (OUTPATIENT)
Dept: LAB | Facility: HOSPITAL | Age: 81
End: 2021-11-09
Attending: FAMILY MEDICINE
Payer: MEDICARE

## 2021-11-09 DIAGNOSIS — D50.8 IRON DEFICIENCY ANEMIA SECONDARY TO INADEQUATE DIETARY IRON INTAKE: ICD-10-CM

## 2021-11-09 DIAGNOSIS — N18.30 CONTROLLED TYPE 2 DIABETES MELLITUS WITH STAGE 3 CHRONIC KIDNEY DISEASE, WITHOUT LONG-TERM CURRENT USE OF INSULIN: ICD-10-CM

## 2021-11-09 DIAGNOSIS — E11.22 CONTROLLED TYPE 2 DIABETES MELLITUS WITH STAGE 3 CHRONIC KIDNEY DISEASE, WITHOUT LONG-TERM CURRENT USE OF INSULIN: ICD-10-CM

## 2021-11-09 DIAGNOSIS — M10.042 ACUTE IDIOPATHIC GOUT OF LEFT HAND: ICD-10-CM

## 2021-11-09 DIAGNOSIS — E11.69 COMBINED HYPERLIPIDEMIA ASSOCIATED WITH TYPE 2 DIABETES MELLITUS: ICD-10-CM

## 2021-11-09 DIAGNOSIS — E11.59 HYPERTENSION ASSOCIATED WITH DIABETES: ICD-10-CM

## 2021-11-09 DIAGNOSIS — E03.9 HYPOTHYROIDISM, UNSPECIFIED TYPE: ICD-10-CM

## 2021-11-09 DIAGNOSIS — I15.2 HYPERTENSION ASSOCIATED WITH DIABETES: ICD-10-CM

## 2021-11-09 DIAGNOSIS — E55.9 VITAMIN D DEFICIENCY: ICD-10-CM

## 2021-11-09 DIAGNOSIS — E78.2 COMBINED HYPERLIPIDEMIA ASSOCIATED WITH TYPE 2 DIABETES MELLITUS: ICD-10-CM

## 2021-11-09 LAB
25(OH)D3+25(OH)D2 SERPL-MCNC: 26 NG/ML (ref 30–96)
ALBUMIN SERPL BCP-MCNC: 3.8 G/DL (ref 3.5–5.2)
ALP SERPL-CCNC: 61 U/L (ref 55–135)
ALT SERPL W/O P-5'-P-CCNC: 12 U/L (ref 10–44)
ANION GAP SERPL CALC-SCNC: 9 MMOL/L (ref 8–16)
AST SERPL-CCNC: 16 U/L (ref 10–40)
BASOPHILS # BLD AUTO: 0.03 K/UL (ref 0–0.2)
BASOPHILS NFR BLD: 0.5 % (ref 0–1.9)
BILIRUB SERPL-MCNC: 1.3 MG/DL (ref 0.1–1)
BUN SERPL-MCNC: 22 MG/DL (ref 8–23)
CALCIUM SERPL-MCNC: 9.6 MG/DL (ref 8.7–10.5)
CHLORIDE SERPL-SCNC: 108 MMOL/L (ref 95–110)
CHOLEST SERPL-MCNC: 107 MG/DL (ref 120–199)
CHOLEST/HDLC SERPL: 2.5 {RATIO} (ref 2–5)
CO2 SERPL-SCNC: 26 MMOL/L (ref 23–29)
CREAT SERPL-MCNC: 1 MG/DL (ref 0.5–1.4)
DIFFERENTIAL METHOD: ABNORMAL
EOSINOPHIL # BLD AUTO: 0.1 K/UL (ref 0–0.5)
EOSINOPHIL NFR BLD: 2.2 % (ref 0–8)
ERYTHROCYTE [DISTWIDTH] IN BLOOD BY AUTOMATED COUNT: 14.4 % (ref 11.5–14.5)
EST. GFR  (AFRICAN AMERICAN): >60 ML/MIN/1.73 M^2
EST. GFR  (NON AFRICAN AMERICAN): >60 ML/MIN/1.73 M^2
ESTIMATED AVG GLUCOSE: 137 MG/DL (ref 68–131)
GLUCOSE SERPL-MCNC: 105 MG/DL (ref 70–110)
HBA1C MFR BLD: 6.4 % (ref 4–5.6)
HCT VFR BLD AUTO: 40.4 % (ref 40–54)
HDLC SERPL-MCNC: 42 MG/DL (ref 40–75)
HDLC SERPL: 39.3 % (ref 20–50)
HGB BLD-MCNC: 13 G/DL (ref 14–18)
IMM GRANULOCYTES # BLD AUTO: 0.03 K/UL (ref 0–0.04)
IMM GRANULOCYTES NFR BLD AUTO: 0.5 % (ref 0–0.5)
IRON SERPL-MCNC: 51 UG/DL (ref 45–160)
LDLC SERPL CALC-MCNC: 51 MG/DL (ref 63–159)
LYMPHOCYTES # BLD AUTO: 1.4 K/UL (ref 1–4.8)
LYMPHOCYTES NFR BLD: 22.8 % (ref 18–48)
MCH RBC QN AUTO: 30.7 PG (ref 27–31)
MCHC RBC AUTO-ENTMCNC: 32.2 G/DL (ref 32–36)
MCV RBC AUTO: 95 FL (ref 82–98)
MONOCYTES # BLD AUTO: 0.6 K/UL (ref 0.3–1)
MONOCYTES NFR BLD: 9.6 % (ref 4–15)
NEUTROPHILS # BLD AUTO: 4.1 K/UL (ref 1.8–7.7)
NEUTROPHILS NFR BLD: 64.4 % (ref 38–73)
NONHDLC SERPL-MCNC: 65 MG/DL
NRBC BLD-RTO: 0 /100 WBC
PLATELET # BLD AUTO: 153 K/UL (ref 150–450)
PMV BLD AUTO: 11.6 FL (ref 9.2–12.9)
POTASSIUM SERPL-SCNC: 3.6 MMOL/L (ref 3.5–5.1)
PROT SERPL-MCNC: 6.5 G/DL (ref 6–8.4)
RBC # BLD AUTO: 4.24 M/UL (ref 4.6–6.2)
SATURATED IRON: 20 % (ref 20–50)
SODIUM SERPL-SCNC: 143 MMOL/L (ref 136–145)
TOTAL IRON BINDING CAPACITY: 253 UG/DL (ref 250–450)
TRANSFERRIN SERPL-MCNC: 171 MG/DL (ref 200–375)
TRIGL SERPL-MCNC: 70 MG/DL (ref 30–150)
TSH SERPL DL<=0.005 MIU/L-ACNC: 2.74 UIU/ML (ref 0.4–4)
URATE SERPL-MCNC: 4.1 MG/DL (ref 3.4–7)
WBC # BLD AUTO: 6.28 K/UL (ref 3.9–12.7)

## 2021-11-09 PROCEDURE — 85025 COMPLETE CBC W/AUTO DIFF WBC: CPT | Performed by: FAMILY MEDICINE

## 2021-11-09 PROCEDURE — 83036 HEMOGLOBIN GLYCOSYLATED A1C: CPT | Performed by: FAMILY MEDICINE

## 2021-11-09 PROCEDURE — 80053 COMPREHEN METABOLIC PANEL: CPT | Performed by: FAMILY MEDICINE

## 2021-11-09 PROCEDURE — 84550 ASSAY OF BLOOD/URIC ACID: CPT | Performed by: FAMILY MEDICINE

## 2021-11-09 PROCEDURE — 82306 VITAMIN D 25 HYDROXY: CPT | Performed by: FAMILY MEDICINE

## 2021-11-09 PROCEDURE — 84443 ASSAY THYROID STIM HORMONE: CPT | Performed by: FAMILY MEDICINE

## 2021-11-09 PROCEDURE — 84466 ASSAY OF TRANSFERRIN: CPT | Performed by: FAMILY MEDICINE

## 2021-11-09 PROCEDURE — 36415 COLL VENOUS BLD VENIPUNCTURE: CPT | Mod: PO | Performed by: FAMILY MEDICINE

## 2021-11-09 PROCEDURE — 80061 LIPID PANEL: CPT | Performed by: FAMILY MEDICINE

## 2022-01-19 DIAGNOSIS — D50.8 IRON DEFICIENCY ANEMIA SECONDARY TO INADEQUATE DIETARY IRON INTAKE: ICD-10-CM

## 2022-01-19 DIAGNOSIS — E03.9 HYPOTHYROIDISM, UNSPECIFIED TYPE: ICD-10-CM

## 2022-01-19 RX ORDER — LEVOTHYROXINE SODIUM 50 UG/1
TABLET ORAL
Qty: 30 TABLET | Refills: 11 | Status: SHIPPED | OUTPATIENT
Start: 2022-01-19 | End: 2022-11-30 | Stop reason: SDUPTHER

## 2022-01-19 RX ORDER — FERROUS SULFATE 325(65) MG
TABLET ORAL
Qty: 270 TABLET | Refills: 0 | Status: SHIPPED | OUTPATIENT
Start: 2022-01-19 | End: 2022-04-19

## 2022-01-19 NOTE — TELEPHONE ENCOUNTER
No new care gaps identified.  Powered by [employer]. Reference number: [id number].   1/19/2022 6:10:14 AM CST

## 2022-02-03 ENCOUNTER — PES CALL (OUTPATIENT)
Dept: ADMINISTRATIVE | Facility: CLINIC | Age: 82
End: 2022-02-03
Payer: MEDICARE

## 2022-02-10 ENCOUNTER — OFFICE VISIT (OUTPATIENT)
Dept: PULMONOLOGY | Facility: CLINIC | Age: 82
End: 2022-02-10
Payer: MEDICARE

## 2022-02-10 ENCOUNTER — HOSPITAL ENCOUNTER (OUTPATIENT)
Dept: RADIOLOGY | Facility: HOSPITAL | Age: 82
Discharge: HOME | End: 2022-02-10
Attending: PHYSICIAN ASSISTANT
Payer: MEDICARE

## 2022-02-10 VITALS
SYSTOLIC BLOOD PRESSURE: 110 MMHG | RESPIRATION RATE: 16 BRPM | HEIGHT: 73 IN | WEIGHT: 211.19 LBS | HEART RATE: 65 BPM | BODY MASS INDEX: 27.99 KG/M2 | DIASTOLIC BLOOD PRESSURE: 78 MMHG | OXYGEN SATURATION: 96 %

## 2022-02-10 DIAGNOSIS — E11.59 HYPERTENSION ASSOCIATED WITH DIABETES: ICD-10-CM

## 2022-02-10 DIAGNOSIS — I15.2 HYPERTENSION ASSOCIATED WITH DIABETES: ICD-10-CM

## 2022-02-10 DIAGNOSIS — G47.33 OSA ON CPAP: Primary | ICD-10-CM

## 2022-02-10 DIAGNOSIS — G47.33 OSA ON CPAP: ICD-10-CM

## 2022-02-10 PROCEDURE — 99999 PR PBB SHADOW E&M-EST. PATIENT-LVL IV: ICD-10-PCS | Mod: PBBFAC,,, | Performed by: PHYSICIAN ASSISTANT

## 2022-02-10 PROCEDURE — 1160F RVW MEDS BY RX/DR IN RCRD: CPT | Mod: CPTII,S$GLB,, | Performed by: PHYSICIAN ASSISTANT

## 2022-02-10 PROCEDURE — 71046 XR CHEST PA AND LATERAL: ICD-10-PCS | Mod: 26,,, | Performed by: RADIOLOGY

## 2022-02-10 PROCEDURE — 1159F PR MEDICATION LIST DOCUMENTED IN MEDICAL RECORD: ICD-10-PCS | Mod: CPTII,S$GLB,, | Performed by: PHYSICIAN ASSISTANT

## 2022-02-10 PROCEDURE — 3078F DIAST BP <80 MM HG: CPT | Mod: CPTII,S$GLB,, | Performed by: PHYSICIAN ASSISTANT

## 2022-02-10 PROCEDURE — 1159F MED LIST DOCD IN RCRD: CPT | Mod: CPTII,S$GLB,, | Performed by: PHYSICIAN ASSISTANT

## 2022-02-10 PROCEDURE — 3078F PR MOST RECENT DIASTOLIC BLOOD PRESSURE < 80 MM HG: ICD-10-PCS | Mod: CPTII,S$GLB,, | Performed by: PHYSICIAN ASSISTANT

## 2022-02-10 PROCEDURE — 99213 PR OFFICE/OUTPT VISIT, EST, LEVL III, 20-29 MIN: ICD-10-PCS | Mod: S$GLB,,, | Performed by: PHYSICIAN ASSISTANT

## 2022-02-10 PROCEDURE — 1160F PR REVIEW ALL MEDS BY PRESCRIBER/CLIN PHARMACIST DOCUMENTED: ICD-10-PCS | Mod: CPTII,S$GLB,, | Performed by: PHYSICIAN ASSISTANT

## 2022-02-10 PROCEDURE — 71046 X-RAY EXAM CHEST 2 VIEWS: CPT | Mod: 26,,, | Performed by: RADIOLOGY

## 2022-02-10 PROCEDURE — 99213 OFFICE O/P EST LOW 20 MIN: CPT | Mod: S$GLB,,, | Performed by: PHYSICIAN ASSISTANT

## 2022-02-10 PROCEDURE — 3074F SYST BP LT 130 MM HG: CPT | Mod: CPTII,S$GLB,, | Performed by: PHYSICIAN ASSISTANT

## 2022-02-10 PROCEDURE — 3074F PR MOST RECENT SYSTOLIC BLOOD PRESSURE < 130 MM HG: ICD-10-PCS | Mod: CPTII,S$GLB,, | Performed by: PHYSICIAN ASSISTANT

## 2022-02-10 PROCEDURE — 99999 PR PBB SHADOW E&M-EST. PATIENT-LVL IV: CPT | Mod: PBBFAC,,, | Performed by: PHYSICIAN ASSISTANT

## 2022-02-10 PROCEDURE — 71046 X-RAY EXAM CHEST 2 VIEWS: CPT | Mod: TC

## 2022-02-10 NOTE — PROGRESS NOTES
Subjective:       Patient ID: Rudy Saldaña is a 81 y.o. male.    Chief Complaint: Apnea      80yo male here for follow up of DEVIKA on CPAP  Patient of Dr. Marie  Using CPAP 8, Duramed, nasal pillow mask  Patient states improved symptoms with use of CPAP. Sleeping more soundly. Waking up feeling more refreshed. Improved daytime sleepiness.  Machine recalled, was told machine would not be in for a year, his insurance informed him to come to doctor office to receive new cpap order  Machine stopped working August 2021, prior to this he was using nightly      EPWORTH SLEEPINESS SCALE 2/10/2022   Sitting and reading 2   Watching TV 2   Sitting, inactive in a public place (e.g. a theatre or a meeting) 2   As a passenger in a car for an hour without a break 2   Lying down to rest in the afternoon when circumstances permit 2   Sitting and talking to someone 0   Sitting quietly after a lunch without alcohol 2   In a car, while stopped for a few minutes in traffic 0   Total score 12       Immunization History   Administered Date(s) Administered    COVID-19, MRNA, LN-S, PF (Pfizer) (Purple Cap) 02/05/2021, 02/26/2021    Influenza 10/19/2010    Influenza (FLUAD) - Quadrivalent - Adjuvanted - PF *Preferred* (65+) 10/17/2020, 11/08/2021    Influenza - High Dose - PF (65 years and older) 09/27/2011, 09/19/2012, 10/16/2013, 10/17/2014, 09/25/2017, 10/16/2018, 10/16/2019    Influenza - Quadrivalent - PF *Preferred* (6 months and older) 11/06/2007, 10/24/2008, 09/17/2009, 10/19/2010, 11/05/2015    Influenza Split 11/06/2007, 10/24/2008, 09/17/2009, 11/05/2015    Pneumococcal Conjugate - 13 Valent 02/29/2016    Pneumococcal Polysaccharide - 23 Valent 09/19/2012    Tdap 08/22/2016    Zoster 06/10/2014      Tobacco Use: Low Risk     Smoking Tobacco Use: Never Smoker    Smokeless Tobacco Use: Never Used      Past Medical History:   Diagnosis Date    Anemia     BPH (benign prostatic hypertrophy)     Carotid bruit     CKD  (chronic kidney disease) stage 2/3     Dr. Nails    CVA (cerebral vascular accident)     Diabetes mellitus, type 2     Hyperlipidemia     Hypertension     Hypothyroidism 2/29/2016    Type 2 diabetes mellitus, uncontrolled, with renal complications     The patient presents with diabetes.  The patient denies polyuria, polydipsia, polyphagia, hypoglycemia and paresthesias.  The patient's glucose control has been good.  Home glucose averages are routinely checked.  The patient is without retinopathy currently.  The patient has no history of neuropathy.  The patient currently complains of no podiatric problems.  The patient has excellent compliance.    Type II or unspecified type diabetes mellitus without mention of complication, not stated as uncontrolled     Vitamin D deficiency 2/29/2016      Current Outpatient Medications on File Prior to Visit   Medication Sig Dispense Refill    acarbose (PRECOSE) 100 MG Tab Take 1 po tid with meals. 270 tablet 1    allopurinoL (ZYLOPRIM) 300 MG tablet Take 1 tablet (300 mg total) by mouth once daily. 90 tablet 3    amLODIPine (NORVASC) 10 MG tablet Take 1 tablet (10 mg total) by mouth once daily. 90 tablet 3    aspirin (ECOTRIN) 81 MG EC tablet Take 81 mg by mouth once daily.      atorvastatin (LIPITOR) 20 MG tablet Take 1 tablet (20 mg total) by mouth once daily. 90 tablet 11    benazepriL (LOTENSIN) 40 MG tablet Take 1 tablet (40 mg total) by mouth once daily. 90 tablet 3    blood sugar diagnostic Strp 1 each by Misc.(Non-Drug; Combo Route) route 3 (three) times daily as needed. One touch ultra 2 test strips 100 each 5    colchicine (COLCRYS) 0.6 mg tablet TAKE 1 TABLET(0.6 MG) BY MOUTH EVERY DAY 90 tablet 3    donepeziL (ARICEPT) 10 MG tablet Take 1 tablet (10 mg total) by mouth once daily. 90 tablet 3    ergocalciferol (ERGOCALCIFEROL) 50,000 unit Cap TAKE 1 CAPSULE BY MOUTH once a WEEK 12 capsule 3    ferrous sulfate (FEROSUL) 325 mg (65 mg iron) Tab  tablet TAKE 1 TABLET BY MOUTH THREE TIMES DAILY 270 tablet 0    furosemide (LASIX) 40 MG tablet Take 1 tablet (40 mg total) by mouth once daily. 90 tablet 3    glipiZIDE (GLUCOTROL) 10 MG tablet TAKE 1 TABLET(10 MG) BY MOUTH TWICE DAILY BEFORE MEALS 180 tablet 1    hydrALAZINE (APRESOLINE) 100 MG tablet TAKE 1 TABLET(100 MG) BY MOUTH EVERY 8 HOURS 270 tablet 3    Lactobacillus rhamnosus GG (CULTURELLE) 10 billion cell capsule Take 1 capsule by mouth once daily.      lancets (TRUEPLUS LANCETS) 33 gauge Misc 1 lancet by Misc.(Non-Drug; Combo Route) route once daily. 100 each 5    levothyroxine (SYNTHROID) 50 MCG tablet TAKE 1 TABLET(50 MCG) BY MOUTH EVERY DAY 30 tablet 11    metoprolol succinate (TOPROL-XL) 25 MG 24 hr tablet Take 1 tablet (25 mg total) by mouth once daily. 30 tablet 11    niacin (NIASPAN) 1000 MG CR tablet TAKE 1 TABLET(1000 MG) BY MOUTH EVERY EVENING 90 tablet 3    pioglitazone (ACTOS) 30 MG tablet Take 1 tablet (30 mg total) by mouth once daily. 90 tablet 0    potassium chloride (KLOR-CON) 10 MEQ TbSR Take 1 tablet (10 mEq total) by mouth once daily. 90 tablet 3    SITagliptin (JANUVIA) 100 MG Tab TAKE 1 TABLET(100 MG) BY MOUTH EVERY DAY 90 tablet 0    ONETOUCH VERIO IQ METER kit Use as instructed 1 each 0     No current facility-administered medications on file prior to visit.        Review of Systems   Constitutional: Negative for fever, weight loss, appetite change, fatigue and weakness.   HENT: Negative for postnasal drip, rhinorrhea, sinus pressure, trouble swallowing and congestion.    Respiratory: Positive for snoring and somnolence. Negative for cough, sputum production, choking, chest tightness, shortness of breath, wheezing and dyspnea on extertion.    Cardiovascular: Negative for chest pain and leg swelling.   Musculoskeletal: Negative for arthralgias, gait problem and joint swelling.   Gastrointestinal: Negative for nausea, vomiting and abdominal pain.   Neurological:  "Negative for dizziness, weakness and headaches.   All other systems reviewed and are negative.      Objective:       Vitals:    02/10/22 1504   BP: 110/78   Pulse: 65   Resp: 16   SpO2: 96%   Weight: 95.8 kg (211 lb 3.2 oz)   Height: 6' 1" (1.854 m)       Physical Exam   Constitutional: He is oriented to person, place, and time. He appears well-developed and well-nourished. No distress.   HENT:   Head: Normocephalic.   Nose: Nose normal.   Mouth/Throat: Oropharynx is clear and moist.   Cardiovascular: Normal rate and regular rhythm.   Pulmonary/Chest: Effort normal and breath sounds normal. No respiratory distress. He has no wheezes. He has no rhonchi. He has no rales.   Musculoskeletal:         General: No edema.      Cervical back: Normal range of motion and neck supple.   Lymphadenopathy: No supraclavicular adenopathy is present.     He has no cervical adenopathy.   Neurological: He is alert and oriented to person, place, and time. Gait normal.   Skin: Skin is warm and dry.   Psychiatric: He has a normal mood and affect.     Personal Diagnostic Review    Compliance Summary  6/12/2020 - 9/9/2020 (90 days)  Days with Device Usage 87 days  Days without Device Usage 3 days  Percent Days with Device Usage 96.7%  Cumulative Usage 15 days 11 hrs. 40 mins. 52 secs.  Maximum Usage (1 Day) 6 hrs. 35 mins. 54 secs.  Average Usage (All Days) 4 hrs. 7 mins. 47 secs.  Average Usage (Days Used) 4 hrs. 16 mins. 19 secs.  Minimum Usage (1 Day) 2 hrs. 20 mins. 9 secs.  Percent of Days with Usage >= 4 Hours 65.6%  Percent of Days with Usage < 4 Hours 34.4%  Date Range  Total Blower Time 15 days 11 hrs. 57 mins. 52 secs.  CPAP Summary ([facility])  Average Time in Large Leak Per Day 1 secs.  Average AHI 4.2  CPAP 8.0 cmH2O      Assessment/Plan:       Problem List Items Addressed This Visit        Cardiac/Vascular    Hypertension associated with diabetes     Stable, F/u regularly with PCP              Endocrine    Type 2 " diabetes mellitus, uncontrolled, with renal complications     F/u regularly with PCP              Other    DEVIKA on CPAP - Primary (Chronic)     New CPAP order today, nasal pillow, CPAP 8  Benefits from use, Ep 12 without use  Discussed therapeutic goals for CPAP: Ideal usage 100% of nights for 6-8 hours per night. Minimum usage is 70% of night for at least 4 hours per night.  Chest X Ray today         Relevant Orders    CPAP/BIPAP SUPPLIES    CPAP FOR HOME USE    MyChart Patient Entered CPAP Usage    X-Ray Chest PA And Lateral        Follow up after initiation of CPAP and at least 31 days of use.    Discussed diagnosis, its evaluation, treatment and usual course. All questions answered.    Patient verbalized understanding of plan and left in no acute distress    Thank you for the courtesy of participating in the care of this patient    Roxy Sutton PA-C

## 2022-02-10 NOTE — ASSESSMENT & PLAN NOTE
New CPAP order today, nasal pillow, CPAP 8  Benefits from use, Ep 12 without use  Discussed therapeutic goals for CPAP: Ideal usage 100% of nights for 6-8 hours per night. Minimum usage is 70% of night for at least 4 hours per night.  Chest X Ray today

## 2022-02-15 ENCOUNTER — OFFICE VISIT (OUTPATIENT)
Dept: PODIATRY | Facility: CLINIC | Age: 82
End: 2022-02-15
Payer: MEDICARE

## 2022-02-15 VITALS
WEIGHT: 211.19 LBS | SYSTOLIC BLOOD PRESSURE: 146 MMHG | BODY MASS INDEX: 27.99 KG/M2 | HEART RATE: 62 BPM | HEIGHT: 73 IN | DIASTOLIC BLOOD PRESSURE: 64 MMHG

## 2022-02-15 DIAGNOSIS — E11.49 TYPE II DIABETES MELLITUS WITH NEUROLOGICAL MANIFESTATIONS: Primary | ICD-10-CM

## 2022-02-15 DIAGNOSIS — M20.42 HAMMER TOES OF BOTH FEET: ICD-10-CM

## 2022-02-15 DIAGNOSIS — L84 CORN OR CALLUS: ICD-10-CM

## 2022-02-15 DIAGNOSIS — M20.41 HAMMER TOES OF BOTH FEET: ICD-10-CM

## 2022-02-15 DIAGNOSIS — B35.1 DERMATOPHYTOSIS OF NAIL: ICD-10-CM

## 2022-02-15 PROCEDURE — 1159F MED LIST DOCD IN RCRD: CPT | Mod: CPTII,S$GLB,, | Performed by: PODIATRIST

## 2022-02-15 PROCEDURE — 3288F PR FALLS RISK ASSESSMENT DOCUMENTED: ICD-10-PCS | Mod: CPTII,S$GLB,, | Performed by: PODIATRIST

## 2022-02-15 PROCEDURE — 3078F DIAST BP <80 MM HG: CPT | Mod: CPTII,S$GLB,, | Performed by: PODIATRIST

## 2022-02-15 PROCEDURE — 1159F PR MEDICATION LIST DOCUMENTED IN MEDICAL RECORD: ICD-10-PCS | Mod: CPTII,S$GLB,, | Performed by: PODIATRIST

## 2022-02-15 PROCEDURE — 11721 DEBRIDE NAIL 6 OR MORE: CPT | Mod: Q9,S$GLB,, | Performed by: PODIATRIST

## 2022-02-15 PROCEDURE — 11721 PR DEBRIDEMENT OF NAILS, 6 OR MORE: ICD-10-PCS | Mod: Q9,S$GLB,, | Performed by: PODIATRIST

## 2022-02-15 PROCEDURE — 99999 PR PBB SHADOW E&M-EST. PATIENT-LVL IV: CPT | Mod: PBBFAC,,, | Performed by: PODIATRIST

## 2022-02-15 PROCEDURE — 1160F PR REVIEW ALL MEDS BY PRESCRIBER/CLIN PHARMACIST DOCUMENTED: ICD-10-PCS | Mod: CPTII,S$GLB,, | Performed by: PODIATRIST

## 2022-02-15 PROCEDURE — 3077F PR MOST RECENT SYSTOLIC BLOOD PRESSURE >= 140 MM HG: ICD-10-PCS | Mod: CPTII,S$GLB,, | Performed by: PODIATRIST

## 2022-02-15 PROCEDURE — 3288F FALL RISK ASSESSMENT DOCD: CPT | Mod: CPTII,S$GLB,, | Performed by: PODIATRIST

## 2022-02-15 PROCEDURE — 99213 OFFICE O/P EST LOW 20 MIN: CPT | Mod: 25,S$GLB,, | Performed by: PODIATRIST

## 2022-02-15 PROCEDURE — 1101F PR PT FALLS ASSESS DOC 0-1 FALLS W/OUT INJ PAST YR: ICD-10-PCS | Mod: CPTII,S$GLB,, | Performed by: PODIATRIST

## 2022-02-15 PROCEDURE — 1160F RVW MEDS BY RX/DR IN RCRD: CPT | Mod: CPTII,S$GLB,, | Performed by: PODIATRIST

## 2022-02-15 PROCEDURE — 1101F PT FALLS ASSESS-DOCD LE1/YR: CPT | Mod: CPTII,S$GLB,, | Performed by: PODIATRIST

## 2022-02-15 PROCEDURE — 99999 PR PBB SHADOW E&M-EST. PATIENT-LVL IV: ICD-10-PCS | Mod: PBBFAC,,, | Performed by: PODIATRIST

## 2022-02-15 PROCEDURE — 3077F SYST BP >= 140 MM HG: CPT | Mod: CPTII,S$GLB,, | Performed by: PODIATRIST

## 2022-02-15 PROCEDURE — 99213 PR OFFICE/OUTPT VISIT, EST, LEVL III, 20-29 MIN: ICD-10-PCS | Mod: 25,S$GLB,, | Performed by: PODIATRIST

## 2022-02-15 PROCEDURE — 3078F PR MOST RECENT DIASTOLIC BLOOD PRESSURE < 80 MM HG: ICD-10-PCS | Mod: CPTII,S$GLB,, | Performed by: PODIATRIST

## 2022-02-28 NOTE — PROGRESS NOTES
Subjective:     Patient ID: Rudy Saldaña is a 81 y.o. male.    Chief Complaint: Nail Care (6mo nail care, diabetic pt wears tennis shoes, PCP Dr. Morfin last seen 11-8-21)    Rudy is a 81 y.o. male who presents to the clinic for evaluation and treatment of high risk feet. Rudy has a past medical history of Anemia, BPH (benign prostatic hypertrophy), Carotid bruit, CKD (chronic kidney disease) stage 2/3, CVA (cerebral vascular accident), Diabetes mellitus, type 2, Hyperlipidemia, Hypertension, Hypothyroidism (2/29/2016), Type 2 diabetes mellitus, uncontrolled, with renal complications, Type II or unspecified type diabetes mellitus without mention of complication, not stated as uncontrolled, and Vitamin D deficiency (2/29/2016). The patient's chief complaint is long, thick toenails. This patient has documented high risk feet requiring routine maintenance secondary to peripheral neuropathy. Patient denies any pain in his feet.      PCP: Mohinder Morfin MD    Date Last Seen by PCP: 11/08/2021    Current shoe gear:  Affected Foot: Tennis shoes     Unaffected Foot: tennis  shoes    Hemoglobin A1C   Date Value Ref Range Status   11/09/2021 6.4 (H) 4.0 - 5.6 % Final     Comment:     ADA Screening Guidelines:  5.7-6.4%  Consistent with prediabetes  >or=6.5%  Consistent with diabetes    High levels of fetal hemoglobin interfere with the HbA1C  assay. Heterozygous hemoglobin variants (HbS, HgC, etc)do  not significantly interfere with this assay.   However, presence of multiple variants may affect accuracy.     03/02/2021 6.1 (H) 4.0 - 5.6 % Final     Comment:     ADA Screening Guidelines:  5.7-6.4%  Consistent with prediabetes  >or=6.5%  Consistent with diabetes    High levels of fetal hemoglobin interfere with the HbA1C  assay. Heterozygous hemoglobin variants (HbS, HgC, etc)do  not significantly interfere with this assay.   However, presence of multiple variants may affect accuracy.     09/11/2020 6.3 (H) 4.0 -  5.6 % Final     Comment:     ADA Screening Guidelines:  5.7-6.4%  Consistent with prediabetes  >or=6.5%  Consistent with diabetes  High levels of fetal hemoglobin interfere with the HbA1C  assay. Heterozygous hemoglobin variants (HbS, HgC, etc)do  not significantly interfere with this assay.   However, presence of multiple variants may affect accuracy.             Patient Active Problem List   Diagnosis    Combined hyperlipidemia associated with type 2 diabetes mellitus    Hypertension associated with diabetes    Type 2 diabetes mellitus, uncontrolled, with renal complications    History of stroke    BPH (benign prostatic hypertrophy)    History of colon polyps    Iron deficiency anemia    Polyneuropathy due to secondary diabetes    DEVIKA on CPAP    Family history of colonic polyps    Vitamin D deficiency    Hypothyroidism    Anemia    Gross hematuria    CKD stage 3 secondary to diabetes    Facet arthropathy, lumbar    Right hip pain    Primary osteoarthritis of right hip    Osteoarthritis of spine with radiculopathy, lumbar region    Acute idiopathic gout of left hand    Chronic fatigue    Crystal arthritis    Idiopathic chronic gout of multiple sites with tophus    Chronic pain of both knees    Pain of both elbows    Unspecified inflammatory spondylopathy, lumbar region    Rib pain on right side    Obesity (BMI 30-39.9)    Primary osteoarthritis of left knee    Chronic pain of left knee    Crystal arthropathy       Medication List with Changes/Refills   Current Medications    ACARBOSE (PRECOSE) 100 MG TAB    Take 1 po tid with meals.    ALLOPURINOL (ZYLOPRIM) 300 MG TABLET    Take 1 tablet (300 mg total) by mouth once daily.    AMLODIPINE (NORVASC) 10 MG TABLET    Take 1 tablet (10 mg total) by mouth once daily.    ASPIRIN (ECOTRIN) 81 MG EC TABLET    Take 81 mg by mouth once daily.    ATORVASTATIN (LIPITOR) 20 MG TABLET    Take 1 tablet (20 mg total) by mouth once daily.    BENAZEPRIL  (LOTENSIN) 40 MG TABLET    Take 1 tablet (40 mg total) by mouth once daily.    BLOOD SUGAR DIAGNOSTIC STRP    1 each by Misc.(Non-Drug; Combo Route) route 3 (three) times daily as needed. One touch ultra 2 test strips    COLCHICINE (COLCRYS) 0.6 MG TABLET    TAKE 1 TABLET(0.6 MG) BY MOUTH EVERY DAY    DONEPEZIL (ARICEPT) 10 MG TABLET    Take 1 tablet (10 mg total) by mouth once daily.    ERGOCALCIFEROL (ERGOCALCIFEROL) 50,000 UNIT CAP    TAKE 1 CAPSULE BY MOUTH once a WEEK    FERROUS SULFATE (FEROSUL) 325 MG (65 MG IRON) TAB TABLET    TAKE 1 TABLET BY MOUTH THREE TIMES DAILY    FUROSEMIDE (LASIX) 40 MG TABLET    Take 1 tablet (40 mg total) by mouth once daily.    GLIPIZIDE (GLUCOTROL) 10 MG TABLET    TAKE 1 TABLET(10 MG) BY MOUTH TWICE DAILY BEFORE MEALS    HYDRALAZINE (APRESOLINE) 100 MG TABLET    TAKE 1 TABLET(100 MG) BY MOUTH EVERY 8 HOURS    LACTOBACILLUS RHAMNOSUS GG (CULTURELLE) 10 BILLION CELL CAPSULE    Take 1 capsule by mouth once daily.    LANCETS (TRUEPLUS LANCETS) 33 GAUGE MISC    1 lancet by Misc.(Non-Drug; Combo Route) route once daily.    LEVOTHYROXINE (SYNTHROID) 50 MCG TABLET    TAKE 1 TABLET(50 MCG) BY MOUTH EVERY DAY    METOPROLOL SUCCINATE (TOPROL-XL) 25 MG 24 HR TABLET    Take 1 tablet (25 mg total) by mouth once daily.    NIACIN (NIASPAN) 1000 MG CR TABLET    TAKE 1 TABLET(1000 MG) BY MOUTH EVERY EVENING    ONETOUCH VERIO IQ METER KIT    Use as instructed    PIOGLITAZONE (ACTOS) 30 MG TABLET    Take 1 tablet (30 mg total) by mouth once daily.    POTASSIUM CHLORIDE (KLOR-CON) 10 MEQ TBSR    Take 1 tablet (10 mEq total) by mouth once daily.    SITAGLIPTIN (JANUVIA) 100 MG TAB    TAKE 1 TABLET(100 MG) BY MOUTH EVERY DAY       Review of patient's allergies indicates:   Allergen Reactions    No known drug allergies        Past Surgical History:   Procedure Laterality Date    COLONOSCOPY  2008    COLONOSCOPY  5/31/2005    repeat in 2015    PILONIDAL CYST DRAINAGE      TRANSURETHRAL RESECTION OF  "PROSTATE         Family History   Problem Relation Age of Onset    Hypertension Mother     Hearing loss Mother     Hypertension Father     Alcohol abuse Father     Heart attack Maternal Uncle     Brain cancer Sister     Diabetes Neg Hx     Stroke Neg Hx        Social History     Socioeconomic History    Marital status:    Tobacco Use    Smoking status: Never Smoker    Smokeless tobacco: Never Used   Substance and Sexual Activity    Alcohol use: No     Alcohol/week: 0.0 standard drinks    Drug use: No       Vitals:    02/15/22 0937   BP: (!) 146/64   Pulse: 62   Weight: 95.8 kg (211 lb 3.2 oz)   Height: 6' 1" (1.854 m)       Hemoglobin A1C   Date Value Ref Range Status   11/09/2021 6.4 (H) 4.0 - 5.6 % Final     Comment:     ADA Screening Guidelines:  5.7-6.4%  Consistent with prediabetes  >or=6.5%  Consistent with diabetes    High levels of fetal hemoglobin interfere with the HbA1C  assay. Heterozygous hemoglobin variants (HbS, HgC, etc)do  not significantly interfere with this assay.   However, presence of multiple variants may affect accuracy.     03/02/2021 6.1 (H) 4.0 - 5.6 % Final     Comment:     ADA Screening Guidelines:  5.7-6.4%  Consistent with prediabetes  >or=6.5%  Consistent with diabetes    High levels of fetal hemoglobin interfere with the HbA1C  assay. Heterozygous hemoglobin variants (HbS, HgC, etc)do  not significantly interfere with this assay.   However, presence of multiple variants may affect accuracy.     09/11/2020 6.3 (H) 4.0 - 5.6 % Final     Comment:     ADA Screening Guidelines:  5.7-6.4%  Consistent with prediabetes  >or=6.5%  Consistent with diabetes  High levels of fetal hemoglobin interfere with the HbA1C  assay. Heterozygous hemoglobin variants (HbS, HgC, etc)do  not significantly interfere with this assay.   However, presence of multiple variants may affect accuracy.         Review of Systems   Constitutional: Negative for chills and fever.   Respiratory: Negative " for shortness of breath.    Cardiovascular: Negative for chest pain, palpitations, orthopnea, claudication and leg swelling.   Gastrointestinal: Negative for diarrhea, nausea and vomiting.   Musculoskeletal: Negative for joint pain.   Skin: Negative for rash.   Neurological: Positive for tingling and sensory change. Negative for dizziness, focal weakness and weakness.   Psychiatric/Behavioral: Negative.              Objective:   PHYSICAL EXAM: Apperance: Alert and orient in no distress,well developed, and with good attention to grooming and body habits  Patient presents ambulating in tennis shoes.   LOWER EXTREMITY EXAM:  VASCULAR: Dorsalis pedis pulses 1/4 bilateral and Posterior Tibial pulses 2/4 bilateral. Capillary fill time <4 seconds bilateral. No edema observed bilateral. Varicosities present bilateral. Skin temperature of the lower extremities is warm to warm, proximal to distal. Hair growth WNL bilateral.  DERMATOLOGICAL: No skin rashes, subcutaneous nodules, lesions, or ulcers observed bilateral. Nails 1,2,3,4,5 right and 2,3,4,5 left thickened. Left hallux nail absent. Webspaces 1-4 clean, dry and without evidence of break in skin integrity bilateral. Minimal hyperkeratotic tissue noted to lateral 5th toe.   NEUROLOGICAL: Light touch, sharp-dull, proprioception all present and equal bilaterally.  Vibratory sensation absent at bilateral hallux and diminished at bilateral navicular . Protective sensation absent at 4/10 sites as tested with a [place] 5.07 monofilament.   MUSCULOSKELETAL: Muscle strength is 5/5 for foot inverters, everters, plantarflexors, and dorsiflexors. Muscle tone is normal. No pain on palpation left lateral midfoot. Minimal bony prominence noted to bilateral lateral midfoot at cuboid-metatarsal joint. Flexible adductovarus hammertoes noted bilateral 5th.       Assessment:   Type II diabetes mellitus with neurological manifestations  -     DIABETIC SHOES FOR HOME  USE    Dermatophytosis of nail    Hammer toes of both feet  -     DIABETIC SHOES FOR HOME USE    Corn or callus  -     DIABETIC SHOES FOR HOME USE          Plan:   Type II diabetes mellitus with neurological manifestations  -     DIABETIC SHOES FOR HOME USE    Dermatophytosis of nail    Hammer toes of both feet  -     DIABETIC SHOES FOR HOME USE    Corn or callus  -     DIABETIC SHOES FOR HOME USE      I counseled the patient on his conditions, regarding findings of my examination, my impressions, and usual treatment plan.   Greater than 50% of this visit spent on counseling and coordination of care.  Greater than 15 minutes of a 20 minute appointment spent on education about the diabetic foot, neuropathy, and prevention of limb loss.  Shoe inspection. Diabetic Foot Education. Patient reminded of the importance of good nutrition and blood sugar control to help prevent podiatric complications of diabetes. Patient instructed on proper foot hygeine. We discussed wearing proper shoe gear, daily foot inspections, never walking without protective shoe gear, never putting sharp instruments to feet.    With patient's permission, nails 1-5 bilateral were debrided/trimmed in length and thickness aggressively to their soft tissue attachment mechanically and with electric , removing all offending nail and debris. Patient relates relief following the procedure.  Prescription written for Diabetic shoes and inserts.   Patient  will continue to monitor the areas daily, inspect feet, wear protective shoe gear when ambulatory, moisturizer to maintain skin integrity. Patient reminded of the importance of good nutrition and blood sugar control to help prevent podiatric complications of diabetes.  Patient to return 4 months or sooner if needed.                 Padmaja Dave DPM  [facility]

## 2022-03-04 ENCOUNTER — PATIENT MESSAGE (OUTPATIENT)
Dept: PULMONOLOGY | Facility: CLINIC | Age: 82
End: 2022-03-04
Payer: MEDICARE

## 2022-04-22 DIAGNOSIS — N18.30 CONTROLLED TYPE 2 DIABETES MELLITUS WITH STAGE 3 CHRONIC KIDNEY DISEASE, WITHOUT LONG-TERM CURRENT USE OF INSULIN: ICD-10-CM

## 2022-04-22 DIAGNOSIS — E11.22 CONTROLLED TYPE 2 DIABETES MELLITUS WITH STAGE 3 CHRONIC KIDNEY DISEASE, WITHOUT LONG-TERM CURRENT USE OF INSULIN: ICD-10-CM

## 2022-04-22 RX ORDER — PIOGLITAZONEHYDROCHLORIDE 30 MG/1
TABLET ORAL
Qty: 90 TABLET | Refills: 0 | Status: SHIPPED | OUTPATIENT
Start: 2022-04-22 | End: 2022-07-19

## 2022-04-22 NOTE — TELEPHONE ENCOUNTER
No new care gaps identified.  Powered by [employer]. Reference number: [id number].   4/22/2022 12:16:28 PM CDT

## 2022-04-22 NOTE — TELEPHONE ENCOUNTER
Refill Authorization Note   Rudy Saldaña  is requesting a refill authorization.  Brief Assessment and Rationale for Refill:  Approve     Medication Therapy Plan:       Medication Reconciliation Completed: No   Comments:     No Care Gaps recommended.     Note composed:2:41 PM 04/22/2022

## 2022-05-03 ENCOUNTER — OFFICE VISIT (OUTPATIENT)
Dept: FAMILY MEDICINE | Facility: CLINIC | Age: 82
End: 2022-05-03
Payer: MEDICARE

## 2022-05-03 VITALS
HEART RATE: 55 BPM | BODY MASS INDEX: 28.14 KG/M2 | WEIGHT: 212.31 LBS | DIASTOLIC BLOOD PRESSURE: 76 MMHG | SYSTOLIC BLOOD PRESSURE: 138 MMHG | HEIGHT: 73 IN | TEMPERATURE: 98 F | OXYGEN SATURATION: 98 %

## 2022-05-03 DIAGNOSIS — Z01.818 PRE-OP EXAM: Primary | ICD-10-CM

## 2022-05-03 PROCEDURE — 1101F PT FALLS ASSESS-DOCD LE1/YR: CPT | Mod: CPTII,S$GLB,, | Performed by: NURSE PRACTITIONER

## 2022-05-03 PROCEDURE — 99214 OFFICE O/P EST MOD 30 MIN: CPT | Mod: S$GLB,,, | Performed by: NURSE PRACTITIONER

## 2022-05-03 PROCEDURE — 93005 ELECTROCARDIOGRAM TRACING: CPT | Mod: S$GLB,,, | Performed by: NURSE PRACTITIONER

## 2022-05-03 PROCEDURE — 1126F AMNT PAIN NOTED NONE PRSNT: CPT | Mod: CPTII,S$GLB,, | Performed by: NURSE PRACTITIONER

## 2022-05-03 PROCEDURE — 1159F MED LIST DOCD IN RCRD: CPT | Mod: CPTII,S$GLB,, | Performed by: NURSE PRACTITIONER

## 2022-05-03 PROCEDURE — 3075F PR MOST RECENT SYSTOLIC BLOOD PRESS GE 130-139MM HG: ICD-10-PCS | Mod: CPTII,S$GLB,, | Performed by: NURSE PRACTITIONER

## 2022-05-03 PROCEDURE — 1101F PR PT FALLS ASSESS DOC 0-1 FALLS W/OUT INJ PAST YR: ICD-10-PCS | Mod: CPTII,S$GLB,, | Performed by: NURSE PRACTITIONER

## 2022-05-03 PROCEDURE — 93005 EKG 12-LEAD: ICD-10-PCS | Mod: S$GLB,,, | Performed by: NURSE PRACTITIONER

## 2022-05-03 PROCEDURE — 99999 PR PBB SHADOW E&M-EST. PATIENT-LVL IV: CPT | Mod: PBBFAC,,, | Performed by: NURSE PRACTITIONER

## 2022-05-03 PROCEDURE — 3288F FALL RISK ASSESSMENT DOCD: CPT | Mod: CPTII,S$GLB,, | Performed by: NURSE PRACTITIONER

## 2022-05-03 PROCEDURE — 93010 ELECTROCARDIOGRAM REPORT: CPT | Mod: S$GLB,,, | Performed by: INTERNAL MEDICINE

## 2022-05-03 PROCEDURE — 99999 PR PBB SHADOW E&M-EST. PATIENT-LVL IV: ICD-10-PCS | Mod: PBBFAC,,, | Performed by: NURSE PRACTITIONER

## 2022-05-03 PROCEDURE — 3075F SYST BP GE 130 - 139MM HG: CPT | Mod: CPTII,S$GLB,, | Performed by: NURSE PRACTITIONER

## 2022-05-03 PROCEDURE — 3078F PR MOST RECENT DIASTOLIC BLOOD PRESSURE < 80 MM HG: ICD-10-PCS | Mod: CPTII,S$GLB,, | Performed by: NURSE PRACTITIONER

## 2022-05-03 PROCEDURE — 1160F PR REVIEW ALL MEDS BY PRESCRIBER/CLIN PHARMACIST DOCUMENTED: ICD-10-PCS | Mod: CPTII,S$GLB,, | Performed by: NURSE PRACTITIONER

## 2022-05-03 PROCEDURE — 3078F DIAST BP <80 MM HG: CPT | Mod: CPTII,S$GLB,, | Performed by: NURSE PRACTITIONER

## 2022-05-03 PROCEDURE — 93010 EKG 12-LEAD: ICD-10-PCS | Mod: S$GLB,,, | Performed by: INTERNAL MEDICINE

## 2022-05-03 PROCEDURE — 1160F RVW MEDS BY RX/DR IN RCRD: CPT | Mod: CPTII,S$GLB,, | Performed by: NURSE PRACTITIONER

## 2022-05-03 PROCEDURE — 1159F PR MEDICATION LIST DOCUMENTED IN MEDICAL RECORD: ICD-10-PCS | Mod: CPTII,S$GLB,, | Performed by: NURSE PRACTITIONER

## 2022-05-03 PROCEDURE — 99214 PR OFFICE/OUTPT VISIT, EST, LEVL IV, 30-39 MIN: ICD-10-PCS | Mod: S$GLB,,, | Performed by: NURSE PRACTITIONER

## 2022-05-03 PROCEDURE — 1126F PR PAIN SEVERITY QUANTIFIED, NO PAIN PRESENT: ICD-10-PCS | Mod: CPTII,S$GLB,, | Performed by: NURSE PRACTITIONER

## 2022-05-03 PROCEDURE — 3288F PR FALLS RISK ASSESSMENT DOCUMENTED: ICD-10-PCS | Mod: CPTII,S$GLB,, | Performed by: NURSE PRACTITIONER

## 2022-05-03 RX ORDER — PREDNISOLONE ACETATE 10 MG/ML
SUSPENSION/ DROPS OPHTHALMIC
COMMUNITY
Start: 2022-04-19 | End: 2023-06-19

## 2022-05-03 NOTE — PROGRESS NOTES
Subjective:       Patient ID: Rudy Saldaña is a 81 y.o. male.    Chief Complaint: Pre-op Exam    HPI     He comes in for preop clearance. he is scheduled to have right cataract removal done by Dr. Frias at [facility] on 5/24/22.  Medical history includes hypertension, hyperlipidemia, gout, DM, hypothyroidism, BPH, iron def, CKD. Preop testing includes EKG. he denies shortness of breath or chest pain, no dizziness or syncope, no headaches or blurry vision. Denies fever. he is not currently on blood thinners.      Review of Systems   Constitutional: Negative for fatigue, fever and unexpected weight change.   HENT: Negative for ear pain and sore throat.    Eyes: Negative.  Negative for pain and visual disturbance.   Respiratory: Negative for cough and shortness of breath.    Cardiovascular: Negative for chest pain and palpitations.   Gastrointestinal: Negative for abdominal pain, diarrhea, nausea and vomiting.   Genitourinary: Negative for dysuria and frequency.   Musculoskeletal: Negative for arthralgias and myalgias.   Skin: Negative for color change and rash.   Neurological: Negative for dizziness and headaches.   Psychiatric/Behavioral: Negative for sleep disturbance. The patient is not nervous/anxious.        Vitals:    05/03/22 0902   BP: 138/76   Pulse: (!) 55   Temp: 97.8 °F (36.6 °C)       Objective:     Current Outpatient Medications   Medication Sig Dispense Refill    acarbose (PRECOSE) 100 MG Tab TAKE 1 TABLET BY MOUTH THREE TIMES DAILY WITH MEALS 270 tablet 0    allopurinoL (ZYLOPRIM) 300 MG tablet Take 1 tablet (300 mg total) by mouth once daily. 90 tablet 3    amLODIPine (NORVASC) 10 MG tablet Take 1 tablet (10 mg total) by mouth once daily. 90 tablet 3    aspirin (ECOTRIN) 81 MG EC tablet Take 81 mg by mouth once daily.      atorvastatin (LIPITOR) 20 MG tablet Take 1 tablet (20 mg total) by mouth once daily. 90 tablet 11    benazepriL (LOTENSIN) 40 MG tablet Take 1 tablet (40 mg total) by  mouth once daily. 90 tablet 3    blood sugar diagnostic Strp 1 each by Misc.(Non-Drug; Combo Route) route 3 (three) times daily as needed. One touch ultra 2 test strips 100 each 5    colchicine (COLCRYS) 0.6 mg tablet TAKE 1 TABLET(0.6 MG) BY MOUTH EVERY DAY 90 tablet 3    donepeziL (ARICEPT) 10 MG tablet Take 1 tablet (10 mg total) by mouth once daily. 90 tablet 3    ergocalciferol (ERGOCALCIFEROL) 50,000 unit Cap TAKE 1 CAPSULE BY MOUTH once a WEEK 12 capsule 3    ferrous sulfate (FEROSUL) 325 mg (65 mg iron) Tab tablet TAKE 1 TABLET BY MOUTH THREE TIMES DAILY 270 tablet 0    furosemide (LASIX) 40 MG tablet Take 1 tablet (40 mg total) by mouth once daily. 90 tablet 3    glipiZIDE (GLUCOTROL) 10 MG tablet TAKE 1 TABLET(10 MG) BY MOUTH TWICE DAILY BEFORE MEALS 180 tablet 0    hydrALAZINE (APRESOLINE) 100 MG tablet TAKE 1 TABLET(100 MG) BY MOUTH EVERY 8 HOURS 270 tablet 3    Lactobacillus rhamnosus GG (CULTURELLE) 10 billion cell capsule Take 1 capsule by mouth once daily.      lancets (TRUEPLUS LANCETS) 33 gauge Misc 1 lancet by Misc.(Non-Drug; Combo Route) route once daily. 100 each 5    levothyroxine (SYNTHROID) 50 MCG tablet TAKE 1 TABLET(50 MCG) BY MOUTH EVERY DAY 30 tablet 11    metoprolol succinate (TOPROL-XL) 25 MG 24 hr tablet Take 1 tablet (25 mg total) by mouth once daily. 30 tablet 11    niacin (NIASPAN) 1000 MG CR tablet TAKE 1 TABLET(1000 MG) BY MOUTH EVERY EVENING 90 tablet 3    pioglitazone (ACTOS) 30 MG tablet TAKE 1 TABLET(30 MG) BY MOUTH EVERY DAY 90 tablet 0    potassium chloride (KLOR-CON) 10 MEQ TbSR Take 1 tablet (10 mEq total) by mouth once daily. 90 tablet 3    prednisoLONE acetate (PRED FORTE) 1 % DrpS       SITagliptin (JANUVIA) 100 MG Tab TAKE 1 TABLET(100 MG) BY MOUTH EVERY DAY 90 tablet 0    ONETOUCH VERIO IQ METER kit Use as instructed 1 each 0     No current facility-administered medications for this visit.       Physical Exam  Vitals and nursing note reviewed.    Constitutional:       General: He is not in acute distress.     Appearance: He is well-developed.   HENT:      Head: Normocephalic and atraumatic.   Eyes:      Pupils: Pupils are equal, round, and reactive to light.   Cardiovascular:      Rate and Rhythm: Normal rate and regular rhythm.   Pulmonary:      Effort: Pulmonary effort is normal.      Breath sounds: Normal breath sounds.   Musculoskeletal:         General: Normal range of motion.      Cervical back: Normal range of motion and neck supple.   Skin:     General: Skin is warm and dry.      Findings: No rash.   Neurological:      Mental Status: He is alert and oriented to person, place, and time.   Psychiatric:         Thought Content: Thought content normal.         EKG shows SB HR 47    Assessment:       1. Pre-op exam        Plan:   Pre-op exam  -     IN OFFICE EKG 12-LEAD (to [place])    I have reviewed his EKG along with his last routine labs from 11/9/21 including A1C, CBC, CMP. He is cleared for surgery    No follow-ups on file.    There are no Patient Instructions on file for this visit.

## 2022-05-03 NOTE — Clinical Note
Please fax my note for clearance along with EKG (will be on Alyssa's desk)  Attn: Veropal [phone]

## 2022-05-04 ENCOUNTER — TELEPHONE (OUTPATIENT)
Dept: FAMILY MEDICINE | Facility: CLINIC | Age: 82
End: 2022-05-04
Payer: MEDICARE

## 2022-05-04 NOTE — TELEPHONE ENCOUNTER
----- Message from Mechelle Spain NP sent at 5/3/2022  5:15 PM CDT -----  Please fax my note for clearance along with EKG (will be on Alyssa's desk)   Attn: Antonieta [phone]

## 2022-05-26 ENCOUNTER — PATIENT MESSAGE (OUTPATIENT)
Dept: FAMILY MEDICINE | Facility: CLINIC | Age: 82
End: 2022-05-26
Payer: MEDICARE

## 2022-07-08 ENCOUNTER — PATIENT MESSAGE (OUTPATIENT)
Dept: FAMILY MEDICINE | Facility: CLINIC | Age: 82
End: 2022-07-08
Payer: MEDICARE

## 2022-08-16 ENCOUNTER — OFFICE VISIT (OUTPATIENT)
Dept: PODIATRY | Facility: CLINIC | Age: 82
End: 2022-08-16
Payer: MEDICARE

## 2022-08-16 VITALS — HEIGHT: 73 IN | WEIGHT: 212.31 LBS | BODY MASS INDEX: 28.14 KG/M2

## 2022-08-16 DIAGNOSIS — E11.49 TYPE II DIABETES MELLITUS WITH NEUROLOGICAL MANIFESTATIONS: Primary | ICD-10-CM

## 2022-08-16 DIAGNOSIS — B35.1 DERMATOPHYTOSIS OF NAIL: ICD-10-CM

## 2022-08-16 PROCEDURE — 1160F PR REVIEW ALL MEDS BY PRESCRIBER/CLIN PHARMACIST DOCUMENTED: ICD-10-PCS | Mod: CPTII,S$GLB,, | Performed by: PODIATRIST

## 2022-08-16 PROCEDURE — 3288F FALL RISK ASSESSMENT DOCD: CPT | Mod: CPTII,S$GLB,, | Performed by: PODIATRIST

## 2022-08-16 PROCEDURE — 1126F AMNT PAIN NOTED NONE PRSNT: CPT | Mod: CPTII,S$GLB,, | Performed by: PODIATRIST

## 2022-08-16 PROCEDURE — 99499 UNLISTED E&M SERVICE: CPT | Mod: S$GLB,,, | Performed by: PODIATRIST

## 2022-08-16 PROCEDURE — 1101F PT FALLS ASSESS-DOCD LE1/YR: CPT | Mod: CPTII,S$GLB,, | Performed by: PODIATRIST

## 2022-08-16 PROCEDURE — 11721 DEBRIDE NAIL 6 OR MORE: CPT | Mod: Q9,S$GLB,, | Performed by: PODIATRIST

## 2022-08-16 PROCEDURE — 1160F RVW MEDS BY RX/DR IN RCRD: CPT | Mod: CPTII,S$GLB,, | Performed by: PODIATRIST

## 2022-08-16 PROCEDURE — 99499 NO LOS: ICD-10-PCS | Mod: S$GLB,,, | Performed by: PODIATRIST

## 2022-08-16 PROCEDURE — 1101F PR PT FALLS ASSESS DOC 0-1 FALLS W/OUT INJ PAST YR: ICD-10-PCS | Mod: CPTII,S$GLB,, | Performed by: PODIATRIST

## 2022-08-16 PROCEDURE — 1159F PR MEDICATION LIST DOCUMENTED IN MEDICAL RECORD: ICD-10-PCS | Mod: CPTII,S$GLB,, | Performed by: PODIATRIST

## 2022-08-16 PROCEDURE — 99999 PR PBB SHADOW E&M-EST. PATIENT-LVL IV: CPT | Mod: PBBFAC,,, | Performed by: PODIATRIST

## 2022-08-16 PROCEDURE — 11721 PR DEBRIDEMENT OF NAILS, 6 OR MORE: ICD-10-PCS | Mod: Q9,S$GLB,, | Performed by: PODIATRIST

## 2022-08-16 PROCEDURE — 3288F PR FALLS RISK ASSESSMENT DOCUMENTED: ICD-10-PCS | Mod: CPTII,S$GLB,, | Performed by: PODIATRIST

## 2022-08-16 PROCEDURE — 1159F MED LIST DOCD IN RCRD: CPT | Mod: CPTII,S$GLB,, | Performed by: PODIATRIST

## 2022-08-16 PROCEDURE — 99999 PR PBB SHADOW E&M-EST. PATIENT-LVL IV: ICD-10-PCS | Mod: PBBFAC,,, | Performed by: PODIATRIST

## 2022-08-16 PROCEDURE — 1126F PR PAIN SEVERITY QUANTIFIED, NO PAIN PRESENT: ICD-10-PCS | Mod: CPTII,S$GLB,, | Performed by: PODIATRIST

## 2022-08-18 NOTE — PROGRESS NOTES
Subjective:     Patient ID: Rudy Saldaña is a 81 y.o. male.    Chief Complaint: Nail Care (Diabetic pt wears sandals, PCP Dr. Morfin last seen 5-3-22)    Rudy is a 81 y.o. male who presents to the clinic for evaluation and treatment of high risk feet. Rudy has a past medical history of Anemia, BPH (benign prostatic hypertrophy), Carotid bruit, CKD (chronic kidney disease) stage 2/3, CVA (cerebral vascular accident), Diabetes mellitus, type 2, Hyperlipidemia, Hypertension, Hypothyroidism (2/29/2016), Type 2 diabetes mellitus, uncontrolled, with renal complications, Type II or unspecified type diabetes mellitus without mention of complication, not stated as uncontrolled, and Vitamin D deficiency (2/29/2016). The patient's chief complaint is long, thick toenails. This patient has documented high risk feet requiring routine maintenance secondary to diabetes mellitis and those secondary complications of diabetes, as mentioned..    PCP: Mohinder Morfin MD    Date Last Seen by PCP: 05/03/2022    Current shoe gear:  Affected Foot: Casual shoes     Unaffected Foot: Casual shoes    Hemoglobin A1C   Date Value Ref Range Status   11/09/2021 6.4 (H) 4.0 - 5.6 % Final     Comment:     ADA Screening Guidelines:  5.7-6.4%  Consistent with prediabetes  >or=6.5%  Consistent with diabetes    High levels of fetal hemoglobin interfere with the HbA1C  assay. Heterozygous hemoglobin variants (HbS, HgC, etc)do  not significantly interfere with this assay.   However, presence of multiple variants may affect accuracy.     03/02/2021 6.1 (H) 4.0 - 5.6 % Final     Comment:     ADA Screening Guidelines:  5.7-6.4%  Consistent with prediabetes  >or=6.5%  Consistent with diabetes    High levels of fetal hemoglobin interfere with the HbA1C  assay. Heterozygous hemoglobin variants (HbS, HgC, etc)do  not significantly interfere with this assay.   However, presence of multiple variants may affect accuracy.     09/11/2020 6.3 (H) 4.0 - 5.6 %  Final     Comment:     ADA Screening Guidelines:  5.7-6.4%  Consistent with prediabetes  >or=6.5%  Consistent with diabetes  High levels of fetal hemoglobin interfere with the HbA1C  assay. Heterozygous hemoglobin variants (HbS, HgC, etc)do  not significantly interfere with this assay.   However, presence of multiple variants may affect accuracy.         Patient Active Problem List   Diagnosis    Combined hyperlipidemia associated with type 2 diabetes mellitus    Hypertension associated with diabetes    Type 2 diabetes mellitus, uncontrolled, with renal complications    History of stroke    BPH (benign prostatic hypertrophy)    History of colon polyps    Iron deficiency anemia    Polyneuropathy due to secondary diabetes    DEVIKA on CPAP    Family history of colonic polyps    Vitamin D deficiency    Hypothyroidism    Anemia    Gross hematuria    CKD stage 3 secondary to diabetes    Facet arthropathy, lumbar    Right hip pain    Primary osteoarthritis of right hip    Osteoarthritis of spine with radiculopathy, lumbar region    Acute idiopathic gout of left hand    Chronic fatigue    Crystal arthritis    Idiopathic chronic gout of multiple sites with tophus    Chronic pain of both knees    Pain of both elbows    Unspecified inflammatory spondylopathy, lumbar region    Rib pain on right side    Obesity (BMI 30-39.9)    Primary osteoarthritis of left knee    Chronic pain of left knee    Crystal arthropathy       Medication List with Changes/Refills   Current Medications    ACARBOSE (PRECOSE) 100 MG TAB    TAKE 1 TABLET BY MOUTH THREE TIMES DAILY WITH MEALS    ALLOPURINOL (ZYLOPRIM) 300 MG TABLET    Take 1 tablet (300 mg total) by mouth once daily.    AMLODIPINE (NORVASC) 10 MG TABLET    Take 1 tablet (10 mg total) by mouth once daily.    ASPIRIN (ECOTRIN) 81 MG EC TABLET    Take 81 mg by mouth once daily.    ATORVASTATIN (LIPITOR) 20 MG TABLET    Take 1 tablet (20 mg total) by mouth once  daily.    BENAZEPRIL (LOTENSIN) 40 MG TABLET    Take 1 tablet (40 mg total) by mouth once daily.    BLOOD SUGAR DIAGNOSTIC STRP    1 each by Misc.(Non-Drug; Combo Route) route 3 (three) times daily as needed. One touch ultra 2 test strips    COLCHICINE (COLCRYS) 0.6 MG TABLET    TAKE 1 TABLET(0.6 MG) BY MOUTH EVERY DAY    DONEPEZIL (ARICEPT) 10 MG TABLET    Take 1 tablet (10 mg total) by mouth once daily.    ERGOCALCIFEROL (ERGOCALCIFEROL) 50,000 UNIT CAP    TAKE 1 CAPSULE BY MOUTH once a WEEK    FERROUS SULFATE (FEROSUL) 325 MG (65 MG IRON) TAB TABLET    TAKE 1 TABLET BY MOUTH THREE TIMES DAILY    FUROSEMIDE (LASIX) 40 MG TABLET    Take 1 tablet (40 mg total) by mouth once daily.    GLIPIZIDE (GLUCOTROL) 10 MG TABLET    TAKE 1 TABLET(10 MG) BY MOUTH TWICE DAILY BEFORE MEALS    HYDRALAZINE (APRESOLINE) 100 MG TABLET    TAKE 1 TABLET(100 MG) BY MOUTH EVERY 8 HOURS    LACTOBACILLUS RHAMNOSUS GG (CULTURELLE) 10 BILLION CELL CAPSULE    Take 1 capsule by mouth once daily.    LANCETS (TRUEPLUS LANCETS) 33 GAUGE MISC    1 lancet by Misc.(Non-Drug; Combo Route) route once daily.    LEVOTHYROXINE (SYNTHROID) 50 MCG TABLET    TAKE 1 TABLET(50 MCG) BY MOUTH EVERY DAY    METOPROLOL SUCCINATE (TOPROL-XL) 25 MG 24 HR TABLET    Take 1 tablet (25 mg total) by mouth once daily.    NIACIN (NIASPAN) 1000 MG CR TABLET    TAKE 1 TABLET(1000 MG) BY MOUTH EVERY EVENING    ONETOUCH VERIO IQ METER KIT    Use as instructed    PIOGLITAZONE (ACTOS) 30 MG TABLET    TAKE 1 TABLET(30 MG) BY MOUTH EVERY DAY    POTASSIUM CHLORIDE (KLOR-CON) 10 MEQ TBSR    Take 1 tablet (10 mEq total) by mouth once daily.    PREDNISOLONE ACETATE (PRED FORTE) 1 % DRPS        SITAGLIPTIN (JANUVIA) 100 MG TAB    TAKE 1 TABLET(100 MG) BY MOUTH EVERY DAY       Review of patient's allergies indicates:   Allergen Reactions    No known drug allergies        Past Surgical History:   Procedure Laterality Date    COLONOSCOPY  2008    COLONOSCOPY  5/31/2005    repeat in 2015  "   PILONIDAL CYST DRAINAGE      TRANSURETHRAL RESECTION OF PROSTATE         Family History   Problem Relation Age of Onset    Hypertension Mother     Hearing loss Mother     Hypertension Father     Alcohol abuse Father     Heart attack Maternal Uncle     Brain cancer Sister     Diabetes Neg Hx     Stroke Neg Hx        Social History     Socioeconomic History    Marital status:    Tobacco Use    Smoking status: Never Smoker    Smokeless tobacco: Never Used   Substance and Sexual Activity    Alcohol use: No     Alcohol/week: 0.0 standard drinks    Drug use: No       Vitals:    08/16/22 0950   Weight: 96.3 kg (212 lb 4.9 oz)   Height: 6' 1" (1.854 m)   PainSc: 0-No pain       Hemoglobin A1C   Date Value Ref Range Status   11/09/2021 6.4 (H) 4.0 - 5.6 % Final     Comment:     ADA Screening Guidelines:  5.7-6.4%  Consistent with prediabetes  >or=6.5%  Consistent with diabetes    High levels of fetal hemoglobin interfere with the HbA1C  assay. Heterozygous hemoglobin variants (HbS, HgC, etc)do  not significantly interfere with this assay.   However, presence of multiple variants may affect accuracy.     03/02/2021 6.1 (H) 4.0 - 5.6 % Final     Comment:     ADA Screening Guidelines:  5.7-6.4%  Consistent with prediabetes  >or=6.5%  Consistent with diabetes    High levels of fetal hemoglobin interfere with the HbA1C  assay. Heterozygous hemoglobin variants (HbS, HgC, etc)do  not significantly interfere with this assay.   However, presence of multiple variants may affect accuracy.     09/11/2020 6.3 (H) 4.0 - 5.6 % Final     Comment:     ADA Screening Guidelines:  5.7-6.4%  Consistent with prediabetes  >or=6.5%  Consistent with diabetes  High levels of fetal hemoglobin interfere with the HbA1C  assay. Heterozygous hemoglobin variants (HbS, HgC, etc)do  not significantly interfere with this assay.   However, presence of multiple variants may affect accuracy.         Review of Systems   Constitutional: " Negative for chills and fever.   Respiratory: Negative for shortness of breath.    Cardiovascular: Negative for chest pain, palpitations, orthopnea, claudication and leg swelling.   Gastrointestinal: Negative for diarrhea, nausea and vomiting.   Musculoskeletal: Negative for joint pain.   Skin: Negative for rash.   Neurological: Positive for tingling and sensory change.   Psychiatric/Behavioral: Negative.          Objective:      PHYSICAL EXAM: Apperance: Alert and orient in no distress,well developed, and with good attention to grooming and body habits  Patient presents ambulating in tennis shoes.   LOWER EXTREMITY EXAM:  VASCULAR: Dorsalis pedis pulses 1/4 bilateral and Posterior Tibial pulses 2/4 bilateral. Capillary fill time <4 seconds bilateral. No edema observed bilateral. Varicosities present bilateral. Skin temperature of the lower extremities is warm to warm, proximal to distal. Hair growth WNL bilateral.  DERMATOLOGICAL: No skin rashes, subcutaneous nodules, lesions, or ulcers observed bilateral. Nails 1,2,3,4,5 right and 2,3,4,5 left thickened. Left hallux nail absent. Webspaces 1-4 clean, dry and without evidence of break in skin integrity bilateral. Minimal hyperkeratotic tissue noted to lateral 5th toe.   NEUROLOGICAL: Light touch, sharp-dull, proprioception all present and equal bilaterally.  Vibratory sensation absent at bilateral hallux and diminished at bilateral navicular . Protective sensation absent at 4/10 sites as tested with a [place]-Chelsy 5.07 monofilament.   MUSCULOSKELETAL: Muscle strength is 5/5 for foot inverters, everters, plantarflexors, and dorsiflexors. Muscle tone is normal. No pain on palpation left lateral midfoot. Minimal bony prominence noted to bilateral lateral midfoot at cuboid-metatarsal joint. Flexible adductovarus hammertoes noted bilateral 5th.           Assessment:       ICD-10-CM ICD-9-CM   1. Type II diabetes mellitus with neurological manifestations  E11.49  250.60   2. Dermatophytosis of nail  B35.1 110.1       Plan:   Type II diabetes mellitus with neurological manifestations    Dermatophytosis of nail      I counseled the patient on his conditions, regarding findings of my examination, my impressions, and usual treatment plan.   Greater than 15 minutes of a 20 minute appointment spent on education about the diabetic foot, neuropathy, and prevention of limb loss.  Shoe inspection. Diabetic Foot Education. Patient reminded of the importance of good nutrition and blood sugar control to help prevent podiatric complications of diabetes. Patient instructed on proper foot hygeine. We discussed wearing proper shoe gear, daily foot inspections, never walking without protective shoe gear, never putting sharp instruments to feet.    With patient's permission, nails 1-5 bilateral were debrided/trimmed in length and thickness aggressively to their soft tissue attachment mechanically and with electric , removing all offending nail and debris. Patient relates relief following the procedure.  Patient  will continue to monitor the areas daily, inspect feet, wear protective shoe gear when ambulatory, moisturizer to maintain skin integrity. Patient reminded of the importance of good nutrition and blood sugar control to help prevent podiatric complications of diabetes.  Patient to return 4 months or sooner if needed.            Padmaja Dave DPM  [facility]

## 2022-10-25 ENCOUNTER — TELEPHONE (OUTPATIENT)
Dept: FAMILY MEDICINE | Facility: CLINIC | Age: 82
End: 2022-10-25
Payer: MEDICARE

## 2022-10-25 NOTE — TELEPHONE ENCOUNTER
----- Message from Ju Loyola sent at 10/25/2022  4:02 PM CDT -----  Type:  Same Day Appointment Request    Caller is requesting a same day appointment.  Caller declined first available appointment listed below.    Name of Caller: Shania   When is the first available appointment? none  Symptoms: trip and now left side is hurting  Best Call Back Number: [phone]  Additional Information:  MRN [id number]/ Dr Mohinder Morfin

## 2022-10-25 NOTE — TELEPHONE ENCOUNTER
Pt called requesting a appointment for tomorrow. Pt reports he had a fall 1 week ago and is now experiencing left side rib pain. Pt reports he has slight bruising in that area. Pt is requesting to be worked in. The next available appointment in the office will be the end of the week. Please advise

## 2022-10-26 ENCOUNTER — HOSPITAL ENCOUNTER (OUTPATIENT)
Dept: RADIOLOGY | Facility: HOSPITAL | Age: 82
Discharge: HOME | End: 2022-10-26
Attending: FAMILY MEDICINE
Payer: MEDICARE

## 2022-10-26 ENCOUNTER — OFFICE VISIT (OUTPATIENT)
Dept: FAMILY MEDICINE | Facility: CLINIC | Age: 82
End: 2022-10-26
Payer: MEDICARE

## 2022-10-26 VITALS
HEART RATE: 49 BPM | HEIGHT: 72 IN | TEMPERATURE: 98 F | BODY MASS INDEX: 28.74 KG/M2 | DIASTOLIC BLOOD PRESSURE: 64 MMHG | WEIGHT: 212.19 LBS | SYSTOLIC BLOOD PRESSURE: 138 MMHG

## 2022-10-26 DIAGNOSIS — E11.22 CONTROLLED TYPE 2 DIABETES MELLITUS WITH STAGE 3 CHRONIC KIDNEY DISEASE, WITHOUT LONG-TERM CURRENT USE OF INSULIN: ICD-10-CM

## 2022-10-26 DIAGNOSIS — N18.30 CONTROLLED TYPE 2 DIABETES MELLITUS WITH STAGE 3 CHRONIC KIDNEY DISEASE, WITHOUT LONG-TERM CURRENT USE OF INSULIN: ICD-10-CM

## 2022-10-26 DIAGNOSIS — R07.89 LEFT-SIDED CHEST WALL PAIN: ICD-10-CM

## 2022-10-26 DIAGNOSIS — R07.89 LEFT-SIDED CHEST WALL PAIN: Primary | ICD-10-CM

## 2022-10-26 PROCEDURE — 71100 X-RAY EXAM RIBS UNI 2 VIEWS: CPT | Mod: TC,PO,LT

## 2022-10-26 PROCEDURE — 3078F DIAST BP <80 MM HG: CPT | Mod: CPTII,S$GLB,, | Performed by: FAMILY MEDICINE

## 2022-10-26 PROCEDURE — 1100F PR PT FALLS ASSESS DOC 2+ FALLS/FALL W/INJURY/YR: ICD-10-PCS | Mod: CPTII,S$GLB,, | Performed by: FAMILY MEDICINE

## 2022-10-26 PROCEDURE — 71100 XR RIBS 2 VIEW LEFT: ICD-10-PCS | Mod: 26,LT,, | Performed by: RADIOLOGY

## 2022-10-26 PROCEDURE — 3075F SYST BP GE 130 - 139MM HG: CPT | Mod: CPTII,S$GLB,, | Performed by: FAMILY MEDICINE

## 2022-10-26 PROCEDURE — 99214 OFFICE O/P EST MOD 30 MIN: CPT | Mod: 25,S$GLB,, | Performed by: FAMILY MEDICINE

## 2022-10-26 PROCEDURE — 90694 VACC AIIV4 NO PRSRV 0.5ML IM: CPT | Mod: S$GLB,,, | Performed by: FAMILY MEDICINE

## 2022-10-26 PROCEDURE — 90694 FLU VACCINE - QUADRIVALENT - ADJUVANTED: ICD-10-PCS | Mod: S$GLB,,, | Performed by: FAMILY MEDICINE

## 2022-10-26 PROCEDURE — 1100F PTFALLS ASSESS-DOCD GE2>/YR: CPT | Mod: CPTII,S$GLB,, | Performed by: FAMILY MEDICINE

## 2022-10-26 PROCEDURE — 3075F PR MOST RECENT SYSTOLIC BLOOD PRESS GE 130-139MM HG: ICD-10-PCS | Mod: CPTII,S$GLB,, | Performed by: FAMILY MEDICINE

## 2022-10-26 PROCEDURE — 99999 PR PBB SHADOW E&M-EST. PATIENT-LVL III: CPT | Mod: PBBFAC,,, | Performed by: FAMILY MEDICINE

## 2022-10-26 PROCEDURE — 71100 X-RAY EXAM RIBS UNI 2 VIEWS: CPT | Mod: 26,LT,, | Performed by: RADIOLOGY

## 2022-10-26 PROCEDURE — 1126F AMNT PAIN NOTED NONE PRSNT: CPT | Mod: CPTII,S$GLB,, | Performed by: FAMILY MEDICINE

## 2022-10-26 PROCEDURE — 3078F PR MOST RECENT DIASTOLIC BLOOD PRESSURE < 80 MM HG: ICD-10-PCS | Mod: CPTII,S$GLB,, | Performed by: FAMILY MEDICINE

## 2022-10-26 PROCEDURE — 99214 PR OFFICE/OUTPT VISIT, EST, LEVL IV, 30-39 MIN: ICD-10-PCS | Mod: 25,S$GLB,, | Performed by: FAMILY MEDICINE

## 2022-10-26 PROCEDURE — 1126F PR PAIN SEVERITY QUANTIFIED, NO PAIN PRESENT: ICD-10-PCS | Mod: CPTII,S$GLB,, | Performed by: FAMILY MEDICINE

## 2022-10-26 PROCEDURE — 3288F FALL RISK ASSESSMENT DOCD: CPT | Mod: CPTII,S$GLB,, | Performed by: FAMILY MEDICINE

## 2022-10-26 PROCEDURE — G0008 FLU VACCINE - QUADRIVALENT - ADJUVANTED: ICD-10-PCS | Mod: S$GLB,,, | Performed by: FAMILY MEDICINE

## 2022-10-26 PROCEDURE — 99499 UNLISTED E&M SERVICE: CPT | Mod: S$GLB,,, | Performed by: FAMILY MEDICINE

## 2022-10-26 PROCEDURE — 3288F PR FALLS RISK ASSESSMENT DOCUMENTED: ICD-10-PCS | Mod: CPTII,S$GLB,, | Performed by: FAMILY MEDICINE

## 2022-10-26 PROCEDURE — G0008 ADMIN INFLUENZA VIRUS VAC: HCPCS | Mod: S$GLB,,, | Performed by: FAMILY MEDICINE

## 2022-10-26 PROCEDURE — 99999 PR PBB SHADOW E&M-EST. PATIENT-LVL III: ICD-10-PCS | Mod: PBBFAC,,, | Performed by: FAMILY MEDICINE

## 2022-10-26 NOTE — PROGRESS NOTES
Subjective:      Patient ID: Rudy Saldaña is a 82 y.o. male.    Chief Complaint: Flank Pain    Problem List Items Addressed This Visit       Left-sided chest wall pain - Primary    Overview     He fell on a stake in the ground 10 days ago and he has had pain in the left ribs.  No SOB or cough and he has not coughed anything up.  He has not had medicine for this.  No fever.  No sputum.  No blood noted.  No skin break.  He has pain with ROM or deep breaths.           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, without long-term current use of insulin        Relevant Orders    Microalbumin/Creatinine Ratio, Urine            The patient's Health Maintenance was reviewed and the following appears to be due:   Health Maintenance Due   Topic Date Due    Shingles Vaccine (3 of 3) 01/07/2022    COVID-19 Vaccine (4 - Booster for Pfizer series) 01/12/2022    Eye Exam  03/30/2022    Influenza Vaccine (1) 09/01/2022    Diabetes Urine Screening  11/09/2022       Past Medical History:  Past Medical History:   Diagnosis Date    Anemia     BPH (benign prostatic hypertrophy)     Carotid bruit     CKD (chronic kidney disease) stage 2/3     Dr. Nails    CVA (cerebral vascular accident)     Diabetes mellitus, type 2     Hyperlipidemia     Hypertension     Hypothyroidism 2/29/2016    Type 2 diabetes mellitus, uncontrolled, with renal complications     The patient presents with diabetes.  The patient denies polyuria, polydipsia, polyphagia, hypoglycemia and paresthesias.  The patient's glucose control has been good.  Home glucose averages are routinely checked.  The patient is without retinopathy currently.  The patient has no history of neuropathy.  The patient currently complains of no podiatric problems.  The patient has excellent compliance.    Type II or unspecified type diabetes mellitus without mention of complication, not  stated as uncontrolled     Vitamin D deficiency 2/29/2016     Past Surgical History:   Procedure Laterality Date    COLONOSCOPY  2008    COLONOSCOPY  5/31/2005    repeat in 2015    PILONIDAL CYST DRAINAGE      TRANSURETHRAL RESECTION OF PROSTATE       Review of patient's allergies indicates:   Allergen Reactions    No known drug allergies      Current Outpatient Medications on File Prior to Visit   Medication Sig Dispense Refill    acarbose (PRECOSE) 100 MG Tab TAKE 1 TABLET BY MOUTH THREE TIMES DAILY WITH MEALS 270 tablet 0    allopurinoL (ZYLOPRIM) 300 MG tablet Take 1 tablet (300 mg total) by mouth once daily. 90 tablet 3    amLODIPine (NORVASC) 10 MG tablet Take 1 tablet (10 mg total) by mouth once daily. 90 tablet 3    aspirin (ECOTRIN) 81 MG EC tablet Take 81 mg by mouth once daily.      atorvastatin (LIPITOR) 20 MG tablet Take 1 tablet (20 mg total) by mouth once daily. 90 tablet 11    benazepriL (LOTENSIN) 40 MG tablet Take 1 tablet (40 mg total) by mouth once daily. 90 tablet 3    blood sugar diagnostic Strp 1 each by Misc.(Non-Drug; Combo Route) route 3 (three) times daily as needed. One touch ultra 2 test strips 100 each 5    colchicine (COLCRYS) 0.6 mg tablet TAKE 1 TABLET(0.6 MG) BY MOUTH EVERY DAY 90 tablet 0    donepeziL (ARICEPT) 10 MG tablet TAKE 1 TABLET(10 MG) BY MOUTH EVERY DAY 90 tablet 3    ergocalciferol (ERGOCALCIFEROL) 50,000 unit Cap TAKE 1 CAPSULE BY MOUTH 1 TIME A WEEK 12 capsule 3    ferrous sulfate (FEROSUL) 325 mg (65 mg iron) Tab tablet TAKE 1 TABLET BY MOUTH THREE TIMES DAILY 270 tablet 0    furosemide (LASIX) 40 MG tablet TAKE 1 TABLET(40 MG) BY MOUTH EVERY DAY 90 tablet 0    glipiZIDE (GLUCOTROL) 10 MG tablet TAKE 1 TABLET(10 MG) BY MOUTH TWICE DAILY BEFORE MEALS 180 tablet 0    hydrALAZINE (APRESOLINE) 100 MG tablet TAKE 1 TABLET(100 MG) BY MOUTH EVERY 8 HOURS 270 tablet 3    Lactobacillus rhamnosus GG (CULTURELLE) 10 billion cell capsule Take 1 capsule by mouth once daily.       lancets (TRUEPLUS LANCETS) 33 gauge Misc 1 lancet by Misc.(Non-Drug; Combo Route) route once daily. 100 each 5    levothyroxine (SYNTHROID) 50 MCG tablet TAKE 1 TABLET(50 MCG) BY MOUTH EVERY DAY 30 tablet 11    metoprolol succinate (TOPROL-XL) 25 MG 24 hr tablet TAKE 1 TABLET(25 MG) BY MOUTH EVERY DAY 30 tablet 11    niacin (NIASPAN) 1000 MG CR tablet TAKE 1 TABLET(1000 MG) BY MOUTH EVERY EVENING 90 tablet 3    pioglitazone (ACTOS) 30 MG tablet TAKE 1 TABLET(30 MG) BY MOUTH EVERY DAY 90 tablet 0    potassium chloride (KLOR-CON) 10 MEQ TbSR Take 1 tablet (10 mEq total) by mouth once daily. 90 tablet 3    prednisoLONE acetate (PRED FORTE) 1 % DrpS       SITagliptin (JANUVIA) 100 MG Tab TAKE 1 TABLET(100 MG) BY MOUTH EVERY DAY 90 tablet 0    ONETOUCH VERIO IQ METER kit Use as instructed 1 each 0     No current facility-administered medications on file prior to visit.     Social History     Socioeconomic History    Marital status:    Tobacco Use    Smoking status: Never    Smokeless tobacco: Never   Substance and Sexual Activity    Alcohol use: No     Alcohol/week: 0.0 standard drinks    Drug use: No     Family History   Problem Relation Age of Onset    Hypertension Mother     Hearing loss Mother     Hypertension Father     Alcohol abuse Father     Heart attack Maternal Uncle     Brain cancer Sister     Diabetes Neg Hx     Stroke Neg Hx        Review of Systems   Constitutional:  Negative for chills and fever.   Respiratory:  Negative for cough, chest tightness, shortness of breath, wheezing and stridor.    Cardiovascular:  Positive for chest pain. Negative for palpitations and leg swelling.     Objective:   /64   Pulse (!) 49   Temp 97.9 °F (36.6 °C) (Oral)   Ht 6' (1.829 m)   Wt 96.2 kg (212 lb 3.1 oz)   BMI 28.78 kg/m²     Physical Exam  Constitutional:       General: He is not in acute distress.     Appearance: He is well-developed. He is not diaphoretic.   Cardiovascular:      Rate and Rhythm:  Normal rate and regular rhythm.      Heart sounds: No murmur heard.    No friction rub.   Pulmonary:      Effort: Pulmonary effort is normal. No respiratory distress.      Breath sounds: No stridor. No wheezing, rhonchi or rales.   Chest:      Chest wall: Tenderness present.      Comments: He has pain at the lowest rib in the left mid axillary line.  Abdominal:      General: There is no distension.      Tenderness: There is no abdominal tenderness.   Neurological:      Mental Status: He is alert and oriented to person, place, and time.   Psychiatric:         Behavior: Behavior normal.         Thought Content: Thought content normal.         Judgment: Judgment normal.     Assessment:     1. Left-sided chest wall pain    2. Controlled type 2 diabetes mellitus with stage 3 chronic kidney disease, without long-term current use of insulin      Plan:   I am having Rudy Saldaña maintain his ONETOUCH VERIO IQ METER, aspirin, blood sugar diagnostic, lancets, Lactobacillus rhamnosus GG, atorvastatin, amLODIPine, benazepriL, allopurinoL, potassium chloride, levothyroxine, prednisoLONE acetate, niacin, ferrous sulfate, ergocalciferol, hydrALAZINE, donepeziL, metoprolol succinate, furosemide, acarbose, glipiZIDE, pioglitazone, SITagliptin, and colchicine.  No problem-specific Assessment & Plan notes found for this encounter.      No follow-ups on file.    Rudy was seen today for flank pain.    Diagnoses and all orders for this visit:    Left-sided chest wall pain  -     X-Ray Ribs 2 View Left; Future    Controlled type 2 diabetes mellitus with stage 3 chronic kidney disease, without long-term current use of insulin  -     Microalbumin/Creatinine Ratio, Urine; Future    Other orders  -     Influenza - Quadrivalent (Adjuvanted); Future       The patient was instructed to stop the following meds:  There are no discontinued medications.  Orders Placed This Encounter   Procedures    X-Ray Ribs 2 View Left     Standing Status:    Future     Standing Expiration Date:   10/26/2023     Order Specific Question:   Reason for Exam:     Answer:   pain    Influenza - Quadrivalent (Adjuvanted)     Administer the flu vaccine to the patient in the office.     Standing Status:   Future     Standing Expiration Date:   10/26/2023    Microalbumin/Creatinine Ratio, Urine     May change to clinic collect if the patient is in the clinic at the time.     Standing Status:   Future     Standing Expiration Date:   10/26/2023     Order Specific Question:   Specimen Source     Answer:   Urine       Medication List with Changes/Refills   Current Medications    ACARBOSE (PRECOSE) 100 MG TAB    TAKE 1 TABLET BY MOUTH THREE TIMES DAILY WITH MEALS    ALLOPURINOL (ZYLOPRIM) 300 MG TABLET    Take 1 tablet (300 mg total) by mouth once daily.    AMLODIPINE (NORVASC) 10 MG TABLET    Take 1 tablet (10 mg total) by mouth once daily.    ASPIRIN (ECOTRIN) 81 MG EC TABLET    Take 81 mg by mouth once daily.    ATORVASTATIN (LIPITOR) 20 MG TABLET    Take 1 tablet (20 mg total) by mouth once daily.    BENAZEPRIL (LOTENSIN) 40 MG TABLET    Take 1 tablet (40 mg total) by mouth once daily.    BLOOD SUGAR DIAGNOSTIC STRP    1 each by Misc.(Non-Drug; Combo Route) route 3 (three) times daily as needed. One touch ultra 2 test strips    COLCHICINE (COLCRYS) 0.6 MG TABLET    TAKE 1 TABLET(0.6 MG) BY MOUTH EVERY DAY    DONEPEZIL (ARICEPT) 10 MG TABLET    TAKE 1 TABLET(10 MG) BY MOUTH EVERY DAY    ERGOCALCIFEROL (ERGOCALCIFEROL) 50,000 UNIT CAP    TAKE 1 CAPSULE BY MOUTH 1 TIME A WEEK    FERROUS SULFATE (FEROSUL) 325 MG (65 MG IRON) TAB TABLET    TAKE 1 TABLET BY MOUTH THREE TIMES DAILY    FUROSEMIDE (LASIX) 40 MG TABLET    TAKE 1 TABLET(40 MG) BY MOUTH EVERY DAY    GLIPIZIDE (GLUCOTROL) 10 MG TABLET    TAKE 1 TABLET(10 MG) BY MOUTH TWICE DAILY BEFORE MEALS    HYDRALAZINE (APRESOLINE) 100 MG TABLET    TAKE 1 TABLET(100 MG) BY MOUTH EVERY 8 HOURS    LACTOBACILLUS RHAMNOSUS GG (CULTURELLE) 10  BILLION CELL CAPSULE    Take 1 capsule by mouth once daily.    LANCETS (TRUEPLUS LANCETS) 33 GAUGE MISC    1 lancet by Misc.(Non-Drug; Combo Route) route once daily.    LEVOTHYROXINE (SYNTHROID) 50 MCG TABLET    TAKE 1 TABLET(50 MCG) BY MOUTH EVERY DAY    METOPROLOL SUCCINATE (TOPROL-XL) 25 MG 24 HR TABLET    TAKE 1 TABLET(25 MG) BY MOUTH EVERY DAY    NIACIN (NIASPAN) 1000 MG CR TABLET    TAKE 1 TABLET(1000 MG) BY MOUTH EVERY EVENING    ONETOUCH VERIO IQ METER KIT    Use as instructed    PIOGLITAZONE (ACTOS) 30 MG TABLET    TAKE 1 TABLET(30 MG) BY MOUTH EVERY DAY    POTASSIUM CHLORIDE (KLOR-CON) 10 MEQ TBSR    Take 1 tablet (10 mEq total) by mouth once daily.    PREDNISOLONE ACETATE (PRED FORTE) 1 % DRPS        SITAGLIPTIN (JANUVIA) 100 MG TAB    TAKE 1 TABLET(100 MG) BY MOUTH EVERY DAY      Medication List with Changes/Refills   Current Medications    ACARBOSE (PRECOSE) 100 MG TAB    TAKE 1 TABLET BY MOUTH THREE TIMES DAILY WITH MEALS       Start Date: 10/16/2022End Date: --    ALLOPURINOL (ZYLOPRIM) 300 MG TABLET    Take 1 tablet (300 mg total) by mouth once daily.       Start Date: 11/8/2021 End Date: --    AMLODIPINE (NORVASC) 10 MG TABLET    Take 1 tablet (10 mg total) by mouth once daily.       Start Date: 11/8/2021 End Date: --    ASPIRIN (ECOTRIN) 81 MG EC TABLET    Take 81 mg by mouth once daily.       Start Date: --        End Date: --    ATORVASTATIN (LIPITOR) 20 MG TABLET    Take 1 tablet (20 mg total) by mouth once daily.       Start Date: 11/8/2021 End Date: --    BENAZEPRIL (LOTENSIN) 40 MG TABLET    Take 1 tablet (40 mg total) by mouth once daily.       Start Date: 11/8/2021 End Date: --    BLOOD SUGAR DIAGNOSTIC STRP    1 each by Misc.(Non-Drug; Combo Route) route 3 (three) times daily as needed. One touch ultra 2 test strips       Start Date: 1/21/2019 End Date: --    COLCHICINE (COLCRYS) 0.6 MG TABLET    TAKE 1 TABLET(0.6 MG) BY MOUTH EVERY DAY       Start Date: 10/16/2022End Date: --    DONEPEZIL  (ARICEPT) 10 MG TABLET    TAKE 1 TABLET(10 MG) BY MOUTH EVERY DAY       Start Date: 10/16/2022End Date: --    ERGOCALCIFEROL (ERGOCALCIFEROL) 50,000 UNIT CAP    TAKE 1 CAPSULE BY MOUTH 1 TIME A WEEK       Start Date: 10/16/2022End Date: --    FERROUS SULFATE (FEROSUL) 325 MG (65 MG IRON) TAB TABLET    TAKE 1 TABLET BY MOUTH THREE TIMES DAILY       Start Date: 7/19/2022 End Date: --    FUROSEMIDE (LASIX) 40 MG TABLET    TAKE 1 TABLET(40 MG) BY MOUTH EVERY DAY       Start Date: 10/16/2022End Date: --    GLIPIZIDE (GLUCOTROL) 10 MG TABLET    TAKE 1 TABLET(10 MG) BY MOUTH TWICE DAILY BEFORE MEALS       Start Date: 10/16/2022End Date: --    HYDRALAZINE (APRESOLINE) 100 MG TABLET    TAKE 1 TABLET(100 MG) BY MOUTH EVERY 8 HOURS       Start Date: 10/16/2022End Date: --    LACTOBACILLUS RHAMNOSUS GG (CULTURELLE) 10 BILLION CELL CAPSULE    Take 1 capsule by mouth once daily.       Start Date: --        End Date: --    LANCETS (TRUEPLUS LANCETS) 33 GAUGE MISC    1 lancet by Misc.(Non-Drug; Combo Route) route once daily.       Start Date: 1/21/2019 End Date: --    LEVOTHYROXINE (SYNTHROID) 50 MCG TABLET    TAKE 1 TABLET(50 MCG) BY MOUTH EVERY DAY       Start Date: 1/19/2022 End Date: --    METOPROLOL SUCCINATE (TOPROL-XL) 25 MG 24 HR TABLET    TAKE 1 TABLET(25 MG) BY MOUTH EVERY DAY       Start Date: 10/16/2022End Date: --    NIACIN (NIASPAN) 1000 MG CR TABLET    TAKE 1 TABLET(1000 MG) BY MOUTH EVERY EVENING       Start Date: 7/19/2022 End Date: --    ONETOUCH VERIO IQ METER KIT    Use as instructed       Start Date: 1/19/2016 End Date: 12/10/2019    PIOGLITAZONE (ACTOS) 30 MG TABLET    TAKE 1 TABLET(30 MG) BY MOUTH EVERY DAY       Start Date: 10/16/2022End Date: --    POTASSIUM CHLORIDE (KLOR-CON) 10 MEQ TBSR    Take 1 tablet (10 mEq total) by mouth once daily.       Start Date: 11/8/2021 End Date: --    PREDNISOLONE ACETATE (PRED FORTE) 1 % DRPS           Start Date: 4/19/2022 End Date: --    SITAGLIPTIN (JANUVIA) 100 MG TAB     TAKE 1 TABLET(100 MG) BY MOUTH EVERY DAY       Start Date: 10/16/2022End Date: --

## 2022-11-19 NOTE — PROGRESS NOTES
Assessment/Plan:    Hypertension associated with diabetes  Elevated yesterday to SBP of 170 at podiatry clinic but unsure of accuracy of read. Asymptomatic. However, patient does reports that SBP has consistently been >150 at home over the past month. Per cardiology, patient's goal SBP is <150. No new changes in meds or diet recently. Continue lasix and lotensin but will increase amlodipine to 10mg daily. Patient has a follow up visit with nephrology in a few weeks so will have BP check at that time.    CKD stage 3 secondary to diabetes  Followed by nephrology. Appointment next month. Continue to work on BP control.      Hypothyroidism  Levothyroxine recently increased to 50mcg due to elevated TSH. Patient requesting 90 day supply. Will provide today.    _____________________________________________________________________________________________________________________________________________________    Orders this visit:    Hypertension associated with diabetes  -     amLODIPine (NORVASC) 10 MG tablet; Take 1 tablet (10 mg total) by mouth once daily.  Dispense: 90 tablet; Refill: 1    CKD stage 3 secondary to diabetes    Influenza vaccine needed  -     Influenza - High Dose (65+) (PF) (IM)    Hypothyroidism, unspecified type  -     levothyroxine (SYNTHROID) 50 MCG tablet; Take 1 tablet (50 mcg total) by mouth once daily.  Dispense: 90 tablet; Refill: 0      Follow up with nephrology and cardiology as already scheduled. Return to clinic if BP still elevated at home prior to those upcoming appointments.    María Gunn MD  _____________________________________________________________________________________________________________________________________________________    HPI:    Patient is in clinic today as an established patient with a new complaint of recently elevated BP. Patient has hx of HTN, followed by cardiology. Currently on Norvasc 5mg, Lotensin 40mg BID and lasix 20mg daily. Blood pressure elevated  at podiatry visit yesterday.  Patient reports that blood pressure was checked four different times at that clinic, however they were only able to get a read on the 4th try which was 177/73.  Patient reports he was asymptomatic at that time.  He did go home and check his blood pressure with his systolic in the 150s.  Patient states that he does routinely check his blood pressure at home and previously SBP was usually less than 150.  He has noticed over the past month that his SBP has been in the 150s to 160s.  Denies any recent changes in medications or diet.  Per cardiology's office note, patient's blood pressure goal is SBP of less than 150.  No other complaints today in clinic.    Past Medical History:  Past Medical History:   Diagnosis Date    Anemia     BPH (benign prostatic hypertrophy)     Carotid bruit     CKD (chronic kidney disease) stage 2/3     Dr. Nails    CVA (cerebral vascular accident)     Diabetes mellitus, type 2     Hyperlipidemia     Hypertension     Hypothyroidism 2/29/2016    Type 2 diabetes mellitus, uncontrolled, with renal complications     The patient presents with diabetes.  The patient denies polyuria, polydipsia, polyphagia, hypoglycemia and paresthesias.  The patient's glucose control has been good.  Home glucose averages are routinely checked.  The patient is without retinopathy currently.  The patient has no history of neuropathy.  The patient currently complains of no podiatric problems.  The patient has excellent compliance.    Type II or unspecified type diabetes mellitus without mention of complication, not stated as uncontrolled     Vitamin D deficiency 2/29/2016     Past Surgical History:   Procedure Laterality Date    COLONOSCOPY  2008    COLONOSCOPY  5/31/2005    repeat in 2015    PILONIDAL CYST DRAINAGE      TRANSURETHRAL RESECTION OF PROSTATE       Review of patient's allergies indicates:   Allergen Reactions    No known drug allergies      Social History      Tobacco Use    Smoking status: Never Smoker    Smokeless tobacco: Never Used   Substance Use Topics    Alcohol use: No     Alcohol/week: 0.0 standard drinks    Drug use: No     Family History   Problem Relation Age of Onset    Hypertension Mother     Hypertension Father     Heart attack Maternal Uncle     Brain cancer Sister     Diabetes Neg Hx     Stroke Neg Hx      Current Outpatient Medications on File Prior to Visit   Medication Sig Dispense Refill    acarbose (PRECOSE) 100 MG Tab TAKE 1 TABLET(100 MG) BY MOUTH THREE TIMES DAILY WITH MEALS 270 tablet 0    allopurinol (ZYLOPRIM) 100 MG tablet Take 2 tablets (200 mg total) by mouth once daily. 180 tablet 1    aspirin (ECOTRIN) 81 MG EC tablet Take 81 mg by mouth once daily.      atorvastatin (LIPITOR) 20 MG tablet TAKE 1 TABLET BY MOUTH DAILY 90 tablet 11    benazepril (LOTENSIN) 40 MG tablet Take 1 tablet (40 mg total) by mouth 2 (two) times daily. 180 tablet 3    blood sugar diagnostic Strp 1 each by Misc.(Non-Drug; Combo Route) route 3 (three) times daily as needed. One touch ultra 2 test strips 100 each 5    ergocalciferol (ERGOCALCIFEROL) 50,000 unit Cap TAKE 1 CAPSULE BY MOUTH 2 TIMES A WEEK 26 capsule 3    ferrous sulfate 325 mg (65 mg iron) Tab tablet Take 1 tablet (325 mg total) by mouth 3 (three) times daily. 270 tablet 3    furosemide (LASIX) 20 MG tablet Take 1 tablet (20 mg total) by mouth once daily. 90 tablet 3    galantamine (RAZADYNE) 8 MG Tab TAKE 1 TABLET BY MOUTH TWICE DAILY WITH MEALS 180 tablet 0    glipiZIDE (GLUCOTROL) 10 MG tablet TAKE 1 TABLET(10 MG) BY MOUTH TWICE DAILY BEFORE MEALS 180 tablet 0    JANUVIA 100 mg Tab TAKE 1 TABLET(100 MG) BY MOUTH EVERY DAY 90 tablet 0    ketoconazole (NIZORAL) 2 % shampoo       lancets (TRUEPLUS LANCETS) 33 gauge Misc 1 lancet by Misc.(Non-Drug; Combo Route) route once daily. 100 each 5    MITIGARE 0.6 mg Cap Take 1 capsule (0.6 mg total) by mouth Daily. 90 capsule 3     Ambulatory niacin (NIASPAN) 1000 MG CR tablet TAKE 1 TABLET(1000 MG) BY MOUTH EVERY EVENING 90 tablet 0    pioglitazone (ACTOS) 30 MG tablet Take 1 tablet (30 mg total) by mouth once daily. 90 tablet 2    potassium chloride (KLOR-CON) 10 MEQ TbSR TAKE 1 TABLET(10 MEQ) BY MOUTH EVERY DAY 90 tablet 0    [DISCONTINUED] amLODIPine (NORVASC) 5 MG tablet Take 1 tablet (5 mg total) by mouth once daily. 90 tablet 3    [DISCONTINUED] levothyroxine (SYNTHROID) 50 MCG tablet Take 1 tablet (50 mcg total) by mouth once daily. 30 tablet 2    ONETOUCH VERIO IQ METER kit Use as instructed 1 each 0    [DISCONTINUED] clopidogrel (PLAVIX) 75 mg tablet Take 1 tablet (75 mg total) by mouth once daily. (Patient not taking: Reported on 10/16/2019) 90 tablet 3     No current facility-administered medications on file prior to visit.        Review of Systems   Constitutional: Negative for chills, diaphoresis, fatigue and fever.   HENT: Negative for congestion, ear pain, postnasal drip, sinus pain and sore throat.    Eyes: Negative for pain and redness.   Respiratory: Negative for cough, chest tightness and shortness of breath.    Cardiovascular: Negative for chest pain and leg swelling.   Gastrointestinal: Negative for abdominal pain, constipation, diarrhea, nausea and vomiting.   Genitourinary: Negative for dysuria and hematuria.   Musculoskeletal: Negative for arthralgias and joint swelling.   Skin: Negative for rash.   Neurological: Negative for dizziness, syncope and headaches.       Vitals:    10/16/19 0931 10/16/19 0937   BP: (!) 162/84 (!) 142/75   BP Location: Left arm Right arm   Patient Position: Sitting Sitting   BP Method: Medium (Automatic) Medium (Automatic)   Pulse: 64    Temp: 98.1 °F (36.7 °C)    Weight: 99.7 kg (219 lb 12.8 oz)    Height: 6' (1.829 m)        Wt Readings from Last 3 Encounters:   10/16/19 99.7 kg (219 lb 12.8 oz)   10/15/19 100.6 kg (221 lb 12.5 oz)   06/25/19 98.4 kg (217 lb)       Physical Exam    Constitutional: He is oriented to person, place, and time. He appears well-developed and well-nourished. No distress.   HENT:   Head: Normocephalic and atraumatic.   Eyes: Conjunctivae and EOM are normal.   Neck: Normal range of motion. Neck supple.   Cardiovascular: Normal rate, regular rhythm, normal heart sounds and intact distal pulses.   No murmur heard.  Pulmonary/Chest: Effort normal and breath sounds normal. No respiratory distress.   Abdominal: Soft. Bowel sounds are normal. He exhibits no distension. There is no tenderness.   Musculoskeletal: Normal range of motion. He exhibits no edema.   Neurological: He is alert and oriented to person, place, and time.   Skin: Skin is warm and dry. No rash noted.   Psychiatric: He has a normal mood and affect.

## 2022-11-30 ENCOUNTER — PATIENT MESSAGE (OUTPATIENT)
Dept: PULMONOLOGY | Facility: CLINIC | Age: 82
End: 2022-11-30
Payer: MEDICARE

## 2022-11-30 ENCOUNTER — OFFICE VISIT (OUTPATIENT)
Dept: FAMILY MEDICINE | Facility: CLINIC | Age: 82
End: 2022-11-30
Payer: MEDICARE

## 2022-11-30 ENCOUNTER — HOSPITAL ENCOUNTER (OUTPATIENT)
Dept: RADIOLOGY | Facility: HOSPITAL | Age: 82
Discharge: HOME | End: 2022-11-30
Attending: FAMILY MEDICINE
Payer: MEDICARE

## 2022-11-30 VITALS
HEIGHT: 72 IN | HEART RATE: 63 BPM | RESPIRATION RATE: 18 BRPM | TEMPERATURE: 98 F | DIASTOLIC BLOOD PRESSURE: 51 MMHG | BODY MASS INDEX: 29.66 KG/M2 | SYSTOLIC BLOOD PRESSURE: 108 MMHG | OXYGEN SATURATION: 99 % | WEIGHT: 219 LBS

## 2022-11-30 DIAGNOSIS — E66.9 OBESITY (BMI 30-39.9): ICD-10-CM

## 2022-11-30 DIAGNOSIS — E55.9 VITAMIN D DEFICIENCY: ICD-10-CM

## 2022-11-30 DIAGNOSIS — R05.3 CHRONIC COUGH: Primary | ICD-10-CM

## 2022-11-30 DIAGNOSIS — E11.59 HYPERTENSION ASSOCIATED WITH DIABETES: ICD-10-CM

## 2022-11-30 DIAGNOSIS — E78.2 COMBINED HYPERLIPIDEMIA ASSOCIATED WITH TYPE 2 DIABETES MELLITUS: ICD-10-CM

## 2022-11-30 DIAGNOSIS — N18.30 CKD STAGE 3 SECONDARY TO DIABETES: ICD-10-CM

## 2022-11-30 DIAGNOSIS — M10.042 ACUTE IDIOPATHIC GOUT OF LEFT HAND: ICD-10-CM

## 2022-11-30 DIAGNOSIS — E11.69 COMBINED HYPERLIPIDEMIA ASSOCIATED WITH TYPE 2 DIABETES MELLITUS: ICD-10-CM

## 2022-11-30 DIAGNOSIS — N18.30 CONTROLLED TYPE 2 DIABETES MELLITUS WITH STAGE 3 CHRONIC KIDNEY DISEASE, WITHOUT LONG-TERM CURRENT USE OF INSULIN: ICD-10-CM

## 2022-11-30 DIAGNOSIS — E11.22 CONTROLLED TYPE 2 DIABETES MELLITUS WITH STAGE 3 CHRONIC KIDNEY DISEASE, WITHOUT LONG-TERM CURRENT USE OF INSULIN: ICD-10-CM

## 2022-11-30 DIAGNOSIS — I15.2 HYPERTENSION ASSOCIATED WITH DIABETES: ICD-10-CM

## 2022-11-30 DIAGNOSIS — E03.9 HYPOTHYROIDISM, UNSPECIFIED TYPE: ICD-10-CM

## 2022-11-30 DIAGNOSIS — R05.3 CHRONIC COUGH: ICD-10-CM

## 2022-11-30 DIAGNOSIS — E11.22 CKD STAGE 3 SECONDARY TO DIABETES: ICD-10-CM

## 2022-11-30 DIAGNOSIS — G47.33 OSA ON CPAP: Chronic | ICD-10-CM

## 2022-11-30 PROCEDURE — 99214 OFFICE O/P EST MOD 30 MIN: CPT | Mod: S$GLB,,, | Performed by: FAMILY MEDICINE

## 2022-11-30 PROCEDURE — 99499 UNLISTED E&M SERVICE: CPT | Mod: S$GLB,,, | Performed by: FAMILY MEDICINE

## 2022-11-30 PROCEDURE — 1126F AMNT PAIN NOTED NONE PRSNT: CPT | Mod: CPTII,S$GLB,, | Performed by: FAMILY MEDICINE

## 2022-11-30 PROCEDURE — 71046 X-RAY EXAM CHEST 2 VIEWS: CPT | Mod: 26,,, | Performed by: RADIOLOGY

## 2022-11-30 PROCEDURE — 99499 RISK ADDL DX/OHS AUDIT: ICD-10-PCS | Mod: S$GLB,,, | Performed by: FAMILY MEDICINE

## 2022-11-30 PROCEDURE — 1101F PR PT FALLS ASSESS DOC 0-1 FALLS W/OUT INJ PAST YR: ICD-10-PCS | Mod: CPTII,S$GLB,, | Performed by: FAMILY MEDICINE

## 2022-11-30 PROCEDURE — 3078F PR MOST RECENT DIASTOLIC BLOOD PRESSURE < 80 MM HG: ICD-10-PCS | Mod: CPTII,S$GLB,, | Performed by: FAMILY MEDICINE

## 2022-11-30 PROCEDURE — 3078F DIAST BP <80 MM HG: CPT | Mod: CPTII,S$GLB,, | Performed by: FAMILY MEDICINE

## 2022-11-30 PROCEDURE — 1159F PR MEDICATION LIST DOCUMENTED IN MEDICAL RECORD: ICD-10-PCS | Mod: CPTII,S$GLB,, | Performed by: FAMILY MEDICINE

## 2022-11-30 PROCEDURE — 3288F PR FALLS RISK ASSESSMENT DOCUMENTED: ICD-10-PCS | Mod: CPTII,S$GLB,, | Performed by: FAMILY MEDICINE

## 2022-11-30 PROCEDURE — 3288F FALL RISK ASSESSMENT DOCD: CPT | Mod: CPTII,S$GLB,, | Performed by: FAMILY MEDICINE

## 2022-11-30 PROCEDURE — 3074F PR MOST RECENT SYSTOLIC BLOOD PRESSURE < 130 MM HG: ICD-10-PCS | Mod: CPTII,S$GLB,, | Performed by: FAMILY MEDICINE

## 2022-11-30 PROCEDURE — 71046 XR CHEST PA AND LATERAL: ICD-10-PCS | Mod: 26,,, | Performed by: RADIOLOGY

## 2022-11-30 PROCEDURE — 99999 PR PBB SHADOW E&M-EST. PATIENT-LVL IV: ICD-10-PCS | Mod: PBBFAC,,, | Performed by: FAMILY MEDICINE

## 2022-11-30 PROCEDURE — 99999 PR PBB SHADOW E&M-EST. PATIENT-LVL IV: CPT | Mod: PBBFAC,,, | Performed by: FAMILY MEDICINE

## 2022-11-30 PROCEDURE — 3074F SYST BP LT 130 MM HG: CPT | Mod: CPTII,S$GLB,, | Performed by: FAMILY MEDICINE

## 2022-11-30 PROCEDURE — 71046 X-RAY EXAM CHEST 2 VIEWS: CPT | Mod: TC,PO

## 2022-11-30 PROCEDURE — 99214 PR OFFICE/OUTPT VISIT, EST, LEVL IV, 30-39 MIN: ICD-10-PCS | Mod: S$GLB,,, | Performed by: FAMILY MEDICINE

## 2022-11-30 PROCEDURE — 1101F PT FALLS ASSESS-DOCD LE1/YR: CPT | Mod: CPTII,S$GLB,, | Performed by: FAMILY MEDICINE

## 2022-11-30 PROCEDURE — 1126F PR PAIN SEVERITY QUANTIFIED, NO PAIN PRESENT: ICD-10-PCS | Mod: CPTII,S$GLB,, | Performed by: FAMILY MEDICINE

## 2022-11-30 PROCEDURE — 1159F MED LIST DOCD IN RCRD: CPT | Mod: CPTII,S$GLB,, | Performed by: FAMILY MEDICINE

## 2022-11-30 RX ORDER — ATORVASTATIN CALCIUM 20 MG/1
20 TABLET, FILM COATED ORAL DAILY
Qty: 90 TABLET | Refills: 11 | Status: SHIPPED | OUTPATIENT
Start: 2022-11-30 | End: 2023-12-26 | Stop reason: SDUPTHER

## 2022-11-30 RX ORDER — NIACIN 1000 MG/1
TABLET, EXTENDED RELEASE ORAL
Qty: 90 TABLET | Refills: 3 | Status: SHIPPED | OUTPATIENT
Start: 2022-11-30 | End: 2023-06-08 | Stop reason: SDUPTHER

## 2022-11-30 RX ORDER — GLIPIZIDE 10 MG/1
10 TABLET ORAL
Qty: 180 TABLET | Refills: 3 | Status: SHIPPED | OUTPATIENT
Start: 2022-11-30 | End: 2023-12-26 | Stop reason: SDUPTHER

## 2022-11-30 RX ORDER — FUROSEMIDE 40 MG/1
40 TABLET ORAL DAILY
Qty: 90 TABLET | Refills: 3 | Status: SHIPPED | OUTPATIENT
Start: 2022-11-30 | End: 2023-12-26 | Stop reason: SDUPTHER

## 2022-11-30 RX ORDER — ERGOCALCIFEROL 1.25 MG/1
CAPSULE ORAL
Qty: 12 CAPSULE | Refills: 3 | Status: SHIPPED | OUTPATIENT
Start: 2022-11-30 | End: 2023-12-06 | Stop reason: SDUPTHER

## 2022-11-30 RX ORDER — DONEPEZIL HYDROCHLORIDE 10 MG/1
10 TABLET, FILM COATED ORAL DAILY
Qty: 90 TABLET | Refills: 3 | Status: SHIPPED | OUTPATIENT
Start: 2022-11-30 | End: 2023-12-26 | Stop reason: SDUPTHER

## 2022-11-30 RX ORDER — BENAZEPRIL HYDROCHLORIDE 40 MG/1
40 TABLET ORAL DAILY
Qty: 90 TABLET | Refills: 3 | Status: SHIPPED | OUTPATIENT
Start: 2022-11-30 | End: 2022-11-30

## 2022-11-30 RX ORDER — LEVOTHYROXINE SODIUM 50 UG/1
50 TABLET ORAL DAILY
Qty: 90 TABLET | Refills: 3 | Status: SHIPPED | OUTPATIENT
Start: 2022-11-30 | End: 2022-12-03

## 2022-11-30 RX ORDER — LOSARTAN POTASSIUM 25 MG/1
25 TABLET ORAL DAILY
Qty: 90 TABLET | Refills: 3 | Status: SHIPPED | OUTPATIENT
Start: 2022-11-30 | End: 2023-06-05

## 2022-11-30 RX ORDER — ALLOPURINOL 300 MG/1
300 TABLET ORAL DAILY
Qty: 90 TABLET | Refills: 3 | Status: SHIPPED | OUTPATIENT
Start: 2022-11-30 | End: 2023-01-03

## 2022-11-30 RX ORDER — ACARBOSE 100 MG/1
100 TABLET ORAL
Qty: 270 TABLET | Refills: 3 | Status: SHIPPED | OUTPATIENT
Start: 2022-11-30 | End: 2023-12-13

## 2022-11-30 RX ORDER — HYDRALAZINE HYDROCHLORIDE 100 MG/1
TABLET, FILM COATED ORAL
Qty: 270 TABLET | Refills: 3 | Status: SHIPPED | OUTPATIENT
Start: 2022-11-30 | End: 2024-02-20

## 2022-11-30 RX ORDER — METOPROLOL SUCCINATE 25 MG/1
25 TABLET, EXTENDED RELEASE ORAL DAILY
Qty: 90 TABLET | Refills: 3 | Status: SHIPPED | OUTPATIENT
Start: 2022-11-30 | End: 2023-12-26 | Stop reason: SDUPTHER

## 2022-11-30 RX ORDER — POTASSIUM CHLORIDE 750 MG/1
10 TABLET, EXTENDED RELEASE ORAL DAILY
Qty: 90 TABLET | Refills: 3 | Status: SHIPPED | OUTPATIENT
Start: 2022-11-30 | End: 2024-02-05

## 2022-11-30 RX ORDER — PIOGLITAZONEHYDROCHLORIDE 30 MG/1
30 TABLET ORAL DAILY
Qty: 90 TABLET | Refills: 3 | Status: SHIPPED | OUTPATIENT
Start: 2022-11-30 | End: 2023-12-26 | Stop reason: SDUPTHER

## 2022-11-30 NOTE — PROGRESS NOTES
Subjective:      Patient ID: Rudy Saldaña is a 82 y.o. male.    Chief Complaint: Annual Exam    Problem List Items Addressed This Visit       DEVIKA on CPAP (Chronic)    Relevant Orders    Ambulatory referral/consult to Sleep Disorders    Acute idiopathic gout of left hand    Overview     He has gout and he has been on zyloprim for a long time and he is on colchicine also daiily.  He had a bad gout problem prior to all of this being used.  He has been stable for a long time and the colchicine is expensive now. We will stop it.  Lab Results   Component Value Date    URICACID 4.1 11/09/2021            Relevant Medications    allopurinoL (ZYLOPRIM) 300 MG tablet    Other Relevant Orders    Uric Acid    Chronic cough - Primary    Overview     Cough: Patient complains of nonproductive cough.  Symptoms began a few years ago.  The cough is non-productive, without wheezing, dyspnea or hemoptysis and is aggravated by nothing Associated symptoms include:nothing. Patient does not have new pets. Patient does not have a history of asthma. Patient does have a history of environmental allergens. Patient does not have recent travel. Patient does not have a history of smoking.  He is on an ace inhibitor.             Relevant Orders    X-Ray Chest PA And Lateral    CKD stage 3 secondary to diabetes    Overview     The patient has an Estimated Glumerular Filtration Rate (EGFR) below:   CrCl cannot be calculated (Patient's most recent lab result is older than the maximum 7 days allowed.).    eGFR if non    Date Value Ref Range Status   11/09/2021 >60.0 >60 mL/min/1.73 m^2 Final     Comment:     Calculation used to obtain the estimated glomerular filtration  rate (eGFR) is the CKD-EPI equation.          Lab Results   Component Value Date    CREATININE 1.0 11/09/2021    BUN 22 11/09/2021     11/09/2021    K 3.6 11/09/2021     11/09/2021    CO2 26 11/09/2021     The patient's chronic kidney disease was  monitored, evaluated, addressed and/or treated.             Relevant Medications    SITagliptin phosphate (JANUVIA) 100 MG Tab    pioglitazone (ACTOS) 30 MG tablet    glipiZIDE (GLUCOTROL) 10 MG tablet    acarbose (PRECOSE) 100 MG Tab    Combined hyperlipidemia associated with type 2 diabetes mellitus    Overview     The patient presents with hyperlipidemia.  The patient reports tolerating the medication well and is in excellent compliance.  There have been no medication side effects.  The patient denies chest pain, neuropathy, and myalgias.  The patient has reduced fat intake and has been exercising.  Current treatment has included the medications listed in the med card.    Lab Results   Component Value Date    CHOL 107 (L) 11/09/2021    CHOL 116 (L) 11/13/2019    CHOL 116 (L) 09/14/2018       Lab Results   Component Value Date    HDL 42 11/09/2021    HDL 45 11/13/2019    HDL 45 09/14/2018       Lab Results   Component Value Date    LDLCALC 51.0 (L) 11/09/2021    LDLCALC 60.8 (L) 11/13/2019    LDLCALC 58.8 (L) 09/14/2018       Lab Results   Component Value Date    TRIG 70 11/09/2021    TRIG 51 11/13/2019    TRIG 61 09/14/2018       Lab Results   Component Value Date    CHOLHDL 39.3 11/09/2021    CHOLHDL 38.8 11/13/2019    CHOLHDL 38.8 09/14/2018     Lab Results   Component Value Date    ALT 12 11/09/2021    AST 16 11/09/2021    ALKPHOS 61 11/09/2021    BILITOT 1.3 (H) 11/09/2021            Relevant Medications    niacin (NIASPAN) 1000 MG CR tablet    atorvastatin (LIPITOR) 20 MG tablet    SITagliptin phosphate (JANUVIA) 100 MG Tab    pioglitazone (ACTOS) 30 MG tablet    glipiZIDE (GLUCOTROL) 10 MG tablet    acarbose (PRECOSE) 100 MG Tab    Other Relevant Orders    Lipid Panel    Hypertension associated with diabetes    Overview     The patient presents with essential hypertension.  The patient is tolerating the medication well and is in excellent compliance.  The patient is experiencing no side effects.  Counseling  was offered regarding low salt diets.  The patient has a reduced salt intake.  The patient denies chest pain, palpitations, shortness of breath, dyspnea on exertion, left or murmur neck pain, nausea, vomiting, diaphoresis, paroxysmal nocturnal dyspnea, and orthopnea. We discussed the # of meds he is on and his bp where it is now.  We will hold the amlodipine to see if he can tolerate this.         Relevant Medications    SITagliptin phosphate (JANUVIA) 100 MG Tab    pioglitazone (ACTOS) 30 MG tablet    glipiZIDE (GLUCOTROL) 10 MG tablet    acarbose (PRECOSE) 100 MG Tab    potassium chloride (KLOR-CON) 10 MEQ TbSR    hydrALAZINE (APRESOLINE) 100 MG tablet    metoprolol succinate (TOPROL-XL) 25 MG 24 hr tablet    furosemide (LASIX) 40 MG tablet    Other Relevant Orders    Comprehensive Metabolic Panel    Hypothyroidism    Overview     The patient presents with hypothyroidism.  The patient denies agitation, anxiety, blurred vision, chest pain, cold intolerance, constipation, dizziness, dry skin, fatigue, lightheadedness, paresthesias, skin coarsening, tachycardia, tremor, weight gain or weight loss.  The patient's current treatment has included Synthroid with a good response.    Lab Results   Component Value Date    TSH 2.736 11/09/2021              Relevant Medications    levothyroxine (SYNTHROID) 50 MCG tablet    Other Relevant Orders    TSH    Obesity (BMI 30-39.9)    Overview     The patient presents with obesity.  Denies bulimia, amenorrhea, cold intolerance, edema, hip pain, hirsutism, knee pain, polydipsia, polyuria, thirst and weakness.  The patient does not perform regular exercise.  Previous treatments for obesity :self-directed dieting without success.  The patient and I discussed the importance of exercise.    Wt Readings from Last 4 Encounters:   11/30/22 99.3 kg (219 lb 0.4 oz)   10/26/22 96.2 kg (212 lb 3.1 oz)   08/16/22 96.3 kg (212 lb 4.9 oz)   05/03/22 96.3 kg (212 lb 4.8 oz)            Vitamin D  deficiency    Relevant Medications    ergocalciferol (ERGOCALCIFEROL) 50,000 unit Cap     Other Visit Diagnoses       Controlled type 2 diabetes mellitus with stage 3 chronic kidney disease, without long-term current use of insulin        Relevant Medications    SITagliptin phosphate (JANUVIA) 100 MG Tab    pioglitazone (ACTOS) 30 MG tablet    glipiZIDE (GLUCOTROL) 10 MG tablet    acarbose (PRECOSE) 100 MG Tab    Other Relevant Orders    Comprehensive Metabolic Panel    Hemoglobin A1C    Microalbumin/Creatinine Ratio, Urine            The patient's Health Maintenance was reviewed and the following appears to be due:   Health Maintenance Due   Topic Date Due    Shingles Vaccine (3 of 3) 01/07/2022    COVID-19 Vaccine (4 - Booster for Pfizer series) 01/12/2022    Eye Exam  03/30/2022    Hemoglobin A1c  05/09/2022    Lipid Panel  11/09/2022       Past Medical History:  Past Medical History:   Diagnosis Date    Anemia     BPH (benign prostatic hypertrophy)     Carotid bruit     CKD (chronic kidney disease) stage 2/3     Dr. Nails    CVA (cerebral vascular accident)     Diabetes mellitus, type 2     Hyperlipidemia     Hypertension     Hypothyroidism 2/29/2016    Type 2 diabetes mellitus, uncontrolled, with renal complications     The patient presents with diabetes.  The patient denies polyuria, polydipsia, polyphagia, hypoglycemia and paresthesias.  The patient's glucose control has been good.  Home glucose averages are routinely checked.  The patient is without retinopathy currently.  The patient has no history of neuropathy.  The patient currently complains of no podiatric problems.  The patient has excellent compliance.    Type II or unspecified type diabetes mellitus without mention of complication, not stated as uncontrolled     Vitamin D deficiency 2/29/2016     Past Surgical History:   Procedure Laterality Date    COLONOSCOPY  2008    COLONOSCOPY  5/31/2005    repeat in 2015    PILONIDAL CYST DRAINAGE       TRANSURETHRAL RESECTION OF PROSTATE       Review of patient's allergies indicates:   Allergen Reactions    Ace inhibitors      Cough       Current Outpatient Medications on File Prior to Visit   Medication Sig Dispense Refill    aspirin (ECOTRIN) 81 MG EC tablet Take 81 mg by mouth once daily.      blood sugar diagnostic Strp 1 each by Misc.(Non-Drug; Combo Route) route 3 (three) times daily as needed. One touch ultra 2 test strips 100 each 5    ferrous sulfate (FEROSUL) 325 mg (65 mg iron) Tab tablet TAKE 1 TABLET BY MOUTH THREE TIMES DAILY 270 tablet 0    lancets (TRUEPLUS LANCETS) 33 gauge Misc 1 lancet by Misc.(Non-Drug; Combo Route) route once daily. 100 each 5    ONETOUCH VERIO IQ METER kit Use as instructed 1 each 0    [DISCONTINUED] acarbose (PRECOSE) 100 MG Tab TAKE 1 TABLET BY MOUTH THREE TIMES DAILY WITH MEALS 270 tablet 0    [DISCONTINUED] allopurinoL (ZYLOPRIM) 300 MG tablet Take 1 tablet (300 mg total) by mouth once daily. 90 tablet 3    [DISCONTINUED] amLODIPine (NORVASC) 10 MG tablet Take 1 tablet (10 mg total) by mouth once daily. 90 tablet 3    [DISCONTINUED] atorvastatin (LIPITOR) 20 MG tablet Take 1 tablet (20 mg total) by mouth once daily. 90 tablet 11    [DISCONTINUED] benazepriL (LOTENSIN) 40 MG tablet Take 1 tablet (40 mg total) by mouth once daily. 90 tablet 3    [DISCONTINUED] colchicine (COLCRYS) 0.6 mg tablet TAKE 1 TABLET(0.6 MG) BY MOUTH EVERY DAY 90 tablet 0    [DISCONTINUED] donepeziL (ARICEPT) 10 MG tablet TAKE 1 TABLET(10 MG) BY MOUTH EVERY DAY 90 tablet 3    [DISCONTINUED] ergocalciferol (ERGOCALCIFEROL) 50,000 unit Cap TAKE 1 CAPSULE BY MOUTH 1 TIME A WEEK 12 capsule 3    [DISCONTINUED] furosemide (LASIX) 40 MG tablet TAKE 1 TABLET(40 MG) BY MOUTH EVERY DAY 90 tablet 0    [DISCONTINUED] glipiZIDE (GLUCOTROL) 10 MG tablet TAKE 1 TABLET(10 MG) BY MOUTH TWICE DAILY BEFORE MEALS 180 tablet 0    [DISCONTINUED] hydrALAZINE (APRESOLINE) 100 MG tablet TAKE 1 TABLET(100 MG) BY MOUTH EVERY  8 HOURS 270 tablet 3    [DISCONTINUED] levothyroxine (SYNTHROID) 50 MCG tablet TAKE 1 TABLET(50 MCG) BY MOUTH EVERY DAY 30 tablet 11    [DISCONTINUED] metoprolol succinate (TOPROL-XL) 25 MG 24 hr tablet TAKE 1 TABLET(25 MG) BY MOUTH EVERY DAY 30 tablet 11    [DISCONTINUED] niacin (NIASPAN) 1000 MG CR tablet TAKE 1 TABLET(1000 MG) BY MOUTH EVERY EVENING 90 tablet 3    [DISCONTINUED] pioglitazone (ACTOS) 30 MG tablet TAKE 1 TABLET(30 MG) BY MOUTH EVERY DAY 90 tablet 0    [DISCONTINUED] potassium chloride (KLOR-CON) 10 MEQ TbSR Take 1 tablet (10 mEq total) by mouth once daily. 90 tablet 3    [DISCONTINUED] SITagliptin (JANUVIA) 100 MG Tab TAKE 1 TABLET(100 MG) BY MOUTH EVERY DAY 90 tablet 0    Lactobacillus rhamnosus GG (CULTURELLE) 10 billion cell capsule Take 1 capsule by mouth once daily.      prednisoLONE acetate (PRED FORTE) 1 % DrpS        No current facility-administered medications on file prior to visit.     Social History     Socioeconomic History    Marital status:    Tobacco Use    Smoking status: Never    Smokeless tobacco: Never   Substance and Sexual Activity    Alcohol use: No     Alcohol/week: 0.0 standard drinks    Drug use: No     Family History   Problem Relation Age of Onset    Hypertension Mother     Hearing loss Mother     Hypertension Father     Alcohol abuse Father     Heart attack Maternal Uncle     Brain cancer Sister     Diabetes Neg Hx     Stroke Neg Hx        Review of Systems   Constitutional: Negative.  Negative for chills, diaphoresis and fever.   HENT:  Negative for congestion, hearing loss, mouth sores, postnasal drip and sore throat.    Eyes:  Negative for pain and visual disturbance.   Respiratory:  Positive for cough. Negative for chest tightness, shortness of breath and wheezing.    Cardiovascular:  Negative for chest pain.   Gastrointestinal:  Negative for abdominal pain, anal bleeding, blood in stool, constipation, diarrhea, nausea and vomiting.   Genitourinary:  Negative  for dysuria and hematuria.   Musculoskeletal:  Negative for back pain, neck pain and neck stiffness.   Skin:  Negative for rash.   Neurological:  Negative for dizziness and weakness.     Objective:   BP (!) 108/51   Pulse 63   Temp 98.1 °F (36.7 °C)   Resp 18   Ht 6' (1.829 m)   Wt 99.3 kg (219 lb 0.4 oz)   SpO2 99%   BMI 29.71 kg/m²     Physical Exam  Vitals reviewed.   Constitutional:       General: He is not in acute distress.     Appearance: Normal appearance. He is well-developed. He is not ill-appearing or diaphoretic.   HENT:      Head: Normocephalic and atraumatic.      Right Ear: Tympanic membrane, ear canal and external ear normal.      Left Ear: Tympanic membrane, ear canal and external ear normal.      Nose: Nose normal.      Mouth/Throat:      Pharynx: No oropharyngeal exudate.   Eyes:      General: No scleral icterus.        Right eye: No discharge.         Left eye: No discharge.      Conjunctiva/sclera: Conjunctivae normal.      Pupils: Pupils are equal, round, and reactive to light.   Neck:      Thyroid: No thyromegaly.      Vascular: No JVD.   Cardiovascular:      Rate and Rhythm: Normal rate and regular rhythm.      Heart sounds: Normal heart sounds. No murmur heard.    No friction rub. No gallop.   Pulmonary:      Effort: Pulmonary effort is normal. No respiratory distress.      Breath sounds: Normal breath sounds. No wheezing or rales.   Chest:      Chest wall: No tenderness.   Abdominal:      General: Bowel sounds are normal. There is no distension.      Palpations: Abdomen is soft. There is no mass.      Tenderness: There is no abdominal tenderness. There is no guarding or rebound.   Musculoskeletal:         General: No tenderness. Normal range of motion.      Cervical back: Normal range of motion and neck supple.   Lymphadenopathy:      Cervical: No cervical adenopathy.   Skin:     General: Skin is warm and dry.   Neurological:      Mental Status: He is alert and oriented to person,  place, and time.      Cranial Nerves: No cranial nerve deficit.      Coordination: Coordination normal.     Assessment:     1. Chronic cough    2. Combined hyperlipidemia associated with type 2 diabetes mellitus    3. Controlled type 2 diabetes mellitus with stage 3 chronic kidney disease, without long-term current use of insulin    4. Hypertension associated with diabetes    5. Hypothyroidism, unspecified type    6. Vitamin D deficiency    7. Acute idiopathic gout of left hand    8. CKD stage 3 secondary to diabetes    9. Obesity (BMI 30-39.9)    10. DEVIKA on CPAP      Plan:   I have discontinued Rudy Saldaña's amLODIPine, benazepriL, colchicine, and benazepriL. I have also changed his niacin, SITagliptin phosphate, pioglitazone, glipiZIDE, acarbose, levothyroxine, hydrALAZINE, metoprolol succinate, furosemide, and donepeziL. Additionally, I am having him start on losartan. Lastly, I am having him maintain his ONETOUCH VERIO IQ METER, aspirin, blood sugar diagnostic, lancets, Lactobacillus rhamnosus GG, prednisoLONE acetate, ferrous sulfate, atorvastatin, ergocalciferol, potassium chloride, and allopurinoL.  No problem-specific Assessment & Plan notes found for this encounter.      Follow up for get a copy of the last eye exam from Dr. Frias., BP CK 2wk w/Nurse.    Rudy was seen today for annual exam.    Diagnoses and all orders for this visit:    Chronic cough  -     X-Ray Chest PA And Lateral; Future    Combined hyperlipidemia associated with type 2 diabetes mellitus  -     niacin (NIASPAN) 1000 MG CR tablet; Take 1 po q hs  -     atorvastatin (LIPITOR) 20 MG tablet; Take 1 tablet (20 mg total) by mouth once daily.  -     Lipid Panel; Future    Controlled type 2 diabetes mellitus with stage 3 chronic kidney disease, without long-term current use of insulin  -     SITagliptin phosphate (JANUVIA) 100 MG Tab; TAKE 1 TABLET(100 MG) BY MOUTH EVERY DAY  -     pioglitazone (ACTOS) 30 MG tablet; Take 1 tablet (30  mg total) by mouth once daily.  -     glipiZIDE (GLUCOTROL) 10 MG tablet; Take 1 tablet (10 mg total) by mouth 2 (two) times daily before meals.  -     acarbose (PRECOSE) 100 MG Tab; Take 1 tablet (100 mg total) by mouth 3 (three) times daily with meals.  -     Comprehensive Metabolic Panel; Future  -     Hemoglobin A1C; Future  -     Microalbumin/Creatinine Ratio, Urine; Future    Hypertension associated with diabetes  -     Discontinue: benazepriL (LOTENSIN) 40 MG tablet; Take 1 tablet (40 mg total) by mouth once daily.  -     potassium chloride (KLOR-CON) 10 MEQ TbSR; Take 1 tablet (10 mEq total) by mouth once daily.  -     hydrALAZINE (APRESOLINE) 100 MG tablet; Take 1 po q 8 hours  -     metoprolol succinate (TOPROL-XL) 25 MG 24 hr tablet; Take 1 tablet (25 mg total) by mouth once daily.  -     furosemide (LASIX) 40 MG tablet; Take 1 tablet (40 mg total) by mouth once daily.  -     Comprehensive Metabolic Panel; Future    Hypothyroidism, unspecified type  -     levothyroxine (SYNTHROID) 50 MCG tablet; Take 1 tablet (50 mcg total) by mouth once daily.  -     TSH; Future    Vitamin D deficiency  -     ergocalciferol (ERGOCALCIFEROL) 50,000 unit Cap; TAKE 1 CAPSULE BY MOUTH 1 TIME A WEEK    Acute idiopathic gout of left hand  -     allopurinoL (ZYLOPRIM) 300 MG tablet; Take 1 tablet (300 mg total) by mouth once daily.  -     Uric Acid; Future    CKD stage 3 secondary to diabetes    Obesity (BMI 30-39.9)    DEVIKA on CPAP  -     Ambulatory referral/consult to Sleep Disorders; Future    Other orders  -     donepeziL (ARICEPT) 10 MG tablet; Take 1 tablet (10 mg total) by mouth once daily.  -     losartan (COZAAR) 25 MG tablet; Take 1 tablet (25 mg total) by mouth once daily.    Medications Ordered This Encounter   Medications    acarbose (PRECOSE) 100 MG Tab     Sig: Take 1 tablet (100 mg total) by mouth 3 (three) times daily with meals.     Dispense:  270 tablet     Refill:  3    allopurinoL (ZYLOPRIM) 300 MG tablet      Sig: Take 1 tablet (300 mg total) by mouth once daily.     Dispense:  90 tablet     Refill:  3    atorvastatin (LIPITOR) 20 MG tablet     Sig: Take 1 tablet (20 mg total) by mouth once daily.     Dispense:  90 tablet     Refill:  11    donepeziL (ARICEPT) 10 MG tablet     Sig: Take 1 tablet (10 mg total) by mouth once daily.     Dispense:  90 tablet     Refill:  3    ergocalciferol (ERGOCALCIFEROL) 50,000 unit Cap     Sig: TAKE 1 CAPSULE BY MOUTH 1 TIME A WEEK     Dispense:  12 capsule     Refill:  3    furosemide (LASIX) 40 MG tablet     Sig: Take 1 tablet (40 mg total) by mouth once daily.     Dispense:  90 tablet     Refill:  3    glipiZIDE (GLUCOTROL) 10 MG tablet     Sig: Take 1 tablet (10 mg total) by mouth 2 (two) times daily before meals.     Dispense:  180 tablet     Refill:  3    hydrALAZINE (APRESOLINE) 100 MG tablet     Sig: Take 1 po q 8 hours     Dispense:  270 tablet     Refill:  3     .    levothyroxine (SYNTHROID) 50 MCG tablet     Sig: Take 1 tablet (50 mcg total) by mouth once daily.     Dispense:  90 tablet     Refill:  3    losartan (COZAAR) 25 MG tablet     Sig: Take 1 tablet (25 mg total) by mouth once daily.     Dispense:  90 tablet     Refill:  3     .STOP THE BENAZEPRIL.    metoprolol succinate (TOPROL-XL) 25 MG 24 hr tablet     Sig: Take 1 tablet (25 mg total) by mouth once daily.     Dispense:  90 tablet     Refill:  3     .    niacin (NIASPAN) 1000 MG CR tablet     Sig: Take 1 po q hs     Dispense:  90 tablet     Refill:  3    pioglitazone (ACTOS) 30 MG tablet     Sig: Take 1 tablet (30 mg total) by mouth once daily.     Dispense:  90 tablet     Refill:  3    potassium chloride (KLOR-CON) 10 MEQ TbSR     Sig: Take 1 tablet (10 mEq total) by mouth once daily.     Dispense:  90 tablet     Refill:  3    SITagliptin phosphate (JANUVIA) 100 MG Tab     Sig: TAKE 1 TABLET(100 MG) BY MOUTH EVERY DAY     Dispense:  90 tablet     Refill:  0     The patient was instructed to stop the following  meds:  Medications Discontinued During This Encounter   Medication Reason    colchicine (COLCRYS) 0.6 mg tablet     amLODIPine (NORVASC) 10 MG tablet     atorvastatin (LIPITOR) 20 MG tablet Reorder    benazepriL (LOTENSIN) 40 MG tablet Reorder    allopurinoL (ZYLOPRIM) 300 MG tablet Reorder    potassium chloride (KLOR-CON) 10 MEQ TbSR Reorder    levothyroxine (SYNTHROID) 50 MCG tablet Reorder    niacin (NIASPAN) 1000 MG CR tablet Reorder    ergocalciferol (ERGOCALCIFEROL) 50,000 unit Cap Reorder    hydrALAZINE (APRESOLINE) 100 MG tablet Reorder    donepeziL (ARICEPT) 10 MG tablet Reorder    metoprolol succinate (TOPROL-XL) 25 MG 24 hr tablet Reorder    furosemide (LASIX) 40 MG tablet Reorder    acarbose (PRECOSE) 100 MG Tab Reorder    glipiZIDE (GLUCOTROL) 10 MG tablet Reorder    pioglitazone (ACTOS) 30 MG tablet Reorder    SITagliptin (JANUVIA) 100 MG Tab Reorder    benazepriL (LOTENSIN) 40 MG tablet      Orders Placed This Encounter   Procedures    X-Ray Chest PA And Lateral     Standing Status:   Future     Standing Expiration Date:   12/30/2022     Order Specific Question:   Reason for Exam:     Answer:   Cough    Comprehensive Metabolic Panel     Standing Status:   Future     Standing Expiration Date:   11/30/2023    Lipid Panel     Standing Status:   Future     Standing Expiration Date:   11/30/2023    Hemoglobin A1C     Standing Status:   Future     Standing Expiration Date:   11/30/2023    Microalbumin/Creatinine Ratio, Urine     May change to clinic collect if the patient is in the clinic at the time.     Standing Status:   Future     Standing Expiration Date:   11/30/2023     Order Specific Question:   Specimen Source     Answer:   Urine    TSH     Standing Status:   Future     Standing Expiration Date:   11/30/2023    Uric Acid     Standing Status:   Future     Standing Expiration Date:   11/30/2023    Ambulatory referral/consult to Sleep Disorders     Standing Status:   Future     Standing Expiration  Date:   12/30/2023     Referral Priority:   Routine     Referral Type:   Consultation     Requested Specialty:   Sleep Medicine     Number of Visits Requested:   1       Medication List with Changes/Refills   New Medications    LOSARTAN (COZAAR) 25 MG TABLET    Take 1 tablet (25 mg total) by mouth once daily.   Current Medications    ASPIRIN (ECOTRIN) 81 MG EC TABLET    Take 81 mg by mouth once daily.    BLOOD SUGAR DIAGNOSTIC STRP    1 each by Misc.(Non-Drug; Combo Route) route 3 (three) times daily as needed. One touch ultra 2 test strips    FERROUS SULFATE (FEROSUL) 325 MG (65 MG IRON) TAB TABLET    TAKE 1 TABLET BY MOUTH THREE TIMES DAILY    LACTOBACILLUS RHAMNOSUS GG (CULTURELLE) 10 BILLION CELL CAPSULE    Take 1 capsule by mouth once daily.    LANCETS (TRUEPLUS LANCETS) 33 GAUGE MISC    1 lancet by Misc.(Non-Drug; Combo Route) route once daily.    ONETOUCH VERIO IQ METER KIT    Use as instructed    PREDNISOLONE ACETATE (PRED FORTE) 1 % DRPS       Changed and/or Refilled Medications    Modified Medication Previous Medication    ACARBOSE (PRECOSE) 100 MG TAB acarbose (PRECOSE) 100 MG Tab       Take 1 tablet (100 mg total) by mouth 3 (three) times daily with meals.    TAKE 1 TABLET BY MOUTH THREE TIMES DAILY WITH MEALS    ALLOPURINOL (ZYLOPRIM) 300 MG TABLET allopurinoL (ZYLOPRIM) 300 MG tablet       Take 1 tablet (300 mg total) by mouth once daily.    Take 1 tablet (300 mg total) by mouth once daily.    ATORVASTATIN (LIPITOR) 20 MG TABLET atorvastatin (LIPITOR) 20 MG tablet       Take 1 tablet (20 mg total) by mouth once daily.    Take 1 tablet (20 mg total) by mouth once daily.    DONEPEZIL (ARICEPT) 10 MG TABLET donepeziL (ARICEPT) 10 MG tablet       Take 1 tablet (10 mg total) by mouth once daily.    TAKE 1 TABLET(10 MG) BY MOUTH EVERY DAY    ERGOCALCIFEROL (ERGOCALCIFEROL) 50,000 UNIT CAP ergocalciferol (ERGOCALCIFEROL) 50,000 unit Cap       TAKE 1 CAPSULE BY MOUTH 1 TIME A WEEK    TAKE 1 CAPSULE BY MOUTH 1  TIME A WEEK    FUROSEMIDE (LASIX) 40 MG TABLET furosemide (LASIX) 40 MG tablet       Take 1 tablet (40 mg total) by mouth once daily.    TAKE 1 TABLET(40 MG) BY MOUTH EVERY DAY    GLIPIZIDE (GLUCOTROL) 10 MG TABLET glipiZIDE (GLUCOTROL) 10 MG tablet       Take 1 tablet (10 mg total) by mouth 2 (two) times daily before meals.    TAKE 1 TABLET(10 MG) BY MOUTH TWICE DAILY BEFORE MEALS    HYDRALAZINE (APRESOLINE) 100 MG TABLET hydrALAZINE (APRESOLINE) 100 MG tablet       Take 1 po q 8 hours    TAKE 1 TABLET(100 MG) BY MOUTH EVERY 8 HOURS    LEVOTHYROXINE (SYNTHROID) 50 MCG TABLET levothyroxine (SYNTHROID) 50 MCG tablet       Take 1 tablet (50 mcg total) by mouth once daily.    TAKE 1 TABLET(50 MCG) BY MOUTH EVERY DAY    METOPROLOL SUCCINATE (TOPROL-XL) 25 MG 24 HR TABLET metoprolol succinate (TOPROL-XL) 25 MG 24 hr tablet       Take 1 tablet (25 mg total) by mouth once daily.    TAKE 1 TABLET(25 MG) BY MOUTH EVERY DAY    NIACIN (NIASPAN) 1000 MG CR TABLET niacin (NIASPAN) 1000 MG CR tablet       Take 1 po q hs    TAKE 1 TABLET(1000 MG) BY MOUTH EVERY EVENING    PIOGLITAZONE (ACTOS) 30 MG TABLET pioglitazone (ACTOS) 30 MG tablet       Take 1 tablet (30 mg total) by mouth once daily.    TAKE 1 TABLET(30 MG) BY MOUTH EVERY DAY    POTASSIUM CHLORIDE (KLOR-CON) 10 MEQ TBSR potassium chloride (KLOR-CON) 10 MEQ TbSR       Take 1 tablet (10 mEq total) by mouth once daily.    Take 1 tablet (10 mEq total) by mouth once daily.    SITAGLIPTIN PHOSPHATE (JANUVIA) 100 MG TAB SITagliptin (JANUVIA) 100 MG Tab       TAKE 1 TABLET(100 MG) BY MOUTH EVERY DAY    TAKE 1 TABLET(100 MG) BY MOUTH EVERY DAY   Discontinued Medications    AMLODIPINE (NORVASC) 10 MG TABLET    Take 1 tablet (10 mg total) by mouth once daily.    BENAZEPRIL (LOTENSIN) 40 MG TABLET    Take 1 tablet (40 mg total) by mouth once daily.    COLCHICINE (COLCRYS) 0.6 MG TABLET    TAKE 1 TABLET(0.6 MG) BY MOUTH EVERY DAY      Medication List with Changes/Refills   New  Medications    LOSARTAN (COZAAR) 25 MG TABLET    Take 1 tablet (25 mg total) by mouth once daily.       Start Date: 11/30/2022End Date: 11/30/2023   Current Medications    ASPIRIN (ECOTRIN) 81 MG EC TABLET    Take 81 mg by mouth once daily.       Start Date: --        End Date: --    BLOOD SUGAR DIAGNOSTIC STRP    1 each by Misc.(Non-Drug; Combo Route) route 3 (three) times daily as needed. One touch ultra 2 test strips       Start Date: 1/21/2019 End Date: --    FERROUS SULFATE (FEROSUL) 325 MG (65 MG IRON) TAB TABLET    TAKE 1 TABLET BY MOUTH THREE TIMES DAILY       Start Date: 7/19/2022 End Date: --    LACTOBACILLUS RHAMNOSUS GG (CULTURELLE) 10 BILLION CELL CAPSULE    Take 1 capsule by mouth once daily.       Start Date: --        End Date: --    LANCETS (TRUEPLUS LANCETS) 33 GAUGE MISC    1 lancet by Misc.(Non-Drug; Combo Route) route once daily.       Start Date: 1/21/2019 End Date: --    ONETOUCH VERIO IQ METER KIT    Use as instructed       Start Date: 1/19/2016 End Date: 11/30/2022    PREDNISOLONE ACETATE (PRED FORTE) 1 % DRPS           Start Date: 4/19/2022 End Date: --   Changed and/or Refilled Medications    Modified Medication Previous Medication    ACARBOSE (PRECOSE) 100 MG TAB acarbose (PRECOSE) 100 MG Tab       Take 1 tablet (100 mg total) by mouth 3 (three) times daily with meals.    TAKE 1 TABLET BY MOUTH THREE TIMES DAILY WITH MEALS       Start Date: 11/30/2022End Date: --    Start Date: 10/16/2022End Date: 11/30/2022    ALLOPURINOL (ZYLOPRIM) 300 MG TABLET allopurinoL (ZYLOPRIM) 300 MG tablet       Take 1 tablet (300 mg total) by mouth once daily.    Take 1 tablet (300 mg total) by mouth once daily.       Start Date: 11/30/2022End Date: --    Start Date: 11/8/2021 End Date: 11/30/2022    ATORVASTATIN (LIPITOR) 20 MG TABLET atorvastatin (LIPITOR) 20 MG tablet       Take 1 tablet (20 mg total) by mouth once daily.    Take 1 tablet (20 mg total) by mouth once daily.       Start Date: 11/30/2022End  Date: --    Start Date: 11/8/2021 End Date: 11/30/2022    DONEPEZIL (ARICEPT) 10 MG TABLET donepeziL (ARICEPT) 10 MG tablet       Take 1 tablet (10 mg total) by mouth once daily.    TAKE 1 TABLET(10 MG) BY MOUTH EVERY DAY       Start Date: 11/30/2022End Date: --    Start Date: 10/16/2022End Date: 11/30/2022    ERGOCALCIFEROL (ERGOCALCIFEROL) 50,000 UNIT CAP ergocalciferol (ERGOCALCIFEROL) 50,000 unit Cap       TAKE 1 CAPSULE BY MOUTH 1 TIME A WEEK    TAKE 1 CAPSULE BY MOUTH 1 TIME A WEEK       Start Date: 11/30/2022End Date: --    Start Date: 10/16/2022End Date: 11/30/2022    FUROSEMIDE (LASIX) 40 MG TABLET furosemide (LASIX) 40 MG tablet       Take 1 tablet (40 mg total) by mouth once daily.    TAKE 1 TABLET(40 MG) BY MOUTH EVERY DAY       Start Date: 11/30/2022End Date: --    Start Date: 10/16/2022End Date: 11/30/2022    GLIPIZIDE (GLUCOTROL) 10 MG TABLET glipiZIDE (GLUCOTROL) 10 MG tablet       Take 1 tablet (10 mg total) by mouth 2 (two) times daily before meals.    TAKE 1 TABLET(10 MG) BY MOUTH TWICE DAILY BEFORE MEALS       Start Date: 11/30/2022End Date: --    Start Date: 10/16/2022End Date: 11/30/2022    HYDRALAZINE (APRESOLINE) 100 MG TABLET hydrALAZINE (APRESOLINE) 100 MG tablet       Take 1 po q 8 hours    TAKE 1 TABLET(100 MG) BY MOUTH EVERY 8 HOURS       Start Date: 11/30/2022End Date: --    Start Date: 10/16/2022End Date: 11/30/2022    LEVOTHYROXINE (SYNTHROID) 50 MCG TABLET levothyroxine (SYNTHROID) 50 MCG tablet       Take 1 tablet (50 mcg total) by mouth once daily.    TAKE 1 TABLET(50 MCG) BY MOUTH EVERY DAY       Start Date: 11/30/2022End Date: --    Start Date: 1/19/2022 End Date: 11/30/2022    METOPROLOL SUCCINATE (TOPROL-XL) 25 MG 24 HR TABLET metoprolol succinate (TOPROL-XL) 25 MG 24 hr tablet       Take 1 tablet (25 mg total) by mouth once daily.    TAKE 1 TABLET(25 MG) BY MOUTH EVERY DAY       Start Date: 11/30/2022End Date: --    Start Date: 10/16/2022End Date: 11/30/2022    RANDALL  (NIASPAN) 1000 MG CR TABLET niacin (NIASPAN) 1000 MG CR tablet       Take 1 po q hs    TAKE 1 TABLET(1000 MG) BY MOUTH EVERY EVENING       Start Date: 11/30/2022End Date: --    Start Date: 7/19/2022 End Date: 11/30/2022    PIOGLITAZONE (ACTOS) 30 MG TABLET pioglitazone (ACTOS) 30 MG tablet       Take 1 tablet (30 mg total) by mouth once daily.    TAKE 1 TABLET(30 MG) BY MOUTH EVERY DAY       Start Date: 11/30/2022End Date: --    Start Date: 10/16/2022End Date: 11/30/2022    POTASSIUM CHLORIDE (KLOR-CON) 10 MEQ TBSR potassium chloride (KLOR-CON) 10 MEQ TbSR       Take 1 tablet (10 mEq total) by mouth once daily.    Take 1 tablet (10 mEq total) by mouth once daily.       Start Date: 11/30/2022End Date: --    Start Date: 11/8/2021 End Date: 11/30/2022    SITAGLIPTIN PHOSPHATE (JANUVIA) 100 MG TAB SITagliptin (JANUVIA) 100 MG Tab       TAKE 1 TABLET(100 MG) BY MOUTH EVERY DAY    TAKE 1 TABLET(100 MG) BY MOUTH EVERY DAY       Start Date: 11/30/2022End Date: --    Start Date: 10/16/2022End Date: 11/30/2022   Discontinued Medications    AMLODIPINE (NORVASC) 10 MG TABLET    Take 1 tablet (10 mg total) by mouth once daily.       Start Date: 11/8/2021 End Date: 11/30/2022    BENAZEPRIL (LOTENSIN) 40 MG TABLET    Take 1 tablet (40 mg total) by mouth once daily.       Start Date: 11/8/2021 End Date: 11/30/2022    COLCHICINE (COLCRYS) 0.6 MG TABLET    TAKE 1 TABLET(0.6 MG) BY MOUTH EVERY DAY       Start Date: 10/16/2022End Date: 11/30/2022              STOP AMLODIPINE, BENAZEPRIL, COLCHICINE AND START LOSARTAN.  MONITOR BP AT HOME.

## 2022-12-01 ENCOUNTER — LAB VISIT (OUTPATIENT)
Dept: LAB | Facility: HOSPITAL | Age: 82
End: 2022-12-01
Attending: FAMILY MEDICINE
Payer: MEDICARE

## 2022-12-01 ENCOUNTER — PATIENT MESSAGE (OUTPATIENT)
Dept: PULMONOLOGY | Facility: CLINIC | Age: 82
End: 2022-12-01
Payer: MEDICARE

## 2022-12-01 DIAGNOSIS — M10.042 ACUTE IDIOPATHIC GOUT OF LEFT HAND: ICD-10-CM

## 2022-12-01 DIAGNOSIS — E78.2 COMBINED HYPERLIPIDEMIA ASSOCIATED WITH TYPE 2 DIABETES MELLITUS: ICD-10-CM

## 2022-12-01 DIAGNOSIS — E11.69 COMBINED HYPERLIPIDEMIA ASSOCIATED WITH TYPE 2 DIABETES MELLITUS: ICD-10-CM

## 2022-12-01 DIAGNOSIS — N18.30 CONTROLLED TYPE 2 DIABETES MELLITUS WITH STAGE 3 CHRONIC KIDNEY DISEASE, WITHOUT LONG-TERM CURRENT USE OF INSULIN: ICD-10-CM

## 2022-12-01 DIAGNOSIS — E11.22 CONTROLLED TYPE 2 DIABETES MELLITUS WITH STAGE 3 CHRONIC KIDNEY DISEASE, WITHOUT LONG-TERM CURRENT USE OF INSULIN: ICD-10-CM

## 2022-12-01 DIAGNOSIS — E03.9 HYPOTHYROIDISM, UNSPECIFIED TYPE: ICD-10-CM

## 2022-12-01 LAB
ALBUMIN/CREAT UR: 70.3 UG/MG (ref 0–30)
CHOLEST SERPL-MCNC: 111 MG/DL (ref 120–199)
CHOLEST/HDLC SERPL: 2.4 {RATIO} (ref 2–5)
CREAT UR-MCNC: 118 MG/DL (ref 23–375)
ESTIMATED AVG GLUCOSE: 154 MG/DL (ref 68–131)
HBA1C MFR BLD: 7 % (ref 4–5.6)
HDLC SERPL-MCNC: 46 MG/DL (ref 40–75)
HDLC SERPL: 41.4 % (ref 20–50)
LDLC SERPL CALC-MCNC: 51.8 MG/DL (ref 63–159)
MICROALBUMIN UR DL<=1MG/L-MCNC: 83 UG/ML
NONHDLC SERPL-MCNC: 65 MG/DL
T4 FREE SERPL-MCNC: 1.11 NG/DL (ref 0.71–1.51)
TRIGL SERPL-MCNC: 66 MG/DL (ref 30–150)
TSH SERPL DL<=0.005 MIU/L-ACNC: 4.84 UIU/ML (ref 0.4–4)
URATE SERPL-MCNC: 4 MG/DL (ref 3.4–7)

## 2022-12-01 PROCEDURE — 82570 ASSAY OF URINE CREATININE: CPT | Performed by: FAMILY MEDICINE

## 2022-12-01 PROCEDURE — 84443 ASSAY THYROID STIM HORMONE: CPT | Performed by: FAMILY MEDICINE

## 2022-12-01 PROCEDURE — 36415 COLL VENOUS BLD VENIPUNCTURE: CPT | Mod: PO | Performed by: FAMILY MEDICINE

## 2022-12-01 PROCEDURE — 84439 ASSAY OF FREE THYROXINE: CPT | Performed by: FAMILY MEDICINE

## 2022-12-01 PROCEDURE — 83036 HEMOGLOBIN GLYCOSYLATED A1C: CPT | Performed by: FAMILY MEDICINE

## 2022-12-01 PROCEDURE — 82043 UR ALBUMIN QUANTITATIVE: CPT | Performed by: FAMILY MEDICINE

## 2022-12-01 PROCEDURE — 84550 ASSAY OF BLOOD/URIC ACID: CPT | Performed by: FAMILY MEDICINE

## 2022-12-01 PROCEDURE — 80061 LIPID PANEL: CPT | Performed by: FAMILY MEDICINE

## 2022-12-01 NOTE — PROGRESS NOTES
I have reviewed the labs and am recommending the following:  CXR NORMAL-The Chest Xray appears to show no signs of pneumonia, cancer, congestive heart failure or bone lesions.      Health maintenance items that remain on your list that need to be arranged are listed below.   Please notify me if you are prepared to get them completed.    Shingles Vaccine(3 of 3) due on 01/07/2022  COVID-19 Vaccine(4 - Booster for Pfizer series) due on 01/12/2022  Eye Exam due on 03/30/2022  Hemoglobin A1c due on 05/09/2022  Lipid Panel due on 11/09/2022    Dr. Mohinder Morfin

## 2022-12-03 DIAGNOSIS — E03.9 HYPOTHYROIDISM, UNSPECIFIED TYPE: ICD-10-CM

## 2022-12-03 RX ORDER — LEVOTHYROXINE SODIUM 75 UG/1
75 TABLET ORAL DAILY
Qty: 90 TABLET | Refills: 0 | Status: SHIPPED | OUTPATIENT
Start: 2022-12-03 | End: 2023-02-23

## 2022-12-23 ENCOUNTER — PATIENT MESSAGE (OUTPATIENT)
Dept: FAMILY MEDICINE | Facility: CLINIC | Age: 82
End: 2022-12-23
Payer: MEDICARE

## 2023-02-07 ENCOUNTER — OFFICE VISIT (OUTPATIENT)
Dept: FAMILY MEDICINE | Facility: CLINIC | Age: 83
End: 2023-02-07
Payer: MEDICARE

## 2023-02-07 ENCOUNTER — TELEPHONE (OUTPATIENT)
Dept: FAMILY MEDICINE | Facility: CLINIC | Age: 83
End: 2023-02-07
Payer: MEDICARE

## 2023-02-07 DIAGNOSIS — J06.9 UPPER RESPIRATORY TRACT INFECTION, UNSPECIFIED TYPE: Primary | ICD-10-CM

## 2023-02-07 PROCEDURE — 99442 PR PHYSICIAN TELEPHONE EVALUATION 11-20 MIN: CPT | Mod: 95,,, | Performed by: FAMILY MEDICINE

## 2023-02-07 PROCEDURE — 99442 PR PHYSICIAN TELEPHONE EVALUATION 11-20 MIN: ICD-10-PCS | Mod: 95,,, | Performed by: FAMILY MEDICINE

## 2023-02-07 RX ORDER — BENZONATATE 200 MG/1
200 CAPSULE ORAL 3 TIMES DAILY PRN
Qty: 30 CAPSULE | Refills: 1 | Status: SHIPPED | OUTPATIENT
Start: 2023-02-07 | End: 2023-02-17

## 2023-02-07 RX ORDER — MONTELUKAST SODIUM 10 MG/1
10 TABLET ORAL NIGHTLY
Qty: 30 TABLET | Refills: 0 | Status: SHIPPED | OUTPATIENT
Start: 2023-02-07 | End: 2023-03-16 | Stop reason: SDUPTHER

## 2023-02-07 NOTE — TELEPHONE ENCOUNTER
----- Message from Belem Paul sent at 2/7/2023 11:48 AM CST -----  Contact: Shania/daughter  Shania needs a call back at [phone], Regards to getting him an appointment work in for same day access for cough and cold.    Thanks  Td

## 2023-02-07 NOTE — PROGRESS NOTES
The patient location is: Home  The chief complaint leading to consultation is:Upper rsp congestion   Visit type: Virtual visit with synchronous audio   Total time spent with patient: 15 minutes  Each patient to whom he or she provides medical services by telemedicine is:  (1) informed of the relationship between the physician and patient and the respective role of any other health care provider with respect to management of the patient; and (2) notified that he or she may decline to receive medical services by telemedicine and may withdraw from such care at any time.    Notes:   Rudy Saldaña presents with moderate upper respiratory congestion,rhinnorhea,moderate severe cough since last night. No dyspnea or wheezing.Denies nausea,vomiting,diarrhea or fever.    Past Medical History:   Diagnosis Date    Anemia     BPH (benign prostatic hypertrophy)     Carotid bruit     CKD (chronic kidney disease) stage 2/3     Dr. Nails    CVA (cerebral vascular accident)     Diabetes mellitus, type 2     Hyperlipidemia     Hypertension     Hypothyroidism 2/29/2016    Type 2 diabetes mellitus, uncontrolled, with renal complications     The patient presents with diabetes.  The patient denies polyuria, polydipsia, polyphagia, hypoglycemia and paresthesias.  The patient's glucose control has been good.  Home glucose averages are routinely checked.  The patient is without retinopathy currently.  The patient has no history of neuropathy.  The patient currently complains of no podiatric problems.  The patient has excellent compliance.    Type II or unspecified type diabetes mellitus without mention of complication, not stated as uncontrolled     Vitamin D deficiency 2/29/2016     Past Surgical History:   Procedure Laterality Date    COLONOSCOPY  2008    COLONOSCOPY  5/31/2005    repeat in 2015    PILONIDAL CYST DRAINAGE      TRANSURETHRAL RESECTION OF PROSTATE       Review of patient's allergies indicates:   Allergen Reactions    Ace  inhibitors      Cough       Current Outpatient Medications on File Prior to Visit   Medication Sig Dispense Refill    acarbose (PRECOSE) 100 MG Tab Take 1 tablet (100 mg total) by mouth 3 (three) times daily with meals. 270 tablet 3    allopurinoL (ZYLOPRIM) 300 MG tablet TAKE 1 TABLET(300 MG) BY MOUTH EVERY DAY 90 tablet 11    amLODIPine (NORVASC) 10 MG tablet TAKE 1 TABLET(10 MG) BY MOUTH EVERY DAY 90 tablet 11    aspirin (ECOTRIN) 81 MG EC tablet Take 81 mg by mouth once daily.      atorvastatin (LIPITOR) 20 MG tablet Take 1 tablet (20 mg total) by mouth once daily. 90 tablet 11    blood sugar diagnostic Strp 1 each by Misc.(Non-Drug; Combo Route) route 3 (three) times daily as needed. One touch ultra 2 test strips 100 each 5    donepeziL (ARICEPT) 10 MG tablet Take 1 tablet (10 mg total) by mouth once daily. 90 tablet 3    ergocalciferol (ERGOCALCIFEROL) 50,000 unit Cap TAKE 1 CAPSULE BY MOUTH 1 TIME A WEEK 12 capsule 3    ferrous sulfate (FEROSUL) 325 mg (65 mg iron) Tab tablet TAKE 1 TABLET BY MOUTH THREE TIMES DAILY 270 tablet 0    furosemide (LASIX) 40 MG tablet Take 1 tablet (40 mg total) by mouth once daily. 90 tablet 3    glipiZIDE (GLUCOTROL) 10 MG tablet Take 1 tablet (10 mg total) by mouth 2 (two) times daily before meals. 180 tablet 3    hydrALAZINE (APRESOLINE) 100 MG tablet Take 1 po q 8 hours 270 tablet 3    Lactobacillus rhamnosus GG (CULTURELLE) 10 billion cell capsule Take 1 capsule by mouth once daily.      lancets (TRUEPLUS LANCETS) 33 gauge Misc 1 lancet by Misc.(Non-Drug; Combo Route) route once daily. 100 each 5    levothyroxine (SYNTHROID) 75 MCG tablet Take 1 tablet (75 mcg total) by mouth once daily. 90 tablet 0    losartan (COZAAR) 25 MG tablet Take 1 tablet (25 mg total) by mouth once daily. 90 tablet 3    metoprolol succinate (TOPROL-XL) 25 MG 24 hr tablet Take 1 tablet (25 mg total) by mouth once daily. 90 tablet 3    niacin (NIASPAN) 1000 MG CR tablet Take 1 po q hs 90 tablet 3     ONETOUCH VERIO IQ METER kit Use as instructed 1 each 0    pioglitazone (ACTOS) 30 MG tablet Take 1 tablet (30 mg total) by mouth once daily. 90 tablet 3    potassium chloride (KLOR-CON) 10 MEQ TbSR Take 1 tablet (10 mEq total) by mouth once daily. 90 tablet 3    prednisoLONE acetate (PRED FORTE) 1 % DrpS       SITagliptin phosphate (JANUVIA) 100 MG Tab TAKE 1 TABLET(100 MG) BY MOUTH EVERY DAY 90 tablet 0     No current facility-administered medications on file prior to visit.     Social History     Socioeconomic History    Marital status:    Tobacco Use    Smoking status: Never    Smokeless tobacco: Never   Substance and Sexual Activity    Alcohol use: No     Alcohol/week: 0.0 standard drinks    Drug use: No     Family History   Problem Relation Age of Onset    Hypertension Mother     Hearing loss Mother     Hypertension Father     Alcohol abuse Father     Heart attack Maternal Uncle     Brain cancer Sister     Diabetes Neg Hx     Stroke Neg Hx          ROS:  SKIN: No rashes, itching or changes in color or texture of skin.  EYES: Visual acuity fine. No photophobia, ocular pain or diplopia.EARS: Denies ear pain, discharge or vertigo.NOSE: No loss of smell, no epistaxis some postnasal drip.MOUTH & THROAT: No hoarseness or change in voice. No excessive gum bleeding.CHEST: Denies WILDER, cyanosis, wheezing  CARDIOVASCULAR: Denies chest pain, PND, orthopnea or reduced exercise tolerance.  ABDOMEN:  No weight loss.No abdominal pain, no hematemesis or blood in stool.  URINARY: No flank pain, dysuria or hematuria.  PERIPHERAL VASCULAR: No claudication or cyanosis.  MUSCULOSKELETAL: Negative   NEUROLOGIC: No history of seizures, paralysis, alteration of gait or coordination.    PE:   Chest:No tachypnea. No wheezing, rhonchi on forced expiration  Abdomen:Soft, non tender to patient palpation  Impression: Upper Respiratory Infection. 465.9  Plan: Montelukast 10mg qd,benzonatate tid prn cough, if not better OV tomorrow and  if worsening rec ER

## 2023-02-08 ENCOUNTER — LAB VISIT (OUTPATIENT)
Dept: LAB | Facility: HOSPITAL | Age: 83
End: 2023-02-08
Attending: FAMILY MEDICINE
Payer: MEDICARE

## 2023-02-08 ENCOUNTER — TELEPHONE (OUTPATIENT)
Dept: FAMILY MEDICINE | Facility: CLINIC | Age: 83
End: 2023-02-08
Payer: MEDICARE

## 2023-02-08 ENCOUNTER — OFFICE VISIT (OUTPATIENT)
Dept: FAMILY MEDICINE | Facility: CLINIC | Age: 83
End: 2023-02-08
Payer: MEDICARE

## 2023-02-08 VITALS
SYSTOLIC BLOOD PRESSURE: 134 MMHG | DIASTOLIC BLOOD PRESSURE: 62 MMHG | HEART RATE: 67 BPM | OXYGEN SATURATION: 93 % | HEIGHT: 72 IN | TEMPERATURE: 98 F | BODY MASS INDEX: 29.54 KG/M2 | WEIGHT: 218.13 LBS

## 2023-02-08 DIAGNOSIS — E03.9 HYPOTHYROIDISM, UNSPECIFIED TYPE: ICD-10-CM

## 2023-02-08 DIAGNOSIS — E11.22 CONTROLLED TYPE 2 DIABETES MELLITUS WITH STAGE 3 CHRONIC KIDNEY DISEASE, WITHOUT LONG-TERM CURRENT USE OF INSULIN: ICD-10-CM

## 2023-02-08 DIAGNOSIS — N18.30 CONTROLLED TYPE 2 DIABETES MELLITUS WITH STAGE 3 CHRONIC KIDNEY DISEASE, WITHOUT LONG-TERM CURRENT USE OF INSULIN: ICD-10-CM

## 2023-02-08 DIAGNOSIS — J20.9 BRONCHITIS WITH BRONCHOSPASM: Primary | ICD-10-CM

## 2023-02-08 LAB — TSH SERPL DL<=0.005 MIU/L-ACNC: 1.6 UIU/ML (ref 0.4–4)

## 2023-02-08 PROCEDURE — 3075F SYST BP GE 130 - 139MM HG: CPT | Mod: CPTII,S$GLB,, | Performed by: NURSE PRACTITIONER

## 2023-02-08 PROCEDURE — 1159F PR MEDICATION LIST DOCUMENTED IN MEDICAL RECORD: ICD-10-PCS | Mod: CPTII,S$GLB,, | Performed by: NURSE PRACTITIONER

## 2023-02-08 PROCEDURE — 3075F PR MOST RECENT SYSTOLIC BLOOD PRESS GE 130-139MM HG: ICD-10-PCS | Mod: CPTII,S$GLB,, | Performed by: NURSE PRACTITIONER

## 2023-02-08 PROCEDURE — 3078F DIAST BP <80 MM HG: CPT | Mod: CPTII,S$GLB,, | Performed by: NURSE PRACTITIONER

## 2023-02-08 PROCEDURE — 94640 PR INHAL RX, AIRWAY OBST/DX SPUTUM INDUCT: ICD-10-PCS | Mod: S$GLB,,, | Performed by: NURSE PRACTITIONER

## 2023-02-08 PROCEDURE — 94640 AIRWAY INHALATION TREATMENT: CPT | Mod: S$GLB,,, | Performed by: NURSE PRACTITIONER

## 2023-02-08 PROCEDURE — 1126F AMNT PAIN NOTED NONE PRSNT: CPT | Mod: CPTII,S$GLB,, | Performed by: NURSE PRACTITIONER

## 2023-02-08 PROCEDURE — 1101F PT FALLS ASSESS-DOCD LE1/YR: CPT | Mod: CPTII,S$GLB,, | Performed by: NURSE PRACTITIONER

## 2023-02-08 PROCEDURE — 1126F PR PAIN SEVERITY QUANTIFIED, NO PAIN PRESENT: ICD-10-PCS | Mod: CPTII,S$GLB,, | Performed by: NURSE PRACTITIONER

## 2023-02-08 PROCEDURE — 99214 PR OFFICE/OUTPT VISIT, EST, LEVL IV, 30-39 MIN: ICD-10-PCS | Mod: 25,S$GLB,, | Performed by: NURSE PRACTITIONER

## 2023-02-08 PROCEDURE — 36415 COLL VENOUS BLD VENIPUNCTURE: CPT | Mod: PO | Performed by: FAMILY MEDICINE

## 2023-02-08 PROCEDURE — 99999 PR PBB SHADOW E&M-EST. PATIENT-LVL V: CPT | Mod: PBBFAC,,, | Performed by: NURSE PRACTITIONER

## 2023-02-08 PROCEDURE — 1160F RVW MEDS BY RX/DR IN RCRD: CPT | Mod: CPTII,S$GLB,, | Performed by: NURSE PRACTITIONER

## 2023-02-08 PROCEDURE — 1101F PR PT FALLS ASSESS DOC 0-1 FALLS W/OUT INJ PAST YR: ICD-10-PCS | Mod: CPTII,S$GLB,, | Performed by: NURSE PRACTITIONER

## 2023-02-08 PROCEDURE — 3288F FALL RISK ASSESSMENT DOCD: CPT | Mod: CPTII,S$GLB,, | Performed by: NURSE PRACTITIONER

## 2023-02-08 PROCEDURE — 3288F PR FALLS RISK ASSESSMENT DOCUMENTED: ICD-10-PCS | Mod: CPTII,S$GLB,, | Performed by: NURSE PRACTITIONER

## 2023-02-08 PROCEDURE — 1160F PR REVIEW ALL MEDS BY PRESCRIBER/CLIN PHARMACIST DOCUMENTED: ICD-10-PCS | Mod: CPTII,S$GLB,, | Performed by: NURSE PRACTITIONER

## 2023-02-08 PROCEDURE — 99214 OFFICE O/P EST MOD 30 MIN: CPT | Mod: 25,S$GLB,, | Performed by: NURSE PRACTITIONER

## 2023-02-08 PROCEDURE — 3078F PR MOST RECENT DIASTOLIC BLOOD PRESSURE < 80 MM HG: ICD-10-PCS | Mod: CPTII,S$GLB,, | Performed by: NURSE PRACTITIONER

## 2023-02-08 PROCEDURE — 99999 PR PBB SHADOW E&M-EST. PATIENT-LVL V: ICD-10-PCS | Mod: PBBFAC,,, | Performed by: NURSE PRACTITIONER

## 2023-02-08 PROCEDURE — 84443 ASSAY THYROID STIM HORMONE: CPT | Performed by: FAMILY MEDICINE

## 2023-02-08 PROCEDURE — 1159F MED LIST DOCD IN RCRD: CPT | Mod: CPTII,S$GLB,, | Performed by: NURSE PRACTITIONER

## 2023-02-08 RX ORDER — LEVALBUTEROL INHALATION SOLUTION 0.63 MG/3ML
0.63 SOLUTION RESPIRATORY (INHALATION)
Status: COMPLETED | OUTPATIENT
Start: 2023-02-08 | End: 2023-02-08

## 2023-02-08 RX ORDER — DOXYCYCLINE 100 MG/1
100 CAPSULE ORAL EVERY 12 HOURS
Qty: 20 CAPSULE | Refills: 0 | Status: SHIPPED | OUTPATIENT
Start: 2023-02-08 | End: 2023-02-18

## 2023-02-08 RX ORDER — BENAZEPRIL HYDROCHLORIDE 40 MG/1
40 TABLET ORAL
COMMUNITY
Start: 2023-01-11 | End: 2023-02-08

## 2023-02-08 RX ADMIN — LEVALBUTEROL INHALATION SOLUTION 0.63 MG: 0.63 SOLUTION RESPIRATORY (INHALATION) at 08:02

## 2023-02-08 NOTE — PROGRESS NOTES
Subjective:       Patient ID: Rudy Saldaña is a 82 y.o. male.    Chief Complaint: Cough, Sore Throat, and Nasal Congestion (X 3 days, taking tylenol)    Cough  This is a new problem. The current episode started in the past 7 days. The problem has been unchanged. The problem occurs every few minutes. The cough is Non-productive. Associated symptoms include nasal congestion, postnasal drip, rhinorrhea and a sore throat. Pertinent negatives include no chest pain, ear pain, fever, headaches, myalgias, rash or shortness of breath. The symptoms are aggravated by lying down (deep breathing). He has tried prescription cough suppressant for the symptoms. The treatment provided no relief.   Sore Throat   Associated symptoms include coughing. Pertinent negatives include no abdominal pain, diarrhea, ear pain, headaches, shortness of breath or vomiting.   He had a virtual visit yesterday and was started on Singulair and Tessalon      History of diabetes, Glucometer stopped working. He has been unable to monitor glucose.     Review of Systems   Constitutional:  Negative for fatigue, fever and unexpected weight change.   HENT:  Positive for postnasal drip, rhinorrhea and sore throat. Negative for ear pain.    Eyes: Negative.  Negative for pain and visual disturbance.   Respiratory:  Positive for cough. Negative for shortness of breath.    Cardiovascular:  Negative for chest pain and palpitations.   Gastrointestinal:  Negative for abdominal pain, diarrhea, nausea and vomiting.   Genitourinary:  Negative for dysuria and frequency.   Musculoskeletal:  Negative for arthralgias and myalgias.   Skin:  Negative for color change and rash.   Neurological:  Negative for dizziness and headaches.   Psychiatric/Behavioral:  Negative for sleep disturbance. The patient is not nervous/anxious.      Vitals:    02/08/23 0754   BP: 134/62   Pulse: 67   Temp: 97.7 °F (36.5 °C)       Objective:     Current Outpatient Medications   Medication Sig  Dispense Refill    acarbose (PRECOSE) 100 MG Tab Take 1 tablet (100 mg total) by mouth 3 (three) times daily with meals. 270 tablet 3    allopurinoL (ZYLOPRIM) 300 MG tablet TAKE 1 TABLET(300 MG) BY MOUTH EVERY DAY 90 tablet 11    amLODIPine (NORVASC) 10 MG tablet TAKE 1 TABLET(10 MG) BY MOUTH EVERY DAY 90 tablet 11    aspirin (ECOTRIN) 81 MG EC tablet Take 81 mg by mouth once daily.      atorvastatin (LIPITOR) 20 MG tablet Take 1 tablet (20 mg total) by mouth once daily. 90 tablet 11    blood sugar diagnostic Strp 1 each by Misc.(Non-Drug; Combo Route) route 3 (three) times daily as needed. One touch ultra 2 test strips 100 each 5    donepeziL (ARICEPT) 10 MG tablet Take 1 tablet (10 mg total) by mouth once daily. 90 tablet 3    ergocalciferol (ERGOCALCIFEROL) 50,000 unit Cap TAKE 1 CAPSULE BY MOUTH 1 TIME A WEEK 12 capsule 3    ferrous sulfate (FEROSUL) 325 mg (65 mg iron) Tab tablet TAKE 1 TABLET BY MOUTH THREE TIMES DAILY 270 tablet 0    furosemide (LASIX) 40 MG tablet Take 1 tablet (40 mg total) by mouth once daily. 90 tablet 3    glipiZIDE (GLUCOTROL) 10 MG tablet Take 1 tablet (10 mg total) by mouth 2 (two) times daily before meals. 180 tablet 3    hydrALAZINE (APRESOLINE) 100 MG tablet Take 1 po q 8 hours 270 tablet 3    Lactobacillus rhamnosus GG (CULTURELLE) 10 billion cell capsule Take 1 capsule by mouth once daily.      lancets (TRUEPLUS LANCETS) 33 gauge Misc 1 lancet by Misc.(Non-Drug; Combo Route) route once daily. 100 each 5    levothyroxine (SYNTHROID) 75 MCG tablet Take 1 tablet (75 mcg total) by mouth once daily. 90 tablet 0    losartan (COZAAR) 25 MG tablet Take 1 tablet (25 mg total) by mouth once daily. 90 tablet 3    metoprolol succinate (TOPROL-XL) 25 MG 24 hr tablet Take 1 tablet (25 mg total) by mouth once daily. 90 tablet 3    niacin (NIASPAN) 1000 MG CR tablet Take 1 po q hs 90 tablet 3    pioglitazone (ACTOS) 30 MG tablet Take 1 tablet (30 mg total) by mouth once daily. 90 tablet 3     potassium chloride (KLOR-CON) 10 MEQ TbSR Take 1 tablet (10 mEq total) by mouth once daily. 90 tablet 3    prednisoLONE acetate (PRED FORTE) 1 % DrpS       SITagliptin phosphate (JANUVIA) 100 MG Tab TAKE 1 TABLET(100 MG) BY MOUTH EVERY DAY 90 tablet 0    benzonatate (TESSALON) 200 MG capsule Take 1 capsule (200 mg total) by mouth 3 (three) times daily as needed for Cough. (Patient not taking: Reported on 2/8/2023) 30 capsule 1    doxycycline (VIBRAMYCIN) 100 MG Cap Take 1 capsule (100 mg total) by mouth every 12 (twelve) hours. for 10 days 20 capsule 0    montelukast (SINGULAIR) 10 mg tablet Take 1 tablet (10 mg total) by mouth every evening. (Patient not taking: Reported on 2/8/2023) 30 tablet 0    ONETOUCH VERIO IQ METER kit Monitor blood glucose once daily 1 each 0     No current facility-administered medications for this visit.       Physical Exam  Vitals and nursing note reviewed.   Constitutional:       General: He is not in acute distress.     Appearance: He is well-developed.   HENT:      Head: Normocephalic and atraumatic.      Nose: Mucosal edema and rhinorrhea present.      Mouth/Throat:      Pharynx: Pharyngeal swelling (post nasal mucus) present.   Eyes:      Pupils: Pupils are equal, round, and reactive to light.   Cardiovascular:      Rate and Rhythm: Normal rate and regular rhythm.   Pulmonary:      Effort: Pulmonary effort is normal.      Breath sounds: Wheezing and rhonchi present.      Comments: + cough  Musculoskeletal:         General: Normal range of motion.      Cervical back: Normal range of motion and neck supple.   Skin:     General: Skin is warm and dry.      Findings: No rash.   Neurological:      Mental Status: He is alert and oriented to person, place, and time.   Psychiatric:         Thought Content: Thought content normal.       Nebulizer treatment given in the office, pt tolerated well    Assessment:       1. Bronchitis with bronchospasm    2. Controlled type 2 diabetes mellitus with  stage 3 chronic kidney disease, without long-term current use of insulin          Plan:   Bronchitis with bronchospasm  -     levalbuterol nebulizer solution 0.63 mg    Controlled type 2 diabetes mellitus with stage 3 chronic kidney disease, without long-term current use of insulin  -     Discontinue: ONETOUCH VERIO IQ METER kit; Use as instructed  Dispense: 1 each; Refill: 0  -     ONETOUCH VERIO IQ METER kit; Monitor blood glucose once daily  Dispense: 1 each; Refill: 0    Other orders  -     doxycycline (VIBRAMYCIN) 100 MG Cap; Take 1 capsule (100 mg total) by mouth every 12 (twelve) hours. for 10 days  Dispense: 20 capsule; Refill: 0    Continue Singulair and Tessalon      No follow-ups on file.    There are no Patient Instructions on file for this visit.

## 2023-02-08 NOTE — TELEPHONE ENCOUNTER
----- Message from Radha Washington sent at 2/8/2023  9:59 AM CST -----  Contact: Melva/ kishan Frye with [employer] is calling in regards to ONETOUCH VERIO IQ METER kit. Reports the medication stated as needed but didn't list how many time a day the patient should be using the meter. Please give melva a call back at [phone]. Patient is at [facility] currently.

## 2023-02-21 ENCOUNTER — OFFICE VISIT (OUTPATIENT)
Dept: PODIATRY | Facility: CLINIC | Age: 83
End: 2023-02-21
Payer: MEDICARE

## 2023-02-21 VITALS — WEIGHT: 218.06 LBS | BODY MASS INDEX: 29.54 KG/M2 | HEIGHT: 72 IN

## 2023-02-21 DIAGNOSIS — M20.42 HAMMER TOES OF BOTH FEET: ICD-10-CM

## 2023-02-21 DIAGNOSIS — M20.41 HAMMER TOES OF BOTH FEET: ICD-10-CM

## 2023-02-21 DIAGNOSIS — L84 CORN OR CALLUS: ICD-10-CM

## 2023-02-21 DIAGNOSIS — E11.49 TYPE II DIABETES MELLITUS WITH NEUROLOGICAL MANIFESTATIONS: ICD-10-CM

## 2023-02-21 DIAGNOSIS — B35.1 DERMATOPHYTOSIS OF NAIL: ICD-10-CM

## 2023-02-21 DIAGNOSIS — E11.9 ENCOUNTER FOR COMPREHENSIVE DIABETIC FOOT EXAMINATION, TYPE 2 DIABETES MELLITUS: Primary | ICD-10-CM

## 2023-02-21 PROCEDURE — 3288F PR FALLS RISK ASSESSMENT DOCUMENTED: ICD-10-PCS | Mod: CPTII,S$GLB,, | Performed by: PODIATRIST

## 2023-02-21 PROCEDURE — 1126F PR PAIN SEVERITY QUANTIFIED, NO PAIN PRESENT: ICD-10-PCS | Mod: CPTII,S$GLB,, | Performed by: PODIATRIST

## 2023-02-21 PROCEDURE — 1101F PR PT FALLS ASSESS DOC 0-1 FALLS W/OUT INJ PAST YR: ICD-10-PCS | Mod: CPTII,S$GLB,, | Performed by: PODIATRIST

## 2023-02-21 PROCEDURE — 3288F FALL RISK ASSESSMENT DOCD: CPT | Mod: CPTII,S$GLB,, | Performed by: PODIATRIST

## 2023-02-21 PROCEDURE — 11721 DEBRIDE NAIL 6 OR MORE: CPT | Mod: Q9,S$GLB,, | Performed by: PODIATRIST

## 2023-02-21 PROCEDURE — 1159F PR MEDICATION LIST DOCUMENTED IN MEDICAL RECORD: ICD-10-PCS | Mod: CPTII,S$GLB,, | Performed by: PODIATRIST

## 2023-02-21 PROCEDURE — 1160F RVW MEDS BY RX/DR IN RCRD: CPT | Mod: CPTII,S$GLB,, | Performed by: PODIATRIST

## 2023-02-21 PROCEDURE — 99999 PR PBB SHADOW E&M-EST. PATIENT-LVL IV: ICD-10-PCS | Mod: PBBFAC,,, | Performed by: PODIATRIST

## 2023-02-21 PROCEDURE — 99213 OFFICE O/P EST LOW 20 MIN: CPT | Mod: 25,S$GLB,, | Performed by: PODIATRIST

## 2023-02-21 PROCEDURE — 99213 PR OFFICE/OUTPT VISIT, EST, LEVL III, 20-29 MIN: ICD-10-PCS | Mod: 25,S$GLB,, | Performed by: PODIATRIST

## 2023-02-21 PROCEDURE — 99999 PR PBB SHADOW E&M-EST. PATIENT-LVL IV: CPT | Mod: PBBFAC,,, | Performed by: PODIATRIST

## 2023-02-21 PROCEDURE — 1101F PT FALLS ASSESS-DOCD LE1/YR: CPT | Mod: CPTII,S$GLB,, | Performed by: PODIATRIST

## 2023-02-21 PROCEDURE — 1159F MED LIST DOCD IN RCRD: CPT | Mod: CPTII,S$GLB,, | Performed by: PODIATRIST

## 2023-02-21 PROCEDURE — 11721 PR DEBRIDEMENT OF NAILS, 6 OR MORE: ICD-10-PCS | Mod: Q9,S$GLB,, | Performed by: PODIATRIST

## 2023-02-21 PROCEDURE — 1160F PR REVIEW ALL MEDS BY PRESCRIBER/CLIN PHARMACIST DOCUMENTED: ICD-10-PCS | Mod: CPTII,S$GLB,, | Performed by: PODIATRIST

## 2023-02-21 PROCEDURE — 1126F AMNT PAIN NOTED NONE PRSNT: CPT | Mod: CPTII,S$GLB,, | Performed by: PODIATRIST

## 2023-02-21 NOTE — PROGRESS NOTES
Subjective:     Patient ID: Rudy Saldaña is a 82 y.o. male.    Chief Complaint: Nail Care (Diabetic pt wears tennis shoes, PCP Dr. Morfin last seen 2-8-23)    Rudy is a 82 y.o. male who presents to the clinic for evaluation and treatment of high risk feet. Rudy has a past medical history of Anemia, BPH (benign prostatic hypertrophy), Carotid bruit, CKD (chronic kidney disease) stage 2/3, CVA (cerebral vascular accident), Diabetes mellitus, type 2, Hyperlipidemia, Hypertension, Hypothyroidism (2/29/2016), Type 2 diabetes mellitus, uncontrolled, with renal complications, Type II or unspecified type diabetes mellitus without mention of complication, not stated as uncontrolled, and Vitamin D deficiency (2/29/2016). The patient's chief complaint is long, thick toenails. This patient has documented high risk feet requiring routine maintenance secondary to diabetes mellitis and those secondary complications of diabetes, as mentioned..    PCP: Mohinder Morfin MD    Date Last Seen by PCP: 02/08/2023    Current shoe gear:  Affected Foot: Casual shoes     Unaffected Foot: Casual shoes    Hemoglobin A1C   Date Value Ref Range Status   12/01/2022 7.0 (H) 4.0 - 5.6 % Final     Comment:     ADA Screening Guidelines:  5.7-6.4%  Consistent with prediabetes  >or=6.5%  Consistent with diabetes    High levels of fetal hemoglobin interfere with the HbA1C  assay. Heterozygous hemoglobin variants (HbS, HgC, etc)do  not significantly interfere with this assay.   However, presence of multiple variants may affect accuracy.     11/09/2021 6.4 (H) 4.0 - 5.6 % Final     Comment:     ADA Screening Guidelines:  5.7-6.4%  Consistent with prediabetes  >or=6.5%  Consistent with diabetes    High levels of fetal hemoglobin interfere with the HbA1C  assay. Heterozygous hemoglobin variants (HbS, HgC, etc)do  not significantly interfere with this assay.   However, presence of multiple variants may affect accuracy.     03/02/2021 6.1 (H) 4.0 -  5.6 % Final     Comment:     ADA Screening Guidelines:  5.7-6.4%  Consistent with prediabetes  >or=6.5%  Consistent with diabetes    High levels of fetal hemoglobin interfere with the HbA1C  assay. Heterozygous hemoglobin variants (HbS, HgC, etc)do  not significantly interfere with this assay.   However, presence of multiple variants may affect accuracy.         Patient Active Problem List   Diagnosis    Combined hyperlipidemia associated with type 2 diabetes mellitus    Hypertension associated with diabetes    Type 2 diabetes mellitus, uncontrolled, with renal complications    History of stroke    BPH (benign prostatic hypertrophy)    History of colon polyps    Iron deficiency anemia    Polyneuropathy due to secondary diabetes    DEVIKA on CPAP    Family history of colonic polyps    Vitamin D deficiency    Hypothyroidism    Anemia    Gross hematuria    CKD stage 3 secondary to diabetes    Facet arthropathy, lumbar    Right hip pain    Primary osteoarthritis of right hip    Osteoarthritis of spine with radiculopathy, lumbar region    Acute idiopathic gout of left hand    Chronic fatigue    Crystal arthritis    Idiopathic chronic gout of multiple sites with tophus    Chronic pain of both knees    Pain of both elbows    Unspecified inflammatory spondylopathy, lumbar region    Rib pain on right side    Obesity (BMI 30-39.9)    Primary osteoarthritis of left knee    Chronic pain of left knee    Crystal arthropathy    Left-sided chest wall pain    Chronic cough       Medication List with Changes/Refills   Current Medications    ACARBOSE (PRECOSE) 100 MG TAB    Take 1 tablet (100 mg total) by mouth 3 (three) times daily with meals.    ALLOPURINOL (ZYLOPRIM) 300 MG TABLET    TAKE 1 TABLET(300 MG) BY MOUTH EVERY DAY    AMLODIPINE (NORVASC) 10 MG TABLET    TAKE 1 TABLET(10 MG) BY MOUTH EVERY DAY    ASPIRIN (ECOTRIN) 81 MG EC TABLET    Take 81 mg by mouth once daily.    ATORVASTATIN (LIPITOR) 20 MG TABLET    Take 1 tablet (20 mg  total) by mouth once daily.    BLOOD SUGAR DIAGNOSTIC STRP    1 each by Misc.(Non-Drug; Combo Route) route 3 (three) times daily as needed. One touch ultra 2 test strips    DONEPEZIL (ARICEPT) 10 MG TABLET    Take 1 tablet (10 mg total) by mouth once daily.    ERGOCALCIFEROL (ERGOCALCIFEROL) 50,000 UNIT CAP    TAKE 1 CAPSULE BY MOUTH 1 TIME A WEEK    FERROUS SULFATE (FEROSUL) 325 MG (65 MG IRON) TAB TABLET    TAKE 1 TABLET BY MOUTH THREE TIMES DAILY    FUROSEMIDE (LASIX) 40 MG TABLET    Take 1 tablet (40 mg total) by mouth once daily.    GLIPIZIDE (GLUCOTROL) 10 MG TABLET    Take 1 tablet (10 mg total) by mouth 2 (two) times daily before meals.    HYDRALAZINE (APRESOLINE) 100 MG TABLET    Take 1 po q 8 hours    LACTOBACILLUS RHAMNOSUS GG (CULTURELLE) 10 BILLION CELL CAPSULE    Take 1 capsule by mouth once daily.    LANCETS (TRUEPLUS LANCETS) 33 GAUGE MISC    1 lancet by Misc.(Non-Drug; Combo Route) route once daily.    LEVOTHYROXINE (SYNTHROID) 75 MCG TABLET    Take 1 tablet (75 mcg total) by mouth once daily.    LOSARTAN (COZAAR) 25 MG TABLET    Take 1 tablet (25 mg total) by mouth once daily.    METOPROLOL SUCCINATE (TOPROL-XL) 25 MG 24 HR TABLET    Take 1 tablet (25 mg total) by mouth once daily.    MONTELUKAST (SINGULAIR) 10 MG TABLET    Take 1 tablet (10 mg total) by mouth every evening.    NIACIN (NIASPAN) 1000 MG CR TABLET    Take 1 po q hs    ONETOUCH VERIO IQ METER KIT    Monitor blood glucose once daily    PIOGLITAZONE (ACTOS) 30 MG TABLET    Take 1 tablet (30 mg total) by mouth once daily.    POTASSIUM CHLORIDE (KLOR-CON) 10 MEQ TBSR    Take 1 tablet (10 mEq total) by mouth once daily.    PREDNISOLONE ACETATE (PRED FORTE) 1 % DRPS        SITAGLIPTIN PHOSPHATE (JANUVIA) 100 MG TAB    TAKE 1 TABLET(100 MG) BY MOUTH EVERY DAY       Review of patient's allergies indicates:   Allergen Reactions    Ace inhibitors      Cough         Past Surgical History:   Procedure Laterality Date    COLONOSCOPY  2008     COLONOSCOPY  5/31/2005    repeat in 2015    PILONIDAL CYST DRAINAGE      TRANSURETHRAL RESECTION OF PROSTATE         Family History   Problem Relation Age of Onset    Hypertension Mother     Hearing loss Mother     Hypertension Father     Alcohol abuse Father     Heart attack Maternal Uncle     Brain cancer Sister     Diabetes Neg Hx     Stroke Neg Hx        Social History     Socioeconomic History    Marital status:    Tobacco Use    Smoking status: Never    Smokeless tobacco: Never   Substance and Sexual Activity    Alcohol use: No     Alcohol/week: 0.0 standard drinks    Drug use: No       Vitals:    02/21/23 1004   Weight: 98.9 kg (218 lb 0.6 oz)   Height: 6' (1.829 m)   PainSc: 0-No pain       Hemoglobin A1C   Date Value Ref Range Status   12/01/2022 7.0 (H) 4.0 - 5.6 % Final     Comment:     ADA Screening Guidelines:  5.7-6.4%  Consistent with prediabetes  >or=6.5%  Consistent with diabetes    High levels of fetal hemoglobin interfere with the HbA1C  assay. Heterozygous hemoglobin variants (HbS, HgC, etc)do  not significantly interfere with this assay.   However, presence of multiple variants may affect accuracy.     11/09/2021 6.4 (H) 4.0 - 5.6 % Final     Comment:     ADA Screening Guidelines:  5.7-6.4%  Consistent with prediabetes  >or=6.5%  Consistent with diabetes    High levels of fetal hemoglobin interfere with the HbA1C  assay. Heterozygous hemoglobin variants (HbS, HgC, etc)do  not significantly interfere with this assay.   However, presence of multiple variants may affect accuracy.     03/02/2021 6.1 (H) 4.0 - 5.6 % Final     Comment:     ADA Screening Guidelines:  5.7-6.4%  Consistent with prediabetes  >or=6.5%  Consistent with diabetes    High levels of fetal hemoglobin interfere with the HbA1C  assay. Heterozygous hemoglobin variants (HbS, HgC, etc)do  not significantly interfere with this assay.   However, presence of multiple variants may affect accuracy.         Review of Systems    Constitutional:  Negative for chills and fever.   Respiratory:  Negative for shortness of breath.    Cardiovascular:  Negative for chest pain, palpitations, orthopnea, claudication and leg swelling.   Gastrointestinal:  Negative for diarrhea, nausea and vomiting.   Musculoskeletal:  Negative for joint pain.   Skin:  Negative for rash.   Neurological:  Positive for tingling and sensory change.   Psychiatric/Behavioral: Negative.         Objective:      PHYSICAL EXAM: Apperance: Alert and orient in no distress,well developed, and with good attention to grooming and body habits  Patient presents ambulating in tennis shoes.   LOWER EXTREMITY EXAM:  VASCULAR: Dorsalis pedis pulses 1/4 bilateral and Posterior Tibial pulses 2/4 bilateral. Capillary fill time <4 seconds bilateral. No edema observed bilateral. Varicosities present bilateral. Skin temperature of the lower extremities is warm to warm, proximal to distal. Hair growth WNL bilateral.  DERMATOLOGICAL: No skin rashes, subcutaneous nodules, lesions, or ulcers observed bilateral. Nails 1,2,3,4,5 right and 2,3,4,5 left thickened. Left hallux nail absent. Webspaces 1-4 clean, dry and without evidence of break in skin integrity bilateral. Minimal hyperkeratotic tissue noted to lateral 5th toe.   NEUROLOGICAL: Light touch, sharp-dull, proprioception all present and equal bilaterally.  Vibratory sensation absent at bilateral hallux and diminished at bilateral navicular . Protective sensation absent at 4/10 sites as tested with a Canvas-Chelsy 5.07 monofilament.   MUSCULOSKELETAL: Muscle strength is 5/5 for foot inverters, everters, plantarflexors, and dorsiflexors. Muscle tone is normal. No pain on palpation left lateral midfoot. Minimal bony prominence noted to bilateral lateral midfoot at cuboid-metatarsal joint. Flexible adductovarus hammertoes noted bilateral 5th.           Assessment:       ICD-10-CM ICD-9-CM   1. Encounter for comprehensive diabetic foot  examination, type 2 diabetes mellitus  E11.9 250.00   2. Type II diabetes mellitus with neurological manifestations  E11.49 250.60   3. Dermatophytosis of nail  B35.1 110.1   4. Hammer toes of both feet  M20.41 735.4    M20.42    5. Corn or callus  L84 700       Plan:   Encounter for comprehensive diabetic foot examination, type 2 diabetes mellitus    Type II diabetes mellitus with neurological manifestations    Dermatophytosis of nail    Hammer toes of both feet    Corn or callus      I counseled the patient on his conditions, regarding findings of my examination, my impressions, and usual treatment plan.   This visit spent on counseling and coordination of care.  Appointment spent on education about the diabetic foot, neuropathy, and prevention of limb loss.  Shoe inspection. Diabetic Foot Education. Patient reminded of the importance of good nutrition and blood sugar control to help prevent podiatric complications of diabetes. Patient instructed on proper foot hygeine. We discussed wearing proper shoe gear, daily foot inspections, never walking without protective shoe gear, never putting sharp instruments to feet.    With patient's permission, nails 1-5 bilateral were debrided/trimmed in length and thickness aggressively to their soft tissue attachment mechanically and with electric , removing all offending nail and debris. Patient relates relief following the procedure.  Prescription for Diabetic shoes and inserts.   Patient  will continue to monitor the areas daily, inspect feet, wear protective shoe gear when ambulatory, moisturizer to maintain skin integrity. Patient reminded of the importance of good nutrition and blood sugar control to help prevent podiatric complications of diabetes.  Patient to return 6 months or sooner if needed.          Padmaja Dave DPM  [facility]

## 2023-02-28 ENCOUNTER — OFFICE VISIT (OUTPATIENT)
Dept: FAMILY MEDICINE | Facility: CLINIC | Age: 83
End: 2023-02-28
Payer: MEDICARE
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
## 2023-02-28 DIAGNOSIS — R42 DIZZINESS AND GIDDINESS: Primary | ICD-10-CM

## 2023-02-28 DIAGNOSIS — S09.90XA INJURY OF HEAD, INITIAL ENCOUNTER: ICD-10-CM

## 2023-02-28 PROCEDURE — 99999 PR PBB SHADOW E&M-EST. PATIENT-LVL IV: ICD-10-PCS | Mod: PBBFAC,,, | Performed by: PHYSICIAN ASSISTANT

## 2023-02-28 PROCEDURE — 1160F RVW MEDS BY RX/DR IN RCRD: CPT | Mod: CPTII,S$GLB,, | Performed by: PHYSICIAN ASSISTANT

## 2023-02-28 PROCEDURE — 99214 OFFICE O/P EST MOD 30 MIN: CPT | Mod: S$GLB,,, | Performed by: PHYSICIAN ASSISTANT

## 2023-02-28 PROCEDURE — 1159F MED LIST DOCD IN RCRD: CPT | Mod: CPTII,S$GLB,, | Performed by: PHYSICIAN ASSISTANT

## 2023-02-28 PROCEDURE — 99999 PR PBB SHADOW E&M-EST. PATIENT-LVL IV: CPT | Mod: PBBFAC,,, | Performed by: PHYSICIAN ASSISTANT

## 2023-02-28 PROCEDURE — 1160F PR REVIEW ALL MEDS BY PRESCRIBER/CLIN PHARMACIST DOCUMENTED: ICD-10-PCS | Mod: CPTII,S$GLB,, | Performed by: PHYSICIAN ASSISTANT

## 2023-02-28 PROCEDURE — 99214 PR OFFICE/OUTPT VISIT, EST, LEVL IV, 30-39 MIN: ICD-10-PCS | Mod: S$GLB,,, | Performed by: PHYSICIAN ASSISTANT

## 2023-02-28 PROCEDURE — 1159F PR MEDICATION LIST DOCUMENTED IN MEDICAL RECORD: ICD-10-PCS | Mod: CPTII,S$GLB,, | Performed by: PHYSICIAN ASSISTANT

## 2023-02-28 RX ORDER — MECLIZINE HYDROCHLORIDE 25 MG/1
25 TABLET ORAL 3 TIMES DAILY PRN
Qty: 30 TABLET | Refills: 0 | Status: SHIPPED | OUTPATIENT
Start: 2023-02-28 | End: 2023-06-19 | Stop reason: SDUPTHER

## 2023-02-28 NOTE — PROGRESS NOTES
Assessment/Plan:    Problem List Items Addressed This Visit    None  Visit Diagnoses       Dizziness and giddiness    -  Primary  -fall which occurred this morning due to dizzy spell  -will obtain CT head w/o contrast   -trial of meclizine PRN and vestibular exercises   -will consider ENT referral to r/o inner ear etiology if symptoms persist  -follow up if no improvement in symptoms  -ER precautions for severe or worsening of symptoms      Relevant Medications    meclizine (ANTIVERT) 25 mg tablet    Other Relevant Orders    CT Head Without Contrast    Fall, initial encounter        Relevant Orders    CT Head Without Contrast    Injury of head, initial encounter        Relevant Orders    CT Head Without Contrast          Follow up if symptoms worsen or fail to improve.    Vidya Palma PA-C  _____________________________________________________________________________________________________________________________________________________    CC: dizziness    HPI: Patient is in clinic today as an established patient here for dizziness. Symptom onset was about 2 days ago, but has worsened since that time. He describes it as a room spinning sensation that comes and goes. He stated that dizzy spells are worse when lying down or with changes in position. He reports a dizzy spell which occurred this morning in which he went to sit down, fell forward, and hit his head on the wall. He denies LOC. He denies headache, vision changes, nausea, vomiting, numbness, tingling, weakness, confusion, or speech difficulty. He has a history of bilateral hearing loss in which he wears hearing aids. He denies ear pressure, otalgia, otorrhea, or tinnitus. He was seen in the clinic on 2/8 for upper respiratory infection in which he was treated with antibiotics and PRN levalbuterol. He reports overall improvement in symptoms. He reports prior history of dizziness, however, he stated the current symptoms are worse than previous symptoms.  He has a history of a CVA and his sister is concerned that dizziness is due to a stroke. No other complaints.    Past Medical History:   Diagnosis Date    Anemia     BPH (benign prostatic hypertrophy)     Carotid bruit     CKD (chronic kidney disease) stage 2/3     Dr. Nails    CVA (cerebral vascular accident)     Diabetes mellitus, type 2     Hyperlipidemia     Hypertension     Hypothyroidism 2/29/2016    Type 2 diabetes mellitus, uncontrolled, with renal complications     The patient presents with diabetes.  The patient denies polyuria, polydipsia, polyphagia, hypoglycemia and paresthesias.  The patient's glucose control has been good.  Home glucose averages are routinely checked.  The patient is without retinopathy currently.  The patient has no history of neuropathy.  The patient currently complains of no podiatric problems.  The patient has excellent compliance.    Type II or unspecified type diabetes mellitus without mention of complication, not stated as uncontrolled     Vitamin D deficiency 2/29/2016     Past Surgical History:   Procedure Laterality Date    COLONOSCOPY  2008    COLONOSCOPY  5/31/2005    repeat in 2015    PILONIDAL CYST DRAINAGE      TRANSURETHRAL RESECTION OF PROSTATE       Review of patient's allergies indicates:   Allergen Reactions    Ace inhibitors      Cough       Social History     Tobacco Use    Smoking status: Never    Smokeless tobacco: Never   Substance Use Topics    Alcohol use: No     Alcohol/week: 0.0 standard drinks    Drug use: No     Family History   Problem Relation Age of Onset    Hypertension Mother     Hearing loss Mother     Hypertension Father     Alcohol abuse Father     Heart attack Maternal Uncle     Brain cancer Sister     Diabetes Neg Hx     Stroke Neg Hx      Current Outpatient Medications on File Prior to Visit   Medication Sig Dispense Refill    acarbose (PRECOSE) 100 MG Tab Take 1 tablet (100 mg total) by mouth 3 (three) times daily with meals. 270 tablet 3     allopurinoL (ZYLOPRIM) 300 MG tablet TAKE 1 TABLET(300 MG) BY MOUTH EVERY DAY 90 tablet 11    amLODIPine (NORVASC) 10 MG tablet TAKE 1 TABLET(10 MG) BY MOUTH EVERY DAY 90 tablet 11    aspirin (ECOTRIN) 81 MG EC tablet Take 81 mg by mouth once daily.      atorvastatin (LIPITOR) 20 MG tablet Take 1 tablet (20 mg total) by mouth once daily. 90 tablet 11    blood sugar diagnostic Strp 1 each by Misc.(Non-Drug; Combo Route) route 3 (three) times daily as needed. One touch ultra 2 test strips 100 each 5    donepeziL (ARICEPT) 10 MG tablet Take 1 tablet (10 mg total) by mouth once daily. 90 tablet 3    ergocalciferol (ERGOCALCIFEROL) 50,000 unit Cap TAKE 1 CAPSULE BY MOUTH 1 TIME A WEEK 12 capsule 3    ferrous sulfate (FEROSUL) 325 mg (65 mg iron) Tab tablet TAKE 1 TABLET BY MOUTH THREE TIMES DAILY 270 tablet 0    furosemide (LASIX) 40 MG tablet Take 1 tablet (40 mg total) by mouth once daily. 90 tablet 3    glipiZIDE (GLUCOTROL) 10 MG tablet Take 1 tablet (10 mg total) by mouth 2 (two) times daily before meals. 180 tablet 3    hydrALAZINE (APRESOLINE) 100 MG tablet Take 1 po q 8 hours 270 tablet 3    Lactobacillus rhamnosus GG (CULTURELLE) 10 billion cell capsule Take 1 capsule by mouth once daily.      lancets (TRUEPLUS LANCETS) 33 gauge Misc 1 lancet by Misc.(Non-Drug; Combo Route) route once daily. 100 each 5    levothyroxine (SYNTHROID) 75 MCG tablet TAKE 1 TABLET(75 MCG) BY MOUTH EVERY DAY 90 tablet 3    losartan (COZAAR) 25 MG tablet Take 1 tablet (25 mg total) by mouth once daily. 90 tablet 3    metoprolol succinate (TOPROL-XL) 25 MG 24 hr tablet Take 1 tablet (25 mg total) by mouth once daily. 90 tablet 3    montelukast (SINGULAIR) 10 mg tablet Take 1 tablet (10 mg total) by mouth every evening. 30 tablet 0    niacin (NIASPAN) 1000 MG CR tablet Take 1 po q hs 90 tablet 3    ONETOUCH VERIO IQ METER kit Monitor blood glucose once daily 1 each 0    pioglitazone (ACTOS) 30 MG tablet Take 1 tablet (30 mg total) by  mouth once daily. 90 tablet 3    potassium chloride (KLOR-CON) 10 MEQ TbSR Take 1 tablet (10 mEq total) by mouth once daily. 90 tablet 3    prednisoLONE acetate (PRED FORTE) 1 % DrpS       SITagliptin phosphate (JANUVIA) 100 MG Tab TAKE 1 TABLET(100 MG) BY MOUTH EVERY DAY 90 tablet 0     No current facility-administered medications on file prior to visit.       Review of Systems   Constitutional:  Negative for chills, diaphoresis, fatigue and fever.   HENT:  Negative for congestion, ear pain, postnasal drip, sinus pain and sore throat.    Eyes:  Negative for pain and redness.   Respiratory:  Negative for cough, chest tightness and shortness of breath.    Cardiovascular:  Negative for chest pain and leg swelling.   Gastrointestinal:  Negative for abdominal pain, constipation, diarrhea, nausea and vomiting.   Genitourinary:  Negative for dysuria and hematuria.   Musculoskeletal:  Negative for arthralgias and joint swelling.   Skin:  Negative for rash.   Neurological:  Positive for dizziness. Negative for syncope and headaches.   Psychiatric/Behavioral:  Negative for dysphoric mood. The patient is not nervous/anxious.      There were no vitals filed for this visit.    Wt Readings from Last 3 Encounters:   02/21/23 98.9 kg (218 lb 0.6 oz)   02/08/23 98.9 kg (218 lb 1.6 oz)   11/30/22 99.3 kg (219 lb 0.4 oz)       Physical Exam  Constitutional:       General: He is not in acute distress.     Appearance: Normal appearance. He is well-developed.   HENT:      Head: Normocephalic and atraumatic.   Eyes:      Conjunctiva/sclera: Conjunctivae normal.   Cardiovascular:      Rate and Rhythm: Normal rate and regular rhythm.      Pulses: Normal pulses.      Heart sounds: Normal heart sounds. No murmur heard.  Pulmonary:      Effort: Pulmonary effort is normal. No respiratory distress.      Breath sounds: Normal breath sounds.   Abdominal:      General: Bowel sounds are normal. There is no distension.      Palpations: Abdomen is  soft.      Tenderness: There is no abdominal tenderness.   Musculoskeletal:         General: Normal range of motion.      Cervical back: Normal range of motion and neck supple.   Skin:     General: Skin is warm and dry.      Findings: Bruising present. No rash.      Comments: +bruising to bilateral upper extremities   Neurological:      General: No focal deficit present.      Mental Status: He is alert and oriented to person, place, and time.      Cranial Nerves: No cranial nerve deficit.      Sensory: No sensory deficit.      Motor: No weakness.      Coordination: Coordination normal.      Gait: Gait normal.      Deep Tendon Reflexes: Reflexes normal.   Psychiatric:         Mood and Affect: Mood normal.         Behavior: Behavior normal.       Health Maintenance   Topic Date Due    Eye Exam  03/30/2022    Hemoglobin A1c  06/01/2023    Lipid Panel  12/01/2023    Foot Exam  02/21/2024    TETANUS VACCINE  08/22/2026

## 2023-03-01 ENCOUNTER — TELEPHONE (OUTPATIENT)
Dept: FAMILY MEDICINE | Facility: CLINIC | Age: 83
End: 2023-03-01
Payer: MEDICARE

## 2023-03-01 NOTE — TELEPHONE ENCOUNTER
Please let the patient know that I have reviewed his CT head which was normal. There was no evidence of a bleed, stroke, mass, or lesion. Please let me know if symptoms worsen or do not improve.

## 2023-05-02 ENCOUNTER — PES CALL (OUTPATIENT)
Dept: ADMINISTRATIVE | Facility: CLINIC | Age: 83
End: 2023-05-02
Payer: MEDICARE

## 2023-06-05 ENCOUNTER — LAB VISIT (OUTPATIENT)
Dept: LAB | Facility: HOSPITAL | Age: 83
End: 2023-06-05
Attending: PHYSICIAN ASSISTANT
Payer: MEDICARE

## 2023-06-05 ENCOUNTER — OFFICE VISIT (OUTPATIENT)
Dept: FAMILY MEDICINE | Facility: CLINIC | Age: 83
End: 2023-06-05
Payer: MEDICARE

## 2023-06-05 VITALS
BODY MASS INDEX: 31 KG/M2 | SYSTOLIC BLOOD PRESSURE: 133 MMHG | HEIGHT: 72 IN | WEIGHT: 228.88 LBS | HEART RATE: 54 BPM | DIASTOLIC BLOOD PRESSURE: 60 MMHG

## 2023-06-05 DIAGNOSIS — I15.2 HYPERTENSION ASSOCIATED WITH DIABETES: ICD-10-CM

## 2023-06-05 DIAGNOSIS — R60.0 BILATERAL LOWER EXTREMITY EDEMA: ICD-10-CM

## 2023-06-05 DIAGNOSIS — E11.59 HYPERTENSION ASSOCIATED WITH DIABETES: ICD-10-CM

## 2023-06-05 DIAGNOSIS — R60.0 BILATERAL LOWER EXTREMITY EDEMA: Primary | ICD-10-CM

## 2023-06-05 LAB
ALBUMIN SERPL BCP-MCNC: 3.9 G/DL (ref 3.5–5.2)
ALP SERPL-CCNC: 65 U/L (ref 55–135)
ALT SERPL W/O P-5'-P-CCNC: 15 U/L (ref 10–44)
ANION GAP SERPL CALC-SCNC: 8 MMOL/L (ref 8–16)
AST SERPL-CCNC: 19 U/L (ref 10–40)
BASOPHILS # BLD AUTO: 0.03 K/UL (ref 0–0.2)
BASOPHILS NFR BLD: 0.4 % (ref 0–1.9)
BILIRUB SERPL-MCNC: 1.1 MG/DL (ref 0.1–1)
BNP SERPL-MCNC: 275 PG/ML (ref 0–99)
BUN SERPL-MCNC: 20 MG/DL (ref 8–23)
CALCIUM SERPL-MCNC: 9.5 MG/DL (ref 8.7–10.5)
CHLORIDE SERPL-SCNC: 107 MMOL/L (ref 95–110)
CO2 SERPL-SCNC: 27 MMOL/L (ref 23–29)
CREAT SERPL-MCNC: 1.2 MG/DL (ref 0.5–1.4)
DIFFERENTIAL METHOD: ABNORMAL
EOSINOPHIL # BLD AUTO: 0.2 K/UL (ref 0–0.5)
EOSINOPHIL NFR BLD: 3 % (ref 0–8)
ERYTHROCYTE [DISTWIDTH] IN BLOOD BY AUTOMATED COUNT: 14.7 % (ref 11.5–14.5)
EST. GFR  (NO RACE VARIABLE): 59.6 ML/MIN/1.73 M^2
GLUCOSE SERPL-MCNC: 194 MG/DL (ref 70–110)
HCT VFR BLD AUTO: 39.8 % (ref 40–54)
HGB BLD-MCNC: 12.7 G/DL (ref 14–18)
IMM GRANULOCYTES # BLD AUTO: 0.02 K/UL (ref 0–0.04)
IMM GRANULOCYTES NFR BLD AUTO: 0.3 % (ref 0–0.5)
LYMPHOCYTES # BLD AUTO: 2 K/UL (ref 1–4.8)
LYMPHOCYTES NFR BLD: 28.5 % (ref 18–48)
MCH RBC QN AUTO: 30.9 PG (ref 27–31)
MCHC RBC AUTO-ENTMCNC: 31.9 G/DL (ref 32–36)
MCV RBC AUTO: 97 FL (ref 82–98)
MONOCYTES # BLD AUTO: 0.7 K/UL (ref 0.3–1)
MONOCYTES NFR BLD: 10.1 % (ref 4–15)
NEUTROPHILS # BLD AUTO: 4 K/UL (ref 1.8–7.7)
NEUTROPHILS NFR BLD: 57.7 % (ref 38–73)
NRBC BLD-RTO: 0 /100 WBC
PLATELET # BLD AUTO: 158 K/UL (ref 150–450)
PMV BLD AUTO: 11.1 FL (ref 9.2–12.9)
POTASSIUM SERPL-SCNC: 4.3 MMOL/L (ref 3.5–5.1)
PROT SERPL-MCNC: 6.8 G/DL (ref 6–8.4)
RBC # BLD AUTO: 4.11 M/UL (ref 4.6–6.2)
SODIUM SERPL-SCNC: 142 MMOL/L (ref 136–145)
WBC # BLD AUTO: 6.91 K/UL (ref 3.9–12.7)

## 2023-06-05 PROCEDURE — 1159F PR MEDICATION LIST DOCUMENTED IN MEDICAL RECORD: ICD-10-PCS | Mod: CPTII,S$GLB,, | Performed by: PHYSICIAN ASSISTANT

## 2023-06-05 PROCEDURE — 36415 COLL VENOUS BLD VENIPUNCTURE: CPT | Mod: PO | Performed by: PHYSICIAN ASSISTANT

## 2023-06-05 PROCEDURE — 3288F PR FALLS RISK ASSESSMENT DOCUMENTED: ICD-10-PCS | Mod: CPTII,S$GLB,, | Performed by: PHYSICIAN ASSISTANT

## 2023-06-05 PROCEDURE — 1101F PR PT FALLS ASSESS DOC 0-1 FALLS W/OUT INJ PAST YR: ICD-10-PCS | Mod: CPTII,S$GLB,, | Performed by: PHYSICIAN ASSISTANT

## 2023-06-05 PROCEDURE — 3075F PR MOST RECENT SYSTOLIC BLOOD PRESS GE 130-139MM HG: ICD-10-PCS | Mod: CPTII,S$GLB,, | Performed by: PHYSICIAN ASSISTANT

## 2023-06-05 PROCEDURE — 1126F PR PAIN SEVERITY QUANTIFIED, NO PAIN PRESENT: ICD-10-PCS | Mod: CPTII,S$GLB,, | Performed by: PHYSICIAN ASSISTANT

## 2023-06-05 PROCEDURE — 1126F AMNT PAIN NOTED NONE PRSNT: CPT | Mod: CPTII,S$GLB,, | Performed by: PHYSICIAN ASSISTANT

## 2023-06-05 PROCEDURE — 3078F DIAST BP <80 MM HG: CPT | Mod: CPTII,S$GLB,, | Performed by: PHYSICIAN ASSISTANT

## 2023-06-05 PROCEDURE — 1160F RVW MEDS BY RX/DR IN RCRD: CPT | Mod: CPTII,S$GLB,, | Performed by: PHYSICIAN ASSISTANT

## 2023-06-05 PROCEDURE — 83880 ASSAY OF NATRIURETIC PEPTIDE: CPT | Performed by: PHYSICIAN ASSISTANT

## 2023-06-05 PROCEDURE — 99999 PR PBB SHADOW E&M-EST. PATIENT-LVL V: CPT | Mod: PBBFAC,,, | Performed by: PHYSICIAN ASSISTANT

## 2023-06-05 PROCEDURE — 85025 COMPLETE CBC W/AUTO DIFF WBC: CPT | Performed by: PHYSICIAN ASSISTANT

## 2023-06-05 PROCEDURE — 3075F SYST BP GE 130 - 139MM HG: CPT | Mod: CPTII,S$GLB,, | Performed by: PHYSICIAN ASSISTANT

## 2023-06-05 PROCEDURE — 1159F MED LIST DOCD IN RCRD: CPT | Mod: CPTII,S$GLB,, | Performed by: PHYSICIAN ASSISTANT

## 2023-06-05 PROCEDURE — 1101F PT FALLS ASSESS-DOCD LE1/YR: CPT | Mod: CPTII,S$GLB,, | Performed by: PHYSICIAN ASSISTANT

## 2023-06-05 PROCEDURE — 99214 PR OFFICE/OUTPT VISIT, EST, LEVL IV, 30-39 MIN: ICD-10-PCS | Mod: S$GLB,,, | Performed by: PHYSICIAN ASSISTANT

## 2023-06-05 PROCEDURE — 3288F FALL RISK ASSESSMENT DOCD: CPT | Mod: CPTII,S$GLB,, | Performed by: PHYSICIAN ASSISTANT

## 2023-06-05 PROCEDURE — 99214 OFFICE O/P EST MOD 30 MIN: CPT | Mod: S$GLB,,, | Performed by: PHYSICIAN ASSISTANT

## 2023-06-05 PROCEDURE — 3078F PR MOST RECENT DIASTOLIC BLOOD PRESSURE < 80 MM HG: ICD-10-PCS | Mod: CPTII,S$GLB,, | Performed by: PHYSICIAN ASSISTANT

## 2023-06-05 PROCEDURE — 80053 COMPREHEN METABOLIC PANEL: CPT | Performed by: PHYSICIAN ASSISTANT

## 2023-06-05 PROCEDURE — 99999 PR PBB SHADOW E&M-EST. PATIENT-LVL V: ICD-10-PCS | Mod: PBBFAC,,, | Performed by: PHYSICIAN ASSISTANT

## 2023-06-05 PROCEDURE — 1160F PR REVIEW ALL MEDS BY PRESCRIBER/CLIN PHARMACIST DOCUMENTED: ICD-10-PCS | Mod: CPTII,S$GLB,, | Performed by: PHYSICIAN ASSISTANT

## 2023-06-05 RX ORDER — LOSARTAN POTASSIUM 50 MG/1
50 TABLET ORAL DAILY
Qty: 30 TABLET | Refills: 11 | Status: SHIPPED | OUTPATIENT
Start: 2023-06-05 | End: 2024-02-20

## 2023-06-05 RX ORDER — AMLODIPINE BESYLATE 5 MG/1
5 TABLET ORAL DAILY
Qty: 30 TABLET | Refills: 11 | Status: SHIPPED | OUTPATIENT
Start: 2023-06-05 | End: 2023-06-19 | Stop reason: ALTCHOICE

## 2023-06-05 RX ORDER — BENZONATATE 200 MG/1
CAPSULE ORAL
COMMUNITY
Start: 2023-02-17 | End: 2023-11-20 | Stop reason: SDUPTHER

## 2023-06-05 NOTE — ASSESSMENT & PLAN NOTE
BP at goal today. Will decrease Amlodipine to 5 mg daily and increase Losartan to 50 mg daily due to LE edema. Will consider stopping Amlodipine in the future if needed. Continue lifestyle modification with low sodium diet and exercise. Discussed hypertension disease course and importance of treating high blood pressure. Patient understood and advised of risk of untreated blood pressure. ER precautions were given for symptoms of hypertensive urgency and emergency.

## 2023-06-05 NOTE — PROGRESS NOTES
Assessment/Plan:    Problem List Items Addressed This Visit          Cardiac/Vascular    Hypertension associated with diabetes    Overview     The patient presents with essential hypertension. Patient has had bilateral lower extremity edema over the past week. BP at goal today. The patient is tolerating the medication well and is in excellent compliance. The patient is experiencing no other side effects. Counseling was offered regarding low salt diets. The patient has a reduced salt intake. The patient denies chest pain, palpitations, shortness of breath, dyspnea on exertion, left or murmur neck pain, nausea, vomiting, diaphoresis, paroxysmal nocturnal dyspnea, and orthopnea.     Hypertension Medications               amLODIPine (NORVASC) 10 MG tablet TAKE 1 TABLET(10 MG) BY MOUTH EVERY DAY    furosemide (LASIX) 40 MG tablet Take 1 tablet (40 mg total) by mouth once daily.    hydrALAZINE (APRESOLINE) 100 MG tablet Take 1 po q 8 hours    losartan (COZAAR) 25 MG tablet Take 1 tablet (25 mg total) by mouth once daily.    metoprolol succinate (TOPROL-XL) 25 MG 24 hr tablet Take 1 tablet (25 mg total) by mouth once daily.            Current Assessment & Plan     BP at goal today. Will decrease Amlodipine to 5 mg daily and increase Losartan to 50 mg daily due to LE edema. Will consider stopping Amlodipine in the future if needed. Continue lifestyle modification with low sodium diet and exercise. Discussed hypertension disease course and importance of treating high blood pressure. Patient understood and advised of risk of untreated blood pressure. ER precautions were given for symptoms of hypertensive urgency and emergency.           Relevant Medications    losartan (COZAAR) 50 MG tablet    amLODIPine (NORVASC) 5 MG tablet     Other Visit Diagnoses       Bilateral lower extremity edema    -  Primary  -LE edema >1 week; worsening  -hx of CKD, HTN, and grade 2 diastolic dysfunction  -blood pressure well controlled  -on Lasix  for diuresis   -will check labs today   -also plan to decrease Amlodipine to 5 mg daily and increase Losartan to 50 mg daily  -discussed lowering salt intake, compression stockings, and leg elevation  -follow up if no improvement in symptoms  -ER precautions for severe or worsening of symptoms      Relevant Orders    CBC Auto Differential    Comprehensive Metabolic Panel    B-TYPE NATRIURETIC PEPTIDE            Follow up in about 2 weeks (around 6/19/2023), or if symptoms worsen or fail to improve.    Vidya Palma PA-C  _____________________________________________________________________________________________________________________________________________________    CC: lower extremity edema    HPI: Patient is in clinic today as an established patient here for lower extremity edema. The location of the edema ankles and feet bilaterally. Symptom onset was about 1 week ago, but has worsened since yesterday. He denies chest pain or shortness of breath. Denies orthopnea or PND. He has a history of CKD, hypertension. Danyel history of any other significant cardiac problems. He has previously had an echo in 2019 which showed EF of 60-65% and grade 2 diastolic dysfunction. He is on Lasix for diuresis. BP is well controlled today. He remains on multiple BP medications including Amlodipine, Hydralazine, Losartan, and Toprol. No other complaints today.     Past Medical History:   Diagnosis Date    Anemia     BPH (benign prostatic hypertrophy)     Carotid bruit     CKD (chronic kidney disease) stage 2/3     Dr. Nails    CVA (cerebral vascular accident)     Diabetes mellitus, type 2     Hyperlipidemia     Hypertension     Hypothyroidism 2/29/2016    Type 2 diabetes mellitus, uncontrolled, with renal complications     The patient presents with diabetes.  The patient denies polyuria, polydipsia, polyphagia, hypoglycemia and paresthesias.  The patient's glucose control has been good.  Home glucose averages are routinely  checked.  The patient is without retinopathy currently.  The patient has no history of neuropathy.  The patient currently complains of no podiatric problems.  The patient has excellent compliance.    Type II or unspecified type diabetes mellitus without mention of complication, not stated as uncontrolled     Vitamin D deficiency 2/29/2016     Past Surgical History:   Procedure Laterality Date    COLONOSCOPY  2008    COLONOSCOPY  5/31/2005    repeat in 2015    PILONIDAL CYST DRAINAGE      TRANSURETHRAL RESECTION OF PROSTATE       Review of patient's allergies indicates:   Allergen Reactions    Ace inhibitors      Cough       Social History     Tobacco Use    Smoking status: Never    Smokeless tobacco: Never   Substance Use Topics    Alcohol use: No     Alcohol/week: 0.0 standard drinks    Drug use: No     Family History   Problem Relation Age of Onset    Hypertension Mother     Hearing loss Mother     Hypertension Father     Alcohol abuse Father     Heart attack Maternal Uncle     Brain cancer Sister     Diabetes Neg Hx     Stroke Neg Hx      Current Outpatient Medications on File Prior to Visit   Medication Sig Dispense Refill    acarbose (PRECOSE) 100 MG Tab Take 1 tablet (100 mg total) by mouth 3 (three) times daily with meals. 270 tablet 3    allopurinoL (ZYLOPRIM) 300 MG tablet TAKE 1 TABLET(300 MG) BY MOUTH EVERY DAY 90 tablet 11    aspirin (ECOTRIN) 81 MG EC tablet Take 81 mg by mouth once daily.      atorvastatin (LIPITOR) 20 MG tablet Take 1 tablet (20 mg total) by mouth once daily. 90 tablet 11    blood sugar diagnostic Strp 1 each by Misc.(Non-Drug; Combo Route) route 3 (three) times daily as needed. One touch ultra 2 test strips 100 each 5    donepeziL (ARICEPT) 10 MG tablet Take 1 tablet (10 mg total) by mouth once daily. 90 tablet 3    ergocalciferol (ERGOCALCIFEROL) 50,000 unit Cap TAKE 1 CAPSULE BY MOUTH 1 TIME A WEEK 12 capsule 3    ferrous sulfate (FEROSUL) 325 mg (65 mg iron) Tab tablet TAKE 1  TABLET BY MOUTH THREE TIMES DAILY 270 tablet 0    furosemide (LASIX) 40 MG tablet Take 1 tablet (40 mg total) by mouth once daily. 90 tablet 3    glipiZIDE (GLUCOTROL) 10 MG tablet Take 1 tablet (10 mg total) by mouth 2 (two) times daily before meals. 180 tablet 3    hydrALAZINE (APRESOLINE) 100 MG tablet Take 1 po q 8 hours 270 tablet 3    lancets (TRUEPLUS LANCETS) 33 gauge Misc 1 lancet by Misc.(Non-Drug; Combo Route) route once daily. 100 each 5    levothyroxine (SYNTHROID) 75 MCG tablet TAKE 1 TABLET(75 MCG) BY MOUTH EVERY DAY 90 tablet 3    metoprolol succinate (TOPROL-XL) 25 MG 24 hr tablet Take 1 tablet (25 mg total) by mouth once daily. 90 tablet 3    montelukast (SINGULAIR) 10 mg tablet TAKE 1 TABLET(10 MG) BY MOUTH EVERY EVENING (Patient not taking: Reported on 6/5/2023) 90 tablet 3    niacin (NIASPAN) 1000 MG CR tablet Take 1 po q hs 90 tablet 3    ONETOUCH VERIO IQ METER kit Monitor blood glucose once daily 1 each 0    pioglitazone (ACTOS) 30 MG tablet Take 1 tablet (30 mg total) by mouth once daily. 90 tablet 3    potassium chloride (KLOR-CON) 10 MEQ TbSR Take 1 tablet (10 mEq total) by mouth once daily. 90 tablet 3    SITagliptin phosphate (JANUVIA) 100 MG Tab TAKE 1 TABLET(100 MG) BY MOUTH EVERY DAY 90 tablet 0    [DISCONTINUED] amLODIPine (NORVASC) 10 MG tablet TAKE 1 TABLET(10 MG) BY MOUTH EVERY DAY 90 tablet 11    [DISCONTINUED] losartan (COZAAR) 25 MG tablet Take 1 tablet (25 mg total) by mouth once daily. 90 tablet 3    benzonatate (TESSALON) 200 MG capsule Take by mouth.      Lactobacillus rhamnosus GG (CULTURELLE) 10 billion cell capsule Take 1 capsule by mouth once daily.      meclizine (ANTIVERT) 25 mg tablet Take 1 tablet (25 mg total) by mouth 3 (three) times daily as needed for Dizziness. 30 tablet 0    prednisoLONE acetate (PRED FORTE) 1 % DrpS        No current facility-administered medications on file prior to visit.       Review of Systems   Constitutional:  Negative for chills,  diaphoresis, fatigue and fever.   HENT:  Negative for congestion, ear pain, postnasal drip, sinus pain and sore throat.    Eyes:  Negative for pain and redness.   Respiratory:  Negative for cough, chest tightness and shortness of breath.    Cardiovascular:  Positive for leg swelling. Negative for chest pain.   Gastrointestinal:  Negative for abdominal pain, constipation, diarrhea, nausea and vomiting.   Genitourinary:  Negative for dysuria and hematuria.   Musculoskeletal:  Negative for arthralgias and joint swelling.   Skin:  Negative for rash.   Neurological:  Negative for dizziness, syncope and headaches.   Psychiatric/Behavioral:  Negative for dysphoric mood. The patient is not nervous/anxious.      Vitals:    06/05/23 1009   BP: 133/60   Pulse: (!) 54   Weight: 103.8 kg (228 lb 14.4 oz)   Height: 6' (1.829 m)       Wt Readings from Last 3 Encounters:   06/05/23 103.8 kg (228 lb 14.4 oz)   02/21/23 98.9 kg (218 lb 0.6 oz)   02/08/23 98.9 kg (218 lb 1.6 oz)       Physical Exam  Constitutional:       General: He is not in acute distress.     Appearance: Normal appearance. He is well-developed.   HENT:      Head: Normocephalic and atraumatic.   Eyes:      Conjunctiva/sclera: Conjunctivae normal.   Cardiovascular:      Rate and Rhythm: Normal rate and regular rhythm.      Pulses: Normal pulses.      Heart sounds: Normal heart sounds. No murmur heard.  Pulmonary:      Effort: Pulmonary effort is normal. No respiratory distress.      Breath sounds: Normal breath sounds.   Abdominal:      General: Bowel sounds are normal. There is no distension.      Palpations: Abdomen is soft.      Tenderness: There is no abdominal tenderness.   Musculoskeletal:         General: Normal range of motion.      Cervical back: Normal range of motion and neck supple.      Right lower leg: Swelling present.      Left lower leg: Swelling present.      Right ankle: Swelling present.      Left ankle: Swelling present.      Right foot: Swelling  present.      Left foot: Swelling present.   Skin:     General: Skin is warm and dry.      Findings: No rash.   Neurological:      General: No focal deficit present.      Mental Status: He is alert and oriented to person, place, and time.   Psychiatric:         Mood and Affect: Mood normal.         Behavior: Behavior normal.       Health Maintenance   Topic Date Due    Eye Exam  03/30/2022    Hemoglobin A1c  06/01/2023    Lipid Panel  12/01/2023    Foot Exam  02/21/2024    TETANUS VACCINE  08/22/2026

## 2023-06-05 NOTE — PATIENT INSTRUCTIONS
Decrease Amlodipine to 5 mg once daily (Due to swelling)  Increase Losartan to 50 mg once daily (can take 2 25 mg tablets)

## 2023-06-06 NOTE — PROGRESS NOTES
Results have been released via [facility]. Please verify that these have been viewed by patient. If not, please call patient with results.     I have sent a message to them with the following interpretation (see below).    I have reviewed your recent blood work.     CBC NORMAL-The CBC appears to be stable at this time with no sign of major anemia, abnormal white count or platelet abnormality.  CMP/BMP NORMAL-The electrolytes all appear stable at this time. This includes kidney functions along with routine electrolytes like sugar, potassium and sodium.  The liver enzymes were noted to be stable also.  BNP ABNORMAL-The BNP is elevated which can indicate heart failure. Continue Lasix as prescribed. We can further discuss this at your appointment next week    Please do not hesitate to call or message with any additional questions or concerns.    Vdiya Palma PA-C

## 2023-06-08 DIAGNOSIS — E78.2 COMBINED HYPERLIPIDEMIA ASSOCIATED WITH TYPE 2 DIABETES MELLITUS: ICD-10-CM

## 2023-06-08 DIAGNOSIS — E03.9 HYPOTHYROIDISM, UNSPECIFIED TYPE: ICD-10-CM

## 2023-06-08 DIAGNOSIS — E11.69 COMBINED HYPERLIPIDEMIA ASSOCIATED WITH TYPE 2 DIABETES MELLITUS: ICD-10-CM

## 2023-06-08 RX ORDER — NIACIN 1000 MG/1
TABLET, EXTENDED RELEASE ORAL
Qty: 90 TABLET | Refills: 3 | Status: SHIPPED | OUTPATIENT
Start: 2023-06-08 | End: 2024-02-20 | Stop reason: SDUPTHER

## 2023-06-08 RX ORDER — LEVOTHYROXINE SODIUM 75 UG/1
75 TABLET ORAL DAILY
Qty: 90 TABLET | Refills: 3 | Status: SHIPPED | OUTPATIENT
Start: 2023-06-08 | End: 2024-02-20 | Stop reason: SDUPTHER

## 2023-06-08 NOTE — TELEPHONE ENCOUNTER
Care Due:                  Date            Visit Type   Department     Provider  --------------------------------------------------------------------------------                                VIRTUAL      [facility] FAMILY  Last Visit: 02-      AUDIO ONLY   MEDICINE       Kei Garg  Next Visit: None Scheduled  None         None Found                                                            Last  Test          Frequency    Reason                     Performed    Due Date  --------------------------------------------------------------------------------    HBA1C.......  6 months...  SITagliptin, acarbose,     12- 05-                             glipiZIDE, pioglitazone..    [facility] Embedded Care Due Messages. Reference number: [id number].   6/08/2023 9:17:29 AM CDT

## 2023-06-08 NOTE — TELEPHONE ENCOUNTER
----- Message from Glenda Martinez sent at 6/8/2023  8:48 AM CDT -----  Contact: Giana/ [facility]  Giana with [facility] is calling to speak with the nurse regarding medication. Reports its a new medication. Please give patient a call back at [phone]

## 2023-06-08 NOTE — TELEPHONE ENCOUNTER
----- Message from Mohinder Morfin MD sent at 7/5/2018  5:57 PM CDT -----  The cholesterol levels are great so continue the lipitor and recheck in 1 year.   However, the kidney function is a little worse than the past and based on this, we need for a visit to be made with the kidney doctor soon.  Please arrange the visit to be done.    The sugar was high on this blood test but the last 3 month average was good.    Lab Results       Component                Value               Date                       HGBA1C                   6.9 (H)             03/13/2018               Rifampin Pregnancy And Lactation Text: This medication is Pregnancy Category C and it isn't know if it is safe during pregnancy. It is also excreted in breast milk and should not be used if you are breast feeding.

## 2023-06-19 ENCOUNTER — LAB VISIT (OUTPATIENT)
Dept: LAB | Facility: HOSPITAL | Age: 83
End: 2023-06-19
Payer: MEDICARE

## 2023-06-19 ENCOUNTER — OFFICE VISIT (OUTPATIENT)
Dept: FAMILY MEDICINE | Facility: CLINIC | Age: 83
End: 2023-06-19
Payer: MEDICARE

## 2023-06-19 VITALS
SYSTOLIC BLOOD PRESSURE: 123 MMHG | WEIGHT: 222.31 LBS | DIASTOLIC BLOOD PRESSURE: 56 MMHG | HEIGHT: 72 IN | BODY MASS INDEX: 30.11 KG/M2 | HEART RATE: 49 BPM

## 2023-06-19 DIAGNOSIS — I50.32 CHRONIC DIASTOLIC HEART FAILURE: ICD-10-CM

## 2023-06-19 DIAGNOSIS — I15.2 HYPERTENSION ASSOCIATED WITH DIABETES: Primary | ICD-10-CM

## 2023-06-19 DIAGNOSIS — R42 DIZZINESS AND GIDDINESS: ICD-10-CM

## 2023-06-19 DIAGNOSIS — R60.0 BILATERAL LOWER EXTREMITY EDEMA: ICD-10-CM

## 2023-06-19 DIAGNOSIS — I15.2 HYPERTENSION ASSOCIATED WITH DIABETES: ICD-10-CM

## 2023-06-19 DIAGNOSIS — E11.59 HYPERTENSION ASSOCIATED WITH DIABETES: Primary | ICD-10-CM

## 2023-06-19 DIAGNOSIS — E11.59 HYPERTENSION ASSOCIATED WITH DIABETES: ICD-10-CM

## 2023-06-19 LAB
ANION GAP SERPL CALC-SCNC: 11 MMOL/L (ref 8–16)
BUN SERPL-MCNC: 27 MG/DL (ref 8–23)
CALCIUM SERPL-MCNC: 10.1 MG/DL (ref 8.7–10.5)
CHLORIDE SERPL-SCNC: 108 MMOL/L (ref 95–110)
CO2 SERPL-SCNC: 24 MMOL/L (ref 23–29)
CREAT SERPL-MCNC: 1.3 MG/DL (ref 0.5–1.4)
EST. GFR  (NO RACE VARIABLE): 54.2 ML/MIN/1.73 M^2
GLUCOSE SERPL-MCNC: 158 MG/DL (ref 70–110)
POTASSIUM SERPL-SCNC: 4.5 MMOL/L (ref 3.5–5.1)
SODIUM SERPL-SCNC: 143 MMOL/L (ref 136–145)

## 2023-06-19 PROCEDURE — 1159F MED LIST DOCD IN RCRD: CPT | Mod: CPTII,S$GLB,, | Performed by: PHYSICIAN ASSISTANT

## 2023-06-19 PROCEDURE — 3074F PR MOST RECENT SYSTOLIC BLOOD PRESSURE < 130 MM HG: ICD-10-PCS | Mod: CPTII,S$GLB,, | Performed by: PHYSICIAN ASSISTANT

## 2023-06-19 PROCEDURE — 3078F PR MOST RECENT DIASTOLIC BLOOD PRESSURE < 80 MM HG: ICD-10-PCS | Mod: CPTII,S$GLB,, | Performed by: PHYSICIAN ASSISTANT

## 2023-06-19 PROCEDURE — 3288F FALL RISK ASSESSMENT DOCD: CPT | Mod: CPTII,S$GLB,, | Performed by: PHYSICIAN ASSISTANT

## 2023-06-19 PROCEDURE — 1160F PR REVIEW ALL MEDS BY PRESCRIBER/CLIN PHARMACIST DOCUMENTED: ICD-10-PCS | Mod: CPTII,S$GLB,, | Performed by: PHYSICIAN ASSISTANT

## 2023-06-19 PROCEDURE — 99999 PR PBB SHADOW E&M-EST. PATIENT-LVL V: ICD-10-PCS | Mod: PBBFAC,,, | Performed by: PHYSICIAN ASSISTANT

## 2023-06-19 PROCEDURE — 3074F SYST BP LT 130 MM HG: CPT | Mod: CPTII,S$GLB,, | Performed by: PHYSICIAN ASSISTANT

## 2023-06-19 PROCEDURE — 3288F PR FALLS RISK ASSESSMENT DOCUMENTED: ICD-10-PCS | Mod: CPTII,S$GLB,, | Performed by: PHYSICIAN ASSISTANT

## 2023-06-19 PROCEDURE — 99999 PR PBB SHADOW E&M-EST. PATIENT-LVL V: CPT | Mod: PBBFAC,,, | Performed by: PHYSICIAN ASSISTANT

## 2023-06-19 PROCEDURE — 1101F PT FALLS ASSESS-DOCD LE1/YR: CPT | Mod: CPTII,S$GLB,, | Performed by: PHYSICIAN ASSISTANT

## 2023-06-19 PROCEDURE — 36415 COLL VENOUS BLD VENIPUNCTURE: CPT | Mod: PO | Performed by: PHYSICIAN ASSISTANT

## 2023-06-19 PROCEDURE — 99214 PR OFFICE/OUTPT VISIT, EST, LEVL IV, 30-39 MIN: ICD-10-PCS | Mod: S$GLB,,, | Performed by: PHYSICIAN ASSISTANT

## 2023-06-19 PROCEDURE — 3078F DIAST BP <80 MM HG: CPT | Mod: CPTII,S$GLB,, | Performed by: PHYSICIAN ASSISTANT

## 2023-06-19 PROCEDURE — 1101F PR PT FALLS ASSESS DOC 0-1 FALLS W/OUT INJ PAST YR: ICD-10-PCS | Mod: CPTII,S$GLB,, | Performed by: PHYSICIAN ASSISTANT

## 2023-06-19 PROCEDURE — 99214 OFFICE O/P EST MOD 30 MIN: CPT | Mod: S$GLB,,, | Performed by: PHYSICIAN ASSISTANT

## 2023-06-19 PROCEDURE — 1160F RVW MEDS BY RX/DR IN RCRD: CPT | Mod: CPTII,S$GLB,, | Performed by: PHYSICIAN ASSISTANT

## 2023-06-19 PROCEDURE — 80048 BASIC METABOLIC PNL TOTAL CA: CPT | Performed by: PHYSICIAN ASSISTANT

## 2023-06-19 PROCEDURE — 1159F PR MEDICATION LIST DOCUMENTED IN MEDICAL RECORD: ICD-10-PCS | Mod: CPTII,S$GLB,, | Performed by: PHYSICIAN ASSISTANT

## 2023-06-19 RX ORDER — MECLIZINE HYDROCHLORIDE 25 MG/1
25 TABLET ORAL 3 TIMES DAILY PRN
Qty: 30 TABLET | Refills: 2 | Status: SHIPPED | OUTPATIENT
Start: 2023-06-19 | End: 2023-07-19

## 2023-06-19 NOTE — PROGRESS NOTES
Assessment/Plan:    Problem List Items Addressed This Visit          Cardiac/Vascular    Hypertension associated with diabetes - Primary    Overview     The patient presents with essential hypertension. Patient had some adjustments made to his BP medication due to LE edema. His Amlodipine was decreased to 5 mg daily and Losartan was increased to 50 mg daily. The patient is tolerating the medication well and is in excellent compliance. The patient is experiencing no other side effects. Counseling was offered regarding low salt diets. The patient has a reduced salt intake. The patient denies chest pain, palpitations, shortness of breath, dyspnea on exertion, left or murmur neck pain, nausea, vomiting, diaphoresis, paroxysmal nocturnal dyspnea, and orthopnea.     Hypertension Medications               furosemide (LASIX) 40 MG tablet Take 1 tablet (40 mg total) by mouth once daily.    hydrALAZINE (APRESOLINE) 100 MG tablet Take 1 po q 8 hours    losartan (COZAAR) 50 MG tablet Take 1 tablet (50 mg total) by mouth once daily.    metoprolol succinate (TOPROL-XL) 25 MG 24 hr tablet Take 1 tablet (25 mg total) by mouth once daily.                 Current Assessment & Plan     BP at goal today. Will stop Amlodipine due to continued LE edema. Plan to recheck renal function today. Continue lifestyle modification with low sodium diet and exercise. Discussed hypertension disease course and importance of treating high blood pressure. Patient understood and advised of risk of untreated blood pressure. ER precautions were given for symptoms of hypertensive urgency and emergency.         Relevant Orders    Basic Metabolic Panel    Chronic diastolic heart failure    Overview     Patient has previously had an echo in 2019 which showed EF of 60-65% and grade 2 diastolic dysfunction. He reports LE edema. Denies chest pain, shortness of breath, orthopnea or PND. He is on Lasix for diuresis. He was previously established with cardiology,  but has not followed up in several years.          Relevant Orders    Ambulatory referral/consult to Cardiology     Other Visit Diagnoses       Dizziness and giddiness        Relevant Medications    meclizine (ANTIVERT) 25 mg tablet    Bilateral lower extremity edema      -LE edema for the past few weeks  -hx of CKD, HTN, and grade 2 diastolic dysfunction  -blood pressure well controlled; plan to stop Amlodipine  -on Lasix for diuresis   -discussed lowering salt intake, compression stockings, and leg elevation  -follow up if no improvement in symptoms  -ER precautions for severe or worsening of symptoms          Follow up in about 6 months (around 12/19/2023), or if symptoms worsen or fail to improve, for with PCP.    Vidya Palma PA-C  _____________________________________________________________________________________________________________________________________________________    CC: BP check; follow up for LE edema    HPI: Patient is in clinic today as an established patient here for BP check and follow up for LE edema. At last visit. Patient had some adjustments made to his BP medication due to LE edema. His Amlodipine was decreased to 5 mg daily and Losartan was increased to 50 mg daily. He denies any adverse effects of medications. BP remains at goal today. He continues to have LE edema mainly in his feet and ankles. Edema is worse at the end of the day. He has been wearing compression stockings with some improvement of symptoms. Recent labs showed elevated BNP otherwise unremarkable. He denies chest pain or shortness of breath. Denies orthopnea or PND. He has previously had an echo in 2019 which showed EF of 60-65% and grade 2 diastolic dysfunction. He is on Lasix for diuresis. He was previously established with cardiology, but has not followed up in several years. No other complaints today.     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 Nails    CVA (cerebral vascular accident)     Diabetes mellitus, type 2     Hyperlipidemia     Hypertension     Hypothyroidism 2/29/2016    Type 2 diabetes mellitus, uncontrolled, with renal complications     The patient presents with diabetes.  The patient denies polyuria, polydipsia, polyphagia, hypoglycemia and paresthesias.  The patient's glucose control has been good.  Home glucose averages are routinely checked.  The patient is without retinopathy currently.  The patient has no history of neuropathy.  The patient currently complains of no podiatric problems.  The patient has excellent compliance.    Type II or unspecified type diabetes mellitus without mention of complication, not stated as uncontrolled     Vitamin D deficiency 2/29/2016     Past Surgical History:   Procedure Laterality Date    COLONOSCOPY  2008    COLONOSCOPY  5/31/2005    repeat in 2015    PILONIDAL CYST DRAINAGE      TRANSURETHRAL RESECTION OF PROSTATE       Review of patient's allergies indicates:   Allergen Reactions    Ace inhibitors      Cough       Social History     Tobacco Use    Smoking status: Never    Smokeless tobacco: Never   Substance Use Topics    Alcohol use: No     Alcohol/week: 0.0 standard drinks    Drug use: No     Family History   Problem Relation Age of Onset    Hypertension Mother     Hearing loss Mother     Hypertension Father     Alcohol abuse Father     Heart attack Maternal Uncle     Brain cancer Sister     Diabetes Neg Hx     Stroke Neg Hx      Current Outpatient Medications on File Prior to Visit   Medication Sig Dispense Refill    acarbose (PRECOSE) 100 MG Tab Take 1 tablet (100 mg total) by mouth 3 (three) times daily with meals. 270 tablet 3    allopurinoL (ZYLOPRIM) 300 MG tablet TAKE 1 TABLET(300 MG) BY MOUTH EVERY DAY 90 tablet 11    aspirin (ECOTRIN) 81 MG EC tablet Take 81 mg by mouth once daily.      atorvastatin (LIPITOR) 20 MG tablet Take 1 tablet  (20 mg total) by mouth once daily. 90 tablet 11    blood sugar diagnostic Strp 1 each by Misc.(Non-Drug; Combo Route) route 3 (three) times daily as needed. One touch ultra 2 test strips 100 each 5    donepeziL (ARICEPT) 10 MG tablet Take 1 tablet (10 mg total) by mouth once daily. 90 tablet 3    ergocalciferol (ERGOCALCIFEROL) 50,000 unit Cap TAKE 1 CAPSULE BY MOUTH 1 TIME A WEEK 12 capsule 3    ferrous sulfate (FEROSUL) 325 mg (65 mg iron) Tab tablet TAKE 1 TABLET BY MOUTH THREE TIMES DAILY 270 tablet 0    furosemide (LASIX) 40 MG tablet Take 1 tablet (40 mg total) by mouth once daily. 90 tablet 3    glipiZIDE (GLUCOTROL) 10 MG tablet Take 1 tablet (10 mg total) by mouth 2 (two) times daily before meals. 180 tablet 3    hydrALAZINE (APRESOLINE) 100 MG tablet Take 1 po q 8 hours 270 tablet 3    Lactobacillus rhamnosus GG (CULTURELLE) 10 billion cell capsule Take 1 capsule by mouth once daily.      lancets (TRUEPLUS LANCETS) 33 gauge Misc 1 lancet by Misc.(Non-Drug; Combo Route) route once daily. 100 each 5    levothyroxine (SYNTHROID) 75 MCG tablet Take 1 tablet (75 mcg total) by mouth once daily. 90 tablet 3    losartan (COZAAR) 50 MG tablet Take 1 tablet (50 mg total) by mouth once daily. 30 tablet 11    metoprolol succinate (TOPROL-XL) 25 MG 24 hr tablet Take 1 tablet (25 mg total) by mouth once daily. 90 tablet 3    niacin (NIASPAN) 1000 MG CR tablet Take 1 po q hs 90 tablet 3    ONETOUCH VERIO IQ METER kit Monitor blood glucose once daily 1 each 0    pioglitazone (ACTOS) 30 MG tablet Take 1 tablet (30 mg total) by mouth once daily. 90 tablet 3    potassium chloride (KLOR-CON) 10 MEQ TbSR Take 1 tablet (10 mEq total) by mouth once daily. 90 tablet 3    SITagliptin phosphate (JANUVIA) 100 MG Tab TAKE 1 TABLET(100 MG) BY MOUTH EVERY DAY 90 tablet 0    [DISCONTINUED] amLODIPine (NORVASC) 5 MG tablet Take 1 tablet (5 mg total) by mouth once daily. 30 tablet 11    benzonatate (TESSALON) 200 MG capsule Take by mouth.       [DISCONTINUED] meclizine (ANTIVERT) 25 mg tablet Take 1 tablet (25 mg total) by mouth 3 (three) times daily as needed for Dizziness. 30 tablet 0    [DISCONTINUED] montelukast (SINGULAIR) 10 mg tablet TAKE 1 TABLET(10 MG) BY MOUTH EVERY EVENING (Patient not taking: Reported on 6/5/2023) 90 tablet 3    [DISCONTINUED] prednisoLONE acetate (PRED FORTE) 1 % DrpS        No current facility-administered medications on file prior to visit.       Review of Systems   Constitutional:  Negative for chills, diaphoresis, fatigue and fever.   HENT:  Negative for congestion, ear pain, postnasal drip, sinus pain and sore throat.    Eyes:  Negative for pain and redness.   Respiratory:  Negative for cough, chest tightness and shortness of breath.    Cardiovascular:  Positive for leg swelling. Negative for chest pain.   Gastrointestinal:  Negative for abdominal pain, constipation, diarrhea, nausea and vomiting.   Genitourinary:  Negative for dysuria and hematuria.   Musculoskeletal:  Negative for arthralgias and joint swelling.   Skin:  Negative for rash.   Neurological:  Negative for dizziness, syncope and headaches.   Psychiatric/Behavioral:  Negative for dysphoric mood. The patient is not nervous/anxious.      Vitals:    06/19/23 0825 06/19/23 0827   BP: (!) 147/61 (!) 123/56   Pulse: (!) 50 (!) 49   Weight: 100.8 kg (222 lb 4.8 oz)    Height: 6' (1.829 m)        Wt Readings from Last 3 Encounters:   06/19/23 100.8 kg (222 lb 4.8 oz)   06/05/23 103.8 kg (228 lb 14.4 oz)   02/21/23 98.9 kg (218 lb 0.6 oz)       Physical Exam  Constitutional:       General: He is not in acute distress.     Appearance: Normal appearance. He is well-developed.   HENT:      Head: Normocephalic and atraumatic.   Eyes:      Conjunctiva/sclera: Conjunctivae normal.   Cardiovascular:      Rate and Rhythm: Normal rate and regular rhythm.      Pulses: Normal pulses.      Heart sounds: Normal heart sounds. No murmur heard.  Pulmonary:      Effort:  Pulmonary effort is normal. No respiratory distress.      Breath sounds: Normal breath sounds.   Abdominal:      General: Bowel sounds are normal. There is no distension.      Palpations: Abdomen is soft.      Tenderness: There is no abdominal tenderness.   Musculoskeletal:         General: Normal range of motion.      Cervical back: Normal range of motion and neck supple.      Right lower leg: Swelling present.      Left lower leg: Swelling present.      Right ankle: Swelling present.      Left ankle: Swelling present.      Right foot: Swelling present.      Left foot: Swelling present.   Skin:     General: Skin is warm and dry.      Findings: No rash.   Neurological:      General: No focal deficit present.      Mental Status: He is alert and oriented to person, place, and time.   Psychiatric:         Mood and Affect: Mood normal.         Behavior: Behavior normal.       Health Maintenance   Topic Date Due    Eye Exam  03/30/2022    Hemoglobin A1c  06/01/2023    Lipid Panel  12/01/2023    Foot Exam  02/21/2024    TETANUS VACCINE  08/22/2026

## 2023-06-19 NOTE — ASSESSMENT & PLAN NOTE
BP at goal today. Will stop Amlodipine due to continued LE edema. Plan to recheck renal function today. Continue lifestyle modification with low sodium diet and exercise. Discussed hypertension disease course and importance of treating high blood pressure. Patient understood and advised of risk of untreated blood pressure. ER precautions were given for symptoms of hypertensive urgency and emergency.

## 2023-06-21 NOTE — PROGRESS NOTES
Results have been released via [facility]. Please verify that these have been viewed by patient. If not, please call patient with results.     I have sent a message to them with the following interpretation (see below).    I have reviewed your recent blood work.     BMP NORMAL-The electrolytes all appear stable at this time. Kidney function slightly decreased. We will continue to monitor this.    Please do not hesitate to call or message with any additional questions or concerns.    Vidya Palma PA-C

## 2023-07-03 DIAGNOSIS — M10.042 ACUTE IDIOPATHIC GOUT OF LEFT HAND: ICD-10-CM

## 2023-07-03 RX ORDER — ALLOPURINOL 300 MG/1
TABLET ORAL
Qty: 90 TABLET | Refills: 1 | Status: SHIPPED | OUTPATIENT
Start: 2023-07-03 | End: 2023-09-30

## 2023-07-03 NOTE — TELEPHONE ENCOUNTER
No care due was identified.  [facility] Embedded Care Due Messages. Reference number: [id number].   7/03/2023 8:11:35 AM CDT

## 2023-07-04 NOTE — TELEPHONE ENCOUNTER
Refill Decision Note   Rudy Saldaña  is requesting a refill authorization.  Brief Assessment and Rationale for Refill:  Approve     Medication Therapy Plan:       Medication Reconciliation Completed: No   Comments:     No Care Gaps recommended.     Note composed:9:51 PM 07/03/2023

## 2023-08-02 ENCOUNTER — TELEPHONE (OUTPATIENT)
Dept: FAMILY MEDICINE | Facility: CLINIC | Age: 83
End: 2023-08-02
Payer: MEDICARE

## 2023-08-02 NOTE — TELEPHONE ENCOUNTER
----- Message from Emerald Diaz sent at 8/2/2023  1:27 PM CDT -----  Contact: Rudy [phone]  Patient needs call back. He is trying to schedule an Annual and the first available I could find is March 2024. Patient states that he received a notice that it's time for his Annual now.

## 2023-08-29 ENCOUNTER — OFFICE VISIT (OUTPATIENT)
Dept: PODIATRY | Facility: CLINIC | Age: 83
End: 2023-08-29
Payer: MEDICARE

## 2023-08-29 VITALS — HEIGHT: 72 IN | BODY MASS INDEX: 30.1 KG/M2 | WEIGHT: 222.25 LBS

## 2023-08-29 DIAGNOSIS — B35.1 DERMATOPHYTOSIS OF NAIL: ICD-10-CM

## 2023-08-29 DIAGNOSIS — D50.8 IRON DEFICIENCY ANEMIA SECONDARY TO INADEQUATE DIETARY IRON INTAKE: ICD-10-CM

## 2023-08-29 DIAGNOSIS — E11.49 TYPE II DIABETES MELLITUS WITH NEUROLOGICAL MANIFESTATIONS: Primary | ICD-10-CM

## 2023-08-29 PROCEDURE — 99499 NO LOS: ICD-10-PCS | Mod: S$GLB,,, | Performed by: PODIATRIST

## 2023-08-29 PROCEDURE — 99999 PR PBB SHADOW E&M-EST. PATIENT-LVL IV: CPT | Mod: PBBFAC,,, | Performed by: PODIATRIST

## 2023-08-29 PROCEDURE — 99999 PR PBB SHADOW E&M-EST. PATIENT-LVL IV: ICD-10-PCS | Mod: PBBFAC,,, | Performed by: PODIATRIST

## 2023-08-29 PROCEDURE — 11721 DEBRIDE NAIL 6 OR MORE: CPT | Mod: Q9,S$GLB,, | Performed by: PODIATRIST

## 2023-08-29 PROCEDURE — 99499 UNLISTED E&M SERVICE: CPT | Mod: S$GLB,,, | Performed by: PODIATRIST

## 2023-08-29 PROCEDURE — 11721 PR DEBRIDEMENT OF NAILS, 6 OR MORE: ICD-10-PCS | Mod: Q9,S$GLB,, | Performed by: PODIATRIST

## 2023-08-29 RX ORDER — FERROUS SULFATE 325(65) MG
TABLET ORAL
Qty: 270 TABLET | Refills: 0 | Status: SHIPPED | OUTPATIENT
Start: 2023-08-29 | End: 2023-12-13

## 2023-08-29 RX ORDER — DAPAGLIFLOZIN 10 MG/1
10 TABLET, FILM COATED ORAL EVERY MORNING
COMMUNITY
Start: 2023-08-14

## 2023-08-29 RX ORDER — SACUBITRIL AND VALSARTAN 24; 26 MG/1; MG/1
1 TABLET, FILM COATED ORAL 2 TIMES DAILY
COMMUNITY
Start: 2023-08-14

## 2023-08-29 NOTE — TELEPHONE ENCOUNTER
Refill Routing Note   Medication(s) are not appropriate for processing by [facility] for the following reason(s):      Medication outside of protocol    [facility] action(s):  Route Care Due:  None identified              Appointments  past 12m or future 3m with PCP    Date Provider   Last Visit   11/30/2022 Mohinder Morfin MD   Next Visit   1/31/2024 Mohinder Morfin MD   ED visits in past 90 days: 0        Note composed:10:01 AM 08/29/2023

## 2023-08-30 ENCOUNTER — PATIENT MESSAGE (OUTPATIENT)
Dept: ADMINISTRATIVE | Facility: HOSPITAL | Age: 83
End: 2023-08-30
Payer: MEDICARE

## 2023-09-11 NOTE — PROGRESS NOTES
Subjective:     Patient ID: Rudy Saldaña is a 83 y.o. male.    Chief Complaint: Nail Care (Diabetic pt wears tennis shoes, PCP Dr. Morfin last seen 6-19-23)    Rudy is a 83 y.o. male who presents to the clinic for evaluation and treatment of high risk feet. Rudy has a past medical history of Anemia, BPH (benign prostatic hypertrophy), Carotid bruit, CKD (chronic kidney disease) stage 2/3, CVA (cerebral vascular accident), Diabetes mellitus, type 2, Hyperlipidemia, Hypertension, Hypothyroidism (2/29/2016), Type 2 diabetes mellitus, uncontrolled, with renal complications, Type II or unspecified type diabetes mellitus without mention of complication, not stated as uncontrolled, and Vitamin D deficiency (2/29/2016). The patient's chief complaint is long, thick toenails. This patient has documented high risk feet requiring routine maintenance secondary to diabetes mellitis and those secondary complications of diabetes, as mentioned..    PCP: Mohinder Morfin MD    Date Last Seen by PCP: 06/19/2023    Current shoe gear:  Affected Foot: Tennis shoes     Unaffected Foot: Tennis shoes    Hemoglobin A1C   Date Value Ref Range Status   12/01/2022 7.0 (H) 4.0 - 5.6 % Final     Comment:     ADA Screening Guidelines:  5.7-6.4%  Consistent with prediabetes  >or=6.5%  Consistent with diabetes    High levels of fetal hemoglobin interfere with the HbA1C  assay. Heterozygous hemoglobin variants (HbS, HgC, etc)do  not significantly interfere with this assay.   However, presence of multiple variants may affect accuracy.     11/09/2021 6.4 (H) 4.0 - 5.6 % Final     Comment:     ADA Screening Guidelines:  5.7-6.4%  Consistent with prediabetes  >or=6.5%  Consistent with diabetes    High levels of fetal hemoglobin interfere with the HbA1C  assay. Heterozygous hemoglobin variants (HbS, HgC, etc)do  not significantly interfere with this assay.   However, presence of multiple variants may affect accuracy.     03/02/2021 6.1 (H) 4.0  - 5.6 % Final     Comment:     ADA Screening Guidelines:  5.7-6.4%  Consistent with prediabetes  >or=6.5%  Consistent with diabetes    High levels of fetal hemoglobin interfere with the HbA1C  assay. Heterozygous hemoglobin variants (HbS, HgC, etc)do  not significantly interfere with this assay.   However, presence of multiple variants may affect accuracy.         Patient Active Problem List   Diagnosis    Combined hyperlipidemia associated with type 2 diabetes mellitus    Hypertension associated with diabetes    Type 2 diabetes mellitus, uncontrolled, with renal complications    History of stroke    BPH (benign prostatic hypertrophy)    History of colon polyps    Iron deficiency anemia    Polyneuropathy due to secondary diabetes    DEVIKA on CPAP    Family history of colonic polyps    Vitamin D deficiency    Hypothyroidism    Anemia    Gross hematuria    CKD stage 3 secondary to diabetes    Facet arthropathy, lumbar    Right hip pain    Primary osteoarthritis of right hip    Osteoarthritis of spine with radiculopathy, lumbar region    Acute idiopathic gout of left hand    Chronic fatigue    Crystal arthritis    Idiopathic chronic gout of multiple sites with tophus    Chronic pain of both knees    Pain of both elbows    Unspecified inflammatory spondylopathy, lumbar region    Rib pain on right side    Obesity (BMI 30-39.9)    Primary osteoarthritis of left knee    Chronic pain of left knee    Crystal arthropathy    Left-sided chest wall pain    Chronic cough    Chronic diastolic heart failure       Medication List with Changes/Refills   Current Medications    ACARBOSE (PRECOSE) 100 MG TAB    Take 1 tablet (100 mg total) by mouth 3 (three) times daily with meals.    ALLOPURINOL (ZYLOPRIM) 300 MG TABLET    TAKE ONE TABLET BY MOUTH EVERY DAY    ASPIRIN (ECOTRIN) 81 MG EC TABLET    Take 81 mg by mouth once daily.    ATORVASTATIN (LIPITOR) 20 MG TABLET    Take 1 tablet (20 mg total) by mouth once daily.    BENZONATATE  (TESSALON) 200 MG CAPSULE    Take by mouth.    BLOOD SUGAR DIAGNOSTIC STRP    1 each by Misc.(Non-Drug; Combo Route) route 3 (three) times daily as needed. One touch ultra 2 test strips    DONEPEZIL (ARICEPT) 10 MG TABLET    Take 1 tablet (10 mg total) by mouth once daily.    ENTRESTO 24-26 MG PER TABLET    Take 1 tablet by mouth 2 (two) times daily.    ERGOCALCIFEROL (ERGOCALCIFEROL) 50,000 UNIT CAP    TAKE 1 CAPSULE BY MOUTH 1 TIME A WEEK    FARXIGA 10 MG TABLET    Take 10 mg by mouth every morning.    FUROSEMIDE (LASIX) 40 MG TABLET    Take 1 tablet (40 mg total) by mouth once daily.    GLIPIZIDE (GLUCOTROL) 10 MG TABLET    Take 1 tablet (10 mg total) by mouth 2 (two) times daily before meals.    HYDRALAZINE (APRESOLINE) 100 MG TABLET    Take 1 po q 8 hours    LACTOBACILLUS RHAMNOSUS GG (CULTURELLE) 10 BILLION CELL CAPSULE    Take 1 capsule by mouth once daily.    LANCETS (TRUEPLUS LANCETS) 33 GAUGE MISC    1 lancet by Misc.(Non-Drug; Combo Route) route once daily.    LEVOTHYROXINE (SYNTHROID) 75 MCG TABLET    Take 1 tablet (75 mcg total) by mouth once daily.    LOSARTAN (COZAAR) 50 MG TABLET    Take 1 tablet (50 mg total) by mouth once daily.    MECLIZINE (ANTIVERT) 25 MG TABLET    Take 1 tablet (25 mg total) by mouth 3 (three) times daily as needed for Dizziness.    METOPROLOL SUCCINATE (TOPROL-XL) 25 MG 24 HR TABLET    Take 1 tablet (25 mg total) by mouth once daily.    NIACIN (NIASPAN) 1000 MG CR TABLET    Take 1 po q hs    ONETOUCH VERIO IQ METER KIT    Monitor blood glucose once daily    PIOGLITAZONE (ACTOS) 30 MG TABLET    Take 1 tablet (30 mg total) by mouth once daily.    POTASSIUM CHLORIDE (KLOR-CON) 10 MEQ TBSR    Take 1 tablet (10 mEq total) by mouth once daily.    SITAGLIPTIN PHOSPHATE (JANUVIA) 100 MG TAB    TAKE 1 TABLET(100 MG) BY MOUTH EVERY DAY   Changed and/or Refilled Medications    Modified Medication Previous Medication    FERROUS SULFATE (FEOSOL) 325 MG (65 MG IRON) TAB TABLET ferrous  sulfate (FEROSUL) 325 mg (65 mg iron) Tab tablet       TAKE ONE TABLET BY MOUTH THREE TIMES DAILY    TAKE 1 TABLET BY MOUTH THREE TIMES DAILY       Review of patient's allergies indicates:   Allergen Reactions    Ace inhibitors      Cough         Past Surgical History:   Procedure Laterality Date    COLONOSCOPY  2008    COLONOSCOPY  5/31/2005    repeat in 2015    PILONIDAL CYST DRAINAGE      TRANSURETHRAL RESECTION OF PROSTATE         Family History   Problem Relation Age of Onset    Hypertension Mother     Hearing loss Mother     Hypertension Father     Alcohol abuse Father     Heart attack Maternal Uncle     Brain cancer Sister     Diabetes Neg Hx     Stroke Neg Hx        Social History     Socioeconomic History    Marital status:    Tobacco Use    Smoking status: Never    Smokeless tobacco: Never   Substance and Sexual Activity    Alcohol use: No     Alcohol/week: 0.0 standard drinks of alcohol    Drug use: No       Vitals:    08/29/23 0950   Weight: 100.8 kg (222 lb 3.6 oz)   Height: 6' (1.829 m)   PainSc: 0-No pain       Hemoglobin A1C   Date Value Ref Range Status   12/01/2022 7.0 (H) 4.0 - 5.6 % Final     Comment:     ADA Screening Guidelines:  5.7-6.4%  Consistent with prediabetes  >or=6.5%  Consistent with diabetes    High levels of fetal hemoglobin interfere with the HbA1C  assay. Heterozygous hemoglobin variants (HbS, HgC, etc)do  not significantly interfere with this assay.   However, presence of multiple variants may affect accuracy.     11/09/2021 6.4 (H) 4.0 - 5.6 % Final     Comment:     ADA Screening Guidelines:  5.7-6.4%  Consistent with prediabetes  >or=6.5%  Consistent with diabetes    High levels of fetal hemoglobin interfere with the HbA1C  assay. Heterozygous hemoglobin variants (HbS, HgC, etc)do  not significantly interfere with this assay.   However, presence of multiple variants may affect accuracy.     03/02/2021 6.1 (H) 4.0 - 5.6 % Final     Comment:     ADA Screening  Guidelines:  5.7-6.4%  Consistent with prediabetes  >or=6.5%  Consistent with diabetes    High levels of fetal hemoglobin interfere with the HbA1C  assay. Heterozygous hemoglobin variants (HbS, HgC, etc)do  not significantly interfere with this assay.   However, presence of multiple variants may affect accuracy.         Review of Systems   Constitutional:  Negative for chills and fever.   Respiratory:  Negative for shortness of breath.    Cardiovascular:  Negative for chest pain, palpitations, orthopnea, claudication and leg swelling.   Gastrointestinal:  Negative for diarrhea, nausea and vomiting.   Musculoskeletal:  Negative for joint pain.   Skin:  Negative for rash.   Neurological:  Positive for tingling and sensory change.   Psychiatric/Behavioral: Negative.           Objective:      PHYSICAL EXAM: Apperance: Alert and orient in no distress,well developed, and with good attention to grooming and body habits  Patient presents ambulating in tennis shoes.   LOWER EXTREMITY EXAM:  VASCULAR: Dorsalis pedis pulses 1/4 bilateral and Posterior Tibial pulses 2/4 bilateral. Capillary fill time <4 seconds bilateral. No edema observed bilateral. Varicosities present bilateral. Skin temperature of the lower extremities is warm to warm, proximal to distal. Hair growth WNL bilateral.  DERMATOLOGICAL: No skin rashes, subcutaneous nodules, lesions, or ulcers observed bilateral. Nails 1,2,3,4,5 right and 2,3,4,5 left thickened. Left hallux nail absent. Webspaces 1-4 clean, dry and without evidence of break in skin integrity bilateral. Minimal hyperkeratotic tissue noted to lateral 5th toe.   NEUROLOGICAL: Light touch, sharp-dull, proprioception all present and equal bilaterally.  Vibratory sensation absent at bilateral hallux and diminished at bilateral navicular . Protective sensation absent at 4/10 sites as tested with a [place]-[employer] 5.07 monofilament.   MUSCULOSKELETAL: Muscle strength is 5/5 for foot inverters, everters,  plantarflexors, and dorsiflexors. Muscle tone is normal. No pain on palpation left lateral midfoot. Minimal bony prominence noted to bilateral lateral midfoot at cuboid-metatarsal joint. Flexible adductovarus hammertoes noted bilateral 5th.           Assessment:       ICD-10-CM ICD-9-CM   1. Type II diabetes mellitus with neurological manifestations  E11.49 250.60   2. Dermatophytosis of nail  B35.1 110.1       Plan:   Type II diabetes mellitus with neurological manifestations    Dermatophytosis of nail    I counseled the patient on his conditions, regarding findings of my examination, my impressions, and usual treatment plan.   This visit spent on counseling and coordination of care.  Appointment spent on education about the diabetic foot, neuropathy, and prevention of limb loss.  Shoe inspection. Diabetic Foot Education. Patient reminded of the importance of good nutrition and blood sugar control to help prevent podiatric complications of diabetes. Patient instructed on proper foot hygeine. We discussed wearing proper shoe gear, daily foot inspections, never walking without protective shoe gear, never putting sharp instruments to feet.    With patient's permission, nails 1-5 bilateral were debrided/trimmed in length and thickness aggressively to their soft tissue attachment mechanically and with electric , removing all offending nail and debris. Patient relates relief following the procedure.  Patient  will continue to monitor the areas daily, inspect feet, wear protective shoe gear when ambulatory, moisturizer to maintain skin integrity. Patient reminded of the importance of good nutrition and blood sugar control to help prevent podiatric complications of diabetes.  Patient to return 6 months or sooner if needed.          Padmaja Dave DPM  [facility]

## 2023-09-30 DIAGNOSIS — M10.042 ACUTE IDIOPATHIC GOUT OF LEFT HAND: ICD-10-CM

## 2023-09-30 RX ORDER — ALLOPURINOL 300 MG/1
300 TABLET ORAL
Qty: 90 TABLET | Refills: 0 | Status: SHIPPED | OUTPATIENT
Start: 2023-09-30 | End: 2023-12-28

## 2023-09-30 NOTE — TELEPHONE ENCOUNTER
Care Due:                  Date            Visit Type   Department     Provider  --------------------------------------------------------------------------------                                VIRTUAL      [facility]  Last Visit: 02-      AUDIO ONLY   MEDICINE       Kei Garg                               -                              PRIMARY      [facility]  Next Visit: 01-      CARE ([facility])   MEDICINE       Mohinder Morfin                                                            Last  Test          Frequency    Reason                     Performed    Due Date  --------------------------------------------------------------------------------    HBA1C.......  6 months...  SITagliptin, acarbose,     12- 05-                             glipiZIDE, pioglitazone..    Lipid Panel.  12 months..  atorvastatin.............  12- 11-    Uric Acid...  12 months..  allopurinoL..............  12- 11-    [facility] Embedded Care Due Messages. Reference number: [id number].   9/30/2023 7:38:11 AM CDT

## 2023-09-30 NOTE — TELEPHONE ENCOUNTER
Provider Staff:  Action required for this patient     Please see care gap opportunities below in Care Due Message.    Thanks!  [facility]     Appointments      Date Provider   Last Visit   11/30/2022 Mohinder Morfin MD   Next Visit   1/31/2024 Mohinder Morfin MD     Refill Decision Note   Rudy Saldaña  is requesting a refill authorization.  Brief Assessment and Rationale for Refill:  Approve     Medication Therapy Plan:         Comments:     Note composed:11:24 AM 09/30/2023             Appointments     Last Visit   11/30/2022 Mohinder Morfin MD   Next Visit   1/31/2024 Mohinder Morfin MD

## 2023-11-09 ENCOUNTER — PATIENT MESSAGE (OUTPATIENT)
Dept: FAMILY MEDICINE | Facility: CLINIC | Age: 83
End: 2023-11-09
Payer: MEDICARE

## 2023-11-20 ENCOUNTER — OFFICE VISIT (OUTPATIENT)
Dept: FAMILY MEDICINE | Facility: CLINIC | Age: 83
End: 2023-11-20
Payer: MEDICARE

## 2023-11-20 VITALS
WEIGHT: 217.38 LBS | SYSTOLIC BLOOD PRESSURE: 120 MMHG | BODY MASS INDEX: 30.43 KG/M2 | OXYGEN SATURATION: 98 % | HEIGHT: 71 IN | HEART RATE: 71 BPM | DIASTOLIC BLOOD PRESSURE: 80 MMHG

## 2023-11-20 DIAGNOSIS — R05.9 COUGH, UNSPECIFIED TYPE: ICD-10-CM

## 2023-11-20 DIAGNOSIS — J06.9 UPPER RESPIRATORY TRACT INFECTION, UNSPECIFIED TYPE: Primary | ICD-10-CM

## 2023-11-20 PROCEDURE — 3074F SYST BP LT 130 MM HG: CPT | Mod: CPTII,S$GLB,, | Performed by: STUDENT IN AN ORGANIZED HEALTH CARE EDUCATION/TRAINING PROGRAM

## 2023-11-20 PROCEDURE — 3288F FALL RISK ASSESSMENT DOCD: CPT | Mod: CPTII,S$GLB,, | Performed by: STUDENT IN AN ORGANIZED HEALTH CARE EDUCATION/TRAINING PROGRAM

## 2023-11-20 PROCEDURE — 3288F PR FALLS RISK ASSESSMENT DOCUMENTED: ICD-10-PCS | Mod: CPTII,S$GLB,, | Performed by: STUDENT IN AN ORGANIZED HEALTH CARE EDUCATION/TRAINING PROGRAM

## 2023-11-20 PROCEDURE — 1159F MED LIST DOCD IN RCRD: CPT | Mod: CPTII,S$GLB,, | Performed by: STUDENT IN AN ORGANIZED HEALTH CARE EDUCATION/TRAINING PROGRAM

## 2023-11-20 PROCEDURE — 3079F PR MOST RECENT DIASTOLIC BLOOD PRESSURE 80-89 MM HG: ICD-10-PCS | Mod: CPTII,S$GLB,, | Performed by: STUDENT IN AN ORGANIZED HEALTH CARE EDUCATION/TRAINING PROGRAM

## 2023-11-20 PROCEDURE — 3079F DIAST BP 80-89 MM HG: CPT | Mod: CPTII,S$GLB,, | Performed by: STUDENT IN AN ORGANIZED HEALTH CARE EDUCATION/TRAINING PROGRAM

## 2023-11-20 PROCEDURE — 1101F PT FALLS ASSESS-DOCD LE1/YR: CPT | Mod: CPTII,S$GLB,, | Performed by: STUDENT IN AN ORGANIZED HEALTH CARE EDUCATION/TRAINING PROGRAM

## 2023-11-20 PROCEDURE — 1159F PR MEDICATION LIST DOCUMENTED IN MEDICAL RECORD: ICD-10-PCS | Mod: CPTII,S$GLB,, | Performed by: STUDENT IN AN ORGANIZED HEALTH CARE EDUCATION/TRAINING PROGRAM

## 2023-11-20 PROCEDURE — 99999 PR PBB SHADOW E&M-EST. PATIENT-LVL IV: ICD-10-PCS | Mod: PBBFAC,,, | Performed by: STUDENT IN AN ORGANIZED HEALTH CARE EDUCATION/TRAINING PROGRAM

## 2023-11-20 PROCEDURE — 99213 OFFICE O/P EST LOW 20 MIN: CPT | Mod: S$GLB,,, | Performed by: STUDENT IN AN ORGANIZED HEALTH CARE EDUCATION/TRAINING PROGRAM

## 2023-11-20 PROCEDURE — 3074F PR MOST RECENT SYSTOLIC BLOOD PRESSURE < 130 MM HG: ICD-10-PCS | Mod: CPTII,S$GLB,, | Performed by: STUDENT IN AN ORGANIZED HEALTH CARE EDUCATION/TRAINING PROGRAM

## 2023-11-20 PROCEDURE — 1101F PR PT FALLS ASSESS DOC 0-1 FALLS W/OUT INJ PAST YR: ICD-10-PCS | Mod: CPTII,S$GLB,, | Performed by: STUDENT IN AN ORGANIZED HEALTH CARE EDUCATION/TRAINING PROGRAM

## 2023-11-20 PROCEDURE — 99213 PR OFFICE/OUTPT VISIT, EST, LEVL III, 20-29 MIN: ICD-10-PCS | Mod: S$GLB,,, | Performed by: STUDENT IN AN ORGANIZED HEALTH CARE EDUCATION/TRAINING PROGRAM

## 2023-11-20 PROCEDURE — 99999 PR PBB SHADOW E&M-EST. PATIENT-LVL IV: CPT | Mod: PBBFAC,,, | Performed by: STUDENT IN AN ORGANIZED HEALTH CARE EDUCATION/TRAINING PROGRAM

## 2023-11-20 RX ORDER — FLUTICASONE PROPIONATE 50 MCG
1 SPRAY, SUSPENSION (ML) NASAL DAILY
Qty: 9.9 ML | Refills: 0 | Status: SHIPPED | OUTPATIENT
Start: 2023-11-20 | End: 2024-02-20

## 2023-11-20 RX ORDER — BENZONATATE 100 MG/1
100 CAPSULE ORAL 3 TIMES DAILY PRN
Qty: 30 CAPSULE | Refills: 0 | Status: SHIPPED | OUTPATIENT
Start: 2023-11-20 | End: 2023-11-30

## 2023-11-20 NOTE — PROGRESS NOTES
[facility]  NOTE      Rudy Saldaña is a 83 y.o. year old White male with PMH as below. Pt presented to the clinic for cough which started 1 week ago.  Cough is nonproductive.  Associated with postnasal drip and nasal congestion.  No fever, chills, wheezing, dyspnea, hemoptysis.  No history of obstructive lung disease or smoking    Active Problem List with Overview Notes    Diagnosis Date Noted    Chronic diastolic heart failure 06/19/2023     Patient has previously had an echo in 2019 which showed EF of 60-65% and grade 2 diastolic dysfunction. He reports LE edema. Denies chest pain, shortness of breath, orthopnea or PND. He is on Lasix for diuresis. He was previously established with cardiology, but has not followed up in several years.       Chronic cough 11/30/2022     Cough: Patient complains of nonproductive cough.  Symptoms began a few years ago.  The cough is non-productive, without wheezing, dyspnea or hemoptysis and is aggravated by nothing Associated symptoms include:nothing. Patient does not have new pets. Patient does not have a history of asthma. Patient does have a history of environmental allergens. Patient does not have recent travel. Patient does not have a history of smoking.  He is on an ace inhibitor.          Left-sided chest wall pain 10/26/2022     He fell on a stake in the ground 10 days ago and he has had pain in the left ribs.  No SOB or cough and he has not coughed anything up.  He has not had medicine for this.  No fever.  No sputum.  No blood noted.  No skin break.  He has pain with ROM or deep breaths.        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  Denies bulimia, amenorrhea, cold intolerance, edema, hip pain, hirsutism, knee pain, polydipsia, polyuria, thirst and weakness.  The patient does not perform regular  exercise.  Previous treatments for obesity :self-directed dieting without success.  The patient and I discussed the importance of exercise.    Wt Readings from Last 4 Encounters:   11/30/22 99.3 kg (219 lb 0.4 oz)   10/26/22 96.2 kg (212 lb 3.1 oz)   08/16/22 96.3 kg (212 lb 4.9 oz)   05/03/22 96.3 kg (212 lb 4.8 oz)         Chronic pain of both knees 11/15/2019     He has pain in his knees bilaterally.  His right knee will hurt going up stairs or steps. He is stiff and he states that it does not hurt to walk.  It hurts to go up the stair.  The right is worse than the left. He feels like his gait is not normal and is wabbly.  He walks 2 miles in the gym.  He does not have to stop.      Pain of both elbows 11/15/2019     He has been having pain in the elbows for the last few months.  He has good strength but it hurts to extend or bend the elbows. Once they are bent or extended, he feels fine.  They don't swell.       Unspecified inflammatory spondylopathy, lumbar region 11/15/2019     He has an unspecified inflammatory spondylopathy in the lumbar region.  He does have chronic pain in the region but does not currently require any specific medications for it.      Rib pain on right side 11/15/2019     1 week ago, he leaned over his  and his rib hit into the arm rest and he felt something pop and he had a sharp pain.  He has had continued pain.  No SOB noted.  No cough noted.      Idiopathic chronic gout of multiple sites with tophus 05/08/2019    Crystal arthritis 11/16/2018    Chronic fatigue 11/12/2018    Acute idiopathic gout of left hand 07/02/2018     He has gout and he has been on zyloprim for a long time and he is on colchicine also daiily.  He had a bad gout problem prior to all of this being used.  He has been stable for a long time and the colchicine is expensive now. We will stop it.  Lab Results   Component Value Date    URICACID 4.1 11/09/2021         Facet arthropathy, lumbar 10/25/2017     He  has been diagnosed with facet arthropathy and he has weakness in the right leg but he does not have chronic pain in this.       Right hip pain 10/25/2017    Primary osteoarthritis of right hip 10/25/2017    Osteoarthritis of spine with radiculopathy, lumbar region 10/25/2017    CKD stage 3 secondary to diabetes 02/22/2017     The patient has an Estimated Glumerular Filtration Rate (EGFR) below:   CrCl cannot be calculated (Patient's most recent lab result is older than the maximum 7 days allowed.).    eGFR if non    Date Value Ref Range Status   11/09/2021 >60.0 >60 mL/min/1.73 m^2 Final     Comment:     Calculation used to obtain the estimated glomerular filtration  rate (eGFR) is the CKD-EPI equation.          Lab Results   Component Value Date    CREATININE 1.0 11/09/2021    BUN 22 11/09/2021     11/09/2021    K 3.6 11/09/2021     11/09/2021    CO2 26 11/09/2021     The patient's chronic kidney disease was monitored, evaluated, addressed and/or treated.          Anemia 05/02/2016    Gross hematuria 04/16/2016    Vitamin D deficiency 02/29/2016    Hypothyroidism 02/29/2016     The patient presents with hypothyroidism.  The patient denies agitation, anxiety, blurred vision, chest pain, cold intolerance, constipation, dizziness, dry skin, fatigue, lightheadedness, paresthesias, skin coarsening, tachycardia, tremor, weight gain or weight loss.  The patient's current treatment has included Synthroid with a good response.    Lab Results   Component Value Date    TSH 2.736 11/09/2021           DEVIKA on CPAP 12/07/2015    Polyneuropathy due to secondary diabetes 08/31/2015     The patient has polyneuropathy due to diabetes.  He does not require any current medications for that.      Iron deficiency anemia 07/02/2015    History of colon polyps 06/17/2015     Due again in 2018.      Family history of colonic polyps 07/02/2013     He has a family history of colon polyps and he had a colonoscopy in  7/2013 at which time he had polyps but they were not even precancerous.  Therefore, he should not be a candidate for future colonoscopy screening.      BPH (benign prostatic hypertrophy)      Dx updated per 2019 IMO Load      Combined hyperlipidemia associated with type 2 diabetes mellitus      The patient presents with hyperlipidemia.  The patient reports tolerating the medication well and is in excellent compliance.  There have been no medication side effects.  The patient denies chest pain, neuropathy, and myalgias.  The patient has reduced fat intake and has been exercising.  Current treatment has included the medications listed in the med card.    Lab Results   Component Value Date    CHOL 107 (L) 11/09/2021    CHOL 116 (L) 11/13/2019    CHOL 116 (L) 09/14/2018       Lab Results   Component Value Date    HDL 42 11/09/2021    HDL 45 11/13/2019    HDL 45 09/14/2018       Lab Results   Component Value Date    LDLCALC 51.0 (L) 11/09/2021    LDLCALC 60.8 (L) 11/13/2019    LDLCALC 58.8 (L) 09/14/2018       Lab Results   Component Value Date    TRIG 70 11/09/2021    TRIG 51 11/13/2019    TRIG 61 09/14/2018       Lab Results   Component Value Date    CHOLHDL 39.3 11/09/2021    CHOLHDL 38.8 11/13/2019    CHOLHDL 38.8 09/14/2018     Lab Results   Component Value Date    ALT 12 11/09/2021    AST 16 11/09/2021    ALKPHOS 61 11/09/2021    BILITOT 1.3 (H) 11/09/2021         Hypertension associated with diabetes      The patient presents with essential hypertension. Patient had some adjustments made to his BP medication due to LE edema. His Amlodipine was decreased to 5 mg daily and Losartan was increased to 50 mg daily. The patient is tolerating the medication well and is in excellent compliance. The patient is experiencing no other side effects. Counseling was offered regarding low salt diets. The patient has a reduced salt intake. The patient denies chest pain, palpitations, shortness of breath, dyspnea on exertion, left  or murmur neck pain, nausea, vomiting, diaphoresis, paroxysmal nocturnal dyspnea, and orthopnea.     Hypertension Medications               furosemide (LASIX) 40 MG tablet Take 1 tablet (40 mg total) by mouth once daily.    hydrALAZINE (APRESOLINE) 100 MG tablet Take 1 po q 8 hours    losartan (COZAAR) 50 MG tablet Take 1 tablet (50 mg total) by mouth once daily.    metoprolol succinate (TOPROL-XL) 25 MG 24 hr tablet Take 1 tablet (25 mg total) by mouth once daily.              Type 2 diabetes mellitus, uncontrolled, with renal complications      The patient presents with diabetes.  The patient denies polyuria, polydipsia, polyphagia, hypoglycemia and paresthesias.  The patient's glucose control has been poor recently due to his use of steroids.  Home glucose averages are routinely checked.  The patient is without retinopathy currently.  The patient has no history of neuropathy.  The patient currently complains of no podiatric problems.  The patient has excellent compliance.  Hemoglobin A1C   Date Value Ref Range Status   11/09/2021 6.4 (H) 4.0 - 5.6 % Final     Comment:     ADA Screening Guidelines:  5.7-6.4%  Consistent with prediabetes  >or=6.5%  Consistent with diabetes    High levels of fetal hemoglobin interfere with the HbA1C  assay. Heterozygous hemoglobin variants (HbS, HgC, etc)do  not significantly interfere with this assay.   However, presence of multiple variants may affect accuracy.     03/02/2021 6.1 (H) 4.0 - 5.6 % Final     Comment:     ADA Screening Guidelines:  5.7-6.4%  Consistent with prediabetes  >or=6.5%  Consistent with diabetes    High levels of fetal hemoglobin interfere with the HbA1C  assay. Heterozygous hemoglobin variants (HbS, HgC, etc)do  not significantly interfere with this assay.   However, presence of multiple variants may affect accuracy.     09/11/2020 6.3 (H) 4.0 - 5.6 % Final     Comment:     ADA Screening Guidelines:  5.7-6.4%  Consistent with prediabetes  >or=6.5%   "Consistent with diabetes  High levels of fetal hemoglobin interfere with the HbA1C  assay. Heterozygous hemoglobin variants (HbS, HgC, etc)do  not significantly interfere with this assay.   However, presence of multiple variants may affect accuracy.       No results found for: SHREYASALBIMAL [id number]  Diabetes Management Status    Statin: Taking  ACE/ARB: Taking    Screening or Prevention Patient's value Goal Complete/Controlled?   HgA1C Testing and Control   Lab Results   Component Value Date    HGBA1C 6.4 (H) 11/09/2021      Annually/Less than 8% Yes   Lipid profile : 11/09/2021 Annually No   LDL control Lab Results   Component Value Date    LDLCALC 51.0 (L) 11/09/2021    Annually/Less than 100 mg/dl  No   Nephropathy screening Lab Results   Component Value Date    LABMICR 30.0 10/26/2022     Lab Results   Component Value Date    PROTEINUA Negative 10/03/2019    Annually No   Blood pressure BP Readings from Last 1 Encounters:   11/30/22 (!) 108/51    Less than 140/90 Yes   Dilated retinal exam : 03/30/2021 Annually Yes   Foot exam   Most Recent Foot Exam Date: Not Found Annually Yes           History of stroke          No problems with medications.  ROS:  Negative except above    Vitals:    11/20/23 1444   BP: 120/80   BP Location: Right arm   Patient Position: Sitting   BP Method: Large (Manual)   Pulse: 71   SpO2: 98%   Weight: 98.6 kg (217 lb 6 oz)   Height: 5' 11" (1.803 m)         Body mass index is 30.32 kg/m².   Wt Readings from Last 5 Encounters:   11/20/23 98.6 kg (217 lb 6 oz)   08/29/23 100.8 kg (222 lb 3.6 oz)   06/19/23 100.8 kg (222 lb 4.8 oz)   06/05/23 103.8 kg (228 lb 14.4 oz)   02/21/23 98.9 kg (218 lb 0.6 oz)       Past Medical History:   Diagnosis Date    Anemia     BPH (benign prostatic hypertrophy)     Carotid bruit     CKD (chronic kidney disease) stage 2/3     Dr. Nails    CVA (cerebral vascular accident)     Diabetes mellitus, type 2     Hyperlipidemia     Hypertension     Hypothyroidism " 2/29/2016    Type 2 diabetes mellitus, uncontrolled, with renal complications     The patient presents with diabetes.  The patient denies polyuria, polydipsia, polyphagia, hypoglycemia and paresthesias.  The patient's glucose control has been good.  Home glucose averages are routinely checked.  The patient is without retinopathy currently.  The patient has no history of neuropathy.  The patient currently complains of no podiatric problems.  The patient has excellent compliance.    Type II or unspecified type diabetes mellitus without mention of complication, not stated as uncontrolled     Vitamin D deficiency 2/29/2016       Past Surgical History:   Procedure Laterality Date    COLONOSCOPY  2008    COLONOSCOPY  5/31/2005    repeat in 2015    PILONIDAL CYST DRAINAGE      TRANSURETHRAL RESECTION OF PROSTATE         Family History   Problem Relation Age of Onset    Hypertension Mother     Hearing loss Mother     Hypertension Father     Alcohol abuse Father     Heart attack Maternal Uncle     Brain cancer Sister     Diabetes Neg Hx     Stroke Neg Hx        Social History     Socioeconomic History    Marital status:    Tobacco Use    Smoking status: Never    Smokeless tobacco: Never   Substance and Sexual Activity    Alcohol use: No     Alcohol/week: 0.0 standard drinks of alcohol    Drug use: No       Current Outpatient Medications   Medication Sig Dispense Refill    acarbose (PRECOSE) 100 MG Tab Take 1 tablet (100 mg total) by mouth 3 (three) times daily with meals. 270 tablet 3    allopurinoL (ZYLOPRIM) 300 MG tablet TAKE ONE TABLET BY MOUTH DAILY 90 tablet 0    aspirin (ECOTRIN) 81 MG EC tablet Take 81 mg by mouth once daily.      atorvastatin (LIPITOR) 20 MG tablet Take 1 tablet (20 mg total) by mouth once daily. 90 tablet 11    blood sugar diagnostic Strp 1 each by Misc.(Non-Drug; Combo Route) route 3 (three) times daily as needed. One touch ultra 2 test strips 100 each 5    donepeziL (ARICEPT) 10 MG tablet  Take 1 tablet (10 mg total) by mouth once daily. 90 tablet 3    ENTRESTO 24-26 mg per tablet Take 1 tablet by mouth 2 (two) times daily.      ergocalciferol (ERGOCALCIFEROL) 50,000 unit Cap TAKE 1 CAPSULE BY MOUTH 1 TIME A WEEK 12 capsule 3    FARXIGA 10 mg tablet Take 10 mg by mouth every morning.      ferrous sulfate (FEOSOL) 325 mg (65 mg iron) Tab tablet TAKE ONE TABLET BY MOUTH THREE TIMES DAILY 270 tablet 0    furosemide (LASIX) 40 MG tablet Take 1 tablet (40 mg total) by mouth once daily. 90 tablet 3    glipiZIDE (GLUCOTROL) 10 MG tablet Take 1 tablet (10 mg total) by mouth 2 (two) times daily before meals. 180 tablet 3    Lactobacillus rhamnosus GG (CULTURELLE) 10 billion cell capsule Take 1 capsule by mouth once daily.      lancets (TRUEPLUS LANCETS) 33 gauge Misc 1 lancet by Misc.(Non-Drug; Combo Route) route once daily. 100 each 5    levothyroxine (SYNTHROID) 75 MCG tablet Take 1 tablet (75 mcg total) by mouth once daily. 90 tablet 3    losartan (COZAAR) 50 MG tablet Take 1 tablet (50 mg total) by mouth once daily. 30 tablet 11    metoprolol succinate (TOPROL-XL) 25 MG 24 hr tablet Take 1 tablet (25 mg total) by mouth once daily. 90 tablet 3    niacin (NIASPAN) 1000 MG CR tablet Take 1 po q hs 90 tablet 3    ONETOUCH VERIO IQ METER kit Monitor blood glucose once daily 1 each 0    pioglitazone (ACTOS) 30 MG tablet Take 1 tablet (30 mg total) by mouth once daily. 90 tablet 3    potassium chloride (KLOR-CON) 10 MEQ TbSR Take 1 tablet (10 mEq total) by mouth once daily. 90 tablet 3    SITagliptin phosphate (JANUVIA) 100 MG Tab TAKE ONE TABLET BY MOUTH EVERY DAY 90 tablet 0    benzonatate (TESSALON) 100 MG capsule Take 1 capsule (100 mg total) by mouth 3 (three) times daily as needed for Cough. 30 capsule 0    fluticasone propionate (FLONASE) 50 mcg/actuation nasal spray 1 spray (50 mcg total) by Each Nostril route once daily. 9.9 mL 0    hydrALAZINE (APRESOLINE) 100 MG tablet Take 1 po q 8 hours (Patient not  "taking: Reported on 8/29/2023) 270 tablet 3    meclizine (ANTIVERT) 25 mg tablet Take 1 tablet (25 mg total) by mouth 3 (three) times daily as needed for Dizziness. 30 tablet 2     No current facility-administered medications for this visit.       Review of patient's allergies indicates:   Allergen Reactions    Ace inhibitors      Cough           PHYSICAL EXAM:  General - Well developed, alert and oriented in NAD  HEENT - normocephalic, no evidence of trauma, sclera white, EOMI, nasal congestion present  Neck - full range of motion  COR - regular rate and rhythm without murmurs or gallops  Lungs - Clear  Abdomen - soft, non-tender  Ext - no cyanosis or edema    Lab Results   Component Value Date    WBC 6.91 06/05/2023    HGB 12.7 (L) 06/05/2023    HCT 39.8 (L) 06/05/2023    MCV 97 06/05/2023    MCH 30.9 06/05/2023    MCHC 31.9 (L) 06/05/2023    RDW 14.7 (H) 06/05/2023     06/05/2023    MPV 11.1 06/05/2023       Lab Results   Component Value Date     06/19/2023    K 4.5 06/19/2023     06/19/2023    CO2 24 06/19/2023     (H) 06/19/2023    BUN 27 (H) 06/19/2023    CALCIUM 10.1 06/19/2023    PROT 6.8 06/05/2023    ALBUMIN 3.9 06/05/2023    BILITOT 1.1 (H) 06/05/2023    AST 19 06/05/2023    ALKPHOS 65 06/05/2023    ALT 15 06/05/2023       Lab Results   Component Value Date    TRIG 66 12/01/2022    CHOL 111 (L) 12/01/2022    HDL 46 12/01/2022    LDLCALC 51.8 (L) 12/01/2022    CHOLHDL 41.4 12/01/2022           Lab Results   Component Value Date    HGBA1C 7.0 (H) 12/01/2022       No results found for: "MICROALBUR", "[id number]"          Impression:  1. Upper respiratory tract infection, unspecified type  benzonatate (TESSALON) 100 MG capsule    fluticasone propionate (FLONASE) 50 mcg/actuation nasal spray      2. Cough, unspecified type             Plan: 1. Upper respiratory tract infection, unspecified type    -     benzonatate (TESSALON) 100 MG capsule; Take 1 capsule (100 mg total) by mouth 3 " (three) times daily as needed for Cough.  Dispense: 30 capsule; Refill: 0  -     fluticasone propionate (FLONASE) 50 mcg/actuation nasal spray; 1 spray (50 mcg total) by Each Nostril route once daily.  Dispense: 9.9 mL; Refill: 0    Patient most likely has upper respiratory infection which is caused by a virus, explained to patient that most viral infection Will resolve uneventfully, and virus they do not respond to antibiotics.  If however the symptoms persist beyond 10 days and or If they get worse or she develops sinus pain on one side of the head, and green discharge, fever or trouble breathing that would be an indication for the use of antibiotics and the patient need to contact us at that time should that happen.  At this time it's okay to treat the symptoms.  Patient understood and acknowledged.    Upper respiratory tract infection, unspecified type  -     benzonatate (TESSALON) 100 MG capsule; Take 1 capsule (100 mg total) by mouth 3 (three) times daily as needed for Cough.  Dispense: 30 capsule; Refill: 0  -     fluticasone propionate (FLONASE) 50 mcg/actuation nasal spray; 1 spray (50 mcg total) by Each Nostril route once daily.  Dispense: 9.9 mL; Refill: 0    Cough, unspecified type        2. Cough, unspecified type            No orders of the defined types were placed in this encounter.          No follow-ups on file.         This note is generated with speech recognition software and is subject to transcription error and sound alike phrases that may be missed by proofreading      Cayla Harrison MD

## 2023-11-21 ENCOUNTER — TELEPHONE (OUTPATIENT)
Dept: FAMILY MEDICINE | Facility: CLINIC | Age: 83
End: 2023-11-21
Payer: MEDICARE

## 2023-11-21 NOTE — TELEPHONE ENCOUNTER
----- Message from Morena Rodgers sent at 11/21/2023 12:31 PM CST -----  Contact: [phone]  Calling requesting medication for pink eye for pt . Please call back at [phone] . [facility] [address]  [id number] [facility] [address]  Phone: [phone] Fax: [phone]

## 2023-11-22 ENCOUNTER — TELEPHONE (OUTPATIENT)
Dept: FAMILY MEDICINE | Facility: CLINIC | Age: 83
End: 2023-11-22
Payer: MEDICARE

## 2023-11-22 NOTE — TELEPHONE ENCOUNTER
----- Message from Keisha Dennison sent at 11/22/2023 10:54 AM CST -----  Regarding: New RX  Contact: Shania  Type:  RX Refill Request    Who Called: Shania   Refill or New Rx: new   RX Name and Strength:  How is the patient currently taking it? (ex. 1XDay):  Is this a 30 day or 90 day RX:    Preferred Pharmacy with phone number:   [facility] -   [id number] [address]  Phone: [phone] Fax: [phone]       Local or Mail Order: local   Ordering Provider: CHANDA Morfin   Would the patient rather a call back or a response via My [facility]? [facility] Call Back Number: [phone] (home)  or daughter at [phone]   Additional Information:  the patient has pink eye and need a medication called into the pharmacy.

## 2023-12-06 DIAGNOSIS — E55.9 VITAMIN D DEFICIENCY: ICD-10-CM

## 2023-12-06 RX ORDER — ERGOCALCIFEROL 1.25 MG/1
CAPSULE ORAL
Qty: 12 CAPSULE | Refills: 3 | Status: SHIPPED | OUTPATIENT
Start: 2023-12-06 | End: 2024-02-20 | Stop reason: SDUPTHER

## 2023-12-06 NOTE — TELEPHONE ENCOUNTER
Refill Routing Note   Medication(s) are not appropriate for processing by [facility] for the following reason(s):        Outside of protocol    [facility] action(s):  Route               Appointments  past 12m or future 3m with PCP    Date Provider   Last Visit   11/30/2022 Mohinder Morfin MD   Next Visit   1/31/2024 Mohinder Morfin MD   ED visits in past 90 days: 0        Note composed:8:45 AM 12/06/2023

## 2023-12-13 DIAGNOSIS — D50.8 IRON DEFICIENCY ANEMIA SECONDARY TO INADEQUATE DIETARY IRON INTAKE: ICD-10-CM

## 2023-12-13 DIAGNOSIS — E11.22 CONTROLLED TYPE 2 DIABETES MELLITUS WITH STAGE 3 CHRONIC KIDNEY DISEASE, WITHOUT LONG-TERM CURRENT USE OF INSULIN: ICD-10-CM

## 2023-12-13 DIAGNOSIS — N18.30 CONTROLLED TYPE 2 DIABETES MELLITUS WITH STAGE 3 CHRONIC KIDNEY DISEASE, WITHOUT LONG-TERM CURRENT USE OF INSULIN: ICD-10-CM

## 2023-12-13 RX ORDER — ACARBOSE 100 MG/1
100 TABLET ORAL 3 TIMES DAILY
Qty: 270 TABLET | Refills: 0 | Status: SHIPPED | OUTPATIENT
Start: 2023-12-13 | End: 2024-02-20 | Stop reason: SDUPTHER

## 2023-12-13 RX ORDER — FERROUS SULFATE 325(65) MG
TABLET ORAL
Qty: 270 TABLET | Refills: 0 | Status: SHIPPED | OUTPATIENT
Start: 2023-12-13 | End: 2024-02-20 | Stop reason: SDUPTHER

## 2023-12-13 NOTE — TELEPHONE ENCOUNTER
Refill Routing Note   Medication(s) are not appropriate for processing by [facility] for the following reason(s):        Outside of protocol  Required labs outdated    [facility] action(s):  Route  Defer     Requires labs : Yes      Medication Therapy Plan: FEOSOL(OP)      Appointments  past 12m or future 3m with PCP    Date Provider   Last Visit   11/30/2022 Mohinder Morfin MD   Next Visit   1/31/2024 Mohinder Morfin MD   ED visits in past 90 days: 0        Note composed:1:06 PM 12/13/2023

## 2023-12-13 NOTE — TELEPHONE ENCOUNTER
Care Due:                  Date            Visit Type   Department     Provider  --------------------------------------------------------------------------------                                VIRTUAL      [facility]  Last Visit: 02-      AUDIO ONLY   MEDICINE       Kei Garg                               -                              PRIMARY      [facility]  Next Visit: 01-      CARE ([facility])   MEDICINE       Mohinder Morfin                                                            Last  Test          Frequency    Reason                     Performed    Due Date  --------------------------------------------------------------------------------    HBA1C.......  6 months...  SITagliptin, acarbose,     12- 05-                             glipiZIDE, pioglitazone..    Lipid Panel.  12 months..  atorvastatin.............  12- 11-    Uric Acid...  12 months..  allopurinoL..............  12- 11-    [facility] Embedded Care Due Messages. Reference number: [id number].   12/13/2023 12:43:30 PM CST

## 2023-12-19 ENCOUNTER — OFFICE VISIT (OUTPATIENT)
Dept: FAMILY MEDICINE | Facility: CLINIC | Age: 83
End: 2023-12-19
Payer: MEDICARE

## 2023-12-19 ENCOUNTER — TELEPHONE (OUTPATIENT)
Dept: FAMILY MEDICINE | Facility: CLINIC | Age: 83
End: 2023-12-19
Payer: MEDICARE

## 2023-12-19 ENCOUNTER — HOSPITAL ENCOUNTER (OUTPATIENT)
Dept: RADIOLOGY | Facility: HOSPITAL | Age: 83
Discharge: HOME | End: 2023-12-19
Attending: STUDENT IN AN ORGANIZED HEALTH CARE EDUCATION/TRAINING PROGRAM
Payer: MEDICARE

## 2023-12-19 VITALS
HEART RATE: 54 BPM | HEIGHT: 72 IN | DIASTOLIC BLOOD PRESSURE: 68 MMHG | OXYGEN SATURATION: 99 % | WEIGHT: 215.25 LBS | BODY MASS INDEX: 29.16 KG/M2 | SYSTOLIC BLOOD PRESSURE: 146 MMHG | TEMPERATURE: 97 F

## 2023-12-19 DIAGNOSIS — R07.81 RIB PAIN: ICD-10-CM

## 2023-12-19 DIAGNOSIS — R07.81 RIB PAIN: Primary | ICD-10-CM

## 2023-12-19 PROCEDURE — 1159F MED LIST DOCD IN RCRD: CPT | Mod: CPTII,S$GLB,, | Performed by: STUDENT IN AN ORGANIZED HEALTH CARE EDUCATION/TRAINING PROGRAM

## 2023-12-19 PROCEDURE — 3077F PR MOST RECENT SYSTOLIC BLOOD PRESSURE >= 140 MM HG: ICD-10-PCS | Mod: CPTII,S$GLB,, | Performed by: STUDENT IN AN ORGANIZED HEALTH CARE EDUCATION/TRAINING PROGRAM

## 2023-12-19 PROCEDURE — 99999 PR PBB SHADOW E&M-EST. PATIENT-LVL V: CPT | Mod: PBBFAC,,, | Performed by: STUDENT IN AN ORGANIZED HEALTH CARE EDUCATION/TRAINING PROGRAM

## 2023-12-19 PROCEDURE — 3077F SYST BP >= 140 MM HG: CPT | Mod: CPTII,S$GLB,, | Performed by: STUDENT IN AN ORGANIZED HEALTH CARE EDUCATION/TRAINING PROGRAM

## 2023-12-19 PROCEDURE — 71100 X-RAY EXAM RIBS UNI 2 VIEWS: CPT | Mod: TC,PO,LT

## 2023-12-19 PROCEDURE — 3288F FALL RISK ASSESSMENT DOCD: CPT | Mod: CPTII,S$GLB,, | Performed by: STUDENT IN AN ORGANIZED HEALTH CARE EDUCATION/TRAINING PROGRAM

## 2023-12-19 PROCEDURE — 3078F DIAST BP <80 MM HG: CPT | Mod: CPTII,S$GLB,, | Performed by: STUDENT IN AN ORGANIZED HEALTH CARE EDUCATION/TRAINING PROGRAM

## 2023-12-19 PROCEDURE — 3078F PR MOST RECENT DIASTOLIC BLOOD PRESSURE < 80 MM HG: ICD-10-PCS | Mod: CPTII,S$GLB,, | Performed by: STUDENT IN AN ORGANIZED HEALTH CARE EDUCATION/TRAINING PROGRAM

## 2023-12-19 PROCEDURE — 1125F PR PAIN SEVERITY QUANTIFIED, PAIN PRESENT: ICD-10-PCS | Mod: CPTII,S$GLB,, | Performed by: STUDENT IN AN ORGANIZED HEALTH CARE EDUCATION/TRAINING PROGRAM

## 2023-12-19 PROCEDURE — 1100F PTFALLS ASSESS-DOCD GE2>/YR: CPT | Mod: CPTII,S$GLB,, | Performed by: STUDENT IN AN ORGANIZED HEALTH CARE EDUCATION/TRAINING PROGRAM

## 2023-12-19 PROCEDURE — 1100F PR PT FALLS ASSESS DOC 2+ FALLS/FALL W/INJURY/YR: ICD-10-PCS | Mod: CPTII,S$GLB,, | Performed by: STUDENT IN AN ORGANIZED HEALTH CARE EDUCATION/TRAINING PROGRAM

## 2023-12-19 PROCEDURE — 99213 PR OFFICE/OUTPT VISIT, EST, LEVL III, 20-29 MIN: ICD-10-PCS | Mod: S$GLB,,, | Performed by: STUDENT IN AN ORGANIZED HEALTH CARE EDUCATION/TRAINING PROGRAM

## 2023-12-19 PROCEDURE — 71100 X-RAY EXAM RIBS UNI 2 VIEWS: CPT | Mod: 26,LT,, | Performed by: RADIOLOGY

## 2023-12-19 PROCEDURE — 99999 PR PBB SHADOW E&M-EST. PATIENT-LVL V: ICD-10-PCS | Mod: PBBFAC,,, | Performed by: STUDENT IN AN ORGANIZED HEALTH CARE EDUCATION/TRAINING PROGRAM

## 2023-12-19 PROCEDURE — 1159F PR MEDICATION LIST DOCUMENTED IN MEDICAL RECORD: ICD-10-PCS | Mod: CPTII,S$GLB,, | Performed by: STUDENT IN AN ORGANIZED HEALTH CARE EDUCATION/TRAINING PROGRAM

## 2023-12-19 PROCEDURE — 3288F PR FALLS RISK ASSESSMENT DOCUMENTED: ICD-10-PCS | Mod: CPTII,S$GLB,, | Performed by: STUDENT IN AN ORGANIZED HEALTH CARE EDUCATION/TRAINING PROGRAM

## 2023-12-19 PROCEDURE — 99213 OFFICE O/P EST LOW 20 MIN: CPT | Mod: S$GLB,,, | Performed by: STUDENT IN AN ORGANIZED HEALTH CARE EDUCATION/TRAINING PROGRAM

## 2023-12-19 PROCEDURE — 71100 XR RIBS 2 VIEW LEFT: ICD-10-PCS | Mod: 26,LT,, | Performed by: RADIOLOGY

## 2023-12-19 PROCEDURE — 1125F AMNT PAIN NOTED PAIN PRSNT: CPT | Mod: CPTII,S$GLB,, | Performed by: STUDENT IN AN ORGANIZED HEALTH CARE EDUCATION/TRAINING PROGRAM

## 2023-12-19 NOTE — PROGRESS NOTES
Same day clinic    Rudy Saldaña is a 83 y.o. 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 Pt presented to the clinic for after a fall yesterday night.  His daughter mentioned that the building a new house and he tripped at night on the concrete and hurt his left ribs, left elbow, right leg.  He is having minimal pain in these areas except chest when he takes a deep breath or coughs.  Denies any trauma to head or loss of consciousness.  No history of recurrent falls      ROS:  Negative except above    Vitals:    12/19/23 1018   BP: (!) 146/68   BP Location: Right arm   Patient Position: Sitting   BP Method: Large (Manual)   Pulse: (!) 54   Temp: 96.8 °F (36 °C)   TempSrc: Tympanic   SpO2: 99%   Weight: 97.7 kg (215 lb 4.5 oz)   Height: 6' (1.829 m)       Body mass index is 29.2 kg/m².     PE:  General - Well developed, alert and oriented in NAD  HEENT - normocephalic, no evidence of trauma, sclera white, EOMI  Neck - full range of motion  COR - regular rate and rhythm without murmurs or gallops  Lungs - Clear  Abdomen - soft, non-tender  Ext - no cyanosis or edema, abrasion present on right tibia, left elbow with minimal tenderness to palpation.  Tenderness to palpation present on left rib.  No crepitus heard    Lab Results   Component Value Date    WBC 6.91 06/05/2023    HGB 12.7 (L) 06/05/2023    HCT 39.8 (L) 06/05/2023    MCV 97 06/05/2023    MCH 30.9 06/05/2023    MCHC 31.9 (L) 06/05/2023    RDW 14.7 (H) 06/05/2023     06/05/2023    MPV 11.1 06/05/2023       Lab Results   Component Value Date    NA  "143 06/19/2023    K 4.5 06/19/2023     06/19/2023    CO2 24 06/19/2023     (H) 06/19/2023    BUN 27 (H) 06/19/2023    CALCIUM 10.1 06/19/2023    PROT 6.8 06/05/2023    ALBUMIN 3.9 06/05/2023    BILITOT 1.1 (H) 06/05/2023    AST 19 06/05/2023    ALKPHOS 65 06/05/2023    ALT 15 06/05/2023       Lab Results   Component Value Date    TRIG 66 12/01/2022    CHOL 111 (L) 12/01/2022    HDL 46 12/01/2022    LDLCALC 51.8 (L) 12/01/2022    CHOLHDL 41.4 12/01/2022           Lab Results   Component Value Date    HGBA1C 7.0 (H) 12/01/2022       No results found for: "MICROALBUR", "[id number]"      Impression:  1. Rib pain  X-Ray Ribs 2 View Left           Plan:  X-ray of the ribs done to rule out fracture.  Take Tylenol extra-strength for pain    Orders Placed This Encounter   Procedures    X-Ray Ribs 2 View Left       No follow-ups on file.     Cayla Harrison MD           "

## 2023-12-19 NOTE — TELEPHONE ENCOUNTER
----- Message from Benny Chester sent at 12/19/2023  7:04 AM CST -----  Contact: patient daughter  Type:  Same Day Appointment Request    Caller is requesting a same day appointment.  Caller declined first available appointment listed below.    Name of Caller:Rodo   When is the first available appointment? 12/20/23   Symptoms: she would like for the pt to have a check up,due to him having a fall  Best Call Back Number: [phone]  Additional Information: she is ok with him seeing any provider at the location.

## 2023-12-26 DIAGNOSIS — N18.30 CONTROLLED TYPE 2 DIABETES MELLITUS WITH STAGE 3 CHRONIC KIDNEY DISEASE, WITHOUT LONG-TERM CURRENT USE OF INSULIN: ICD-10-CM

## 2023-12-26 DIAGNOSIS — E11.69 COMBINED HYPERLIPIDEMIA ASSOCIATED WITH TYPE 2 DIABETES MELLITUS: ICD-10-CM

## 2023-12-26 DIAGNOSIS — I15.2 HYPERTENSION ASSOCIATED WITH DIABETES: ICD-10-CM

## 2023-12-26 DIAGNOSIS — E11.22 CONTROLLED TYPE 2 DIABETES MELLITUS WITH STAGE 3 CHRONIC KIDNEY DISEASE, WITHOUT LONG-TERM CURRENT USE OF INSULIN: ICD-10-CM

## 2023-12-26 DIAGNOSIS — E11.59 HYPERTENSION ASSOCIATED WITH DIABETES: ICD-10-CM

## 2023-12-26 DIAGNOSIS — E78.2 COMBINED HYPERLIPIDEMIA ASSOCIATED WITH TYPE 2 DIABETES MELLITUS: ICD-10-CM

## 2023-12-26 RX ORDER — PIOGLITAZONEHYDROCHLORIDE 30 MG/1
30 TABLET ORAL
Qty: 90 TABLET | Refills: 0 | Status: SHIPPED | OUTPATIENT
Start: 2023-12-26 | End: 2024-02-20 | Stop reason: SDUPTHER

## 2023-12-26 RX ORDER — ATORVASTATIN CALCIUM 20 MG/1
20 TABLET, FILM COATED ORAL
Qty: 90 TABLET | Refills: 0 | Status: SHIPPED | OUTPATIENT
Start: 2023-12-26 | End: 2024-02-20 | Stop reason: SDUPTHER

## 2023-12-26 RX ORDER — DONEPEZIL HYDROCHLORIDE 10 MG/1
10 TABLET, FILM COATED ORAL
Qty: 90 TABLET | Refills: 0 | Status: SHIPPED | OUTPATIENT
Start: 2023-12-26 | End: 2024-02-20 | Stop reason: SDUPTHER

## 2023-12-26 RX ORDER — FUROSEMIDE 40 MG/1
40 TABLET ORAL
Qty: 90 TABLET | Refills: 0 | Status: SHIPPED | OUTPATIENT
Start: 2023-12-26 | End: 2024-02-20 | Stop reason: SDUPTHER

## 2023-12-26 RX ORDER — METOPROLOL SUCCINATE 25 MG/1
25 TABLET, EXTENDED RELEASE ORAL
Qty: 90 TABLET | Refills: 0 | Status: SHIPPED | OUTPATIENT
Start: 2023-12-26 | End: 2024-02-20

## 2023-12-26 RX ORDER — GLIPIZIDE 10 MG/1
10 TABLET ORAL
Qty: 180 TABLET | Refills: 0 | Status: SHIPPED | OUTPATIENT
Start: 2023-12-26 | End: 2024-02-20 | Stop reason: SDUPTHER

## 2023-12-26 NOTE — TELEPHONE ENCOUNTER
Refill Routing Note   Medication(s) are not appropriate for processing by [facility] for the following reason(s):        Required labs outdated  Required vitals abnormal  Outside of protocol  Drug-disease interaction    [facility] action(s):  Defer  Route        Medication Therapy Plan: Patient does  have a history of Heart Failure- Actos      Appointments  past 12m or future 3m with PCP    Date Provider   Last Visit   11/30/2022 Mohinder Morfin MD   Next Visit   1/31/2024 Mohinder Morfin MD   ED visits in past 90 days: 0        Note composed:11:37 AM 12/26/2023

## 2023-12-26 NOTE — TELEPHONE ENCOUNTER
No care due was identified.  Health [facility] Embedded Care Due Messages. Reference number: [id number].   12/26/2023 7:03:43 AM CST

## 2023-12-27 DIAGNOSIS — M10.042 ACUTE IDIOPATHIC GOUT OF LEFT HAND: ICD-10-CM

## 2023-12-27 NOTE — TELEPHONE ENCOUNTER
No care due was identified.  Health [facility] Embedded Care Due Messages. Reference number: [id number].   12/27/2023 7:11:42 AM CST

## 2023-12-27 NOTE — TELEPHONE ENCOUNTER
Refill Routing Note   Medication(s) are not appropriate for processing by [facility] for the following reason(s):        Required labs outdated    [facility] action(s):  Defer               Appointments  past 12m or future 3m with PCP    Date Provider   Last Visit   11/30/2022 Mohinder Morfin MD   Next Visit   1/31/2024 Mohinder Morfin MD   ED visits in past 90 days: 0        Note composed:7:15 AM 12/27/2023

## 2023-12-28 RX ORDER — ALLOPURINOL 300 MG/1
300 TABLET ORAL
Qty: 90 TABLET | Refills: 0 | Status: SHIPPED | OUTPATIENT
Start: 2023-12-28 | End: 2024-02-20 | Stop reason: SDUPTHER

## 2024-02-15 DIAGNOSIS — E11.22 CONTROLLED TYPE 2 DIABETES MELLITUS WITH STAGE 3 CHRONIC KIDNEY DISEASE, WITHOUT LONG-TERM CURRENT USE OF INSULIN: ICD-10-CM

## 2024-02-15 DIAGNOSIS — N18.30 CONTROLLED TYPE 2 DIABETES MELLITUS WITH STAGE 3 CHRONIC KIDNEY DISEASE, WITHOUT LONG-TERM CURRENT USE OF INSULIN: ICD-10-CM

## 2024-02-15 NOTE — TELEPHONE ENCOUNTER
Care Due:                  Date            Visit Type   Department     Provider  --------------------------------------------------------------------------------                                VIRTUAL      [facility] FAMILY  Last Visit: 02-      AUDIO ONLY   MEDICINE       Kei Garg  Next Visit: None Scheduled  None         None Found                                                            Last  Test          Frequency    Reason                     Performed    Due Date  --------------------------------------------------------------------------------    HBA1C.......  6 months...  SITagliptin, acarbose,     12- 05-                             glipiZIDE, pioglitazone..    Lipid Panel.  12 months..  atorvastatin.............  12- 11-    Health [facility] Embedded Care Due Messages. Reference number: [id number].   2/15/2024 8:56:00 AM CST

## 2024-02-16 NOTE — TELEPHONE ENCOUNTER
The patient requested a medicine refill which I have done x 1.  However, the following health maintenance is due.  Please arrange for the patient to have this updated.  Health Maintenance Due   Topic Date Due    RSV Vaccine (Age 60+ and Pregnant patients) (1 - 1-dose 60+ series) Never done    Shingles Vaccine (3 of 3) 01/07/2022    Hemoglobin A1c  06/01/2023    COVID-19 Vaccine (5 - 2023-24 season) 09/01/2023    Diabetes Urine Screening  12/01/2023    Lipid Panel  12/01/2023

## 2024-02-16 NOTE — TELEPHONE ENCOUNTER
Refill Routing Note   Medication(s) are not appropriate for processing by [facility] for the following reason(s):        Required labs outdated: A1C    [facility] action(s):  Defer     Requires labs : Yes (Lipid panel outdated)            Appointments  past 12m or future 3m with PCP    Date Provider   Last Visit   11/30/2022 Mohinder Morfin MD   Next Visit   Visit date not found Mohinder Morfin MD   ED visits in past 90 days: 0        Note composed:10:32 PM 02/15/2024

## 2024-02-20 ENCOUNTER — OFFICE VISIT (OUTPATIENT)
Dept: FAMILY MEDICINE | Facility: CLINIC | Age: 84
End: 2024-02-20
Payer: MEDICARE

## 2024-02-20 ENCOUNTER — LAB VISIT (OUTPATIENT)
Dept: LAB | Facility: HOSPITAL | Age: 84
End: 2024-02-20
Attending: FAMILY MEDICINE
Payer: MEDICARE

## 2024-02-20 VITALS
BODY MASS INDEX: 29.17 KG/M2 | WEIGHT: 215.38 LBS | TEMPERATURE: 98 F | RESPIRATION RATE: 18 BRPM | SYSTOLIC BLOOD PRESSURE: 162 MMHG | HEART RATE: 68 BPM | OXYGEN SATURATION: 97 % | DIASTOLIC BLOOD PRESSURE: 63 MMHG | HEIGHT: 72 IN

## 2024-02-20 DIAGNOSIS — E11.59 HYPERTENSION ASSOCIATED WITH DIABETES: ICD-10-CM

## 2024-02-20 DIAGNOSIS — E11.69 COMBINED HYPERLIPIDEMIA ASSOCIATED WITH TYPE 2 DIABETES MELLITUS: ICD-10-CM

## 2024-02-20 DIAGNOSIS — I15.2 HYPERTENSION ASSOCIATED WITH DIABETES: ICD-10-CM

## 2024-02-20 DIAGNOSIS — E78.2 COMBINED HYPERLIPIDEMIA ASSOCIATED WITH TYPE 2 DIABETES MELLITUS: ICD-10-CM

## 2024-02-20 DIAGNOSIS — E03.9 HYPOTHYROIDISM, UNSPECIFIED TYPE: ICD-10-CM

## 2024-02-20 DIAGNOSIS — Z00.00 ENCOUNTER FOR ANNUAL HEALTH EXAMINATION: ICD-10-CM

## 2024-02-20 DIAGNOSIS — E55.9 VITAMIN D DEFICIENCY: ICD-10-CM

## 2024-02-20 DIAGNOSIS — M1A.09X1 IDIOPATHIC CHRONIC GOUT OF MULTIPLE SITES WITH TOPHUS: ICD-10-CM

## 2024-02-20 DIAGNOSIS — N18.30 CONTROLLED TYPE 2 DIABETES MELLITUS WITH STAGE 3 CHRONIC KIDNEY DISEASE, WITHOUT LONG-TERM CURRENT USE OF INSULIN: ICD-10-CM

## 2024-02-20 DIAGNOSIS — D50.8 IRON DEFICIENCY ANEMIA SECONDARY TO INADEQUATE DIETARY IRON INTAKE: ICD-10-CM

## 2024-02-20 DIAGNOSIS — E11.22 CONTROLLED TYPE 2 DIABETES MELLITUS WITH STAGE 3 CHRONIC KIDNEY DISEASE, WITHOUT LONG-TERM CURRENT USE OF INSULIN: ICD-10-CM

## 2024-02-20 DIAGNOSIS — E11.22 TYPE 2 DIABETES MELLITUS WITH STAGE 3 CHRONIC KIDNEY DISEASE, WITHOUT LONG-TERM CURRENT USE OF INSULIN, UNSPECIFIED WHETHER STAGE 3A OR 3B CKD: ICD-10-CM

## 2024-02-20 DIAGNOSIS — N18.30 TYPE 2 DIABETES MELLITUS WITH STAGE 3 CHRONIC KIDNEY DISEASE, WITHOUT LONG-TERM CURRENT USE OF INSULIN, UNSPECIFIED WHETHER STAGE 3A OR 3B CKD: ICD-10-CM

## 2024-02-20 DIAGNOSIS — I50.32 CHRONIC DIASTOLIC HEART FAILURE: ICD-10-CM

## 2024-02-20 DIAGNOSIS — Z00.00 ENCOUNTER FOR ANNUAL HEALTH EXAMINATION: Primary | ICD-10-CM

## 2024-02-20 LAB
25(OH)D3+25(OH)D2 SERPL-MCNC: 31 NG/ML (ref 30–96)
ALBUMIN SERPL BCP-MCNC: 4.1 G/DL (ref 3.5–5.2)
ALBUMIN/CREAT UR: 46.2 UG/MG (ref 0–30)
ALP SERPL-CCNC: 82 U/L (ref 55–135)
ALT SERPL W/O P-5'-P-CCNC: 13 U/L (ref 10–44)
ANION GAP SERPL CALC-SCNC: 10 MMOL/L (ref 8–16)
AST SERPL-CCNC: 16 U/L (ref 10–40)
BASOPHILS # BLD AUTO: 0.02 K/UL (ref 0–0.2)
BASOPHILS NFR BLD: 0.3 % (ref 0–1.9)
BILIRUB SERPL-MCNC: 1.5 MG/DL (ref 0.1–1)
BUN SERPL-MCNC: 19 MG/DL (ref 8–23)
CALCIUM SERPL-MCNC: 9.9 MG/DL (ref 8.7–10.5)
CHLORIDE SERPL-SCNC: 109 MMOL/L (ref 95–110)
CHOLEST SERPL-MCNC: 134 MG/DL (ref 120–199)
CHOLEST/HDLC SERPL: 2.8 {RATIO} (ref 2–5)
CO2 SERPL-SCNC: 24 MMOL/L (ref 23–29)
CREAT SERPL-MCNC: 1.2 MG/DL (ref 0.5–1.4)
CREAT UR-MCNC: 93 MG/DL (ref 23–375)
DIFFERENTIAL METHOD BLD: ABNORMAL
EOSINOPHIL # BLD AUTO: 0.2 K/UL (ref 0–0.5)
EOSINOPHIL NFR BLD: 2.6 % (ref 0–8)
ERYTHROCYTE [DISTWIDTH] IN BLOOD BY AUTOMATED COUNT: 14.7 % (ref 11.5–14.5)
EST. GFR  (NO RACE VARIABLE): 59.3 ML/MIN/1.73 M^2
ESTIMATED AVG GLUCOSE: 148 MG/DL (ref 68–131)
FERRITIN SERPL-MCNC: 817 NG/ML (ref 20–300)
GLUCOSE SERPL-MCNC: 135 MG/DL (ref 70–110)
HBA1C MFR BLD: 6.8 % (ref 4–5.6)
HCT VFR BLD AUTO: 49.2 % (ref 40–54)
HDLC SERPL-MCNC: 48 MG/DL (ref 40–75)
HDLC SERPL: 35.8 % (ref 20–50)
HGB BLD-MCNC: 16.1 G/DL (ref 14–18)
IMM GRANULOCYTES # BLD AUTO: 0.01 K/UL (ref 0–0.04)
IMM GRANULOCYTES NFR BLD AUTO: 0.2 % (ref 0–0.5)
IRON SERPL-MCNC: 67 UG/DL (ref 45–160)
LDLC SERPL CALC-MCNC: 73.4 MG/DL (ref 63–159)
LYMPHOCYTES # BLD AUTO: 1.4 K/UL (ref 1–4.8)
LYMPHOCYTES NFR BLD: 23.2 % (ref 18–48)
MCH RBC QN AUTO: 31.3 PG (ref 27–31)
MCHC RBC AUTO-ENTMCNC: 32.7 G/DL (ref 32–36)
MCV RBC AUTO: 96 FL (ref 82–98)
MICROALBUMIN UR DL<=1MG/L-MCNC: 43 UG/ML
MONOCYTES # BLD AUTO: 0.5 K/UL (ref 0.3–1)
MONOCYTES NFR BLD: 7.9 % (ref 4–15)
NEUTROPHILS # BLD AUTO: 4.1 K/UL (ref 1.8–7.7)
NEUTROPHILS NFR BLD: 65.8 % (ref 38–73)
NONHDLC SERPL-MCNC: 86 MG/DL
NRBC BLD-RTO: 0 /100 WBC
PLATELET # BLD AUTO: 157 K/UL (ref 150–450)
PMV BLD AUTO: 12 FL (ref 9.2–12.9)
POTASSIUM SERPL-SCNC: 4.1 MMOL/L (ref 3.5–5.1)
PROT SERPL-MCNC: 7.8 G/DL (ref 6–8.4)
RBC # BLD AUTO: 5.15 M/UL (ref 4.6–6.2)
SATURATED IRON: 21 % (ref 20–50)
SODIUM SERPL-SCNC: 143 MMOL/L (ref 136–145)
TOTAL IRON BINDING CAPACITY: 320 UG/DL (ref 250–450)
TRANSFERRIN SERPL-MCNC: 216 MG/DL (ref 200–375)
TRIGL SERPL-MCNC: 63 MG/DL (ref 30–150)
TSH SERPL DL<=0.005 MIU/L-ACNC: 2.25 UIU/ML (ref 0.4–4)
URATE SERPL-MCNC: 3.3 MG/DL (ref 3.4–7)
WBC # BLD AUTO: 6.2 K/UL (ref 3.9–12.7)

## 2024-02-20 PROCEDURE — 80053 COMPREHEN METABOLIC PANEL: CPT | Performed by: NURSE PRACTITIONER

## 2024-02-20 PROCEDURE — 83540 ASSAY OF IRON: CPT | Performed by: NURSE PRACTITIONER

## 2024-02-20 PROCEDURE — 82728 ASSAY OF FERRITIN: CPT | Performed by: NURSE PRACTITIONER

## 2024-02-20 PROCEDURE — 36415 COLL VENOUS BLD VENIPUNCTURE: CPT | Mod: PO | Performed by: NURSE PRACTITIONER

## 2024-02-20 PROCEDURE — 84550 ASSAY OF BLOOD/URIC ACID: CPT | Performed by: NURSE PRACTITIONER

## 2024-02-20 PROCEDURE — 99214 OFFICE O/P EST MOD 30 MIN: CPT | Mod: S$GLB,,, | Performed by: NURSE PRACTITIONER

## 2024-02-20 PROCEDURE — 99999 PR PBB SHADOW E&M-EST. PATIENT-LVL IV: CPT | Mod: PBBFAC,,, | Performed by: NURSE PRACTITIONER

## 2024-02-20 PROCEDURE — 80061 LIPID PANEL: CPT | Performed by: NURSE PRACTITIONER

## 2024-02-20 PROCEDURE — 84443 ASSAY THYROID STIM HORMONE: CPT | Performed by: NURSE PRACTITIONER

## 2024-02-20 PROCEDURE — 82306 VITAMIN D 25 HYDROXY: CPT | Performed by: NURSE PRACTITIONER

## 2024-02-20 PROCEDURE — 83036 HEMOGLOBIN GLYCOSYLATED A1C: CPT | Performed by: FAMILY MEDICINE

## 2024-02-20 PROCEDURE — 82043 UR ALBUMIN QUANTITATIVE: CPT | Performed by: NURSE PRACTITIONER

## 2024-02-20 PROCEDURE — 85025 COMPLETE CBC W/AUTO DIFF WBC: CPT | Performed by: NURSE PRACTITIONER

## 2024-02-20 RX ORDER — ALLOPURINOL 300 MG/1
300 TABLET ORAL DAILY
Qty: 90 TABLET | Refills: 3 | Status: SHIPPED | OUTPATIENT
Start: 2024-02-20

## 2024-02-20 RX ORDER — NIACIN 1000 MG/1
TABLET, EXTENDED RELEASE ORAL
Qty: 90 TABLET | Refills: 3 | Status: SHIPPED | OUTPATIENT
Start: 2024-02-20

## 2024-02-20 RX ORDER — ACARBOSE 100 MG/1
100 TABLET ORAL 3 TIMES DAILY
Qty: 270 TABLET | Refills: 3 | Status: SHIPPED | OUTPATIENT
Start: 2024-02-20

## 2024-02-20 RX ORDER — DONEPEZIL HYDROCHLORIDE 10 MG/1
10 TABLET, FILM COATED ORAL DAILY
Qty: 90 TABLET | Refills: 3 | Status: SHIPPED | OUTPATIENT
Start: 2024-02-20

## 2024-02-20 RX ORDER — ATORVASTATIN CALCIUM 20 MG/1
20 TABLET, FILM COATED ORAL DAILY
Qty: 90 TABLET | Refills: 3 | Status: SHIPPED | OUTPATIENT
Start: 2024-02-20

## 2024-02-20 RX ORDER — FUROSEMIDE 40 MG/1
40 TABLET ORAL DAILY
Qty: 90 TABLET | Refills: 3 | Status: SHIPPED | OUTPATIENT
Start: 2024-02-20

## 2024-02-20 RX ORDER — BENZONATATE 200 MG/1
200 CAPSULE ORAL 3 TIMES DAILY PRN
Qty: 30 CAPSULE | Refills: 2 | Status: SHIPPED | OUTPATIENT
Start: 2024-02-20 | End: 2024-03-21

## 2024-02-20 RX ORDER — FERROUS SULFATE 325(65) MG
325 TABLET ORAL 3 TIMES DAILY
Qty: 270 TABLET | Refills: 3 | Status: SHIPPED | OUTPATIENT
Start: 2024-02-20

## 2024-02-20 RX ORDER — LEVOTHYROXINE SODIUM 75 UG/1
75 TABLET ORAL DAILY
Qty: 90 TABLET | Refills: 3 | Status: SHIPPED | OUTPATIENT
Start: 2024-02-20

## 2024-02-20 RX ORDER — ERGOCALCIFEROL 1.25 MG/1
CAPSULE ORAL
Qty: 12 CAPSULE | Refills: 3 | Status: SHIPPED | OUTPATIENT
Start: 2024-02-20

## 2024-02-20 RX ORDER — PIOGLITAZONEHYDROCHLORIDE 30 MG/1
30 TABLET ORAL DAILY
Qty: 90 TABLET | Refills: 3 | Status: SHIPPED | OUTPATIENT
Start: 2024-02-20

## 2024-02-20 RX ORDER — GLIPIZIDE 10 MG/1
10 TABLET ORAL
Qty: 180 TABLET | Refills: 3 | Status: SHIPPED | OUTPATIENT
Start: 2024-02-20

## 2024-02-20 NOTE — PROGRESS NOTES
Subjective:       Patient ID: Rudy Saldaña is a 83 y.o. male.    Chief Complaint: Annual Exam    HPI    He comes in for routine annual wellness exam with fasting labs and medication refills     Problem List Items Addressed This Visit          Cardiac/Vascular    Combined hyperlipidemia associated with type 2 diabetes mellitus    Overview     The patient presents with hyperlipidemia.  The patient reports tolerating the medication well and is in excellent compliance.  There have been no medication side effects.  The patient denies chest pain, neuropathy, and myalgias.  The patient has reduced fat intake and has been exercising.  Current treatment has included the medications listed in the med card.    Lab Results   Component Value Date    CHOL 111 (L) 12/01/2022    CHOL 107 (L) 11/09/2021    CHOL 116 (L) 11/13/2019       Lab Results   Component Value Date    HDL 46 12/01/2022    HDL 42 11/09/2021    HDL 45 11/13/2019       Lab Results   Component Value Date    LDLCALC 51.8 (L) 12/01/2022    LDLCALC 51.0 (L) 11/09/2021    LDLCALC 60.8 (L) 11/13/2019       Lab Results   Component Value Date    TRIG 66 12/01/2022    TRIG 70 11/09/2021    TRIG 51 11/13/2019       Lab Results   Component Value Date    CHOLHDL 41.4 12/01/2022    CHOLHDL 39.3 11/09/2021    CHOLHDL 38.8 11/13/2019     Lab Results   Component Value Date    ALT 15 06/05/2023    AST 19 06/05/2023    ALKPHOS 65 06/05/2023    BILITOT 1.1 (H) 06/05/2023            Relevant Medications    niacin (NIASPAN) 1000 MG CR tablet    SITagliptin phosphate (JANUVIA) 100 MG Tab    glipiZIDE (GLUCOTROL) 10 MG tablet    acarbose (PRECOSE) 100 MG Tab    atorvastatin (LIPITOR) 20 MG tablet    pioglitazone (ACTOS) 30 MG tablet    Other Relevant Orders    Lipid Panel    Hypertension associated with diabetes    Overview     The patient presents with essential hypertension. Patient had some adjustments made to his BP medication due to LE edema. His Amlodipine was decreased to 5 mg  daily and Losartan was increased to 50 mg daily. The patient is tolerating the medication well and is in excellent compliance. The patient is experiencing no other side effects. Counseling was offered regarding low salt diets. The patient has a reduced salt intake. The patient denies chest pain, palpitations, shortness of breath, dyspnea on exertion, left or murmur neck pain, nausea, vomiting, diaphoresis, paroxysmal nocturnal dyspnea, and orthopnea.     Hypertension Medications               furosemide (LASIX) 40 MG tablet Take 1 tablet (40 mg total) by mouth once daily.    hydrALAZINE (APRESOLINE) 100 MG tablet Take 1 po q 8 hours    losartan (COZAAR) 50 MG tablet Take 1 tablet (50 mg total) by mouth once daily.    metoprolol succinate (TOPROL-XL) 25 MG 24 hr tablet Take 1 tablet (25 mg total) by mouth once daily.                 Relevant Medications    SITagliptin phosphate (JANUVIA) 100 MG Tab    glipiZIDE (GLUCOTROL) 10 MG tablet    acarbose (PRECOSE) 100 MG Tab    pioglitazone (ACTOS) 30 MG tablet    furosemide (LASIX) 40 MG tablet    Other Relevant Orders    Lipid Panel    Comprehensive Metabolic Panel    Chronic diastolic heart failure    Overview     Patient has previously had an echo in 2019 which showed EF of 60-65% and grade 2 diastolic dysfunction. He reports LE edema. Denies chest pain, shortness of breath, orthopnea or PND. He is on Lasix for diuresis. He was previously established with cardiology, but has not followed up in several years.             Oncology    Iron deficiency anemia    Overview     -history of iron deficiency  -treatment with ferrous sulfate    Lab Results   Component Value Date    IRON 51 11/09/2021    TRANSFERRIN 171 (L) 11/09/2021    TIBC 253 11/09/2021    FESATURATED 20 11/09/2021      Lab Results   Component Value Date    FERRITIN 350 (H) 11/09/2015     Lab Results   Component Value Date    WBC 6.91 06/05/2023    HGB 12.7 (L) 06/05/2023    HCT 39.8 (L) 06/05/2023    MCV 97  "06/05/2023     06/05/2023              Relevant Medications    ferrous sulfate (FEOSOL) 325 mg (65 mg iron) Tab tablet       Endocrine    Type 2 diabetes mellitus with kidney complication, without long-term current use of insulin    Overview     The patient presents with diabetes.  The patient denies polyuria, polydipsia, polyphagia, hypoglycemia and paresthesias.  The patient's glucose control has been poor recently due to his use of steroids.  Home glucose averages are routinely checked.  The patient is without retinopathy currently.  The patient has no history of neuropathy.  The patient currently complains of no podiatric problems.  The patient has excellent compliance.  Hemoglobin A1C   Date Value Ref Range Status   12/01/2022 7.0 (H) 4.0 - 5.6 % Final     Comment:     ADA Screening Guidelines:  5.7-6.4%  Consistent with prediabetes  >or=6.5%  Consistent with diabetes    High levels of fetal hemoglobin interfere with the HbA1C  assay. Heterozygous hemoglobin variants (HbS, HgC, etc)do  not significantly interfere with this assay.   However, presence of multiple variants may affect accuracy.     11/09/2021 6.4 (H) 4.0 - 5.6 % Final     Comment:     ADA Screening Guidelines:  5.7-6.4%  Consistent with prediabetes  >or=6.5%  Consistent with diabetes    High levels of fetal hemoglobin interfere with the HbA1C  assay. Heterozygous hemoglobin variants (HbS, HgC, etc)do  not significantly interfere with this assay.   However, presence of multiple variants may affect accuracy.     03/02/2021 6.1 (H) 4.0 - 5.6 % Final     Comment:     ADA Screening Guidelines:  5.7-6.4%  Consistent with prediabetes  >or=6.5%  Consistent with diabetes    High levels of fetal hemoglobin interfere with the HbA1C  assay. Heterozygous hemoglobin variants (HbS, HgC, etc)do  not significantly interfere with this assay.   However, presence of multiple variants may affect accuracy.     No results found for: "MICROALBUR", " ""[id number]"  Diabetes Management Status    Statin: Taking  ACE/ARB: Taking    Screening or Prevention Patient's value Goal Complete/Controlled?   HgA1C Testing and Control   Lab Results   Component Value Date    HGBA1C 7.0 (H) 12/01/2022      Annually/Less than 8% Yes   Lipid profile : 12/01/2022 Annually No   LDL control Lab Results   Component Value Date    LDLCALC 51.8 (L) 12/01/2022    Annually/Less than 100 mg/dl  No   Nephropathy screening Lab Results   Component Value Date    LABMICR 83.0 12/01/2022     Lab Results   Component Value Date    PROTEINUA Negative 10/03/2019    Annually No   Blood pressure BP Readings from Last 1 Encounters:   02/20/24 (!) 162/63    Less than 140/90 Yes   Dilated retinal exam : 09/06/2023 Annually Yes   Foot exam   : 08/29/2023 Annually Yes            Relevant Medications    SITagliptin phosphate (JANUVIA) 100 MG Tab    glipiZIDE (GLUCOTROL) 10 MG tablet    acarbose (PRECOSE) 100 MG Tab    pioglitazone (ACTOS) 30 MG tablet    Other Relevant Orders    CBC Auto Differential    Comprehensive Metabolic Panel    Microalbumin/Creatinine Ratio, Urine    Vitamin D deficiency    Overview     -history of Vit D deficiency  -treatment with supplements           Relevant Medications    ergocalciferol (VITAMIN D2) 50,000 unit Cap    Other Relevant Orders    Vitamin D    Iron and TIBC    Ferritin    Hypothyroidism    Overview     The patient presents with hypothyroidism.  The patient denies agitation, anxiety, blurred vision, chest pain, cold intolerance, constipation, dizziness, dry skin, fatigue, lightheadedness, paresthesias, skin coarsening, tachycardia, tremor, weight gain or weight loss.  The patient's current treatment has included Synthroid with a good response.    Lab Results   Component Value Date    TSH 1.600 02/08/2023              Relevant Medications    levothyroxine (SYNTHROID) 75 MCG tablet    Other Relevant Orders    TSH    Comprehensive Metabolic Panel       Orthopedic    " Idiopathic chronic gout of multiple sites with tophus    Overview     -history of chronic gout  -symptoms have been stable with no flares  -treatment with Allopurinol    Lab Results   Component Value Date    URICACID 4.0 12/01/2022   Past treatment included colchicine but was stopped due to cost         Relevant Medications    allopurinoL (ZYLOPRIM) 300 MG tablet    Other Relevant Orders    Uric Acid    Vitamin D    Iron and TIBC    Ferritin     Other Visit Diagnoses       Encounter for annual health examination    -  Primary    Relevant Orders    TSH    Lipid Panel    CBC Auto Differential    Comprehensive Metabolic Panel    Uric Acid    Vitamin D    Iron and TIBC    Ferritin    Microalbumin/Creatinine Ratio, Urine             Review of Systems   Constitutional:  Negative for fatigue, fever and unexpected weight change.   HENT:  Negative for ear pain and sore throat.    Eyes: Negative.  Negative for pain and visual disturbance.   Respiratory:  Negative for cough and shortness of breath.    Cardiovascular:  Negative for chest pain and palpitations.   Gastrointestinal:  Negative for abdominal pain, diarrhea, nausea and vomiting.   Genitourinary:  Negative for dysuria and frequency.   Musculoskeletal:  Negative for arthralgias and myalgias.   Skin:  Negative for color change and rash.   Neurological:  Negative for dizziness and headaches.   Psychiatric/Behavioral:  Negative for sleep disturbance. The patient is not nervous/anxious.        Vitals:    02/20/24 0925   BP: (!) 162/63   Pulse: 68   Resp: 18   Temp: 97.8 °F (36.6 °C)       Objective:     Current Outpatient Medications   Medication Sig Dispense Refill    aspirin (ECOTRIN) 81 MG EC tablet Take 81 mg by mouth once daily.      blood sugar diagnostic Strp 1 each by Misc.(Non-Drug; Combo Route) route 3 (three) times daily as needed. One touch ultra 2 test strips 100 each 5    ENTRESTO 24-26 mg per tablet Take 1 tablet by mouth 2 (two) times daily.      FARXIGA 10  mg tablet Take 10 mg by mouth every morning.      Lactobacillus rhamnosus GG (CULTURELLE) 10 billion cell capsule Take 1 capsule by mouth once daily.      lancets (TRUEPLUS LANCETS) 33 gauge Misc 1 lancet by Misc.(Non-Drug; Combo Route) route once daily. 100 each 5    ONETOUCH VERIO IQ METER kit Monitor blood glucose once daily 1 each 0    potassium chloride (KLOR-CON) 10 MEQ TbSR TAKE ONE TABLET BY MOUTH EVERY DAY 90 tablet 0    acarbose (PRECOSE) 100 MG Tab Take 1 tablet (100 mg total) by mouth 3 (three) times daily. 270 tablet 3    allopurinoL (ZYLOPRIM) 300 MG tablet Take 1 tablet (300 mg total) by mouth once daily. 90 tablet 3    atorvastatin (LIPITOR) 20 MG tablet Take 1 tablet (20 mg total) by mouth once daily. 90 tablet 3    donepeziL (ARICEPT) 10 MG tablet Take 1 tablet (10 mg total) by mouth once daily. 90 tablet 3    ergocalciferol (VITAMIN D2) 50,000 unit Cap TAKE ONE CAPSULE BY MOUTH EVERY WEEK 12 capsule 3    ferrous sulfate (FEOSOL) 325 mg (65 mg iron) Tab tablet Take 1 tablet (325 mg total) by mouth 3 (three) times daily. 270 tablet 3    furosemide (LASIX) 40 MG tablet Take 1 tablet (40 mg total) by mouth once daily. 90 tablet 3    glipiZIDE (GLUCOTROL) 10 MG tablet Take 1 tablet (10 mg total) by mouth 2 (two) times daily before meals. 180 tablet 3    levothyroxine (SYNTHROID) 75 MCG tablet Take 1 tablet (75 mcg total) by mouth once daily. 90 tablet 3    meclizine (ANTIVERT) 25 mg tablet Take 1 tablet (25 mg total) by mouth 3 (three) times daily as needed for Dizziness. 30 tablet 2    niacin (NIASPAN) 1000 MG CR tablet Take 1 po q hs 90 tablet 3    pioglitazone (ACTOS) 30 MG tablet Take 1 tablet (30 mg total) by mouth once daily. 90 tablet 3    SITagliptin phosphate (JANUVIA) 100 MG Tab TAKE ONE TABLET BY MOUTH EVERY DAY 90 tablet 3     No current facility-administered medications for this visit.       Physical Exam  Vitals and nursing note reviewed.   Constitutional:       General: He is not in  acute distress.     Appearance: He is well-developed.   HENT:      Head: Normocephalic and atraumatic.   Eyes:      Pupils: Pupils are equal, round, and reactive to light.   Neck:      Vascular: No carotid bruit.   Cardiovascular:      Rate and Rhythm: Normal rate and regular rhythm.   Pulmonary:      Effort: Pulmonary effort is normal.      Breath sounds: Normal breath sounds.   Musculoskeletal:         General: Normal range of motion.      Cervical back: Normal range of motion and neck supple.   Skin:     General: Skin is warm and dry.      Findings: No rash.   Neurological:      Mental Status: He is alert and oriented to person, place, and time.   Psychiatric:         Thought Content: Thought content normal.         Assessment:       1. Encounter for annual health examination    2. Type 2 diabetes mellitus with stage 3 chronic kidney disease, without long-term current use of insulin, unspecified whether stage 3a or 3b CKD    3. Hypertension associated with diabetes    4. Combined hyperlipidemia associated with type 2 diabetes mellitus    5. Hypothyroidism, unspecified type    6. Vitamin D deficiency    7. Idiopathic chronic gout of multiple sites with tophus    8. Iron deficiency anemia secondary to inadequate dietary iron intake    9. Chronic diastolic heart failure        Plan:   Encounter for annual health examination  -     TSH; Future; Expected date: 02/20/2024  -     Lipid Panel; Future; Expected date: 02/20/2024  -     CBC Auto Differential; Future; Expected date: 02/20/2024  -     Comprehensive Metabolic Panel; Future; Expected date: 02/20/2024  -     Uric Acid; Future; Expected date: 02/20/2024  -     Vitamin D; Future; Expected date: 02/20/2024  -     Iron and TIBC; Future; Expected date: 02/20/2024  -     Ferritin; Future; Expected date: 02/20/2024  -     Microalbumin/Creatinine Ratio, Urine    Type 2 diabetes mellitus with stage 3 chronic kidney disease, without long-term current use of insulin,  unspecified whether stage 3a or 3b CKD  -     CBC Auto Differential; Future; Expected date: 02/20/2024  -     Comprehensive Metabolic Panel; Future; Expected date: 02/20/2024  -     Microalbumin/Creatinine Ratio, Urine  -     SITagliptin phosphate (JANUVIA) 100 MG Tab; TAKE ONE TABLET BY MOUTH EVERY DAY  Dispense: 90 tablet; Refill: 3  -     glipiZIDE (GLUCOTROL) 10 MG tablet; Take 1 tablet (10 mg total) by mouth 2 (two) times daily before meals.  Dispense: 180 tablet; Refill: 3  -     acarbose (PRECOSE) 100 MG Tab; Take 1 tablet (100 mg total) by mouth 3 (three) times daily.  Dispense: 270 tablet; Refill: 3  -     pioglitazone (ACTOS) 30 MG tablet; Take 1 tablet (30 mg total) by mouth once daily.  Dispense: 90 tablet; Refill: 3    Hypertension associated with diabetes  -     Lipid Panel; Future; Expected date: 02/20/2024  -     Comprehensive Metabolic Panel; Future; Expected date: 02/20/2024  -     furosemide (LASIX) 40 MG tablet; Take 1 tablet (40 mg total) by mouth once daily.  Dispense: 90 tablet; Refill: 3    Combined hyperlipidemia associated with type 2 diabetes mellitus  -     Lipid Panel; Future; Expected date: 02/20/2024  -     niacin (NIASPAN) 1000 MG CR tablet; Take 1 po q hs  Dispense: 90 tablet; Refill: 3  -     atorvastatin (LIPITOR) 20 MG tablet; Take 1 tablet (20 mg total) by mouth once daily.  Dispense: 90 tablet; Refill: 3    Hypothyroidism, unspecified type  -     TSH; Future; Expected date: 02/20/2024  -     Comprehensive Metabolic Panel; Future; Expected date: 02/20/2024  -     levothyroxine (SYNTHROID) 75 MCG tablet; Take 1 tablet (75 mcg total) by mouth once daily.  Dispense: 90 tablet; Refill: 3    Vitamin D deficiency  -     Vitamin D; Future; Expected date: 02/20/2024  -     Iron and TIBC; Future; Expected date: 02/20/2024  -     Ferritin; Future; Expected date: 02/20/2024  -     ergocalciferol (VITAMIN D2) 50,000 unit Cap; TAKE ONE CAPSULE BY MOUTH EVERY WEEK  Dispense: 12 capsule;  Refill: 3    Idiopathic chronic gout of multiple sites with tophus  -     Uric Acid; Future; Expected date: 02/20/2024  -     Vitamin D; Future; Expected date: 02/20/2024  -     Iron and TIBC; Future; Expected date: 02/20/2024  -     Ferritin; Future; Expected date: 02/20/2024  -     allopurinoL (ZYLOPRIM) 300 MG tablet; Take 1 tablet (300 mg total) by mouth once daily.  Dispense: 90 tablet; Refill: 3    Iron deficiency anemia secondary to inadequate dietary iron intake  -     ferrous sulfate (FEOSOL) 325 mg (65 mg iron) Tab tablet; Take 1 tablet (325 mg total) by mouth 3 (three) times daily.  Dispense: 270 tablet; Refill: 3    Chronic diastolic heart failure  Continue Entresto    Other orders  -     donepeziL (ARICEPT) 10 MG tablet; Take 1 tablet (10 mg total) by mouth once daily.  Dispense: 90 tablet; Refill: 3    Schedule follow up with Dr Conte, cardiology, to reevaluate blood pressure and make med changes.       No follow-ups on file.    There are no Patient Instructions on file for this visit.

## 2024-03-05 ENCOUNTER — OFFICE VISIT (OUTPATIENT)
Dept: PODIATRY | Facility: CLINIC | Age: 84
End: 2024-03-05
Payer: MEDICARE

## 2024-03-05 VITALS — HEIGHT: 72 IN | BODY MASS INDEX: 29.17 KG/M2 | WEIGHT: 215.38 LBS

## 2024-03-05 DIAGNOSIS — M20.41 HAMMER TOES OF BOTH FEET: ICD-10-CM

## 2024-03-05 DIAGNOSIS — E11.49 TYPE II DIABETES MELLITUS WITH NEUROLOGICAL MANIFESTATIONS: ICD-10-CM

## 2024-03-05 DIAGNOSIS — L84 CORN OR CALLUS: ICD-10-CM

## 2024-03-05 DIAGNOSIS — E11.9 ENCOUNTER FOR COMPREHENSIVE DIABETIC FOOT EXAMINATION, TYPE 2 DIABETES MELLITUS: Primary | ICD-10-CM

## 2024-03-05 DIAGNOSIS — B35.1 DERMATOPHYTOSIS OF NAIL: ICD-10-CM

## 2024-03-05 DIAGNOSIS — M20.42 HAMMER TOES OF BOTH FEET: ICD-10-CM

## 2024-03-05 PROCEDURE — 99999 PR PBB SHADOW E&M-EST. PATIENT-LVL III: CPT | Mod: PBBFAC,,, | Performed by: PODIATRIST

## 2024-03-05 PROCEDURE — 11721 DEBRIDE NAIL 6 OR MORE: CPT | Mod: Q9,S$GLB,, | Performed by: PODIATRIST

## 2024-03-05 PROCEDURE — 99213 OFFICE O/P EST LOW 20 MIN: CPT | Mod: 25,S$GLB,, | Performed by: PODIATRIST

## 2024-03-05 NOTE — PROGRESS NOTES
Subjective:     Patient ID: Rudy Saldaña is a 83 y.o. male.    Chief Complaint: Nail Care (Nail care (diabetic pt, wearing tennis shoes and socks, no complaints.) last seen pcp Dr. Morfin/Mechelle Spain NP 2/20/24.)    Rudy is a 83 y.o. male who presents to the clinic for evaluation and treatment of high risk feet. Rudy has a past medical history of Anemia, BPH (benign prostatic hypertrophy), Carotid bruit, CKD (chronic kidney disease) stage 2/3, CVA (cerebral vascular accident), Diabetes mellitus, type 2, Hyperlipidemia, Hypertension, Hypothyroidism (2/29/2016), Type 2 diabetes mellitus, uncontrolled, with renal complications, Type II or unspecified type diabetes mellitus without mention of complication, not stated as uncontrolled, and Vitamin D deficiency (2/29/2016). The patient's chief complaint is long, thick toenails. This patient has documented high risk feet requiring routine maintenance secondary to diabetes mellitis and those secondary complications of diabetes, as mentioned..    PCP: Mohinder Morfin MD    Date Last Seen by PCP: 02/20/2024    Current shoe gear:  Affected Foot: Tennis shoes     Unaffected Foot: Tennis shoes    Hemoglobin A1C   Date Value Ref Range Status   02/20/2024 6.8 (H) 4.0 - 5.6 % Final     Comment:     ADA Screening Guidelines:  5.7-6.4%  Consistent with prediabetes  >or=6.5%  Consistent with diabetes    High levels of fetal hemoglobin interfere with the HbA1C  assay. Heterozygous hemoglobin variants (HbS, HgC, etc)do  not significantly interfere with this assay.   However, presence of multiple variants may affect accuracy.     12/01/2022 7.0 (H) 4.0 - 5.6 % Final     Comment:     ADA Screening Guidelines:  5.7-6.4%  Consistent with prediabetes  >or=6.5%  Consistent with diabetes    High levels of fetal hemoglobin interfere with the HbA1C  assay. Heterozygous hemoglobin variants (HbS, HgC, etc)do  not significantly interfere with this assay.   However, presence of  multiple variants may affect accuracy.     11/09/2021 6.4 (H) 4.0 - 5.6 % Final     Comment:     ADA Screening Guidelines:  5.7-6.4%  Consistent with prediabetes  >or=6.5%  Consistent with diabetes    High levels of fetal hemoglobin interfere with the HbA1C  assay. Heterozygous hemoglobin variants (HbS, HgC, etc)do  not significantly interfere with this assay.   However, presence of multiple variants may affect accuracy.         Patient Active Problem List   Diagnosis    Combined hyperlipidemia associated with type 2 diabetes mellitus    Hypertension associated with diabetes    Type 2 diabetes mellitus with kidney complication, without long-term current use of insulin    History of stroke    BPH (benign prostatic hypertrophy)    History of colon polyps    Iron deficiency anemia    Polyneuropathy due to secondary diabetes    DEVIKA on CPAP    Family history of colonic polyps    Vitamin D deficiency    Hypothyroidism    Anemia    Gross hematuria    CKD stage 3 secondary to diabetes    Facet arthropathy, lumbar    Right hip pain    Primary osteoarthritis of right hip    Osteoarthritis of spine with radiculopathy, lumbar region    Acute idiopathic gout of left hand    Chronic fatigue    Crystal arthritis    Idiopathic chronic gout of multiple sites with tophus    Chronic pain of both knees    Pain of both elbows    Unspecified inflammatory spondylopathy, lumbar region    Rib pain on right side    Obesity (BMI 30-39.9)    Primary osteoarthritis of left knee    Chronic pain of left knee    Crystal arthropathy    Left-sided chest wall pain    Chronic cough    Chronic diastolic heart failure       Medication List with Changes/Refills   Current Medications    ACARBOSE (PRECOSE) 100 MG TAB    Take 1 tablet (100 mg total) by mouth 3 (three) times daily.    ALLOPURINOL (ZYLOPRIM) 300 MG TABLET    Take 1 tablet (300 mg total) by mouth once daily.    ASPIRIN (ECOTRIN) 81 MG EC TABLET    Take 81 mg by mouth once daily.     ATORVASTATIN (LIPITOR) 20 MG TABLET    Take 1 tablet (20 mg total) by mouth once daily.    BENZONATATE (TESSALON) 200 MG CAPSULE    Take 1 capsule (200 mg total) by mouth 3 (three) times daily as needed for Cough.    BLOOD SUGAR DIAGNOSTIC STRP    1 each by Misc.(Non-Drug; Combo Route) route 3 (three) times daily as needed. One touch ultra 2 test strips    DONEPEZIL (ARICEPT) 10 MG TABLET    Take 1 tablet (10 mg total) by mouth once daily.    ENTRESTO 24-26 MG PER TABLET    Take 1 tablet by mouth 2 (two) times daily.    ERGOCALCIFEROL (VITAMIN D2) 50,000 UNIT CAP    TAKE ONE CAPSULE BY MOUTH EVERY WEEK    FARXIGA 10 MG TABLET    Take 10 mg by mouth every morning.    FERROUS SULFATE (FEOSOL) 325 MG (65 MG IRON) TAB TABLET    Take 1 tablet (325 mg total) by mouth 3 (three) times daily.    FUROSEMIDE (LASIX) 40 MG TABLET    Take 1 tablet (40 mg total) by mouth once daily.    GLIPIZIDE (GLUCOTROL) 10 MG TABLET    Take 1 tablet (10 mg total) by mouth 2 (two) times daily before meals.    LACTOBACILLUS RHAMNOSUS GG (CULTURELLE) 10 BILLION CELL CAPSULE    Take 1 capsule by mouth once daily.    LANCETS (TRUEPLUS LANCETS) 33 GAUGE MISC    1 lancet by Misc.(Non-Drug; Combo Route) route once daily.    LEVOTHYROXINE (SYNTHROID) 75 MCG TABLET    Take 1 tablet (75 mcg total) by mouth once daily.    MECLIZINE (ANTIVERT) 25 MG TABLET    Take 1 tablet (25 mg total) by mouth 3 (three) times daily as needed for Dizziness.    NIACIN (NIASPAN) 1000 MG CR TABLET    Take 1 po q hs    ONETOUCH VERIO IQ METER KIT    Monitor blood glucose once daily    PIOGLITAZONE (ACTOS) 30 MG TABLET    Take 1 tablet (30 mg total) by mouth once daily.    POTASSIUM CHLORIDE (KLOR-CON) 10 MEQ TBSR    TAKE ONE TABLET BY MOUTH EVERY DAY    SITAGLIPTIN PHOSPHATE (JANUVIA) 100 MG TAB    TAKE ONE TABLET BY MOUTH EVERY DAY       Review of patient's allergies indicates:   Allergen Reactions    Ace inhibitors      Cough         Past Surgical History:   Procedure  Laterality Date    COLONOSCOPY  2008    COLONOSCOPY  5/31/2005    repeat in 2015    PILONIDAL CYST DRAINAGE      TRANSURETHRAL RESECTION OF PROSTATE         Family History   Problem Relation Age of Onset    Hypertension Mother     Hearing loss Mother     Hypertension Father     Alcohol abuse Father     Heart attack Maternal Uncle     Brain cancer Sister     Diabetes Neg Hx     Stroke Neg Hx        Social History     Socioeconomic History    Marital status:    Tobacco Use    Smoking status: Never    Smokeless tobacco: Never   Substance and Sexual Activity    Alcohol use: No     Alcohol/week: 0.0 standard drinks of alcohol    Drug use: No       Vitals:    03/05/24 0933   Weight: 97.7 kg (215 lb 6.2 oz)   Height: 6' (1.829 m)         Hemoglobin A1C   Date Value Ref Range Status   02/20/2024 6.8 (H) 4.0 - 5.6 % Final     Comment:     ADA Screening Guidelines:  5.7-6.4%  Consistent with prediabetes  >or=6.5%  Consistent with diabetes    High levels of fetal hemoglobin interfere with the HbA1C  assay. Heterozygous hemoglobin variants (HbS, HgC, etc)do  not significantly interfere with this assay.   However, presence of multiple variants may affect accuracy.     12/01/2022 7.0 (H) 4.0 - 5.6 % Final     Comment:     ADA Screening Guidelines:  5.7-6.4%  Consistent with prediabetes  >or=6.5%  Consistent with diabetes    High levels of fetal hemoglobin interfere with the HbA1C  assay. Heterozygous hemoglobin variants (HbS, HgC, etc)do  not significantly interfere with this assay.   However, presence of multiple variants may affect accuracy.     11/09/2021 6.4 (H) 4.0 - 5.6 % Final     Comment:     ADA Screening Guidelines:  5.7-6.4%  Consistent with prediabetes  >or=6.5%  Consistent with diabetes    High levels of fetal hemoglobin interfere with the HbA1C  assay. Heterozygous hemoglobin variants (HbS, HgC, etc)do  not significantly interfere with this assay.   However, presence of multiple variants may affect  accuracy.         Review of Systems   Constitutional:  Negative for chills and fever.   Respiratory:  Negative for shortness of breath.    Cardiovascular:  Negative for chest pain, palpitations, orthopnea, claudication and leg swelling.   Gastrointestinal:  Negative for diarrhea, nausea and vomiting.   Musculoskeletal:  Negative for joint pain.   Skin:  Negative for rash.   Neurological:  Positive for tingling and sensory change.   Psychiatric/Behavioral: Negative.           Objective:      PHYSICAL EXAM: Apperance: Alert and orient in no distress,well developed, and with good attention to grooming and body habits  Patient presents ambulating in tennis shoes.   LOWER EXTREMITY EXAM:  VASCULAR: Dorsalis pedis pulses 1/4 bilateral and Posterior Tibial pulses 2/4 bilateral. Capillary fill time <4 seconds bilateral. No edema observed bilateral. Varicosities present bilateral. Skin temperature of the lower extremities is warm to warm, proximal to distal. Hair growth WNL bilateral.  DERMATOLOGICAL: No skin rashes, subcutaneous nodules, lesions, or ulcers observed bilateral. Nails 1,2,3,4,5 right and 2,3,4,5 left thickened. Left hallux nail absent. Webspaces 1-4 clean, dry and without evidence of break in skin integrity bilateral. Minimal hyperkeratotic tissue noted to lateral 5th toe.   NEUROLOGICAL: Light touch, sharp-dull, proprioception all present and equal bilaterally.  Vibratory sensation absent at bilateral hallux and diminished at bilateral navicular . Protective sensation absent at 4/10 sites as tested with a [place] 5.07 monofilament.   MUSCULOSKELETAL: Muscle strength is 5/5 for foot inverters, everters, plantarflexors, and dorsiflexors. Muscle tone is normal. No pain on palpation left lateral midfoot. Minimal bony prominence noted to bilateral lateral midfoot at cuboid-metatarsal joint. Flexible adductovarus hammertoes noted bilateral 5th.         Assessment:       ICD-10-CM ICD-9-CM   1. Encounter  for comprehensive diabetic foot examination, type 2 diabetes mellitus  E11.9 250.00   2. Type II diabetes mellitus with neurological manifestations  E11.49 250.60   3. Dermatophytosis of nail  B35.1 110.1   4. Hammer toes of both feet  M20.41 735.4    M20.42    5. Corn or callus  L84 700         Plan:   Encounter for comprehensive diabetic foot examination, type 2 diabetes mellitus    Type II diabetes mellitus with neurological manifestations  -     DIABETIC SHOES FOR HOME USE    Dermatophytosis of nail  -     DIABETIC SHOES FOR HOME USE    Hammer toes of both feet  -     DIABETIC SHOES FOR HOME USE    Corn or callus  -     DIABETIC SHOES FOR HOME USE    I counseled the patient on his conditions, regarding findings of my examination, my impressions, and usual treatment plan.   This visit spent on counseling and coordination of care.  Appointment spent on education about the diabetic foot, neuropathy, and prevention of limb loss.  Shoe inspection. Diabetic Foot Education. Patient reminded of the importance of good nutrition and blood sugar control to help prevent podiatric complications of diabetes. Patient instructed on proper foot hygeine. We discussed wearing proper shoe gear, daily foot inspections, never walking without protective shoe gear, never putting sharp instruments to feet.    With patient's permission, nails 1-5 bilateral were debrided/trimmed in length and thickness aggressively to their soft tissue attachment mechanically and with electric , removing all offending nail and debris. Patient relates relief following the procedure.   Prescription written for Diabetic shoes and inserts.   Patient  will continue to monitor the areas daily, inspect feet, wear protective shoe gear when ambulatory, moisturizer to maintain skin integrity. Patient reminded of the importance of good nutrition and blood sugar control to help prevent podiatric complications of diabetes.  Patient to return 6 months or sooner  if needed.          LEONIDES TejadaM  [facility]

## 2024-03-23 ENCOUNTER — PATIENT MESSAGE (OUTPATIENT)
Dept: FAMILY MEDICINE | Facility: CLINIC | Age: 84
End: 2024-03-23
Payer: MEDICARE

## 2024-03-28 ENCOUNTER — PATIENT MESSAGE (OUTPATIENT)
Dept: FAMILY MEDICINE | Facility: CLINIC | Age: 84
End: 2024-03-28
Payer: MEDICARE

## 2024-04-24 ENCOUNTER — TELEPHONE (OUTPATIENT)
Dept: PODIATRY | Facility: CLINIC | Age: 84
End: 2024-04-24
Payer: MEDICARE

## 2024-04-24 NOTE — TELEPHONE ENCOUNTER
Spoke with Mr Rudy to let him know of the 2 places in [place] that offer diabetic shoes, I gave him the number to call so he can see which location he prefers.             ----- Message from Shantanu Gilbert MA sent at 4/24/2024 10:49 AM CDT -----  Contact: [email or web address]@ [phone]  Pt called              Pt is requesting a call back to speak with staff to get recommendations for places that are accepting pt insurance for diabetic shoes.

## 2024-04-26 ENCOUNTER — PATIENT MESSAGE (OUTPATIENT)
Dept: FAMILY MEDICINE | Facility: CLINIC | Age: 84
End: 2024-04-26
Payer: MEDICARE

## 2024-05-01 ENCOUNTER — PATIENT MESSAGE (OUTPATIENT)
Dept: PODIATRY | Facility: CLINIC | Age: 84
End: 2024-05-01
Payer: MEDICARE

## 2024-05-02 DIAGNOSIS — I15.2 HYPERTENSION ASSOCIATED WITH DIABETES: ICD-10-CM

## 2024-05-02 DIAGNOSIS — E11.59 HYPERTENSION ASSOCIATED WITH DIABETES: ICD-10-CM

## 2024-05-02 NOTE — TELEPHONE ENCOUNTER
Care Due:                  Date            Visit Type   Department     Provider  --------------------------------------------------------------------------------                                VIRTUAL      [facility] FAMILY  Last Visit: 02-      AUDIO ONLY   MEDICINE       Kei Garg  Next Visit: None Scheduled  None         None Found                                                            Last  Test          Frequency    Reason                     Performed    Due Date  --------------------------------------------------------------------------------    Office Visit  15 months..  potassium................  02- 05-    [facility] Embedded Care Due Messages. Reference number: [id number].   5/02/2024 7:56:18 AM CDT

## 2024-05-02 NOTE — TELEPHONE ENCOUNTER
Refill Routing Note   Medication(s) are not appropriate for processing by [facility] for the following reason(s):        Patient not seen by provider within 15 months    [facility] action(s):  Defer   Requires appointment : Yes             Appointments  past 12m or future 3m with PCP    Date Provider   Last Visit   11/30/2022 Mohinder Morfin MD   Next Visit   Visit date not found Mohinder Morfin MD   ED visits in past 90 days: 0        Note composed:8:12 AM 05/02/2024

## 2024-05-03 RX ORDER — POTASSIUM CHLORIDE 750 MG/1
10 TABLET, EXTENDED RELEASE ORAL
Qty: 90 TABLET | Refills: 11 | Status: SHIPPED | OUTPATIENT
Start: 2024-05-03

## 2024-05-03 NOTE — TELEPHONE ENCOUNTER
The patient requested medicine refills and I did refill it x 1 year.  Message the patient to notify of any health maintenance care gaps that need to be arranged.   Health Maintenance Due   Topic Date Due    RSV Vaccine (Age 60+ and Pregnant patients) (1 - 1-dose 60+ series) Never done    Shingles Vaccine (3 of 3) 01/07/2022    COVID-19 Vaccine (5 - 2023-24 season) 09/01/2023     BP Readings from Last 3 Encounters:   02/20/24 (!) 162/63   12/19/23 (!) 146/68   11/20/23 120/80     Lab Results   Component Value Date    HGBA1C 6.8 (H) 02/20/2024

## 2024-05-03 NOTE — ASSESSMENT & PLAN NOTE
Uric acid not at goal.  Repeat uric acid from today in process.  No gout flares since last visit.  If uric acid above 5,  increase allopurinol dose to 300 mg daily.  Reduce colchicine dose to every other day to prevent flares.   Detail Level: Zone

## 2024-06-12 ENCOUNTER — TELEPHONE (OUTPATIENT)
Dept: FAMILY MEDICINE | Facility: CLINIC | Age: 84
End: 2024-06-12
Payer: MEDICARE

## 2024-06-12 NOTE — TELEPHONE ENCOUNTER
----- Message from Tammy Selby sent at 6/12/2024  9:08 AM CDT -----  Contact: Starr Jauregui  Type:  Same Day Appointment Request    Caller is requesting a same day appointment.  Caller declined first available appointment listed below.      Name of Caller:  Patients starr Jauregui  When is the first available appointment?  06/14  Symptoms:  feeling dizzy, possible upper respiratory inf, cough, hoarse, no energy  Best Call Back Number:  [phone] or [phone]  Additional Information:   Thank You

## 2024-06-28 ENCOUNTER — PATIENT MESSAGE (OUTPATIENT)
Dept: FAMILY MEDICINE | Facility: CLINIC | Age: 84
End: 2024-06-28
Payer: MEDICARE

## 2024-08-21 ENCOUNTER — LAB VISIT (OUTPATIENT)
Dept: LAB | Facility: HOSPITAL | Age: 84
End: 2024-08-21
Attending: FAMILY MEDICINE
Payer: MEDICARE

## 2024-08-21 DIAGNOSIS — N18.30 CONTROLLED TYPE 2 DIABETES MELLITUS WITH STAGE 3 CHRONIC KIDNEY DISEASE, WITHOUT LONG-TERM CURRENT USE OF INSULIN: ICD-10-CM

## 2024-08-21 DIAGNOSIS — E11.22 CONTROLLED TYPE 2 DIABETES MELLITUS WITH STAGE 3 CHRONIC KIDNEY DISEASE, WITHOUT LONG-TERM CURRENT USE OF INSULIN: ICD-10-CM

## 2024-08-21 LAB
ESTIMATED AVG GLUCOSE: 151 MG/DL (ref 68–131)
HBA1C MFR BLD: 6.9 % (ref 4–5.6)

## 2024-08-21 PROCEDURE — 36415 COLL VENOUS BLD VENIPUNCTURE: CPT | Mod: PO | Performed by: FAMILY MEDICINE

## 2024-08-21 PROCEDURE — 83036 HEMOGLOBIN GLYCOSYLATED A1C: CPT | Performed by: FAMILY MEDICINE

## 2024-08-22 ENCOUNTER — LAB VISIT (OUTPATIENT)
Dept: LAB | Facility: HOSPITAL | Age: 84
End: 2024-08-22
Attending: FAMILY MEDICINE
Payer: MEDICARE

## 2024-08-22 DIAGNOSIS — N18.30 CONTROLLED TYPE 2 DIABETES MELLITUS WITH STAGE 3 CHRONIC KIDNEY DISEASE, WITHOUT LONG-TERM CURRENT USE OF INSULIN: ICD-10-CM

## 2024-08-22 DIAGNOSIS — E11.22 CONTROLLED TYPE 2 DIABETES MELLITUS WITH STAGE 3 CHRONIC KIDNEY DISEASE, WITHOUT LONG-TERM CURRENT USE OF INSULIN: ICD-10-CM

## 2024-08-22 LAB
ESTIMATED AVG GLUCOSE: 151 MG/DL (ref 68–131)
HBA1C MFR BLD: 6.9 % (ref 4–5.6)

## 2024-08-22 PROCEDURE — 83036 HEMOGLOBIN GLYCOSYLATED A1C: CPT | Performed by: FAMILY MEDICINE

## 2024-08-22 PROCEDURE — 36415 COLL VENOUS BLD VENIPUNCTURE: CPT | Mod: PO | Performed by: FAMILY MEDICINE

## 2024-09-11 ENCOUNTER — PATIENT MESSAGE (OUTPATIENT)
Dept: FAMILY MEDICINE | Facility: CLINIC | Age: 84
End: 2024-09-11
Payer: MEDICARE

## 2024-09-17 ENCOUNTER — OFFICE VISIT (OUTPATIENT)
Dept: PODIATRY | Facility: CLINIC | Age: 84
End: 2024-09-17
Payer: MEDICARE

## 2024-09-17 VITALS — HEIGHT: 72 IN | BODY MASS INDEX: 29.17 KG/M2 | WEIGHT: 215.38 LBS

## 2024-09-17 DIAGNOSIS — M20.41 HAMMER TOES OF BOTH FEET: ICD-10-CM

## 2024-09-17 DIAGNOSIS — E11.49 TYPE II DIABETES MELLITUS WITH NEUROLOGICAL MANIFESTATIONS: Primary | ICD-10-CM

## 2024-09-17 DIAGNOSIS — M20.42 HAMMER TOES OF BOTH FEET: ICD-10-CM

## 2024-09-17 DIAGNOSIS — L84 CORN OR CALLUS: ICD-10-CM

## 2024-09-17 DIAGNOSIS — B35.1 DERMATOPHYTOSIS OF NAIL: ICD-10-CM

## 2024-09-17 PROCEDURE — 1160F RVW MEDS BY RX/DR IN RCRD: CPT | Mod: CPTII,S$GLB,, | Performed by: PODIATRIST

## 2024-09-17 PROCEDURE — 99213 OFFICE O/P EST LOW 20 MIN: CPT | Mod: 25,S$GLB,, | Performed by: PODIATRIST

## 2024-09-17 PROCEDURE — 99999 PR PBB SHADOW E&M-EST. PATIENT-LVL III: CPT | Mod: PBBFAC,,, | Performed by: PODIATRIST

## 2024-09-17 PROCEDURE — 1101F PT FALLS ASSESS-DOCD LE1/YR: CPT | Mod: CPTII,S$GLB,, | Performed by: PODIATRIST

## 2024-09-17 PROCEDURE — 1159F MED LIST DOCD IN RCRD: CPT | Mod: CPTII,S$GLB,, | Performed by: PODIATRIST

## 2024-09-17 PROCEDURE — 1126F AMNT PAIN NOTED NONE PRSNT: CPT | Mod: CPTII,S$GLB,, | Performed by: PODIATRIST

## 2024-09-17 PROCEDURE — 3288F FALL RISK ASSESSMENT DOCD: CPT | Mod: CPTII,S$GLB,, | Performed by: PODIATRIST

## 2024-09-17 PROCEDURE — 11721 DEBRIDE NAIL 6 OR MORE: CPT | Mod: Q9,S$GLB,, | Performed by: PODIATRIST

## 2024-09-17 RX ORDER — SPIRONOLACTONE 25 MG/1
25 TABLET ORAL DAILY
COMMUNITY

## 2024-09-17 RX ORDER — NIFEDIPINE 30 MG/1
30 TABLET, EXTENDED RELEASE ORAL DAILY
COMMUNITY

## 2024-09-18 NOTE — PROGRESS NOTES
Subjective:     Patient ID: Rudy Saldaña is a 84 y.o. male.    Chief Complaint: Nail Care (Diabetic pt wears slide on shoes, PCP Mohinder Morfin last seen 2/20/2024)    Rudy is a 84 y.o. male who presents to the clinic for evaluation and treatment of high risk feet. Rudy has a past medical history of Anemia, BPH (benign prostatic hypertrophy), Carotid bruit, CKD (chronic kidney disease) stage 2/3, CVA (cerebral vascular accident), Diabetes mellitus, type 2, Hyperlipidemia, Hypertension, Hypothyroidism (2/29/2016), Type 2 diabetes mellitus, uncontrolled, with renal complications, Type II or unspecified type diabetes mellitus without mention of complication, not stated as uncontrolled, and Vitamin D deficiency (2/29/2016). The patient's chief complaint is long, thick toenails. This patient has documented high risk feet requiring routine maintenance secondary to diabetes mellitis and those secondary complications of diabetes, as mentioned..    PCP: Mohinder Morfin MD    Date Last Seen by PCP: 02/20/2024    Current shoe gear:  Affected Foot: Tennis shoes     Unaffected Foot: Tennis shoes    Hemoglobin A1C   Date Value Ref Range Status   08/22/2024 6.9 (H) 4.0 - 5.6 % Final     Comment:     ADA Screening Guidelines:  5.7-6.4%  Consistent with prediabetes  >or=6.5%  Consistent with diabetes    High levels of fetal hemoglobin interfere with the HbA1C  assay. Heterozygous hemoglobin variants (HbS, HgC, etc)do  not significantly interfere with this assay.   However, presence of multiple variants may affect accuracy.     08/21/2024 6.9 (H) 4.0 - 5.6 % Final     Comment:     ADA Screening Guidelines:  5.7-6.4%  Consistent with prediabetes  >or=6.5%  Consistent with diabetes    High levels of fetal hemoglobin interfere with the HbA1C  assay. Heterozygous hemoglobin variants (HbS, HgC, etc)do  not significantly interfere with this assay.   However, presence of multiple variants may affect accuracy.     02/20/2024 6.8  (H) 4.0 - 5.6 % Final     Comment:     ADA Screening Guidelines:  5.7-6.4%  Consistent with prediabetes  >or=6.5%  Consistent with diabetes    High levels of fetal hemoglobin interfere with the HbA1C  assay. Heterozygous hemoglobin variants (HbS, HgC, etc)do  not significantly interfere with this assay.   However, presence of multiple variants may affect accuracy.         Patient Active Problem List   Diagnosis    Combined hyperlipidemia associated with type 2 diabetes mellitus    Hypertension associated with diabetes    Type 2 diabetes mellitus with kidney complication, without long-term current use of insulin    History of stroke    BPH (benign prostatic hypertrophy)    History of colon polyps    Iron deficiency anemia    Polyneuropathy due to secondary diabetes    DEVIKA on CPAP    Family history of colonic polyps    Vitamin D deficiency    Hypothyroidism    Anemia    Gross hematuria    CKD stage 3 secondary to diabetes    Facet arthropathy, lumbar    Right hip pain    Primary osteoarthritis of right hip    Osteoarthritis of spine with radiculopathy, lumbar region    Acute idiopathic gout of left hand    Chronic fatigue    Crystal arthritis    Idiopathic chronic gout of multiple sites with tophus    Chronic pain of both knees    Pain of both elbows    Unspecified inflammatory spondylopathy, lumbar region    Rib pain on right side    Obesity (BMI 30-39.9)    Primary osteoarthritis of left knee    Chronic pain of left knee    Crystal arthropathy    Left-sided chest wall pain    Chronic cough    Chronic diastolic heart failure       Medication List with Changes/Refills   Current Medications    ACARBOSE (PRECOSE) 100 MG TAB    Take 1 tablet (100 mg total) by mouth 3 (three) times daily.    ALLOPURINOL (ZYLOPRIM) 300 MG TABLET    Take 1 tablet (300 mg total) by mouth once daily.    ASPIRIN (ECOTRIN) 81 MG EC TABLET    Take 81 mg by mouth once daily.    ATORVASTATIN (LIPITOR) 20 MG TABLET    Take 1 tablet (20 mg total) by  mouth once daily.    BLOOD SUGAR DIAGNOSTIC STRP    1 each by Misc.(Non-Drug; Combo Route) route 3 (three) times daily as needed. One touch ultra 2 test strips    DONEPEZIL (ARICEPT) 10 MG TABLET    Take 1 tablet (10 mg total) by mouth once daily.    ENTRESTO 24-26 MG PER TABLET    Take 1 tablet by mouth 2 (two) times daily.    ERGOCALCIFEROL (VITAMIN D2) 50,000 UNIT CAP    TAKE ONE CAPSULE BY MOUTH EVERY WEEK    FARXIGA 10 MG TABLET    Take 10 mg by mouth every morning.    FERROUS SULFATE (FEOSOL) 325 MG (65 MG IRON) TAB TABLET    Take 1 tablet (325 mg total) by mouth 3 (three) times daily.    FUROSEMIDE (LASIX) 40 MG TABLET    Take 1 tablet (40 mg total) by mouth once daily.    GLIPIZIDE (GLUCOTROL) 10 MG TABLET    Take 1 tablet (10 mg total) by mouth 2 (two) times daily before meals.    LACTOBACILLUS RHAMNOSUS GG (CULTURELLE) 10 BILLION CELL CAPSULE    Take 1 capsule by mouth once daily.    LANCETS (TRUEPLUS LANCETS) 33 GAUGE MISC    1 lancet by Misc.(Non-Drug; Combo Route) route once daily.    LEVOTHYROXINE (SYNTHROID) 75 MCG TABLET    Take 1 tablet (75 mcg total) by mouth once daily.    MECLIZINE (ANTIVERT) 25 MG TABLET    Take 1 tablet (25 mg total) by mouth 3 (three) times daily as needed for Dizziness.    NIACIN (NIASPAN) 1000 MG CR TABLET    Take 1 po q hs    NIFEDIPINE (ADALAT CC) 30 MG TBSR    Take 30 mg by mouth once daily.    ONETOUCH VERIO IQ METER KIT    Monitor blood glucose once daily    PIOGLITAZONE (ACTOS) 30 MG TABLET    Take 1 tablet (30 mg total) by mouth once daily.    POTASSIUM CHLORIDE (KLOR-CON) 10 MEQ TBSR    TAKE ONE TABLET BY MOUTH EVERY DAY    SITAGLIPTIN PHOSPHATE (JANUVIA) 100 MG TAB    TAKE ONE TABLET BY MOUTH EVERY DAY    SPIRONOLACTONE (ALDACTONE) 25 MG TABLET    Take 25 mg by mouth once daily.       Review of patient's allergies indicates:   Allergen Reactions    Ace inhibitors      Cough         Past Surgical History:   Procedure Laterality Date    COLONOSCOPY  2008     COLONOSCOPY  5/31/2005    repeat in 2015    PILONIDAL CYST DRAINAGE      TRANSURETHRAL RESECTION OF PROSTATE         Family History   Problem Relation Name Age of Onset    Hypertension Mother Sharonda Saldaña     Hearing loss Mother Sharonda Saldaña     Hypertension Father Kieraee Saldaña     Alcohol abuse Father Nalee Saldaña     Heart attack Maternal Uncle      Brain cancer Sister      Diabetes Neg Hx      Stroke Neg Hx         Social History     Socioeconomic History    Marital status:    Tobacco Use    Smoking status: Never    Smokeless tobacco: Never   Substance and Sexual Activity    Alcohol use: No     Alcohol/week: 0.0 standard drinks of alcohol    Drug use: No       Vitals:    09/17/24 0922   Weight: 97.7 kg (215 lb 6.2 oz)   Height: 6' (1.829 m)   PainSc: 0-No pain         Hemoglobin A1C   Date Value Ref Range Status   08/22/2024 6.9 (H) 4.0 - 5.6 % Final     Comment:     ADA Screening Guidelines:  5.7-6.4%  Consistent with prediabetes  >or=6.5%  Consistent with diabetes    High levels of fetal hemoglobin interfere with the HbA1C  assay. Heterozygous hemoglobin variants (HbS, HgC, etc)do  not significantly interfere with this assay.   However, presence of multiple variants may affect accuracy.     08/21/2024 6.9 (H) 4.0 - 5.6 % Final     Comment:     ADA Screening Guidelines:  5.7-6.4%  Consistent with prediabetes  >or=6.5%  Consistent with diabetes    High levels of fetal hemoglobin interfere with the HbA1C  assay. Heterozygous hemoglobin variants (HbS, HgC, etc)do  not significantly interfere with this assay.   However, presence of multiple variants may affect accuracy.     02/20/2024 6.8 (H) 4.0 - 5.6 % Final     Comment:     ADA Screening Guidelines:  5.7-6.4%  Consistent with prediabetes  >or=6.5%  Consistent with diabetes    High levels of fetal hemoglobin interfere with the HbA1C  assay. Heterozygous hemoglobin variants (HbS, HgC, etc)do  not significantly interfere with this assay.   However, presence of  multiple variants may affect accuracy.         Review of Systems   Constitutional:  Negative for chills and fever.   Respiratory:  Negative for shortness of breath.    Cardiovascular:  Negative for chest pain, palpitations, orthopnea, claudication and leg swelling.   Gastrointestinal:  Negative for diarrhea, nausea and vomiting.   Musculoskeletal:  Negative for joint pain.   Skin:  Negative for rash.   Neurological:  Positive for tingling and sensory change.   Psychiatric/Behavioral: Negative.           Objective:      PHYSICAL EXAM: Apperance: Alert and orient in no distress,well developed, and with good attention to grooming and body habits  Patient presents ambulating in tennis shoes.   LOWER EXTREMITY EXAM:  VASCULAR: Dorsalis pedis pulses 1/4 bilateral and Posterior Tibial pulses 2/4 bilateral. Capillary fill time <4 seconds bilateral. No edema observed bilateral. Varicosities present bilateral. Skin temperature of the lower extremities is warm to warm, proximal to distal. Hair growth WNL bilateral.  DERMATOLOGICAL: No skin rashes, subcutaneous nodules, lesions, or ulcers observed bilateral. Nails 1,2,3,4,5 right and 2,3,4,5 left thickened. Left hallux nail absent. Webspaces 1-4 clean, dry and without evidence of break in skin integrity bilateral. Minimal hyperkeratotic tissue noted to lateral 5th toe.   NEUROLOGICAL: Light touch, sharp-dull, proprioception all present and equal bilaterally.  Vibratory sensation absent at bilateral hallux and diminished at bilateral navicular . Protective sensation absent at 4/10 sites as tested with a Denbo-Chelsy 5.07 monofilament.   MUSCULOSKELETAL: Muscle strength is 5/5 for foot inverters, everters, plantarflexors, and dorsiflexors. Muscle tone is normal. No pain on palpation left lateral midfoot. Minimal bony prominence noted to bilateral lateral midfoot at cuboid-metatarsal joint. Flexible adductovarus hammertoes noted bilateral 5th.         Assessment:        ICD-10-CM ICD-9-CM   1. Type II diabetes mellitus with neurological manifestations  E11.49 250.60   2. Dermatophytosis of nail  B35.1 110.1   3. Hammer toes of both feet  M20.41 735.4    M20.42    4. Corn or callus  L84 700         Plan:   Type II diabetes mellitus with neurological manifestations  -     DIABETIC SHOES FOR HOME USE    Dermatophytosis of nail    Hammer toes of both feet  -     DIABETIC SHOES FOR HOME USE    Corn or callus  -     DIABETIC SHOES FOR HOME USE    I counseled the patient on his conditions, regarding findings of my examination, my impressions, and usual treatment plan.   This visit spent on counseling and coordination of care.  Appointment spent on education about the diabetic foot, neuropathy, and prevention of limb loss.  Shoe inspection. Diabetic Foot Education. Patient reminded of the importance of good nutrition and blood sugar control to help prevent podiatric complications of diabetes. Patient instructed on proper foot hygeine. We discussed wearing proper shoe gear, daily foot inspections, never walking without protective shoe gear, never putting sharp instruments to feet.    With patient's permission, nails 1-5 bilateral were debrided/trimmed in length and thickness aggressively to their soft tissue attachment mechanically and with electric , removing all offending nail and debris. Patient relates relief following the procedure.   Prescription written for Diabetic shoes and inserts.   Patient  will continue to monitor the areas daily, inspect feet, wear protective shoe gear when ambulatory, moisturizer to maintain skin integrity. Patient reminded of the importance of good nutrition and blood sugar control to help prevent podiatric complications of diabetes.  Patient to return 6 months or sooner if needed.          Padmaja Dave DPM  [facility]

## 2024-09-25 DIAGNOSIS — Z00.00 ENCOUNTER FOR MEDICARE ANNUAL WELLNESS EXAM: ICD-10-CM

## 2024-10-04 ENCOUNTER — TELEPHONE (OUTPATIENT)
Dept: FAMILY MEDICINE | Facility: CLINIC | Age: 84
End: 2024-10-04
Payer: MEDICARE

## 2024-10-04 NOTE — TELEPHONE ENCOUNTER
----- Message from Ken sent at 10/4/2024 10:53 AM CDT -----  Type:  Patient Returning Call    Who Called:pt  Who Left Message for Patient:office  Does the patient know what this is regarding?:eAWV  Would the patient rather a call back or a response via [facility]? CALL  Best Call Back Number: [phone]  Additional Information: Pt states he would like a call back from office to schedule his appt for AWV. Thank you

## 2024-10-07 ENCOUNTER — TELEPHONE (OUTPATIENT)
Dept: ADMINISTRATIVE | Facility: CLINIC | Age: 84
End: 2024-10-07
Payer: MEDICARE

## 2024-10-08 ENCOUNTER — OFFICE VISIT (OUTPATIENT)
Dept: FAMILY MEDICINE | Facility: CLINIC | Age: 84
End: 2024-10-08
Payer: MEDICARE

## 2024-10-08 VITALS
HEIGHT: 72 IN | HEART RATE: 57 BPM | BODY MASS INDEX: 29.71 KG/M2 | WEIGHT: 219.31 LBS | DIASTOLIC BLOOD PRESSURE: 75 MMHG | OXYGEN SATURATION: 98 % | RESPIRATION RATE: 18 BRPM | TEMPERATURE: 98 F | SYSTOLIC BLOOD PRESSURE: 148 MMHG

## 2024-10-08 DIAGNOSIS — D69.2 OTHER NONTHROMBOCYTOPENIC PURPURA: ICD-10-CM

## 2024-10-08 DIAGNOSIS — N18.30 TYPE 2 DIABETES MELLITUS WITH STAGE 3 CHRONIC KIDNEY DISEASE, WITHOUT LONG-TERM CURRENT USE OF INSULIN, UNSPECIFIED WHETHER STAGE 3A OR 3B CKD: ICD-10-CM

## 2024-10-08 DIAGNOSIS — E03.9 HYPOTHYROIDISM, UNSPECIFIED TYPE: ICD-10-CM

## 2024-10-08 DIAGNOSIS — E11.22 CKD STAGE 3 SECONDARY TO DIABETES: ICD-10-CM

## 2024-10-08 DIAGNOSIS — R26.9 ABNORMALITY OF GAIT AND MOBILITY: ICD-10-CM

## 2024-10-08 DIAGNOSIS — Z00.00 ENCOUNTER FOR MEDICARE ANNUAL WELLNESS EXAM: Primary | ICD-10-CM

## 2024-10-08 DIAGNOSIS — E11.59 HYPERTENSION ASSOCIATED WITH DIABETES: ICD-10-CM

## 2024-10-08 DIAGNOSIS — I15.2 HYPERTENSION ASSOCIATED WITH DIABETES: ICD-10-CM

## 2024-10-08 DIAGNOSIS — E11.69 COMBINED HYPERLIPIDEMIA ASSOCIATED WITH TYPE 2 DIABETES MELLITUS: ICD-10-CM

## 2024-10-08 DIAGNOSIS — Z86.73 HISTORY OF STROKE: ICD-10-CM

## 2024-10-08 DIAGNOSIS — N18.30 CKD STAGE 3 SECONDARY TO DIABETES: ICD-10-CM

## 2024-10-08 DIAGNOSIS — E78.2 COMBINED HYPERLIPIDEMIA ASSOCIATED WITH TYPE 2 DIABETES MELLITUS: ICD-10-CM

## 2024-10-08 DIAGNOSIS — E11.22 TYPE 2 DIABETES MELLITUS WITH STAGE 3 CHRONIC KIDNEY DISEASE, WITHOUT LONG-TERM CURRENT USE OF INSULIN, UNSPECIFIED WHETHER STAGE 3A OR 3B CKD: ICD-10-CM

## 2024-10-08 DIAGNOSIS — G47.33 OSA ON CPAP: Chronic | ICD-10-CM

## 2024-10-08 DIAGNOSIS — E11.3213 TYPE 2 DIABETES MELLITUS WITH BOTH EYES AFFECTED BY MILD NONPROLIFERATIVE RETINOPATHY AND MACULAR EDEMA, WITHOUT LONG-TERM CURRENT USE OF INSULIN: ICD-10-CM

## 2024-10-08 DIAGNOSIS — N40.0 BENIGN PROSTATIC HYPERPLASIA WITHOUT LOWER URINARY TRACT SYMPTOMS: ICD-10-CM

## 2024-10-08 DIAGNOSIS — M1A.09X1 IDIOPATHIC CHRONIC GOUT OF MULTIPLE SITES WITH TOPHUS: ICD-10-CM

## 2024-10-08 DIAGNOSIS — Z23 IMMUNIZATION DUE: ICD-10-CM

## 2024-10-08 DIAGNOSIS — I50.32 CHRONIC DIASTOLIC HEART FAILURE: ICD-10-CM

## 2024-10-08 DIAGNOSIS — E55.9 VITAMIN D DEFICIENCY: ICD-10-CM

## 2024-10-08 DIAGNOSIS — M46.96 UNSPECIFIED INFLAMMATORY SPONDYLOPATHY, LUMBAR REGION: ICD-10-CM

## 2024-10-08 DIAGNOSIS — D50.9 IRON DEFICIENCY ANEMIA, UNSPECIFIED IRON DEFICIENCY ANEMIA TYPE: ICD-10-CM

## 2024-10-08 DIAGNOSIS — F03.A0 MILD DEMENTIA WITHOUT BEHAVIORAL DISTURBANCE, PSYCHOTIC DISTURBANCE, MOOD DISTURBANCE, OR ANXIETY, UNSPECIFIED DEMENTIA TYPE: ICD-10-CM

## 2024-10-08 PROCEDURE — 99999 PR PBB SHADOW E&M-EST. PATIENT-LVL V: CPT | Mod: PBBFAC,,, | Performed by: NURSE PRACTITIONER

## 2024-10-08 PROCEDURE — 90653 IIV ADJUVANT VACCINE IM: CPT | Mod: S$GLB,,, | Performed by: NURSE PRACTITIONER

## 2024-10-08 PROCEDURE — 1101F PT FALLS ASSESS-DOCD LE1/YR: CPT | Mod: CPTII,S$GLB,, | Performed by: NURSE PRACTITIONER

## 2024-10-08 PROCEDURE — G0439 PPPS, SUBSEQ VISIT: HCPCS | Mod: S$GLB,,, | Performed by: NURSE PRACTITIONER

## 2024-10-08 PROCEDURE — 1170F FXNL STATUS ASSESSED: CPT | Mod: CPTII,S$GLB,, | Performed by: NURSE PRACTITIONER

## 2024-10-08 PROCEDURE — 1158F ADVNC CARE PLAN TLK DOCD: CPT | Mod: CPTII,S$GLB,, | Performed by: NURSE PRACTITIONER

## 2024-10-08 PROCEDURE — G0008 ADMIN INFLUENZA VIRUS VAC: HCPCS | Mod: S$GLB,,, | Performed by: NURSE PRACTITIONER

## 2024-10-08 PROCEDURE — 1126F AMNT PAIN NOTED NONE PRSNT: CPT | Mod: CPTII,S$GLB,, | Performed by: NURSE PRACTITIONER

## 2024-10-08 PROCEDURE — 1160F RVW MEDS BY RX/DR IN RCRD: CPT | Mod: CPTII,S$GLB,, | Performed by: NURSE PRACTITIONER

## 2024-10-08 PROCEDURE — 1159F MED LIST DOCD IN RCRD: CPT | Mod: CPTII,S$GLB,, | Performed by: NURSE PRACTITIONER

## 2024-10-08 PROCEDURE — 3288F FALL RISK ASSESSMENT DOCD: CPT | Mod: CPTII,S$GLB,, | Performed by: NURSE PRACTITIONER

## 2024-10-08 NOTE — PROGRESS NOTES
Rudy Saldaña presented for a  Medicare AWV and comprehensive Health Risk Assessment today. The following components were reviewed and updated:    Medical history  Family History  Social history  Allergies and Current Medications  Health Risk Assessment  Health Maintenance  Care Team         ** See Completed Assessments for Annual Wellness Visit within the encounter summary.**         The following assessments were completed:  Living Situation  CAGE  Depression Screening  Timed Get Up and Go  Whisper Test  Cognitive Function Screening  Nutrition Screening  ADL Screening  PAQ Screening      Opioid documentation:      Patient does not have a current opioid prescription.       Has urine leakage ever interrupted your daily activites or sleep? No  Do you think you could use some help to better manage urine leakage?No       Vitals:    10/08/24 1307   BP: (!) 148/75   BP Location: Left arm   Patient Position: Sitting   Pulse: (!) 57   Resp: 18   Temp: 97.9 °F (36.6 °C)   TempSrc: Oral   SpO2: 98%   Weight: 99.5 kg (219 lb 4.8 oz)   Height: 6' (1.829 m)     Body mass index is 29.74 kg/m².  Physical Exam  Constitutional:       General: He is not in acute distress.  HENT:      Head: Normocephalic and atraumatic.      Mouth/Throat:      Mouth: Mucous membranes are moist.   Eyes:      Extraocular Movements: Extraocular movements intact.      Pupils: Pupils are equal, round, and reactive to light.   Cardiovascular:      Rate and Rhythm: Bradycardia present.   Pulmonary:      Effort: Pulmonary effort is normal. No respiratory distress.   Musculoskeletal:         General: No swelling or deformity. Normal range of motion.      Cervical back: Normal range of motion and neck supple.   Skin:     General: Skin is warm and dry.   Neurological:      General: No focal deficit present.      Mental Status: He is alert and oriented to person, place, and time.      Gait: Gait normal.   Psychiatric:         Mood and Affect: Mood normal.          Thought Content: Thought content normal.               Diagnoses and health risks identified today and associated recommendations/orders:    1. Encounter for Medicare annual wellness exam    - Ambulatory Referral/Consult to Enhanced Annual Wellness Visit (eAWV)    2. Immunization due    - influenza (adjuvanted) (Fluad) 45 mcg/0.5 mL IM vaccine (> or = 66 yo) 0.5 mL    3. Abnormality of gait and mobility  Stable  Follow-up for worsening symptoms    4. Type 2 diabetes mellitus with stage 3 chronic kidney disease, without long-term current use of insulin, unspecified whether stage 3a or 3b CKD  Stable on glipizide, pioglitazone, Januvia, and acarbose  Routine A1c  Continue healthy diabetic diet    5. Hypertension associated with diabetes  Stable controlled Entresto, nifedipine, furosemide  Continue to monitor blood pressure  Low-sodium diet  Routine follow-up    6. Combined hyperlipidemia associated with type 2 diabetes mellitus  Stable and controlled with atorvastatin and niacin  Routine lipid panel and liver function  Continue healthy dietary choices    7. Chronic diastolic heart failure  Stable controlled on Entresto, furosemide, and spironolactone  Routine follow-up with Cardiology  Daily weights    8. CKD stage 3 secondary to diabetes  Stable controlled  Routine labs for re-evaluation    9. Benign prostatic hyperplasia without lower urinary tract symptoms  Stable and controlled with no medications at this time  Follow-up for worsening symptoms    10. Iron deficiency anemia, unspecified iron deficiency anemia type  Stable controlled on Ferrous sulfate  Routine labs to include CBC and iron    11. Vitamin D deficiency  Stable and controlled on vitamin-D supplement  Routine vitamin-D lab work    12. Hypothyroidism, unspecified type  Stable and controlled on levothyroxine  Routine TSH  Follow-up for worsening symptoms    13. DEVIKA on CPAP  Stable controlled with use of CPAP  Routine re-evaluation  Follow-up for  worsening symptoms    14. History of stroke  Stable controlled  No residual symptoms at this time    15. Idiopathic chronic gout of multiple sites with tophus  Stable and controlled allopurinol  Routine uric acid level  Follow-up or worsening symptoms    16. Mild dementia without behavioral disturbance, psychotic disturbance, mood disturbance, or anxiety, unspecified dementia type  Stable Aricept  For worsening symptoms    17. Type 2 diabetes mellitus with both eyes affected by mild nonproliferative retinopathy and macular edema, without long-term current use of insulin  Stable with current diabetes treatment  Annual dilated eye exam  - Ambulatory referral/consult to Ophthalmology; Future    18. Other nonthrombocytopenic purpura  Stable control  Continue current medications    19. Unspecified inflammatory spondylopathy, lumbar region  Stable and controlled with no medications at this time  Follow-up for worsening symptoms      Provided Rudy with a 5-10 year written screening schedule and personal prevention plan. Recommendations were developed using the USPSTF age appropriate recommendations. Education, counseling, and referrals were provided as needed. After Visit Summary printed and given to patient which includes a list of additional screenings\tests needed.    Follow up if symptoms worsen or fail to improve, for Routine scheduled appointment.    Mechelle Spain NP      I offered to discuss advanced care planning, including how to pick a person who would make decisions for you if you were unable to make them for yourself, called a health care power of , and what kind of decisions you might make such as use of life sustaining treatments such as ventilators and tube feeding when faced with a life limiting illness recorded on a living will that they will need to know. (How you want to be cared for as you near the end of your natural life)     X Patient is interested in learning more about how to make  advanced directives.  I provided them paperwork and offered to discuss this with them.

## 2024-10-08 NOTE — PATIENT INSTRUCTIONS
Counseling and Referral of Other Preventative  (Italic type indicates deductible and co-insurance are waived)    Patient Name: Rudy Saldaña  Today's Date: 10/8/2024    Health Maintenance       Date Due Completion Date    RSV Vaccine (Age 60+ and Pregnant patients) (1 - 1-dose 75+ series) Never done ---    Shingles Vaccine (3 of 3) 01/07/2022 11/12/2021    Influenza Vaccine (1) 09/01/2024 12/4/2023    COVID-19 Vaccine (5 - 2024-25 season) 09/01/2024 12/1/2022    Eye Exam 09/06/2024 9/6/2023    Diabetes Urine Screening 02/20/2025 2/20/2024    Lipid Panel 02/20/2025 2/20/2024    Hemoglobin A1c 02/22/2025 8/22/2024    Foot Exam 09/18/2025 9/18/2024 (Done)    Override on 9/18/2024: Done    Override on 3/5/2024: Done    Override on 8/29/2023: Done    Override on 2/21/2023: Done    Override on 2/15/2022: Done    Override on 5/4/2021: Done    Override on 8/18/2020: Done    Override on 10/15/2019: Done    TETANUS VACCINE 08/22/2026 8/22/2016        No orders of the defined types were placed in this encounter.      The following information is provided to all patients.  This information is to help you find resources for any of the problems found today that may be affecting your health:                  Living healthy guide: www.[facility].[place].gov      Understanding Diabetes: www.diabetes.org      Eating healthy: www.cdc.gov/healthyweight      CDC home safety checklist: www.cdc.gov/steadi/patient.html      Agency on Aging: www.goea.[place].gov      Alcoholics anonymous (AA): www.aa.org      Physical Activity: www.bernarda.nih.gov/ua4oqem      Tobacco use: www.quitwithusla.org

## 2024-10-23 DIAGNOSIS — E55.9 VITAMIN D DEFICIENCY: ICD-10-CM

## 2024-10-23 DIAGNOSIS — E03.9 HYPOTHYROIDISM, UNSPECIFIED TYPE: ICD-10-CM

## 2024-10-23 DIAGNOSIS — E11.69 COMBINED HYPERLIPIDEMIA ASSOCIATED WITH TYPE 2 DIABETES MELLITUS: ICD-10-CM

## 2024-10-23 DIAGNOSIS — E78.2 COMBINED HYPERLIPIDEMIA ASSOCIATED WITH TYPE 2 DIABETES MELLITUS: ICD-10-CM

## 2024-10-23 RX ORDER — LEVOTHYROXINE SODIUM 75 UG/1
TABLET ORAL
Qty: 90 TABLET | Refills: 0 | Status: SHIPPED | OUTPATIENT
Start: 2024-10-23

## 2024-10-23 RX ORDER — NIACIN 1000 MG/1
TABLET, EXTENDED RELEASE ORAL
Qty: 90 TABLET | Refills: 0 | Status: SHIPPED | OUTPATIENT
Start: 2024-10-23

## 2024-10-23 RX ORDER — ERGOCALCIFEROL 1.25 MG/1
CAPSULE ORAL
Qty: 12 CAPSULE | Refills: 3 | Status: SHIPPED | OUTPATIENT
Start: 2024-10-23

## 2024-10-23 NOTE — TELEPHONE ENCOUNTER
Care Due:                  Date            Visit Type   Department     Provider  --------------------------------------------------------------------------------                                VIRTUAL      [facility] FAMILY  Last Visit: 02-      AUDIO ONLY   MEDICINE       Kei Garg  Next Visit: None Scheduled  None         None Found                                                            Last  Test          Frequency    Reason                     Performed    Due Date  --------------------------------------------------------------------------------    Office Visit  15 months..  potassium................  02- 05-    [facility] Embedded Care Due Messages. Reference number: [id number].   10/23/2024 8:49:46 AM CDT

## 2024-10-23 NOTE — TELEPHONE ENCOUNTER
No care due was identified.  [facility] Embedded Care Due Messages. Reference number: [id number].   10/23/2024 3:21:35 PM CDT

## 2024-10-23 NOTE — TELEPHONE ENCOUNTER
Refill Routing Note   Medication(s) are not appropriate for processing by [facility] for the following reason(s):        Outside of protocol    [facility] action(s):  Route   Requires appointment : Yes             Appointments  past 12m or future 3m with PCP    Date Provider   Last Visit   11/30/2022 Mohinder Morfin MD   Next Visit   Visit date not found Mohinder Morfin MD   ED visits in past 90 days: 0        Note composed:10:17 AM 10/23/2024

## 2024-10-23 NOTE — TELEPHONE ENCOUNTER
Refill Routing Note   Medication(s) are not appropriate for processing by [facility] for the following reason(s):        Patient not seen by provider within 15 months  No active prescription written by provider    [facility] action(s):  Defer               Appointments  past 12m or future 3m with PCP    Date Provider   Last Visit   11/30/2022 Mohinder Morfin MD   Next Visit   10/23/2024 Mohinder Morfin MD   ED visits in past 90 days: 0        Note composed:3:41 PM 10/23/2024

## 2024-11-29 DIAGNOSIS — D50.8 IRON DEFICIENCY ANEMIA SECONDARY TO INADEQUATE DIETARY IRON INTAKE: ICD-10-CM

## 2024-11-29 RX ORDER — FERROUS SULFATE 325(65) MG
TABLET ORAL
Qty: 270 TABLET | Refills: 3 | Status: SHIPPED | OUTPATIENT
Start: 2024-11-29

## 2024-11-29 NOTE — TELEPHONE ENCOUNTER
Refill Routing Note   Medication(s) are not appropriate for processing by [facility] for the following reason(s):        Outside of protocol  Non-participating provider    [facility] action(s):  Route               Appointments  past 12m or future 3m with PCP    Date Provider   Last Visit   10/8/2024 Mechelle Spain, NP   Next Visit   Visit date not found Mechelle Spain, NP   ED visits in past 90 days: 0        Note composed:8:56 AM 11/29/2024

## 2024-12-09 DIAGNOSIS — E78.2 COMBINED HYPERLIPIDEMIA ASSOCIATED WITH TYPE 2 DIABETES MELLITUS: ICD-10-CM

## 2024-12-09 DIAGNOSIS — E11.69 COMBINED HYPERLIPIDEMIA ASSOCIATED WITH TYPE 2 DIABETES MELLITUS: ICD-10-CM

## 2024-12-09 DIAGNOSIS — N18.30 TYPE 2 DIABETES MELLITUS WITH STAGE 3 CHRONIC KIDNEY DISEASE, WITHOUT LONG-TERM CURRENT USE OF INSULIN, UNSPECIFIED WHETHER STAGE 3A OR 3B CKD: ICD-10-CM

## 2024-12-09 DIAGNOSIS — E11.22 TYPE 2 DIABETES MELLITUS WITH STAGE 3 CHRONIC KIDNEY DISEASE, WITHOUT LONG-TERM CURRENT USE OF INSULIN, UNSPECIFIED WHETHER STAGE 3A OR 3B CKD: ICD-10-CM

## 2024-12-10 RX ORDER — GLIPIZIDE 10 MG/1
TABLET ORAL
Qty: 180 TABLET | Refills: 3 | Status: SHIPPED | OUTPATIENT
Start: 2024-12-10

## 2024-12-10 RX ORDER — ATORVASTATIN CALCIUM 20 MG/1
TABLET, FILM COATED ORAL
Qty: 90 TABLET | Refills: 3 | Status: SHIPPED | OUTPATIENT
Start: 2024-12-10

## 2024-12-10 RX ORDER — DONEPEZIL HYDROCHLORIDE 10 MG/1
TABLET, FILM COATED ORAL
Qty: 90 TABLET | Refills: 3 | Status: SHIPPED | OUTPATIENT
Start: 2024-12-10

## 2024-12-10 RX ORDER — PIOGLITAZONEHYDROCHLORIDE 30 MG/1
TABLET ORAL
Qty: 90 TABLET | Refills: 3 | Status: SHIPPED | OUTPATIENT
Start: 2024-12-10

## 2024-12-10 NOTE — TELEPHONE ENCOUNTER
Refill Routing Note   Medication(s) are not appropriate for processing by [facility] for the following reason(s):        Non-participating provider    [facility] action(s):  Route               Appointments  past 12m or future 3m with PCP    Date Provider   Last Visit   10/8/2024 Mechelle Spain, NP   Next Visit   Visit date not found Mechelle Spain, NP   ED visits in past 90 days: 0        Note composed:6:56 PM 12/09/2024

## 2024-12-23 DIAGNOSIS — E11.22 TYPE 2 DIABETES MELLITUS WITH STAGE 3 CHRONIC KIDNEY DISEASE, WITHOUT LONG-TERM CURRENT USE OF INSULIN, UNSPECIFIED WHETHER STAGE 3A OR 3B CKD: ICD-10-CM

## 2024-12-23 DIAGNOSIS — N18.30 TYPE 2 DIABETES MELLITUS WITH STAGE 3 CHRONIC KIDNEY DISEASE, WITHOUT LONG-TERM CURRENT USE OF INSULIN, UNSPECIFIED WHETHER STAGE 3A OR 3B CKD: ICD-10-CM

## 2024-12-23 RX ORDER — ACARBOSE 100 MG/1
TABLET ORAL
Qty: 270 TABLET | Refills: 0 | Status: SHIPPED | OUTPATIENT
Start: 2024-12-23

## 2025-02-17 ENCOUNTER — TELEPHONE (OUTPATIENT)
Dept: ORTHOPEDICS | Facility: CLINIC | Age: 85
End: 2025-02-17
Payer: MEDICARE

## 2025-02-17 NOTE — TELEPHONE ENCOUNTER
----- Message from Annamarie sent at 2/17/2025 11:06 AM CST -----  Contact: anoop/daughter  Anoop is requesting a call back in regards to getting Moiz an appointment set up for his thumb, please call her at

## 2025-03-06 DIAGNOSIS — M1A.09X1 IDIOPATHIC CHRONIC GOUT OF MULTIPLE SITES WITH TOPHUS: ICD-10-CM

## 2025-03-06 RX ORDER — ALLOPURINOL 300 MG/1
TABLET ORAL
Qty: 90 TABLET | Refills: 0 | Status: SHIPPED | OUTPATIENT
Start: 2025-03-06

## 2025-03-06 NOTE — TELEPHONE ENCOUNTER
The patient sent a request for medication their medical issues and is overdue to see me.   The patient's last visit with me was on 10/8/2024.  Ask the patient to schedule an E-Visit, Virtual Visit or In Person Visit and schedule labs/immunizations needed in Health Maintenance to close the care gaps.  I have refilled the requested medicine x 1.  Arrange health maintenance to be completed prior to visit if the patient is able to do that.  Health Maintenance Due   Topic Date Due    RSV Vaccine (Age 60+ and Pregnant patients) (1 - 1-dose 75+ series) Never done    Shingles Vaccine (3 of 3) 01/07/2022    COVID-19 Vaccine (5 - 2024-25 season) 09/01/2024    Diabetic Eye Exam  09/06/2024    Diabetes Urine Screening  02/20/2025    Lipid Panel  02/20/2025    Hemoglobin A1c  02/22/2025

## 2025-03-07 ENCOUNTER — PATIENT MESSAGE (OUTPATIENT)
Dept: FAMILY MEDICINE | Facility: CLINIC | Age: 85
End: 2025-03-07
Payer: MEDICARE

## 2025-03-18 ENCOUNTER — OFFICE VISIT (OUTPATIENT)
Dept: PODIATRY | Facility: CLINIC | Age: 85
End: 2025-03-18
Payer: MEDICARE

## 2025-03-18 VITALS — HEIGHT: 72 IN | WEIGHT: 219.38 LBS | BODY MASS INDEX: 29.71 KG/M2

## 2025-03-18 DIAGNOSIS — E11.49 TYPE II DIABETES MELLITUS WITH NEUROLOGICAL MANIFESTATIONS: ICD-10-CM

## 2025-03-18 DIAGNOSIS — E11.9 ENCOUNTER FOR COMPREHENSIVE DIABETIC FOOT EXAMINATION, TYPE 2 DIABETES MELLITUS: Primary | ICD-10-CM

## 2025-03-18 DIAGNOSIS — B35.1 DERMATOPHYTOSIS OF NAIL: ICD-10-CM

## 2025-03-18 PROCEDURE — 11721 DEBRIDE NAIL 6 OR MORE: CPT | Mod: Q9,S$GLB,, | Performed by: PODIATRIST

## 2025-03-18 PROCEDURE — 3288F FALL RISK ASSESSMENT DOCD: CPT | Mod: CPTII,S$GLB,, | Performed by: PODIATRIST

## 2025-03-18 PROCEDURE — 1126F AMNT PAIN NOTED NONE PRSNT: CPT | Mod: CPTII,S$GLB,, | Performed by: PODIATRIST

## 2025-03-18 PROCEDURE — 1101F PT FALLS ASSESS-DOCD LE1/YR: CPT | Mod: CPTII,S$GLB,, | Performed by: PODIATRIST

## 2025-03-18 PROCEDURE — 1159F MED LIST DOCD IN RCRD: CPT | Mod: CPTII,S$GLB,, | Performed by: PODIATRIST

## 2025-03-18 PROCEDURE — 99213 OFFICE O/P EST LOW 20 MIN: CPT | Mod: 25,S$GLB,, | Performed by: PODIATRIST

## 2025-03-18 PROCEDURE — 99999 PR PBB SHADOW E&M-EST. PATIENT-LVL III: CPT | Mod: PBBFAC,,, | Performed by: PODIATRIST

## 2025-03-18 NOTE — PROGRESS NOTES
Subjective:     Patient ID: Rudy Saldaña is a 84 y.o. male.    Chief Complaint: Nail Care (Diabetic pt wears tennis shoes, PCP Dr. Morfin last seen 10-8-24)    uRdy is a 84 y.o. male who presents to the clinic for evaluation and treatment of high risk feet. Rudy has a past medical history of Anemia, BPH (benign prostatic hypertrophy), Carotid bruit, CKD (chronic kidney disease) stage 2/3, CVA (cerebral vascular accident), Diabetes mellitus, type 2, Hyperlipidemia, Hypertension, Hypothyroidism (2/29/2016), Type 2 diabetes mellitus, uncontrolled, with renal complications, Type II or unspecified type diabetes mellitus without mention of complication, not stated as uncontrolled, and Vitamin D deficiency (2/29/2016). The patient's chief complaint is long, thick toenails. This patient has documented high risk feet requiring routine maintenance secondary to diabetes mellitis and those secondary complications of diabetes, as mentioned..    PCP: Mohinder Morfin MD    Date Last Seen by PCP: 10/08/2024    Current shoe gear:  Affected Foot: Tennis shoes     Unaffected Foot: Tennis shoes    Hemoglobin A1C   Date Value Ref Range Status   08/22/2024 6.9 (H) 4.0 - 5.6 % Final     Comment:     ADA Screening Guidelines:  5.7-6.4%  Consistent with prediabetes  >or=6.5%  Consistent with diabetes    High levels of fetal hemoglobin interfere with the HbA1C  assay. Heterozygous hemoglobin variants (HbS, HgC, etc)do  not significantly interfere with this assay.   However, presence of multiple variants may affect accuracy.     08/21/2024 6.9 (H) 4.0 - 5.6 % Final     Comment:     ADA Screening Guidelines:  5.7-6.4%  Consistent with prediabetes  >or=6.5%  Consistent with diabetes    High levels of fetal hemoglobin interfere with the HbA1C  assay. Heterozygous hemoglobin variants (HbS, HgC, etc)do  not significantly interfere with this assay.   However, presence of multiple variants may affect accuracy.     02/20/2024 6.8 (H) 4.0  - 5.6 % Final     Comment:     ADA Screening Guidelines:  5.7-6.4%  Consistent with prediabetes  >or=6.5%  Consistent with diabetes    High levels of fetal hemoglobin interfere with the HbA1C  assay. Heterozygous hemoglobin variants (HbS, HgC, etc)do  not significantly interfere with this assay.   However, presence of multiple variants may affect accuracy.         Patient Active Problem List   Diagnosis    Combined hyperlipidemia associated with type 2 diabetes mellitus    Hypertension associated with diabetes    Type 2 diabetes mellitus with kidney complication, without long-term current use of insulin    History of stroke    BPH (benign prostatic hypertrophy)    History of colon polyps    Iron deficiency anemia    Polyneuropathy due to secondary diabetes    DEVIKA on CPAP    Family history of colonic polyps    Vitamin D deficiency    Hypothyroidism    Anemia    Gross hematuria    CKD stage 3 secondary to diabetes    Facet arthropathy, lumbar    Right hip pain    Primary osteoarthritis of right hip    Osteoarthritis of spine with radiculopathy, lumbar region    Acute idiopathic gout of left hand    Chronic fatigue    Crystal arthritis    Idiopathic chronic gout of multiple sites with tophus    Chronic pain of both knees    Pain of both elbows    Unspecified inflammatory spondylopathy, lumbar region    Rib pain on right side    Obesity (BMI 30-39.9)    Primary osteoarthritis of left knee    Chronic pain of left knee    Crystal arthropathy    Left-sided chest wall pain    Chronic cough    Chronic diastolic heart failure    Mild dementia without behavioral disturbance, psychotic disturbance, mood disturbance, or anxiety, unspecified dementia type    Type 2 diabetes mellitus with both eyes affected by mild nonproliferative retinopathy and macular edema, without long-term current use of insulin    Other nonthrombocytopenic purpura       Medication List with Changes/Refills   Current Medications    ACARBOSE (PRECOSE) 100 MG  TAB    TAKE ONE TABLET (100 MG TOTAL) BY MOUTH THREE TIMES DAILY    ALLOPURINOL (ZYLOPRIM) 300 MG TABLET    TAKE ONE TABLET (300 MG TOTAL) BY MOUTH ONCE DAILY    ASPIRIN (ECOTRIN) 81 MG EC TABLET    Take 81 mg by mouth once daily.    ATORVASTATIN (LIPITOR) 20 MG TABLET    TAKE ONE TABLET (20 MG TOTAL) BY MOUTH ONCE DAILY    BLOOD SUGAR DIAGNOSTIC STRP    1 each by Misc.(Non-Drug; Combo Route) route 3 (three) times daily as needed. One touch ultra 2 test strips    DONEPEZIL (ARICEPT) 10 MG TABLET    TAKE ONE TABLET (10 MG TOTAL) BY MOUTH ONCE DAILY    ENTRESTO 24-26 MG PER TABLET    Take 1 tablet by mouth 2 (two) times daily.    ERGOCALCIFEROL (VITAMIN D2) 50,000 UNIT CAP    TAKE ONE CAPSULE BY MOUTH EVERY WEEK    FARXIGA 10 MG TABLET    Take 10 mg by mouth every morning.    FERROUS SULFATE (FEOSOL) 325 MG (65 MG IRON) TAB TABLET    TAKE ONE TABLET (325 MG TOTAL) BY MOUTH THREE TIMES DAILY    FUROSEMIDE (LASIX) 40 MG TABLET    Take 1 tablet (40 mg total) by mouth once daily.    GLIPIZIDE (GLUCOTROL) 10 MG TABLET    TAKE ONE TABLET (10 MG TOTAL) BY MOUTH TWICE DAILY BEFORE MEALS    LACTOBACILLUS RHAMNOSUS GG (CULTURELLE) 10 BILLION CELL CAPSULE    Take 1 capsule by mouth once daily.    LANCETS (TRUEPLUS LANCETS) 33 GAUGE MISC    1 lancet by Misc.(Non-Drug; Combo Route) route once daily.    LEVOTHYROXINE (SYNTHROID) 75 MCG TABLET    TAKE ONE TABLET (75 MCG TOTAL) BY MOUTH ONCE DAILY    MECLIZINE (ANTIVERT) 25 MG TABLET    Take 1 tablet (25 mg total) by mouth 3 (three) times daily as needed for Dizziness.    NIACIN (NIASPAN) 1000 MG CR TABLET    TAKE ONE TABLET BY MOUTH AT BEDTIME    NIFEDIPINE (ADALAT CC) 30 MG TBSR    Take 30 mg by mouth once daily.    ONETOUCH VERIO IQ METER KIT    Monitor blood glucose once daily    PIOGLITAZONE (ACTOS) 30 MG TABLET    TAKE ONE TABLET (30 MG TOTAL) BY MOUTH ONCE DAILY    POTASSIUM CHLORIDE (KLOR-CON) 10 MEQ TBSR    TAKE ONE TABLET BY MOUTH EVERY DAY    SITAGLIPTIN PHOSPHATE  (JANUVIA) 100 MG TAB    TAKE ONE TABLET BY MOUTH EVERY DAY    SPIRONOLACTONE (ALDACTONE) 25 MG TABLET    Take 25 mg by mouth once daily.       Review of patient's allergies indicates:   Allergen Reactions    Ace inhibitors      Cough         Past Surgical History:   Procedure Laterality Date    COLONOSCOPY  2008    COLONOSCOPY  5/31/2005    repeat in 2015    PILONIDAL CYST DRAINAGE      TRANSURETHRAL RESECTION OF PROSTATE         Family History   Problem Relation Name Age of Onset    Hypertension Mother Sharonda Saldaña     Hearing loss Mother Sharonda Saldaña     Hypertension Father Manny Saldaña     Alcohol abuse Father Nalee Saldaña     Heart attack Maternal Uncle      Brain cancer Sister      Diabetes Neg Hx      Stroke Neg Hx         Social History     Socioeconomic History    Marital status:    Tobacco Use    Smoking status: Never    Smokeless tobacco: Never   Substance and Sexual Activity    Alcohol use: No     Alcohol/week: 0.0 standard drinks of alcohol    Drug use: No     Social Drivers of Health     Financial Resource Strain: Low Risk  (10/8/2024)    Overall Financial Resource Strain (CARDIA)     Difficulty of Paying Living Expenses: Not hard at all   Food Insecurity: No Food Insecurity (10/8/2024)    Hunger Vital Sign     Worried About Running Out of Food in the Last Year: Never true     Ran Out of Food in the Last Year: Never true   Transportation Needs: No Transportation Needs (10/8/2024)    PRAPARE - Transportation     Lack of Transportation (Medical): No     Lack of Transportation (Non-Medical): No   Physical Activity: Inactive (10/8/2024)    Exercise Vital Sign     Days of Exercise per Week: 0 days     Minutes of Exercise per Session: 0 min   Stress: No Stress Concern Present (10/8/2024)    [employer] - Occupational Stress Questionnaire     Feeling of Stress : Not at all   Housing Stability: Unknown (10/8/2024)    Housing Stability Vital Sign     Unable to Pay for Housing in  the Last Year: No     Homeless in the Last Year: No       Vitals:    03/18/25 0930   Weight: 99.5 kg (219 lb 5.7 oz)   Height: 6' (1.829 m)   PainSc: 0-No pain           Hemoglobin A1C   Date Value Ref Range Status   08/22/2024 6.9 (H) 4.0 - 5.6 % Final     Comment:     ADA Screening Guidelines:  5.7-6.4%  Consistent with prediabetes  >or=6.5%  Consistent with diabetes    High levels of fetal hemoglobin interfere with the HbA1C  assay. Heterozygous hemoglobin variants (HbS, HgC, etc)do  not significantly interfere with this assay.   However, presence of multiple variants may affect accuracy.     08/21/2024 6.9 (H) 4.0 - 5.6 % Final     Comment:     ADA Screening Guidelines:  5.7-6.4%  Consistent with prediabetes  >or=6.5%  Consistent with diabetes    High levels of fetal hemoglobin interfere with the HbA1C  assay. Heterozygous hemoglobin variants (HbS, HgC, etc)do  not significantly interfere with this assay.   However, presence of multiple variants may affect accuracy.     02/20/2024 6.8 (H) 4.0 - 5.6 % Final     Comment:     ADA Screening Guidelines:  5.7-6.4%  Consistent with prediabetes  >or=6.5%  Consistent with diabetes    High levels of fetal hemoglobin interfere with the HbA1C  assay. Heterozygous hemoglobin variants (HbS, HgC, etc)do  not significantly interfere with this assay.   However, presence of multiple variants may affect accuracy.         Review of Systems   Constitutional:  Negative for chills and fever.   Respiratory:  Negative for shortness of breath.    Cardiovascular:  Negative for chest pain, palpitations, orthopnea, claudication and leg swelling.   Gastrointestinal:  Negative for diarrhea, nausea and vomiting.   Musculoskeletal:  Negative for joint pain.   Skin:  Negative for rash.   Neurological:  Positive for tingling and sensory change.   Psychiatric/Behavioral: Negative.           Objective:      PHYSICAL EXAM: Apperance: Alert and orient in no distress,well developed, and with good  attention to grooming and body habits  Patient presents ambulating in tennis shoes.   LOWER EXTREMITY EXAM:  VASCULAR: Dorsalis pedis pulses 1/4 bilateral and Posterior Tibial pulses 2/4 bilateral. Capillary fill time <4 seconds bilateral. No edema observed bilateral. Varicosities present bilateral. Skin temperature of the lower extremities is warm to warm, proximal to distal. Hair growth WNL bilateral.  DERMATOLOGICAL: No skin rashes, subcutaneous nodules, lesions, or ulcers observed bilateral. Nails 1,2,3,4,5 right and 2,3,4,5 left thickened. Left hallux nail absent. Webspaces 1-4 clean, dry and without evidence of break in skin integrity bilateral. Minimal hyperkeratotic tissue noted to lateral 5th toe.   NEUROLOGICAL: Light touch, sharp-dull, proprioception all present and equal bilaterally.  Vibratory sensation absent at bilateral hallux and diminished at bilateral navicular . Protective sensation absent at 4/10 sites as tested with a Corinth-Chelsy 5.07 monofilament.   MUSCULOSKELETAL: Muscle strength is 5/5 for foot inverters, everters, plantarflexors, and dorsiflexors. Muscle tone is normal. No pain on palpation left lateral midfoot. Minimal bony prominence noted to bilateral lateral midfoot at cuboid-metatarsal joint. Flexible adductovarus hammertoes noted bilateral 5th.         Assessment:       ICD-10-CM ICD-9-CM   1. Encounter for comprehensive diabetic foot examination, type 2 diabetes mellitus  E11.9 250.00   2. Type II diabetes mellitus with neurological manifestations  E11.49 250.60   3. Dermatophytosis of nail  B35.1 110.1           Plan:   Encounter for comprehensive diabetic foot examination, type 2 diabetes mellitus    Type II diabetes mellitus with neurological manifestations    Dermatophytosis of nail    I counseled the patient on his conditions, regarding findings of my examination, my impressions, and usual treatment plan.   This visit spent on counseling and coordination of  care.  Appointment spent on education about the diabetic foot, neuropathy, and prevention of limb loss.  Shoe inspection. Diabetic Foot Education. Patient reminded of the importance of good nutrition and blood sugar control to help prevent podiatric complications of diabetes. Patient instructed on proper foot hygeine. We discussed wearing proper shoe gear, daily foot inspections, never walking without protective shoe gear, never putting sharp instruments to feet.    With patient's permission, nails 1-5 bilateral were debrided/trimmed in length and thickness aggressively to their soft tissue attachment mechanically and with electric , removing all offending nail and debris. Patient relates relief following the procedure.   Patient  will continue to monitor the areas daily, inspect feet, wear protective shoe gear when ambulatory, moisturizer to maintain skin integrity. Patient reminded of the importance of good nutrition and blood sugar control to help prevent podiatric complications of diabetes.  Patient to return 6 months or sooner if needed.          Padmaja Dave DPM  [facility]

## 2025-03-19 ENCOUNTER — TELEPHONE (OUTPATIENT)
Dept: FAMILY MEDICINE | Facility: CLINIC | Age: 85
End: 2025-03-19
Payer: MEDICARE

## 2025-03-25 DIAGNOSIS — D50.8 IRON DEFICIENCY ANEMIA SECONDARY TO INADEQUATE DIETARY IRON INTAKE: ICD-10-CM

## 2025-03-25 RX ORDER — FERROUS SULFATE 325(65) MG
TABLET ORAL
Qty: 270 TABLET | Refills: 3 | Status: SHIPPED | OUTPATIENT
Start: 2025-03-25

## 2025-04-23 ENCOUNTER — OFFICE VISIT (OUTPATIENT)
Dept: FAMILY MEDICINE | Facility: CLINIC | Age: 85
End: 2025-04-23
Payer: MEDICARE

## 2025-04-23 VITALS
HEIGHT: 72 IN | DIASTOLIC BLOOD PRESSURE: 64 MMHG | HEART RATE: 63 BPM | WEIGHT: 224 LBS | RESPIRATION RATE: 14 BRPM | BODY MASS INDEX: 30.34 KG/M2 | SYSTOLIC BLOOD PRESSURE: 137 MMHG

## 2025-04-23 DIAGNOSIS — N18.30 CKD STAGE 3 SECONDARY TO DIABETES: ICD-10-CM

## 2025-04-23 DIAGNOSIS — N18.30 TYPE 2 DIABETES MELLITUS WITH STAGE 3 CHRONIC KIDNEY DISEASE, WITHOUT LONG-TERM CURRENT USE OF INSULIN, UNSPECIFIED WHETHER STAGE 3A OR 3B CKD: ICD-10-CM

## 2025-04-23 DIAGNOSIS — E11.3213 TYPE 2 DIABETES MELLITUS WITH BOTH EYES AFFECTED BY MILD NONPROLIFERATIVE RETINOPATHY AND MACULAR EDEMA, WITHOUT LONG-TERM CURRENT USE OF INSULIN: ICD-10-CM

## 2025-04-23 DIAGNOSIS — E11.69 COMBINED HYPERLIPIDEMIA ASSOCIATED WITH TYPE 2 DIABETES MELLITUS: ICD-10-CM

## 2025-04-23 DIAGNOSIS — I15.2 HYPERTENSION ASSOCIATED WITH DIABETES: ICD-10-CM

## 2025-04-23 DIAGNOSIS — E11.22 CKD STAGE 3 SECONDARY TO DIABETES: ICD-10-CM

## 2025-04-23 DIAGNOSIS — E03.9 HYPOTHYROIDISM, UNSPECIFIED TYPE: Primary | ICD-10-CM

## 2025-04-23 DIAGNOSIS — M94.9 DISORDER OF CARTILAGE, UNSPECIFIED: ICD-10-CM

## 2025-04-23 DIAGNOSIS — I50.30 HEART FAILURE WITH PRESERVED EJECTION FRACTION, BORDERLINE, CLASS II: ICD-10-CM

## 2025-04-23 DIAGNOSIS — M1A.09X1 IDIOPATHIC CHRONIC GOUT OF MULTIPLE SITES WITH TOPHUS: ICD-10-CM

## 2025-04-23 DIAGNOSIS — E11.22 TYPE 2 DIABETES MELLITUS WITH STAGE 3 CHRONIC KIDNEY DISEASE, WITHOUT LONG-TERM CURRENT USE OF INSULIN, UNSPECIFIED WHETHER STAGE 3A OR 3B CKD: ICD-10-CM

## 2025-04-23 DIAGNOSIS — E78.2 COMBINED HYPERLIPIDEMIA ASSOCIATED WITH TYPE 2 DIABETES MELLITUS: ICD-10-CM

## 2025-04-23 DIAGNOSIS — E11.59 HYPERTENSION ASSOCIATED WITH DIABETES: ICD-10-CM

## 2025-04-23 DIAGNOSIS — F03.A0 MILD DEMENTIA WITHOUT BEHAVIORAL DISTURBANCE, PSYCHOTIC DISTURBANCE, MOOD DISTURBANCE, OR ANXIETY, UNSPECIFIED DEMENTIA TYPE: ICD-10-CM

## 2025-04-23 DIAGNOSIS — M62.08 DIASTASIS OF RECTUS ABDOMINIS: ICD-10-CM

## 2025-04-23 DIAGNOSIS — D50.9 IRON DEFICIENCY ANEMIA, UNSPECIFIED IRON DEFICIENCY ANEMIA TYPE: ICD-10-CM

## 2025-04-23 DIAGNOSIS — E55.9 VITAMIN D DEFICIENCY: ICD-10-CM

## 2025-04-23 PROCEDURE — 3288F FALL RISK ASSESSMENT DOCD: CPT | Mod: CPTII,S$GLB,, | Performed by: FAMILY MEDICINE

## 2025-04-23 PROCEDURE — 99999 PR PBB SHADOW E&M-EST. PATIENT-LVL IV: CPT | Mod: PBBFAC,,, | Performed by: FAMILY MEDICINE

## 2025-04-23 PROCEDURE — 99214 OFFICE O/P EST MOD 30 MIN: CPT | Mod: S$GLB,,, | Performed by: FAMILY MEDICINE

## 2025-04-23 PROCEDURE — G2211 COMPLEX E/M VISIT ADD ON: HCPCS | Mod: S$GLB,,, | Performed by: FAMILY MEDICINE

## 2025-04-23 PROCEDURE — 1159F MED LIST DOCD IN RCRD: CPT | Mod: CPTII,S$GLB,, | Performed by: FAMILY MEDICINE

## 2025-04-23 PROCEDURE — 1101F PT FALLS ASSESS-DOCD LE1/YR: CPT | Mod: CPTII,S$GLB,, | Performed by: FAMILY MEDICINE

## 2025-04-23 PROCEDURE — 1126F AMNT PAIN NOTED NONE PRSNT: CPT | Mod: CPTII,S$GLB,, | Performed by: FAMILY MEDICINE

## 2025-04-23 RX ORDER — PIOGLITAZONE 30 MG/1
30 TABLET ORAL DAILY
Qty: 90 TABLET | Refills: 3 | Status: SHIPPED | OUTPATIENT
Start: 2025-04-23

## 2025-04-23 RX ORDER — ALLOPURINOL 300 MG/1
300 TABLET ORAL DAILY
Qty: 90 TABLET | Refills: 3 | Status: SHIPPED | OUTPATIENT
Start: 2025-04-23

## 2025-04-23 RX ORDER — KETOCONAZOLE 20 MG/ML
SHAMPOO, SUSPENSION TOPICAL
COMMUNITY
Start: 2024-11-12 | End: 2025-04-23 | Stop reason: SDUPTHER

## 2025-04-23 RX ORDER — NIFEDIPINE 30 MG/1
1 TABLET, EXTENDED RELEASE ORAL DAILY
COMMUNITY
Start: 2025-02-07 | End: 2025-04-23

## 2025-04-23 RX ORDER — DAPAGLIFLOZIN 10 MG/1
10 TABLET, FILM COATED ORAL EVERY MORNING
Qty: 90 TABLET | Refills: 3 | Status: SHIPPED | OUTPATIENT
Start: 2025-04-23

## 2025-04-23 RX ORDER — NIACIN 1000 MG/1
TABLET, EXTENDED RELEASE ORAL
Qty: 90 TABLET | Refills: 3 | Status: SHIPPED | OUTPATIENT
Start: 2025-04-23

## 2025-04-23 RX ORDER — ERGOCALCIFEROL 1.25 MG/1
CAPSULE ORAL
Qty: 12 CAPSULE | Refills: 3 | Status: SHIPPED | OUTPATIENT
Start: 2025-04-23

## 2025-04-23 RX ORDER — KETOCONAZOLE 20 MG/ML
SHAMPOO, SUSPENSION TOPICAL
Qty: 120 ML | Refills: 11 | Status: SHIPPED | OUTPATIENT
Start: 2025-04-23

## 2025-04-23 RX ORDER — NIFEDIPINE 30 MG/1
30 TABLET, EXTENDED RELEASE ORAL DAILY
Qty: 90 TABLET | Refills: 3 | Status: SHIPPED | OUTPATIENT
Start: 2025-04-23

## 2025-04-23 RX ORDER — SPIRONOLACTONE 25 MG/1
25 TABLET ORAL DAILY
Qty: 90 TABLET | Refills: 3 | Status: SHIPPED | OUTPATIENT
Start: 2025-04-23

## 2025-04-23 RX ORDER — SACUBITRIL AND VALSARTAN 24; 26 MG/1; MG/1
1 TABLET, FILM COATED ORAL 2 TIMES DAILY
Qty: 180 TABLET | Refills: 3 | Status: SHIPPED | OUTPATIENT
Start: 2025-04-23

## 2025-04-23 RX ORDER — POTASSIUM CHLORIDE 750 MG/1
10 TABLET, EXTENDED RELEASE ORAL DAILY
Qty: 90 TABLET | Refills: 3 | Status: SHIPPED | OUTPATIENT
Start: 2025-04-23

## 2025-04-23 RX ORDER — LEVOTHYROXINE SODIUM 75 UG/1
75 TABLET ORAL
Qty: 90 TABLET | Refills: 3 | Status: SHIPPED | OUTPATIENT
Start: 2025-04-23

## 2025-04-23 RX ORDER — FUROSEMIDE 40 MG/1
40 TABLET ORAL
Qty: 90 TABLET | Refills: 3 | Status: SHIPPED | OUTPATIENT
Start: 2025-04-23

## 2025-04-23 RX ORDER — GLIPIZIDE 10 MG/1
10 TABLET ORAL
Qty: 90 TABLET | Refills: 3 | Status: SHIPPED | OUTPATIENT
Start: 2025-04-23

## 2025-04-23 RX ORDER — ACARBOSE 100 MG/1
100 TABLET ORAL
Qty: 270 TABLET | Refills: 3 | Status: SHIPPED | OUTPATIENT
Start: 2025-04-23

## 2025-04-23 RX ORDER — DONEPEZIL HYDROCHLORIDE 10 MG/1
10 TABLET, FILM COATED ORAL DAILY
Qty: 90 TABLET | Refills: 3 | Status: SHIPPED | OUTPATIENT
Start: 2025-04-23

## 2025-04-23 NOTE — PROGRESS NOTES
Subjective:      Patient ID: Rudy Saldaña is a 84 y.o. male.    Chief Complaint: Annual Exam    History of Present Illness    CHIEF COMPLAINT:  Patient presents today for follow up    CURRENT SYMPTOMS:  He reports concern about possible hernia in abdomen without associated pain. He experiences shortness of breath and swelling at the end of the day. He denies chest pain with walking, blood in urine, pain with urination, and blood in stool.    MEDICAL HISTORY:  He has history of gout without recent flares, anemia, and diabetes. Past surgical history includes stitches from a saw accident.    CURRENT MEDICATIONS:  He takes Lasix as needed for swelling with potassium supplementation, thyroid medication, memory medication daily, Ketoconazole shampoo, and niacin.  Dr. Conte's notes were reviewed today.  He is continue all medications except for the atorvastatin which he stopped recently.      ROS:  General: -fever, -chills, -fatigue, -weight gain, -weight loss  Eyes: -vision changes, -redness, -discharge  ENT: -ear pain, -nasal congestion, -sore throat  Cardiovascular: -chest pain, -palpitations, +lower extremity edema  Respiratory: -cough, +shortness of breath  Gastrointestinal: -abdominal pain, -nausea, -vomiting, -diarrhea, -constipation, -blood in stool  Genitourinary: -dysuria, -hematuria, -frequency  Musculoskeletal: -joint pain, -muscle pain  Skin: -rash, -lesion, +skin color changes  Neurological: -headache, -dizziness, -numbness, -tingling, +decreased sensation in extremities  Psychiatric: -anxiety, -depression, -sleep difficulty         Problem List Items Addressed This Visit       CKD stage 3 secondary to diabetes    Overview   The patient has an Estimated Glumerular Filtration Rate (EGFR) below:   CrCl cannot be calculated (Patient's most recent lab result is older than the maximum 7 days allowed.).    eGFR if non    Date Value Ref Range Status   11/09/2021 >60.0 >60 mL/min/1.73 m^2 Final      Comment:     Calculation used to obtain the estimated glomerular filtration  rate (eGFR) is the CKD-EPI equation.            Lab Results   Component Value Date    CREATININE 1.0 11/09/2021    BUN 22 11/09/2021     11/09/2021    K 3.6 11/09/2021     11/09/2021    CO2 26 11/09/2021       The patient's chronic kidney disease was monitored, evaluated, addressed and/or treated.             Relevant Medications    acarbose (PRECOSE) 100 MG Tab    glipiZIDE (GLUCOTROL) 10 MG tablet    pioglitazone (ACTOS) 30 MG tablet    SITagliptin phosphate (JANUVIA) 100 MG Tab    Combined hyperlipidemia associated with type 2 diabetes mellitus    Overview   The patient presents with hyperlipidemia.  The patient reports tolerating the medication well and is in excellent compliance.  There have been no medication side effects.  The patient denies chest pain, neuropathy, and myalgias.  The patient has reduced fat intake and has been exercising.  Current treatment has included the medications listed in the med card.    Lab Results   Component Value Date    CHOL 134 02/20/2024    CHOL 111 (L) 12/01/2022    CHOL 107 (L) 11/09/2021       Lab Results   Component Value Date    HDL 48 02/20/2024    HDL 46 12/01/2022    HDL 42 11/09/2021       Lab Results   Component Value Date    LDLCALC 73.4 02/20/2024    LDLCALC 51.8 (L) 12/01/2022    LDLCALC 51.0 (L) 11/09/2021       Lab Results   Component Value Date    TRIG 63 02/20/2024    TRIG 66 12/01/2022    TRIG 70 11/09/2021       Lab Results   Component Value Date    CHOLHDL 35.8 02/20/2024    CHOLHDL 41.4 12/01/2022    CHOLHDL 39.3 11/09/2021     Lab Results   Component Value Date    ALT 14 07/17/2024    AST 15 07/17/2024    ALKPHOS 76 07/17/2024    BILITOT 1.2 07/17/2024              Relevant Medications    acarbose (PRECOSE) 100 MG Tab    glipiZIDE (GLUCOTROL) 10 MG tablet    niacin (NIASPAN) 1000 MG CR tablet    pioglitazone (ACTOS) 30 MG tablet    SITagliptin phosphate (JANUVIA) 100  MG Tab    Other Relevant Orders    Lipid Panel    Vitamin D    Heart failure with preserved ejection fraction, borderline, class II    Overview   He has CHF and he sees Dr. Conte for this.  He has been controlled but has some WILDER in the afternoons.  He does increase the use of lasix when he is swelling more or weighs more.  He confirmed that he is doing this process.           Hypertension associated with diabetes    Overview   The patient presents with essential hypertension. Patient had some adjustments made to his BP medication due to LE edema. His Amlodipine was decreased to 5 mg daily and Losartan was increased to 50 mg daily. The patient is tolerating the medication well and is in excellent compliance. The patient is experiencing no other side effects. Counseling was offered regarding low salt diets. The patient has a reduced salt intake. The patient denies chest pain, palpitations, shortness of breath, dyspnea on exertion, left or murmur neck pain, nausea, vomiting, diaphoresis, paroxysmal nocturnal dyspnea, and orthopnea.     Hypertension Medications               furosemide (LASIX) 40 MG tablet Take 1 tablet (40 mg total) by mouth once daily.    hydrALAZINE (APRESOLINE) 100 MG tablet Take 1 po q 8 hours    losartan (COZAAR) 50 MG tablet Take 1 tablet (50 mg total) by mouth once daily.    metoprolol succinate (TOPROL-XL) 25 MG 24 hr tablet Take 1 tablet (25 mg total) by mouth once daily.                 Relevant Medications    acarbose (PRECOSE) 100 MG Tab    ENTRESTO 24-26 mg per tablet    furosemide (LASIX) 40 MG tablet    glipiZIDE (GLUCOTROL) 10 MG tablet    NIFEdipine (ADALAT CC) 30 MG TbSR    pioglitazone (ACTOS) 30 MG tablet    potassium chloride (KLOR-CON) 10 MEQ TbSR    SITagliptin phosphate (JANUVIA) 100 MG Tab    spironolactone (ALDACTONE) 25 MG tablet    Other Relevant Orders    Comprehensive Metabolic Panel    Hypothyroidism - Primary    Overview   The patient presents with hypothyroidism.  The  patient denies agitation, anxiety, blurred vision, chest pain, cold intolerance, constipation, dizziness, dry skin, fatigue, lightheadedness, paresthesias, skin coarsening, tachycardia, tremor, weight gain or weight loss.  The patient's current treatment has included Synthroid with a good response.    Lab Results   Component Value Date    TSH 2.247 02/20/2024                Relevant Medications    levothyroxine (SYNTHROID) 75 MCG tablet    Other Relevant Orders    TSH    Idiopathic chronic gout of multiple sites with tophus    Overview   -he has chronic gout  -symptoms have been stable with no flares  -treatment with Allopurinol    Lab Results   Component Value Date    URICACID 3.3 (L) 02/20/2024     Past treatment included colchicine but was stopped due to cost         Relevant Medications    allopurinoL (ZYLOPRIM) 300 MG tablet    Other Relevant Orders    Uric Acid    Iron deficiency anemia    Overview   -history of iron deficiency  -treatment with ferrous sulfate    Lab Results   Component Value Date    IRON 51 11/09/2021    TRANSFERRIN 171 (L) 11/09/2021    TIBC 253 11/09/2021    FESATURATED 20 11/09/2021      Lab Results   Component Value Date    FERRITIN 350 (H) 11/09/2015     Lab Results   Component Value Date    WBC 6.91 06/05/2023    HGB 12.7 (L) 06/05/2023    HCT 39.8 (L) 06/05/2023    MCV 97 06/05/2023     06/05/2023                Relevant Orders    CBC Auto Differential    Iron and TIBC    Mild dementia without behavioral disturbance, psychotic disturbance, mood disturbance, or anxiety, unspecified dementia type    Relevant Medications    donepeziL (ARICEPT) 10 MG tablet    Type 2 diabetes mellitus with both eyes affected by mild nonproliferative retinopathy and macular edema, without long-term current use of insulin    Relevant Medications    acarbose (PRECOSE) 100 MG Tab    glipiZIDE (GLUCOTROL) 10 MG tablet    pioglitazone (ACTOS) 30 MG tablet    SITagliptin phosphate (JANUVIA) 100 MG Tab     "Type 2 diabetes mellitus with kidney complication, without long-term current use of insulin    Overview   The patient presents with diabetes.  The patient denies polyuria, polydipsia, polyphagia, hypoglycemia and paresthesias.  The patient's glucose control has been poor recently due to his use of steroids.  Home glucose averages are routinely checked.  The patient is without retinopathy currently.  The patient has no history of neuropathy.  The patient currently complains of no podiatric problems.  The patient has excellent compliance.  Hemoglobin A1C   Date Value Ref Range Status   12/01/2022 7.0 (H) 4.0 - 5.6 % Final     Comment:     ADA Screening Guidelines:  5.7-6.4%  Consistent with prediabetes  >or=6.5%  Consistent with diabetes    High levels of fetal hemoglobin interfere with the HbA1C  assay. Heterozygous hemoglobin variants (HbS, HgC, etc)do  not significantly interfere with this assay.   However, presence of multiple variants may affect accuracy.     11/09/2021 6.4 (H) 4.0 - 5.6 % Final     Comment:     ADA Screening Guidelines:  5.7-6.4%  Consistent with prediabetes  >or=6.5%  Consistent with diabetes    High levels of fetal hemoglobin interfere with the HbA1C  assay. Heterozygous hemoglobin variants (HbS, HgC, etc)do  not significantly interfere with this assay.   However, presence of multiple variants may affect accuracy.     03/02/2021 6.1 (H) 4.0 - 5.6 % Final     Comment:     ADA Screening Guidelines:  5.7-6.4%  Consistent with prediabetes  >or=6.5%  Consistent with diabetes    High levels of fetal hemoglobin interfere with the HbA1C  assay. Heterozygous hemoglobin variants (HbS, HgC, etc)do  not significantly interfere with this assay.   However, presence of multiple variants may affect accuracy.       No results found for: "MICROALBUR", "[id number]"  Diabetes Management Status    Statin: Taking  ACE/ARB: Taking    Screening or Prevention Patient's value Goal Complete/Controlled?   HgA1C Testing " and Control   Lab Results   Component Value Date    HGBA1C 7.0 (H) 12/01/2022      Annually/Less than 8% Yes   Lipid profile : 12/01/2022 Annually No   LDL control Lab Results   Component Value Date    LDLCALC 51.8 (L) 12/01/2022    Annually/Less than 100 mg/dl  No   Nephropathy screening Lab Results   Component Value Date    LABMICR 83.0 12/01/2022     Lab Results   Component Value Date    PROTEINUA Negative 10/03/2019    Annually No   Blood pressure BP Readings from Last 1 Encounters:   02/20/24 (!) 162/63    Less than 140/90 Yes   Dilated retinal exam : 09/06/2023 Annually Yes   Foot exam   : 08/29/2023 Annually Yes              Relevant Medications    acarbose (PRECOSE) 100 MG Tab    FARXIGA 10 mg tablet    glipiZIDE (GLUCOTROL) 10 MG tablet    pioglitazone (ACTOS) 30 MG tablet    SITagliptin phosphate (JANUVIA) 100 MG Tab    Other Relevant Orders    Hemoglobin A1C    Microalbumin/Creatinine Ratio, Urine    Vitamin D deficiency    Overview   The patient has Vitamin D deficiency and has been on medication for this.  There have been no side effects from the medicine and levels need to be tracked over time.   Lab Results   Component Value Date    ABJAMREV70MQ 31 02/20/2024    ALELAHVL94LE 26 (L) 11/09/2021    LBWMECXF28RU 31 10/03/2019                Relevant Medications    ergocalciferol (VITAMIN D2) 50,000 unit Cap     Other Visit Diagnoses         Diastasis of rectus abdominis          Disorder of cartilage, unspecified        Relevant Orders    Vitamin D            The patient's Health Maintenance was reviewed and the following appears to be due:   Health Maintenance Due   Topic Date Due    RSV Vaccine (Age 60+ and Pregnant patients) (1 - 1-dose 75+ series) Never done    Shingles Vaccine (3 of 3) 01/07/2022    COVID-19 Vaccine (5 - 2024-25 season) 09/01/2024    Diabetic Eye Exam  09/06/2024    Diabetes Urine Screening  02/20/2025    Lipid Panel  02/20/2025    Hemoglobin A1c  02/22/2025       Past Medical  History:  Past Medical History:   Diagnosis Date    Anemia     BPH (benign prostatic hypertrophy)     Carotid bruit     CKD (chronic kidney disease) stage 2/3     Dr. Nails    CVA (cerebral vascular accident)     Diabetes mellitus, type 2     Hyperlipidemia     Hypertension     Hypothyroidism 2/29/2016    Type 2 diabetes mellitus, uncontrolled, with renal complications     The patient presents with diabetes.  The patient denies polyuria, polydipsia, polyphagia, hypoglycemia and paresthesias.  The patient's glucose control has been good.  Home glucose averages are routinely checked.  The patient is without retinopathy currently.  The patient has no history of neuropathy.  The patient currently complains of no podiatric problems.  The patient has excellent compliance.    Type II or unspecified type diabetes mellitus without mention of complication, not stated as uncontrolled     Vitamin D deficiency 2/29/2016     Past Surgical History:   Procedure Laterality Date    COLONOSCOPY  2008    COLONOSCOPY  5/31/2005    repeat in 2015    PILONIDAL CYST DRAINAGE      TRANSURETHRAL RESECTION OF PROSTATE       Review of patient's allergies indicates:   Allergen Reactions    Ace inhibitors      Cough       Medications Ordered Prior to Encounter[1]  Social History[2]  Family History   Problem Relation Name Age of Onset    Hypertension Mother Sharonda Saldaña     Hearing loss Mother Sharonda Saldaña     Hypertension Father Manny Saldaña     Alcohol abuse Father Manny Saldaña     Heart attack Maternal Uncle      Brain cancer Sister      Diabetes Neg Hx      Stroke Neg Hx         Review of Systems   Constitutional: Negative.  Negative for chills, diaphoresis and fever.   HENT:  Negative for congestion, hearing loss, mouth sores, postnasal drip and sore throat.    Eyes:  Negative for pain and visual disturbance.   Respiratory:  Negative for cough, chest tightness, shortness of breath and wheezing.    Cardiovascular:  Positive for leg swelling.  Negative for chest pain.   Gastrointestinal:  Negative for abdominal pain, anal bleeding, blood in stool, constipation, diarrhea, nausea and vomiting.   Genitourinary:  Negative for dysuria and hematuria.   Musculoskeletal:  Negative for back pain, neck pain and neck stiffness.   Skin:  Negative for rash.   Neurological:  Negative for dizziness and weakness.       Objective:   /64 (BP Location: Left arm, Patient Position: Sitting)   Pulse 63   Resp 14   Ht 6' (1.829 m)   Wt 101.6 kg (224 lb)   BMI 30.38 kg/m²   Physical Exam    General: No acute distress. Well-developed. Well-nourished.  Eyes: EOMI. Sclerae anicteric.  HENT: Normocephalic. Atraumatic. Nares patent. Moist oral mucosa.  Ears: Bilateral TMs clear. Bilateral EACs clear.  Cardiovascular: Regular rate. Regular rhythm. No murmurs. No rubs. No gallops. Normal S1, S2. Good pulses in lower extremities. Carotid arteries sound fine.  Respiratory: Normal respiratory effort. Clear to auscultation bilaterally. No rales. No rhonchi. No wheezing. Normal breath sounds. No crackles.  Abdomen: Soft. Non-tender. Non-distended. Normoactive bowel sounds.  Musculoskeletal: No  obvious deformity.  Extremities: No lower extremity edema. Mild swelling in lower extremities.  Neurological: Alert & oriented x3. No slurred speech. Normal gait. Diminished sensation in lower extremities. Lower extremity sessations abnormal bilaterally.  Psychiatric: Normal mood. Normal affect. Good insight. Good judgment.  Skin: Warm. Dry. No rash. Different pigmentation in lower legs.       Physical Exam  Vitals reviewed.   Constitutional:       General: He is not in acute distress.     Appearance: Normal appearance. He is well-developed. He is not ill-appearing or diaphoretic.   HENT:      Head: Normocephalic and atraumatic.      Right Ear: Tympanic membrane, ear canal and external ear normal.      Left Ear: Tympanic membrane, ear canal and external ear normal.      Nose: Nose normal.       Mouth/Throat:      Pharynx: No oropharyngeal exudate.   Eyes:      General: No scleral icterus.        Right eye: No discharge.         Left eye: No discharge.      Conjunctiva/sclera: Conjunctivae normal.      Pupils: Pupils are equal, round, and reactive to light.   Neck:      Thyroid: No thyromegaly.      Vascular: No JVD.   Cardiovascular:      Rate and Rhythm: Normal rate and regular rhythm.      Heart sounds: Normal heart sounds. No murmur heard.     No friction rub. No gallop.   Pulmonary:      Effort: Pulmonary effort is normal. No respiratory distress.      Breath sounds: Normal breath sounds. No wheezing or rales.   Chest:      Chest wall: No tenderness.   Abdominal:      General: Bowel sounds are normal. There is no distension.      Palpations: Abdomen is soft. There is no mass.      Tenderness: There is no abdominal tenderness. There is no guarding or rebound.   Musculoskeletal:         General: No tenderness. Normal range of motion.      Cervical back: Normal range of motion and neck supple.      Right lower leg: Edema (this is mild.) present.      Left lower leg: Edema (This is mild) present.   Lymphadenopathy:      Cervical: No cervical adenopathy.   Skin:     General: Skin is warm and dry.   Neurological:      Mental Status: He is alert and oriented to person, place, and time.      Cranial Nerves: No cranial nerve deficit.      Coordination: Coordination normal.       Assessment:     1. Hypothyroidism, unspecified type    2. Idiopathic chronic gout of multiple sites with tophus    3. Combined hyperlipidemia associated with type 2 diabetes mellitus    4. Vitamin D deficiency    5. Type 2 diabetes mellitus with stage 3 chronic kidney disease, without long-term current use of insulin, unspecified whether stage 3a or 3b CKD    6. Hypertension associated with diabetes    7. Mild dementia without behavioral disturbance, psychotic disturbance, mood disturbance, or anxiety, unspecified dementia type     8. CKD stage 3 secondary to diabetes    9. Iron deficiency anemia, unspecified iron deficiency anemia type    10. Diastasis of rectus abdominis    11. Type 2 diabetes mellitus with both eyes affected by mild nonproliferative retinopathy and macular edema, without long-term current use of insulin    12. Disorder of cartilage, unspecified    13. Heart failure with preserved ejection fraction, borderline, class II      Plan:   Assessment & Plan    IMPRESSION:  - Assessed abdominal concern, determined to be rectus abdominis weakness rather than hernia.  - Evaluated for gout, noting no recent flares.  - Considered vitamin D supplementation unnecessary based on current levels.  - Assessed cardiovascular health, including carotid arteries and peripheral pulses.  - Noted lower leg discoloration and sensation changes, likely due to chronic swelling affecting skin pigmentation and nerve sensation rather than diabetic neuropathy.    1. FOLLOW-UP AND MONITORING:  - - Scheduled follow-up for diabetic eye exam; requested report to be sent. - Inquired about recent hospitalizations or emergency room visits. - Ordered labs to monitor diabetes and related conditions.    2. PHYSICAL EXAMINATION AND ASSESSMENT:  - - Performed comprehensive physical exam, including inspection of ears, throat, lungs, and legs. - Noted changes in skin color and sensation in lower legs, possibly related to diabetes complications.    3. MEDICATION MANAGEMENT:  - - Reviewed and updated all current medications, including diabetes medicines. - Updated all prescriptions for the patient.    4. PREVENTIVE CARE:  - - Recommend vaccines to be administered at a pharmacy, including: a) Shingles vaccine b) COVID-19 vaccine c) RSV vaccine    HYPOTHYROIDISM:  - Ordered fasting lab work including thyroid level.  - Noted that the patient's last thyroid level was checked a year ago.  - Confirmed that the patient is continuing thyroid medication.    GOUT:  - Ordered  fasting lab work including uric acid test.  - Patient reported that the last gout flare occurred quite a while ago.    ANEMIA:  - Ordered fasting lab work including CBC and iron studies due to history of anemia.    PARESTHESIA:  - Assessed that the sensation difference is likely due to swelling affecting the nerves in the lower legs.  - Differentiated between neuropathy caused by diabetes and sensation changes due to swelling.    EDEMA:  - Examined legs, finding mild swelling but good arterial pulses.  - Noted patient's report of swelling at the end of the day.  - Explained the effects of prolonged swelling on skin color and sensation.  - Confirmed that the patient takes furosemide (Lasix) when swelling occurs.    SKIN CHANGES:  - Explained that prolonged swelling can cause skin color changes due to iron deposition from disintegrated blood cells.  - Refilled prescription for ketoconazole shampoo.    MUSCLE WEAKNESS:  - Identified weakening of the rectus abdominis muscle, causing abdominal protrusion when the patient sits up.  - Explained that this condition is age-related muscle weakening, not a hernia, and does not require treatment or surgery.    ADDITIONAL NOTES:  - Continued Donepezil (memory pill) daily.  - Ordered fasting lab work including: Complete Metabolic Panel (CMP), Lipid panel, Vitamin D.       I have changed Rudy Saldaña's acarbose, allopurinoL, donepeziL, FARXIGA, furosemide, glipiZIDE, levothyroxine, NIFEdipine, pioglitazone, potassium chloride, and spironolactone. I am also having him maintain his aspirin, blood sugar diagnostic, lancets, Lactobacillus rhamnosus GG, ONETOUCH VERIO IQ METER, meclizine, ferrous sulfate, ENTRESTO, ergocalciferol, ketoconazole, niacin, and SITagliptin phosphate.  No problem-specific Assessment & Plan notes found for this encounter.      No follow-ups on file.    Rudy was seen today for annual exam.    Diagnoses and all orders for this visit:    Hypothyroidism,  unspecified type  -     levothyroxine (SYNTHROID) 75 MCG tablet; Take 1 tablet (75 mcg total) by mouth before breakfast.  -     TSH; Future    Idiopathic chronic gout of multiple sites with tophus  -     allopurinoL (ZYLOPRIM) 300 MG tablet; Take 1 tablet (300 mg total) by mouth once daily.  -     Uric Acid; Future    Combined hyperlipidemia associated with type 2 diabetes mellitus  -     niacin (NIASPAN) 1000 MG CR tablet; TAKE ONE TABLET BY MOUTH AT BEDTIME  -     Lipid Panel; Future  -     Vitamin D; Future    Vitamin D deficiency  -     ergocalciferol (VITAMIN D2) 50,000 unit Cap; TAKE ONE CAPSULE BY MOUTH EVERY WEEK    Type 2 diabetes mellitus with stage 3 chronic kidney disease, without long-term current use of insulin, unspecified whether stage 3a or 3b CKD  -     acarbose (PRECOSE) 100 MG Tab; Take 1 tablet (100 mg total) by mouth 3 (three) times daily with meals.  -     FARXIGA 10 mg tablet; Take 1 tablet (10 mg total) by mouth every morning.  -     glipiZIDE (GLUCOTROL) 10 MG tablet; Take 1 tablet (10 mg total) by mouth daily with breakfast.  -     pioglitazone (ACTOS) 30 MG tablet; Take 1 tablet (30 mg total) by mouth once daily.  -     SITagliptin phosphate (JANUVIA) 100 MG Tab; TAKE ONE TABLET BY MOUTH EVERY DAY  -     Hemoglobin A1C; Future  -     Microalbumin/Creatinine Ratio, Urine; Future  -     Microalbumin/Creatinine Ratio, Urine    Hypertension associated with diabetes  -     ENTRESTO 24-26 mg per tablet; Take 1 tablet by mouth 2 (two) times daily.  -     furosemide (LASIX) 40 MG tablet; Take 1 tablet (40 mg total) by mouth as needed (It wt increases 2 to 3 lbs overnight). It wt increases 2 to 3 lbs overnight  -     NIFEdipine (ADALAT CC) 30 MG TbSR; Take 1 tablet (30 mg total) by mouth once daily.  -     potassium chloride (KLOR-CON) 10 MEQ TbSR; Take 1 tablet (10 mEq total) by mouth once daily.  -     spironolactone (ALDACTONE) 25 MG tablet; Take 1 tablet (25 mg total) by mouth once daily.  -      Comprehensive Metabolic Panel; Future    Mild dementia without behavioral disturbance, psychotic disturbance, mood disturbance, or anxiety, unspecified dementia type  -     donepeziL (ARICEPT) 10 MG tablet; Take 1 tablet (10 mg total) by mouth once daily.    CKD stage 3 secondary to diabetes    Iron deficiency anemia, unspecified iron deficiency anemia type  -     CBC Auto Differential; Future  -     Iron and TIBC; Future    Diastasis of rectus abdominis  No treatment is needed for this.  Type 2 diabetes mellitus with both eyes affected by mild nonproliferative retinopathy and macular edema, without long-term current use of insulin  Continue current medication and check the labs.  Disorder of cartilage, unspecified  -     Vitamin D; Future    Heart failure with preserved ejection fraction, borderline, class II  Take Lasix as needed.  Monitor weight daily.  Continue follow-up with Dr. Conte.  Other orders  -     ketoconazole (NIZORAL) 2 % shampoo; Apply topically 3 (three) times a week.      Medications Ordered This Encounter   Medications    acarbose (PRECOSE) 100 MG Tab     Sig: Take 1 tablet (100 mg total) by mouth 3 (three) times daily with meals.     Dispense:  270 tablet     Refill:  3     This prescription was filled on 12/23/2024. Any refills authorized will be placed on file.    allopurinoL (ZYLOPRIM) 300 MG tablet     Sig: Take 1 tablet (300 mg total) by mouth once daily.     Dispense:  90 tablet     Refill:  3    donepeziL (ARICEPT) 10 MG tablet     Sig: Take 1 tablet (10 mg total) by mouth once daily.     Dispense:  90 tablet     Refill:  3     This prescription was filled on 12/9/2024. Any refills authorized will be placed on file.    ENTRESTO 24-26 mg per tablet     Sig: Take 1 tablet by mouth 2 (two) times daily.     Dispense:  180 tablet     Refill:  3    ergocalciferol (VITAMIN D2) 50,000 unit Cap     Sig: TAKE ONE CAPSULE BY MOUTH EVERY WEEK     Dispense:  12 capsule     Refill:  3     This  prescription was filled on 10/23/2024. Any refills authorized will be placed on file.    FARXIGA 10 mg tablet     Sig: Take 1 tablet (10 mg total) by mouth every morning.     Dispense:  90 tablet     Refill:  3    furosemide (LASIX) 40 MG tablet     Sig: Take 1 tablet (40 mg total) by mouth as needed (It wt increases 2 to 3 lbs overnight). It wt increases 2 to 3 lbs overnight     Dispense:  90 tablet     Refill:  3    glipiZIDE (GLUCOTROL) 10 MG tablet     Sig: Take 1 tablet (10 mg total) by mouth daily with breakfast.     Dispense:  90 tablet     Refill:  3     This prescription was filled on 12/9/2024. Any refills authorized will be placed on file.    ketoconazole (NIZORAL) 2 % shampoo     Sig: Apply topically 3 (three) times a week.     Dispense:  120 mL     Refill:  11    levothyroxine (SYNTHROID) 75 MCG tablet     Sig: Take 1 tablet (75 mcg total) by mouth before breakfast.     Dispense:  90 tablet     Refill:  3    niacin (NIASPAN) 1000 MG CR tablet     Sig: TAKE ONE TABLET BY MOUTH AT BEDTIME     Dispense:  90 tablet     Refill:  3    NIFEdipine (ADALAT CC) 30 MG TbSR     Sig: Take 1 tablet (30 mg total) by mouth once daily.     Dispense:  90 tablet     Refill:  3     .    pioglitazone (ACTOS) 30 MG tablet     Sig: Take 1 tablet (30 mg total) by mouth once daily.     Dispense:  90 tablet     Refill:  3     This prescription was filled on 12/9/2024. Any refills authorized will be placed on file.    potassium chloride (KLOR-CON) 10 MEQ TbSR     Sig: Take 1 tablet (10 mEq total) by mouth once daily.     Dispense:  90 tablet     Refill:  3    SITagliptin phosphate (JANUVIA) 100 MG Tab     Sig: TAKE ONE TABLET BY MOUTH EVERY DAY     Dispense:  90 tablet     Refill:  3    spironolactone (ALDACTONE) 25 MG tablet     Sig: Take 1 tablet (25 mg total) by mouth once daily.     Dispense:  90 tablet     Refill:  3     .     The patient was instructed to stop the following meds:  Medications Discontinued During This  Encounter   Medication Reason    atorvastatin (LIPITOR) 20 MG tablet Patient no longer taking    NIFEdipine (ADALAT CC) 30 MG TbSR Patient no longer taking    ENTRESTO 24-26 mg per tablet Reorder    FARXIGA 10 mg tablet Reorder    furosemide (LASIX) 40 MG tablet Reorder    potassium chloride (KLOR-CON) 10 MEQ TbSR Reorder    NIFEdipine (ADALAT CC) 30 MG TbSR Reorder    spironolactone (ALDACTONE) 25 MG tablet Reorder    levothyroxine (SYNTHROID) 75 MCG tablet Reorder    niacin (NIASPAN) 1000 MG CR tablet Reorder    ergocalciferol (VITAMIN D2) 50,000 unit Cap Reorder    donepeziL (ARICEPT) 10 MG tablet Reorder    glipiZIDE (GLUCOTROL) 10 MG tablet Reorder    acarbose (PRECOSE) 100 MG Tab Reorder    SITagliptin phosphate (JANUVIA) 100 MG Tab Reorder    allopurinoL (ZYLOPRIM) 300 MG tablet Reorder    pioglitazone (ACTOS) 30 MG tablet Reorder    ketoconazole (NIZORAL) 2 % shampoo Reorder     Orders Placed This Encounter   Procedures    Comprehensive Metabolic Panel     Standing Status:   Future     Expected Date:   4/23/2025     Expiration Date:   4/23/2026     Send normal result to authorizing provider's In Basket if patient is active on MyChart::   No    Hemoglobin A1C     Standing Status:   Future     Expected Date:   4/23/2025     Expiration Date:   4/23/2026     Send normal result to authorizing provider's In Basket if patient is active on MyChart::   Yes    Lipid Panel     Standing Status:   Future     Expected Date:   4/23/2025     Expiration Date:   4/23/2026     Send normal result to authorizing provider's In Basket if patient is active on MyChart::   Yes    Microalbumin/Creatinine Ratio, Urine     May change to clinic collect if the patient is in the clinic at the time.     Standing Status:   Future     Number of Occurrences:   1     Expected Date:   4/23/2025     Expiration Date:   4/23/2026     Specimen Source:   Urine    TSH     Standing Status:   Future     Expected Date:   4/23/2025     Expiration Date:    4/23/2026     Send normal result to authorizing provider's In Basket if patient is active on MyChart::   No    Uric Acid     Standing Status:   Future     Expected Date:   4/23/2025     Expiration Date:   4/23/2026     Send normal result to authorizing provider's In Basket if patient is active on MyChart::   No    Vitamin D     Standing Status:   Future     Expected Date:   4/23/2025     Expiration Date:   6/22/2026     Send normal result to authorizing provider's In Basket if patient is active on MyChart::   No    CBC Auto Differential     Standing Status:   Future     Expected Date:   4/23/2025     Expiration Date:   6/22/2026     Send normal result to authorizing provider's In Basket if patient is active on MyChart::   No    Iron and TIBC     Standing Status:   Future     Expected Date:   4/23/2025     Expiration Date:   4/23/2026     Send normal result to authorizing provider's In Basket if patient is active on MyChart::   No       Medication List with Changes/Refills   Current Medications    ASPIRIN (ECOTRIN) 81 MG EC TABLET    Take 81 mg by mouth once daily.    BLOOD SUGAR DIAGNOSTIC STRP    1 each by Misc.(Non-Drug; Combo Route) route 3 (three) times daily as needed. One touch ultra 2 test strips    FERROUS SULFATE (FEOSOL) 325 MG (65 MG IRON) TAB TABLET    TAKE ONE TABLET (325 MG TOTAL) BY MOUTH THREE TIMES DAILY    LACTOBACILLUS RHAMNOSUS GG (CULTURELLE) 10 BILLION CELL CAPSULE    Take 1 capsule by mouth once daily.    LANCETS (TRUEPLUS LANCETS) 33 GAUGE MISC    1 lancet by Misc.(Non-Drug; Combo Route) route once daily.    MECLIZINE (ANTIVERT) 25 MG TABLET    Take 1 tablet (25 mg total) by mouth 3 (three) times daily as needed for Dizziness.    ONETOUCH VERIO IQ METER KIT    Monitor blood glucose once daily   Changed and/or Refilled Medications    Modified Medication Previous Medication    ACARBOSE (PRECOSE) 100 MG TAB acarbose (PRECOSE) 100 MG Tab       Take 1 tablet (100 mg total) by mouth 3 (three) times  daily with meals.    TAKE ONE TABLET (100 MG TOTAL) BY MOUTH THREE TIMES DAILY    ALLOPURINOL (ZYLOPRIM) 300 MG TABLET allopurinoL (ZYLOPRIM) 300 MG tablet       Take 1 tablet (300 mg total) by mouth once daily.    TAKE ONE TABLET (300 MG TOTAL) BY MOUTH ONCE DAILY    DONEPEZIL (ARICEPT) 10 MG TABLET donepeziL (ARICEPT) 10 MG tablet       Take 1 tablet (10 mg total) by mouth once daily.    TAKE ONE TABLET (10 MG TOTAL) BY MOUTH ONCE DAILY    ENTRESTO 24-26 MG PER TABLET ENTRESTO 24-26 mg per tablet       Take 1 tablet by mouth 2 (two) times daily.    Take 1 tablet by mouth 2 (two) times daily.    ERGOCALCIFEROL (VITAMIN D2) 50,000 UNIT CAP ergocalciferol (VITAMIN D2) 50,000 unit Cap       TAKE ONE CAPSULE BY MOUTH EVERY WEEK    TAKE ONE CAPSULE BY MOUTH EVERY WEEK    FARXIGA 10 MG TABLET FARXIGA 10 mg tablet       Take 1 tablet (10 mg total) by mouth every morning.    Take 10 mg by mouth every morning.    FUROSEMIDE (LASIX) 40 MG TABLET furosemide (LASIX) 40 MG tablet       Take 1 tablet (40 mg total) by mouth as needed (It wt increases 2 to 3 lbs overnight). It wt increases 2 to 3 lbs overnight    Take 1 tablet (40 mg total) by mouth once daily.    GLIPIZIDE (GLUCOTROL) 10 MG TABLET glipiZIDE (GLUCOTROL) 10 MG tablet       Take 1 tablet (10 mg total) by mouth daily with breakfast.    TAKE ONE TABLET (10 MG TOTAL) BY MOUTH TWICE DAILY BEFORE MEALS    KETOCONAZOLE (NIZORAL) 2 % SHAMPOO ketoconazole (NIZORAL) 2 % shampoo       Apply topically 3 (three) times a week.    Apply topically 3 (three) times a week.    LEVOTHYROXINE (SYNTHROID) 75 MCG TABLET levothyroxine (SYNTHROID) 75 MCG tablet       Take 1 tablet (75 mcg total) by mouth before breakfast.    TAKE ONE TABLET (75 MCG TOTAL) BY MOUTH ONCE DAILY    NIACIN (NIASPAN) 1000 MG CR TABLET niacin (NIASPAN) 1000 MG CR tablet       TAKE ONE TABLET BY MOUTH AT BEDTIME    TAKE ONE TABLET BY MOUTH AT BEDTIME    NIFEDIPINE (ADALAT CC) 30 MG TBSR NIFEdipine (ADALAT CC) 30  MG TbSR       Take 1 tablet (30 mg total) by mouth once daily.    Take 30 mg by mouth once daily.    PIOGLITAZONE (ACTOS) 30 MG TABLET pioglitazone (ACTOS) 30 MG tablet       Take 1 tablet (30 mg total) by mouth once daily.    TAKE ONE TABLET (30 MG TOTAL) BY MOUTH ONCE DAILY    POTASSIUM CHLORIDE (KLOR-CON) 10 MEQ TBSR potassium chloride (KLOR-CON) 10 MEQ TbSR       Take 1 tablet (10 mEq total) by mouth once daily.    TAKE ONE TABLET BY MOUTH EVERY DAY    SITAGLIPTIN PHOSPHATE (JANUVIA) 100 MG TAB SITagliptin phosphate (JANUVIA) 100 MG Tab       TAKE ONE TABLET BY MOUTH EVERY DAY    TAKE ONE TABLET BY MOUTH EVERY DAY    SPIRONOLACTONE (ALDACTONE) 25 MG TABLET spironolactone (ALDACTONE) 25 MG tablet       Take 1 tablet (25 mg total) by mouth once daily.    Take 25 mg by mouth once daily.   Discontinued Medications    ATORVASTATIN (LIPITOR) 20 MG TABLET    TAKE ONE TABLET (20 MG TOTAL) BY MOUTH ONCE DAILY    NIFEDIPINE (ADALAT CC) 30 MG TBSR    Take 1 tablet by mouth once daily.      Medication List with Changes/Refills   Current Medications    ASPIRIN (ECOTRIN) 81 MG EC TABLET    Take 81 mg by mouth once daily.       Start Date: --        End Date: --    BLOOD SUGAR DIAGNOSTIC STRP    1 each by Misc.(Non-Drug; Combo Route) route 3 (three) times daily as needed. One touch ultra 2 test strips       Start Date: 1/21/2019 End Date: --    FERROUS SULFATE (FEOSOL) 325 MG (65 MG IRON) TAB TABLET    TAKE ONE TABLET (325 MG TOTAL) BY MOUTH THREE TIMES DAILY       Start Date: 3/25/2025 End Date: --    LACTOBACILLUS RHAMNOSUS GG (CULTURELLE) 10 BILLION CELL CAPSULE    Take 1 capsule by mouth once daily.       Start Date: --        End Date: --    LANCETS (TRUEPLUS LANCETS) 33 GAUGE MISC    1 lancet by Misc.(Non-Drug; Combo Route) route once daily.       Start Date: 1/21/2019 End Date: --    MECLIZINE (ANTIVERT) 25 MG TABLET    Take 1 tablet (25 mg total) by mouth 3 (three) times daily as needed for Dizziness.       Start Date:  6/19/2023 End Date: 7/19/2023    ONETOUCH VERIO IQ METER KIT    Monitor blood glucose once daily       Start Date: 2/8/2023  End Date: 12/20/2029   Changed and/or Refilled Medications    Modified Medication Previous Medication    ACARBOSE (PRECOSE) 100 MG TAB acarbose (PRECOSE) 100 MG Tab       Take 1 tablet (100 mg total) by mouth 3 (three) times daily with meals.    TAKE ONE TABLET (100 MG TOTAL) BY MOUTH THREE TIMES DAILY       Start Date: 4/23/2025 End Date: --    Start Date: 12/23/2024End Date: 4/23/2025    ALLOPURINOL (ZYLOPRIM) 300 MG TABLET allopurinoL (ZYLOPRIM) 300 MG tablet       Take 1 tablet (300 mg total) by mouth once daily.    TAKE ONE TABLET (300 MG TOTAL) BY MOUTH ONCE DAILY       Start Date: 4/23/2025 End Date: --    Start Date: 3/6/2025  End Date: 4/23/2025    DONEPEZIL (ARICEPT) 10 MG TABLET donepeziL (ARICEPT) 10 MG tablet       Take 1 tablet (10 mg total) by mouth once daily.    TAKE ONE TABLET (10 MG TOTAL) BY MOUTH ONCE DAILY       Start Date: 4/23/2025 End Date: --    Start Date: 12/10/2024End Date: 4/23/2025    ENTRESTO 24-26 MG PER TABLET ENTRESTO 24-26 mg per tablet       Take 1 tablet by mouth 2 (two) times daily.    Take 1 tablet by mouth 2 (two) times daily.       Start Date: 4/23/2025 End Date: --    Start Date: 8/14/2023 End Date: 4/23/2025    ERGOCALCIFEROL (VITAMIN D2) 50,000 UNIT CAP ergocalciferol (VITAMIN D2) 50,000 unit Cap       TAKE ONE CAPSULE BY MOUTH EVERY WEEK    TAKE ONE CAPSULE BY MOUTH EVERY WEEK       Start Date: 4/23/2025 End Date: --    Start Date: 10/23/2024End Date: 4/23/2025    FARXIGA 10 MG TABLET FARXIGA 10 mg tablet       Take 1 tablet (10 mg total) by mouth every morning.    Take 10 mg by mouth every morning.       Start Date: 4/23/2025 End Date: --    Start Date: 8/14/2023 End Date: 4/23/2025    FUROSEMIDE (LASIX) 40 MG TABLET furosemide (LASIX) 40 MG tablet       Take 1 tablet (40 mg total) by mouth as needed (It wt increases 2 to 3 lbs overnight). It wt  increases 2 to 3 lbs overnight    Take 1 tablet (40 mg total) by mouth once daily.       Start Date: 4/23/2025 End Date: --    Start Date: 2/20/2024 End Date: 4/23/2025    GLIPIZIDE (GLUCOTROL) 10 MG TABLET glipiZIDE (GLUCOTROL) 10 MG tablet       Take 1 tablet (10 mg total) by mouth daily with breakfast.    TAKE ONE TABLET (10 MG TOTAL) BY MOUTH TWICE DAILY BEFORE MEALS       Start Date: 4/23/2025 End Date: --    Start Date: 12/10/2024End Date: 4/23/2025    KETOCONAZOLE (NIZORAL) 2 % SHAMPOO ketoconazole (NIZORAL) 2 % shampoo       Apply topically 3 (three) times a week.    Apply topically 3 (three) times a week.       Start Date: 4/23/2025 End Date: --    Start Date: 11/12/2024End Date: 4/23/2025    LEVOTHYROXINE (SYNTHROID) 75 MCG TABLET levothyroxine (SYNTHROID) 75 MCG tablet       Take 1 tablet (75 mcg total) by mouth before breakfast.    TAKE ONE TABLET (75 MCG TOTAL) BY MOUTH ONCE DAILY       Start Date: 4/23/2025 End Date: --    Start Date: 10/23/2024End Date: 4/23/2025    NIACIN (NIASPAN) 1000 MG CR TABLET niacin (NIASPAN) 1000 MG CR tablet       TAKE ONE TABLET BY MOUTH AT BEDTIME    TAKE ONE TABLET BY MOUTH AT BEDTIME       Start Date: 4/23/2025 End Date: --    Start Date: 10/23/2024End Date: 4/23/2025    NIFEDIPINE (ADALAT CC) 30 MG TBSR NIFEdipine (ADALAT CC) 30 MG TbSR       Take 1 tablet (30 mg total) by mouth once daily.    Take 30 mg by mouth once daily.       Start Date: 4/23/2025 End Date: --    Start Date: --        End Date: 4/23/2025    PIOGLITAZONE (ACTOS) 30 MG TABLET pioglitazone (ACTOS) 30 MG tablet       Take 1 tablet (30 mg total) by mouth once daily.    TAKE ONE TABLET (30 MG TOTAL) BY MOUTH ONCE DAILY       Start Date: 4/23/2025 End Date: --    Start Date: 3/7/2025  End Date: 4/23/2025    POTASSIUM CHLORIDE (KLOR-CON) 10 MEQ TBSR potassium chloride (KLOR-CON) 10 MEQ TbSR       Take 1 tablet (10 mEq total) by mouth once daily.    TAKE ONE TABLET BY MOUTH EVERY DAY       Start Date:  4/23/2025 End Date: --    Start Date: 5/3/2024  End Date: 4/23/2025    SITAGLIPTIN PHOSPHATE (JANUVIA) 100 MG TAB SITagliptin phosphate (JANUVIA) 100 MG Tab       TAKE ONE TABLET BY MOUTH EVERY DAY    TAKE ONE TABLET BY MOUTH EVERY DAY       Start Date: 4/23/2025 End Date: --    Start Date: 1/22/2025 End Date: 4/23/2025    SPIRONOLACTONE (ALDACTONE) 25 MG TABLET spironolactone (ALDACTONE) 25 MG tablet       Take 1 tablet (25 mg total) by mouth once daily.    Take 25 mg by mouth once daily.       Start Date: 4/23/2025 End Date: --    Start Date: --        End Date: 4/23/2025   Discontinued Medications    ATORVASTATIN (LIPITOR) 20 MG TABLET    TAKE ONE TABLET (20 MG TOTAL) BY MOUTH ONCE DAILY       Start Date: 12/10/2024End Date: 4/23/2025    NIFEDIPINE (ADALAT CC) 30 MG TBSR    Take 1 tablet by mouth once daily.       Start Date: 2/7/2025  End Date: 4/23/2025                This note was generated with the assistance of ambient listening technology. Verbal consent was obtained by the patient and accompanying visitor(s) for the recording of patient appointment to facilitate this note. I attest to having reviewed and edited the generated note for accuracy, though some syntax or spelling errors may persist. Please contact the author of this note for any clarification.           [1]   Current Outpatient Medications on File Prior to Visit   Medication Sig Dispense Refill    aspirin (ECOTRIN) 81 MG EC tablet Take 81 mg by mouth once daily.      blood sugar diagnostic Strp 1 each by Misc.(Non-Drug; Combo Route) route 3 (three) times daily as needed. One touch ultra 2 test strips 100 each 5    ferrous sulfate (FEOSOL) 325 mg (65 mg iron) Tab tablet TAKE ONE TABLET (325 MG TOTAL) BY MOUTH THREE TIMES DAILY 270 tablet 3    Lactobacillus rhamnosus GG (CULTURELLE) 10 billion cell capsule Take 1 capsule by mouth once daily.      lancets (TRUEPLUS LANCETS) 33 gauge Misc 1 lancet by Misc.(Non-Drug; Combo Route) route once daily.  100 each 5    ONETOUCH VERIO IQ METER kit Monitor blood glucose once daily 1 each 0    [DISCONTINUED] acarbose (PRECOSE) 100 MG Tab TAKE ONE TABLET (100 MG TOTAL) BY MOUTH THREE TIMES DAILY 270 tablet 0    [DISCONTINUED] allopurinoL (ZYLOPRIM) 300 MG tablet TAKE ONE TABLET (300 MG TOTAL) BY MOUTH ONCE DAILY 90 tablet 0    [DISCONTINUED] atorvastatin (LIPITOR) 20 MG tablet TAKE ONE TABLET (20 MG TOTAL) BY MOUTH ONCE DAILY 90 tablet 3    [DISCONTINUED] donepeziL (ARICEPT) 10 MG tablet TAKE ONE TABLET (10 MG TOTAL) BY MOUTH ONCE DAILY 90 tablet 3    [DISCONTINUED] ENTRESTO 24-26 mg per tablet Take 1 tablet by mouth 2 (two) times daily.      [DISCONTINUED] ergocalciferol (VITAMIN D2) 50,000 unit Cap TAKE ONE CAPSULE BY MOUTH EVERY WEEK 12 capsule 3    [DISCONTINUED] FARXIGA 10 mg tablet Take 10 mg by mouth every morning.      [DISCONTINUED] furosemide (LASIX) 40 MG tablet Take 1 tablet (40 mg total) by mouth once daily. (Patient taking differently: Take 40 mg by mouth as needed (It wt increases 2 to 3 lbs overnight). It wt increases 2 to 3 lbs overnight) 90 tablet 3    [DISCONTINUED] glipiZIDE (GLUCOTROL) 10 MG tablet TAKE ONE TABLET (10 MG TOTAL) BY MOUTH TWICE DAILY BEFORE MEALS 180 tablet 3    [DISCONTINUED] ketoconazole (NIZORAL) 2 % shampoo Apply topically 3 (three) times a week.      [DISCONTINUED] levothyroxine (SYNTHROID) 75 MCG tablet TAKE ONE TABLET (75 MCG TOTAL) BY MOUTH ONCE DAILY 90 tablet 0    [DISCONTINUED] niacin (NIASPAN) 1000 MG CR tablet TAKE ONE TABLET BY MOUTH AT BEDTIME 90 tablet 0    [DISCONTINUED] NIFEdipine (ADALAT CC) 30 MG TbSR Take 30 mg by mouth once daily.      [DISCONTINUED] NIFEdipine (ADALAT CC) 30 MG TbSR Take 1 tablet by mouth once daily.      [DISCONTINUED] pioglitazone (ACTOS) 30 MG tablet TAKE ONE TABLET (30 MG TOTAL) BY MOUTH ONCE DAILY 90 tablet 0    [DISCONTINUED] potassium chloride (KLOR-CON) 10 MEQ TbSR TAKE ONE TABLET BY MOUTH EVERY DAY 90 tablet 11    [DISCONTINUED]  SITagliptin phosphate (JANUVIA) 100 MG Tab TAKE ONE TABLET BY MOUTH EVERY DAY 90 tablet 0    [DISCONTINUED] spironolactone (ALDACTONE) 25 MG tablet Take 25 mg by mouth once daily.      meclizine (ANTIVERT) 25 mg tablet Take 1 tablet (25 mg total) by mouth 3 (three) times daily as needed for Dizziness. 30 tablet 2     No current facility-administered medications on file prior to visit.   [2]   Social History  Socioeconomic History    Marital status:    Tobacco Use    Smoking status: Never    Smokeless tobacco: Never   Substance and Sexual Activity    Alcohol use: No     Alcohol/week: 0.0 standard drinks of alcohol    Drug use: No     Social Drivers of Health     Financial Resource Strain: Low Risk  (10/8/2024)    Overall Financial Resource Strain (CARDIA)     Difficulty of Paying Living Expenses: Not hard at all   Food Insecurity: No Food Insecurity (10/8/2024)    Hunger Vital Sign     Worried About Running Out of Food in the Last Year: Never true     Ran Out of Food in the Last Year: Never true   Transportation Needs: No Transportation Needs (10/8/2024)    PRAPARE - Transportation     Lack of Transportation (Medical): No     Lack of Transportation (Non-Medical): No   Physical Activity: Inactive (10/8/2024)    Exercise Vital Sign     Days of Exercise per Week: 0 days     Minutes of Exercise per Session: 0 min   Stress: No Stress Concern Present (10/8/2024)    [employer] - Occupational Stress Questionnaire     Feeling of Stress : Not at all   Housing Stability: Unknown (10/8/2024)    Housing Stability Vital Sign     Unable to Pay for Housing in the Last Year: No     Homeless in the Last Year: No

## 2025-04-29 ENCOUNTER — LAB VISIT (OUTPATIENT)
Dept: LAB | Facility: HOSPITAL | Age: 85
End: 2025-04-29
Attending: FAMILY MEDICINE
Payer: MEDICARE

## 2025-04-29 DIAGNOSIS — E78.2 COMBINED HYPERLIPIDEMIA ASSOCIATED WITH TYPE 2 DIABETES MELLITUS: ICD-10-CM

## 2025-04-29 DIAGNOSIS — M94.9 DISORDER OF CARTILAGE, UNSPECIFIED: ICD-10-CM

## 2025-04-29 DIAGNOSIS — I15.2 HYPERTENSION ASSOCIATED WITH DIABETES: ICD-10-CM

## 2025-04-29 DIAGNOSIS — E03.9 HYPOTHYROIDISM, UNSPECIFIED TYPE: ICD-10-CM

## 2025-04-29 DIAGNOSIS — N18.30 TYPE 2 DIABETES MELLITUS WITH STAGE 3 CHRONIC KIDNEY DISEASE, WITHOUT LONG-TERM CURRENT USE OF INSULIN, UNSPECIFIED WHETHER STAGE 3A OR 3B CKD: ICD-10-CM

## 2025-04-29 DIAGNOSIS — M1A.09X1 IDIOPATHIC CHRONIC GOUT OF MULTIPLE SITES WITH TOPHUS: ICD-10-CM

## 2025-04-29 DIAGNOSIS — D50.9 IRON DEFICIENCY ANEMIA, UNSPECIFIED IRON DEFICIENCY ANEMIA TYPE: ICD-10-CM

## 2025-04-29 DIAGNOSIS — E11.22 TYPE 2 DIABETES MELLITUS WITH STAGE 3 CHRONIC KIDNEY DISEASE, WITHOUT LONG-TERM CURRENT USE OF INSULIN, UNSPECIFIED WHETHER STAGE 3A OR 3B CKD: ICD-10-CM

## 2025-04-29 DIAGNOSIS — E11.59 HYPERTENSION ASSOCIATED WITH DIABETES: ICD-10-CM

## 2025-04-29 DIAGNOSIS — E11.69 COMBINED HYPERLIPIDEMIA ASSOCIATED WITH TYPE 2 DIABETES MELLITUS: ICD-10-CM

## 2025-04-29 LAB
25(OH)D3+25(OH)D2 SERPL-MCNC: 29 NG/ML (ref 30–96)
ABSOLUTE EOSINOPHIL (OHS): 0.12 K/UL
ABSOLUTE MONOCYTE (OHS): 0.53 K/UL (ref 0.3–1)
ABSOLUTE NEUTROPHIL COUNT (OHS): 3.35 K/UL (ref 1.8–7.7)
ALBUMIN SERPL BCP-MCNC: 3.9 G/DL (ref 3.5–5.2)
ALP SERPL-CCNC: 69 UNIT/L (ref 40–150)
ALT SERPL W/O P-5'-P-CCNC: 11 UNIT/L (ref 10–44)
ANION GAP (OHS): 11 MMOL/L (ref 8–16)
AST SERPL-CCNC: 14 UNIT/L (ref 11–45)
BASOPHILS # BLD AUTO: 0.02 K/UL
BASOPHILS NFR BLD AUTO: 0.4 %
BILIRUB SERPL-MCNC: 1.3 MG/DL (ref 0.1–1)
BUN SERPL-MCNC: 25 MG/DL (ref 8–23)
CALCIUM SERPL-MCNC: 10.1 MG/DL (ref 8.7–10.5)
CHLORIDE SERPL-SCNC: 108 MMOL/L (ref 95–110)
CHOLEST SERPL-MCNC: 176 MG/DL (ref 120–199)
CHOLEST/HDLC SERPL: 4 {RATIO} (ref 2–5)
CO2 SERPL-SCNC: 25 MMOL/L (ref 23–29)
CREAT SERPL-MCNC: 1.4 MG/DL (ref 0.5–1.4)
EAG (OHS): 166 MG/DL (ref 68–131)
ERYTHROCYTE [DISTWIDTH] IN BLOOD BY AUTOMATED COUNT: 14.6 % (ref 11.5–14.5)
GFR SERPLBLD CREATININE-BSD FMLA CKD-EPI: 49 ML/MIN/1.73/M2
GLUCOSE SERPL-MCNC: 159 MG/DL (ref 70–110)
HBA1C MFR BLD: 7.4 % (ref 4–5.6)
HCT VFR BLD AUTO: 48.4 % (ref 40–54)
HDLC SERPL-MCNC: 44 MG/DL (ref 40–75)
HDLC SERPL: 25 % (ref 20–50)
HGB BLD-MCNC: 15.4 GM/DL (ref 14–18)
IMM GRANULOCYTES # BLD AUTO: 0.02 K/UL (ref 0–0.04)
IMM GRANULOCYTES NFR BLD AUTO: 0.4 % (ref 0–0.5)
IRON SATN MFR SERPL: 24 % (ref 20–50)
IRON SERPL-MCNC: 71 UG/DL (ref 45–160)
LDLC SERPL CALC-MCNC: 112 MG/DL (ref 63–159)
LEFT EYE DM RETINOPATHY: NEGATIVE
LYMPHOCYTES # BLD AUTO: 1.52 K/UL (ref 1–4.8)
MCH RBC QN AUTO: 31 PG (ref 27–31)
MCHC RBC AUTO-ENTMCNC: 31.8 G/DL (ref 32–36)
MCV RBC AUTO: 97 FL (ref 82–98)
NONHDLC SERPL-MCNC: 132 MG/DL
NUCLEATED RBC (/100WBC) (OHS): 0 /100 WBC
PLATELET # BLD AUTO: 168 K/UL (ref 150–450)
PMV BLD AUTO: 11.3 FL (ref 9.2–12.9)
POTASSIUM SERPL-SCNC: 5.1 MMOL/L (ref 3.5–5.1)
PROT SERPL-MCNC: 7.4 GM/DL (ref 6–8.4)
RBC # BLD AUTO: 4.97 M/UL (ref 4.6–6.2)
RELATIVE EOSINOPHIL (OHS): 2.2 %
RELATIVE LYMPHOCYTE (OHS): 27.3 % (ref 18–48)
RELATIVE MONOCYTE (OHS): 9.5 % (ref 4–15)
RELATIVE NEUTROPHIL (OHS): 60.2 % (ref 38–73)
RIGHT EYE DM RETINOPATHY: NEGATIVE
SODIUM SERPL-SCNC: 144 MMOL/L (ref 136–145)
TIBC SERPL-MCNC: 295 UG/DL (ref 250–450)
TRANSFERRIN SERPL-MCNC: 199 MG/DL (ref 200–375)
TRIGL SERPL-MCNC: 100 MG/DL (ref 30–150)
TSH SERPL-ACNC: 3.2 UIU/ML (ref 0.4–4)
URATE SERPL-MCNC: 4.7 MG/DL (ref 3.4–7)
WBC # BLD AUTO: 5.56 K/UL (ref 3.9–12.7)

## 2025-04-29 PROCEDURE — 84443 ASSAY THYROID STIM HORMONE: CPT

## 2025-04-29 PROCEDURE — 84550 ASSAY OF BLOOD/URIC ACID: CPT

## 2025-04-29 PROCEDURE — 80061 LIPID PANEL: CPT

## 2025-04-29 PROCEDURE — 82306 VITAMIN D 25 HYDROXY: CPT

## 2025-04-29 PROCEDURE — 36415 COLL VENOUS BLD VENIPUNCTURE: CPT | Mod: PO

## 2025-04-29 PROCEDURE — 83036 HEMOGLOBIN GLYCOSYLATED A1C: CPT

## 2025-04-29 PROCEDURE — 84155 ASSAY OF PROTEIN SERUM: CPT

## 2025-04-29 PROCEDURE — 85025 COMPLETE CBC W/AUTO DIFF WBC: CPT

## 2025-04-29 PROCEDURE — 84466 ASSAY OF TRANSFERRIN: CPT

## 2025-04-30 ENCOUNTER — RESULTS FOLLOW-UP (OUTPATIENT)
Dept: FAMILY MEDICINE | Facility: CLINIC | Age: 85
End: 2025-04-30

## 2025-04-30 DIAGNOSIS — N18.30 TYPE 2 DIABETES MELLITUS WITH STAGE 3 CHRONIC KIDNEY DISEASE, WITHOUT LONG-TERM CURRENT USE OF INSULIN, UNSPECIFIED WHETHER STAGE 3A OR 3B CKD: ICD-10-CM

## 2025-04-30 DIAGNOSIS — E11.22 TYPE 2 DIABETES MELLITUS WITH STAGE 3 CHRONIC KIDNEY DISEASE, WITHOUT LONG-TERM CURRENT USE OF INSULIN, UNSPECIFIED WHETHER STAGE 3A OR 3B CKD: ICD-10-CM

## 2025-04-30 RX ORDER — PIOGLITAZONE 45 MG/1
45 TABLET ORAL DAILY
Qty: 90 TABLET | Refills: 1 | Status: SHIPPED | OUTPATIENT
Start: 2025-04-30

## 2025-04-30 NOTE — PROGRESS NOTES
I have reviewed the labs and am recommending the following:  A1C ABNORMAL-The A1c is abnormal.  CHANGE the DIABETES medicines to include ACTOS 45 MG A DAY INSTEAD OF 30 MG A DAY and we need to send medications in for this change.  We will need to send a 90 day prescription for all of the diabetes medicines if not done recently with 0 refills to the pharmacy of choice.  We need to order an a1c to be done 3 months from now.    CBC NORMAL-The CBC appears to be stable at this time with no sign of major anemia, abnormal white count or platelet abnormality.  CMP/BMP NORMAL-The electrolytes all appear stable at this time.  This includes kidney functions along with routine electrolytes like sugar, potassium and sodium.  If it was a CMP test, the liver enzymes were noted to be stable also.  LIPID NORMAL SCREEN-The cholesterol panel screening showed levels that are considered at target at this time.  Recheck each year.   The Vitamin D and the iron are acceptable for you and I do not want to change any medicines at this time on that.     Health maintenance items that remain on your list that need to be arranged are listed below.   Please notify me if you are prepared to get them completed.    RSV Vaccine (Age 60+ and Pregnant patients)(1 - 1-dose 75+ series) Never done  Shingles Vaccine(3 of 3) due on 01/07/2022  COVID-19 Vaccine(5 - 2024-25 season) due on 09/01/2024  Diabetic Eye Exam due on 09/06/2024    Dr. Mohinder Morfin

## 2025-05-15 ENCOUNTER — PATIENT OUTREACH (OUTPATIENT)
Dept: ADMINISTRATIVE | Facility: HOSPITAL | Age: 85
End: 2025-05-15
Payer: MEDICARE

## 2025-06-20 ENCOUNTER — TELEPHONE (OUTPATIENT)
Dept: FAMILY MEDICINE | Facility: CLINIC | Age: 85
End: 2025-06-20
Payer: MEDICARE

## 2025-06-20 NOTE — TELEPHONE ENCOUNTER
Copied from CRM #[id number]. Topic: Appointments - Appointment Access  >> Jun 20, 2025  1:25 PM Woodrow wrote:  .Type:  Sooner Apoointment Request    Caller is requesting a sooner appointment.  Caller declined first available appointment listed below.  Caller will not accept being placed on the waitlist and is requesting a message be sent to doctor.  Name of Caller: prasanna  When is the first available appointment?  Symptoms:fever, chills, weak  Would the patient rather a call back or a response via [facility]? Call back  Best Call Back Number:[phone]  Additional Information: requesting to be seen today due to symptoms.

## 2025-07-31 ENCOUNTER — LAB VISIT (OUTPATIENT)
Dept: LAB | Facility: HOSPITAL | Age: 85
End: 2025-07-31
Attending: FAMILY MEDICINE
Payer: MEDICARE

## 2025-07-31 DIAGNOSIS — N18.30 TYPE 2 DIABETES MELLITUS WITH STAGE 3 CHRONIC KIDNEY DISEASE, WITHOUT LONG-TERM CURRENT USE OF INSULIN, UNSPECIFIED WHETHER STAGE 3A OR 3B CKD: ICD-10-CM

## 2025-07-31 DIAGNOSIS — E11.22 TYPE 2 DIABETES MELLITUS WITH STAGE 3 CHRONIC KIDNEY DISEASE, WITHOUT LONG-TERM CURRENT USE OF INSULIN, UNSPECIFIED WHETHER STAGE 3A OR 3B CKD: ICD-10-CM

## 2025-07-31 LAB
EAG (OHS): 186 MG/DL (ref 68–131)
HBA1C MFR BLD: 8.1 % (ref 4–5.6)

## 2025-07-31 PROCEDURE — 36415 COLL VENOUS BLD VENIPUNCTURE: CPT | Mod: PO

## 2025-07-31 PROCEDURE — 83036 HEMOGLOBIN GLYCOSYLATED A1C: CPT
